# Patient Record
Sex: MALE | Race: WHITE | NOT HISPANIC OR LATINO
[De-identification: names, ages, dates, MRNs, and addresses within clinical notes are randomized per-mention and may not be internally consistent; named-entity substitution may affect disease eponyms.]

---

## 2022-04-27 ENCOUNTER — NON-APPOINTMENT (OUTPATIENT)
Age: 77
End: 2022-04-27

## 2022-06-24 ENCOUNTER — OUTPATIENT (OUTPATIENT)
Dept: OUTPATIENT SERVICES | Facility: HOSPITAL | Age: 77
LOS: 1 days | End: 2022-06-24
Payer: COMMERCIAL

## 2022-06-24 VITALS
RESPIRATION RATE: 20 BRPM | TEMPERATURE: 98 F | SYSTOLIC BLOOD PRESSURE: 130 MMHG | HEIGHT: 72 IN | HEART RATE: 61 BPM | DIASTOLIC BLOOD PRESSURE: 80 MMHG | WEIGHT: 308.65 LBS | OXYGEN SATURATION: 96 %

## 2022-06-24 DIAGNOSIS — Z98.890 OTHER SPECIFIED POSTPROCEDURAL STATES: Chronic | ICD-10-CM

## 2022-06-24 DIAGNOSIS — I48.0 PAROXYSMAL ATRIAL FIBRILLATION: ICD-10-CM

## 2022-06-24 DIAGNOSIS — Z29.9 ENCOUNTER FOR PROPHYLACTIC MEASURES, UNSPECIFIED: ICD-10-CM

## 2022-06-24 DIAGNOSIS — Z01.818 ENCOUNTER FOR OTHER PREPROCEDURAL EXAMINATION: ICD-10-CM

## 2022-06-24 DIAGNOSIS — D49.4 NEOPLASM OF UNSPECIFIED BEHAVIOR OF BLADDER: Chronic | ICD-10-CM

## 2022-06-24 LAB
ALBUMIN SERPL ELPH-MCNC: 4 G/DL — SIGNIFICANT CHANGE UP (ref 3.3–5.2)
ALP SERPL-CCNC: 57 U/L — SIGNIFICANT CHANGE UP (ref 40–120)
ALT FLD-CCNC: 19 U/L — SIGNIFICANT CHANGE UP
ANION GAP SERPL CALC-SCNC: 11 MMOL/L — SIGNIFICANT CHANGE UP (ref 5–17)
APTT BLD: 33.6 SEC — SIGNIFICANT CHANGE UP (ref 27.5–35.5)
AST SERPL-CCNC: 16 U/L — SIGNIFICANT CHANGE UP
BASOPHILS # BLD AUTO: 0.04 K/UL — SIGNIFICANT CHANGE UP (ref 0–0.2)
BASOPHILS NFR BLD AUTO: 0.5 % — SIGNIFICANT CHANGE UP (ref 0–2)
BILIRUB SERPL-MCNC: 0.9 MG/DL — SIGNIFICANT CHANGE UP (ref 0.4–2)
BLD GP AB SCN SERPL QL: SIGNIFICANT CHANGE UP
BUN SERPL-MCNC: 15.2 MG/DL — SIGNIFICANT CHANGE UP (ref 8–20)
CALCIUM SERPL-MCNC: 8.7 MG/DL — SIGNIFICANT CHANGE UP (ref 8.6–10.2)
CHLORIDE SERPL-SCNC: 104 MMOL/L — SIGNIFICANT CHANGE UP (ref 98–107)
CO2 SERPL-SCNC: 23 MMOL/L — SIGNIFICANT CHANGE UP (ref 22–29)
CREAT SERPL-MCNC: 1.05 MG/DL — SIGNIFICANT CHANGE UP (ref 0.5–1.3)
EGFR: 74 ML/MIN/1.73M2 — SIGNIFICANT CHANGE UP
EOSINOPHIL # BLD AUTO: 0.08 K/UL — SIGNIFICANT CHANGE UP (ref 0–0.5)
EOSINOPHIL NFR BLD AUTO: 1 % — SIGNIFICANT CHANGE UP (ref 0–6)
GLUCOSE SERPL-MCNC: 145 MG/DL — HIGH (ref 70–99)
HCT VFR BLD CALC: 38.2 % — LOW (ref 39–50)
HGB BLD-MCNC: 12.3 G/DL — LOW (ref 13–17)
IMM GRANULOCYTES NFR BLD AUTO: 0.4 % — SIGNIFICANT CHANGE UP (ref 0–1.5)
INR BLD: 2.08 RATIO — HIGH (ref 0.88–1.16)
LYMPHOCYTES # BLD AUTO: 0.71 K/UL — LOW (ref 1–3.3)
LYMPHOCYTES # BLD AUTO: 8.9 % — LOW (ref 13–44)
MAGNESIUM SERPL-MCNC: 1.4 MG/DL — LOW (ref 1.6–2.6)
MCHC RBC-ENTMCNC: 27.2 PG — SIGNIFICANT CHANGE UP (ref 27–34)
MCHC RBC-ENTMCNC: 32.2 GM/DL — SIGNIFICANT CHANGE UP (ref 32–36)
MCV RBC AUTO: 84.3 FL — SIGNIFICANT CHANGE UP (ref 80–100)
MONOCYTES # BLD AUTO: 0.86 K/UL — SIGNIFICANT CHANGE UP (ref 0–0.9)
MONOCYTES NFR BLD AUTO: 10.8 % — SIGNIFICANT CHANGE UP (ref 2–14)
NEUTROPHILS # BLD AUTO: 6.25 K/UL — SIGNIFICANT CHANGE UP (ref 1.8–7.4)
NEUTROPHILS NFR BLD AUTO: 78.4 % — HIGH (ref 43–77)
PLATELET # BLD AUTO: 186 K/UL — SIGNIFICANT CHANGE UP (ref 150–400)
POTASSIUM SERPL-MCNC: 4.5 MMOL/L — SIGNIFICANT CHANGE UP (ref 3.5–5.3)
POTASSIUM SERPL-SCNC: 4.5 MMOL/L — SIGNIFICANT CHANGE UP (ref 3.5–5.3)
PROT SERPL-MCNC: 6.6 G/DL — SIGNIFICANT CHANGE UP (ref 6.6–8.7)
PROTHROM AB SERPL-ACNC: 24.3 SEC — HIGH (ref 10.5–13.4)
RBC # BLD: 4.53 M/UL — SIGNIFICANT CHANGE UP (ref 4.2–5.8)
RBC # FLD: 15 % — HIGH (ref 10.3–14.5)
SODIUM SERPL-SCNC: 138 MMOL/L — SIGNIFICANT CHANGE UP (ref 135–145)
T3 SERPL-MCNC: 59 NG/DL — LOW (ref 80–200)
T4 AB SER-ACNC: 6.4 UG/DL — SIGNIFICANT CHANGE UP (ref 4.5–12)
TSH SERPL-MCNC: 2.96 UIU/ML — SIGNIFICANT CHANGE UP (ref 0.27–4.2)
WBC # BLD: 7.97 K/UL — SIGNIFICANT CHANGE UP (ref 3.8–10.5)
WBC # FLD AUTO: 7.97 K/UL — SIGNIFICANT CHANGE UP (ref 3.8–10.5)

## 2022-06-24 PROCEDURE — G0463: CPT

## 2022-06-24 PROCEDURE — 86901 BLOOD TYPING SEROLOGIC RH(D): CPT

## 2022-06-24 PROCEDURE — 93005 ELECTROCARDIOGRAM TRACING: CPT

## 2022-06-24 PROCEDURE — 80053 COMPREHEN METABOLIC PANEL: CPT

## 2022-06-24 PROCEDURE — 36415 COLL VENOUS BLD VENIPUNCTURE: CPT

## 2022-06-24 PROCEDURE — 84436 ASSAY OF TOTAL THYROXINE: CPT

## 2022-06-24 PROCEDURE — 93010 ELECTROCARDIOGRAM REPORT: CPT

## 2022-06-24 PROCEDURE — 85025 COMPLETE CBC W/AUTO DIFF WBC: CPT

## 2022-06-24 PROCEDURE — 83735 ASSAY OF MAGNESIUM: CPT

## 2022-06-24 PROCEDURE — 84443 ASSAY THYROID STIM HORMONE: CPT

## 2022-06-24 PROCEDURE — 86850 RBC ANTIBODY SCREEN: CPT

## 2022-06-24 PROCEDURE — 85610 PROTHROMBIN TIME: CPT

## 2022-06-24 PROCEDURE — 85730 THROMBOPLASTIN TIME PARTIAL: CPT

## 2022-06-24 PROCEDURE — 84480 ASSAY TRIIODOTHYRONINE (T3): CPT

## 2022-06-24 PROCEDURE — 86900 BLOOD TYPING SEROLOGIC ABO: CPT

## 2022-06-24 NOTE — H&P PST ADULT - HISTORY OF PRESENT ILLNESS
77 yo M PMH of HTN, HLD, DM2, hypothyroidism, obesity, bladder cancer s/p resection now self catheterizes, presents with c/o palpitations. Patient reports he was in his usual state of health until about 2 months ago when he started to experience some chest discomfort. He was feeling fatigued but denied dyspnea on exertion, near syncope or syncope, nausea, vomiting, diarrhea, fever, chills, or sweats. He presented to his cardiologist, was found to be in tachycardia and was referred to the ER at [facility] on 4/22/2022 where he was found to be in 2:1 atrial flutter. He was planned for ablation but aflutter degenerated into afib and then he spontaneously converted to sinus rhythm. He continued to go in and out of arrhythmia, has been treated with rate control and anticoagulation (Eliquis 5 mg bid). Since discharge he developed hematuria and had to hold eliquis for 2 days. He c/o chest discomfort and had a cardiac cath on 5/2/2022 which revealed no evidence of obstructive disease. He continues to c/o intermittent fatigue and some BLEDSOE. Patient now presents in anticipation of elective radiofrequency ablation of atrial fibrillation w/Dr Burdick scheduled for 7/13/2022    Cardiac Summary:   ECG: Typical atrial flutter with variable conduction.   12/27/21 Regadenoson NST: LVEF: 68%. Normal perfusion imaging.   7/14/21 TTE: LVEF: 60-65%. LAd 4.6 cm. RA/RV normal. Mild MR.  4/28/2022 TTE: LVEF 61%. LV thickness is mildly decreased. Grade 2 diastolic dysfunction. Interventricular septal thickness is mildly increased. RV systolic pressure elevated at 40-50 mmHg. Moderately dilated LA (LAd 5.3 cm; MARQUISE 422.6 ml/m2). Moderately dilated RA. Mild MR with eccentric jet. Mild TR.  Cath 5/2/2022: no obstructive coronary disease   77 yo M PMH of HTN, HLD, DM2, hypothyroidism, obesity, bladder cancer s/p resection now self catheterizes, presents with c/o palpitations. Patient reports he was in his usual state of health until about 2 months ago when he started to experience some chest discomfort. He was feeling fatigued but denied dyspnea on exertion, near syncope or syncope, nausea, vomiting, diarrhea, fever, chills, or sweats. He presented to his cardiologist, was found to be in tachycardia and was sent to the ER at [facility] on 4/22/2022 where he was found to be in 2:1 atrial flutter. He was planned for ablation but aflutter degenerated into afib and then he spontaneously converted to sinus rhythm. He continued to go in and out of arrhythmia, has been treated with rate control and anticoagulation (Eliquis 5 mg bid). Since discharge he developed hematuria and had to hold eliquis for 2 days. He c/o chest discomfort and had a cardiac cath on 5/2/2022 which revealed no evidence of obstructive disease. He continues to c/o intermittent fatigue and some BLEDSOE. Patient now presents in anticipation of elective radiofrequency ablation of atrial fibrillation w/Dr Burdick scheduled for 7/13/2022    Cardiac Summary:   ECG: Typical atrial flutter with variable conduction.   12/27/21 Regadenoson NST: LVEF: 68%. Normal perfusion imaging.   7/14/21 TTE: LVEF: 60-65%. LAd 4.6 cm. RA/RV normal. Mild MR.  4/28/2022 TTE: LVEF 61%. LV thickness is mildly decreased. Grade 2 diastolic dysfunction. Interventricular septal thickness is mildly increased. RV systolic pressure elevated at 40-50 mmHg. Moderately dilated LA (LAd 5.3 cm; MARQUISE 422.6 ml/m2). Moderately dilated RA. Mild MR with eccentric jet. Mild TR.  Cath 5/2/2022: no obstructive coronary disease

## 2022-06-24 NOTE — H&P PST ADULT - NSANTHOSAYNRD_GEN_A_CORE
sleep study scheduled/No. LILLY screening performed.  STOP BANG Legend: 0-2 = LOW Risk; 3-4 = INTERMEDIATE Risk; 5-8 = HIGH Risk

## 2022-06-24 NOTE — H&P PST ADULT - ATTENDING COMMENTS
All risks, benefits and alternatives discussed with patient. He agrees to proceed.    Fernando Burdick MD  Clinical Cardiac Electrophysiology

## 2022-06-24 NOTE — H&P PST ADULT - NEGATIVE ENMT SYMPTOMS
no hearing difficulty/no tinnitus/no vertigo/no sinus symptoms/no nose bleeds/no gum bleeding/no dysphagia

## 2022-06-24 NOTE — H&P PST ADULT - GASTROINTESTINAL
obese/soft/nontender/nondistended/normal active bowel sounds/no guarding/no masses palpable details…

## 2022-06-24 NOTE — H&P PST ADULT - NSICDXPASTMEDICALHX_GEN_ALL_CORE_FT
PAST MEDICAL HISTORY:  Bladder cancer     Diabetes mellitus     Hyperlipidemia     Hypertension     Hypothyroidism     Paroxysmal atrial fibrillation      PAST MEDICAL HISTORY:  At risk for sleep apnea     Bladder cancer     Diabetes mellitus     Hyperlipidemia     Hypertension     Hypothyroidism     Paroxysmal atrial fibrillation

## 2022-06-24 NOTE — H&P PST ADULT - CARDIOVASCULAR
details… normal/regular rate and rhythm/S1 S2 present/no gallops/no rub/no murmur/no JVD/pedal edema

## 2022-06-24 NOTE — H&P PST ADULT - NEUROLOGICAL
details… normal/cranial nerves II-XII intact/sensation intact/responds to verbal commands/cranial nerves intact/no spontaneous movement

## 2022-06-24 NOTE — H&P PST ADULT - NSICDXFAMILYHX_GEN_ALL_CORE_FT
FAMILY HISTORY:  Father  Still living? Unknown  FH: heart disease, Age at diagnosis: Age Unknown    Mother  Still living? Unknown  FHx: ovarian cancer, Age at diagnosis: Age Unknown    Sibling  Still living? Unknown  FH: breast cancer, Age at diagnosis: Age Unknown

## 2022-06-24 NOTE — H&P PST ADULT - RESPIRATORY
normal/clear to auscultation bilaterally/no wheezes/no rales/no rhonchi/breath sounds equal/respirations non-labored

## 2022-06-24 NOTE — H&P PST ADULT - ASSESSMENT
75 yo M PMH of HTN, HLD, DM2, hypothyroidism, obesity, bladder cancer s/p resection now self catheterizes, presents with c/o palpitations. Patient reports he was in his usual state of health until about 2 months ago when he started to experience some chest discomfort. He was feeling fatigued but denied dyspnea on exertion, near syncope or syncope, nausea, vomiting, diarrhea, fever, chills, or sweats. He presented to his cardiologist, was found to be in tachycardia and was referred to the ER at [facility] on 2022 where he was found to be in 2:1 atrial flutter. He was planned for ablation but aflutter degenerated into afib and then he spontaneously converted to sinus rhythm. He continued to go in and out of arrhythmia, has been treated with rate control and anticoagulation (Eliquis 5 mg bid). Since discharge he developed hematuria and had to hold eliquis for 2 days. He c/o chest discomfort and had a cardiac cath on 2022 which revealed no evidence of obstructive disease. He continues to c/o intermittent fatigue and some BLEDSOE. Patient now presents in anticipation of elective radiofrequency ablation of atrial fibrillation w/Dr Burdick scheduled for 2022    Plan:   Labs pending.   Pre-procedure instructions provided (verbal & written) as follows:  Ablation 2022  - Last dose Eliquis 2022 PM (NO a/c day of ablation).    - NPO after midnight prior except meds w/sips of water.    - Hold the following medications the morning of the procedure: vinicio    Pt was educated on preop preparation with written and verbal instructions. Pt was educated on NSAIDs, multivitamins and herbals that increase the risk of bleeding and need to be stopped 7 days before procedure. Pt was educated on covid testing and covid prevention, i.e. social distancing, handwashing, mask wearing. Pt verbalized understanding of the above.     OPIOID RISK TOOL    ALICIA EACH BOX THAT APPLIES AND ADD TOTALS AT THE END    FAMILY HISTORY OF SUBSTANCE ABUSE                 FEMALE         MALE                                                Alcohol                             [  ]1 pt          [  ]3pts                                               Illegal Durgs                     [  ]2 pts        [  ]3pts                                               Rx Drugs                           [  ]4 pts        [  ]4 pts    PERSONAL HISTORY OF SUBSTANCE ABUSE                                                                                          Alcohol                             [  ]3 pts       [  ]3 pts                                               Illegal Drugs                     [  ]4 pts        [  ]4 pts                                               Rx Drugs                           [  ]5 pts        [  ]5 pts    AGE BETWEEN 16-45 YEARS                                      [  ]1 pt         [  ]1 pt    HISTORY OF PREADOLESCENT   SEXUAL ABUSE                                                             [  ]3 pts        [  ]0pts    PSYCHOLOGICAL DISEASE                     ADD, OCD, Bipolar, Schizophrenia        [  ]2 pts         [  ]2 pts                      Depression                                               [  ]1 pt           [  ]1 pt           SCORING TOTAL   (add numbers and type here)              ( 0 )                                     A score of 3 or lower indicated LOW risk for future opioid abuse  A score of 4 to 7 indicated moderate risk for future opioid abuse  A score of 8 or higher indicates a high risk for opioid abuse    CAPRINI VTE 2.0 SCORE [CLOT updated 2019]    AGE RELATED RISK FACTORS                                                       MOBILITY RELATED FACTORS  [ ] Age 41-60 years                                            (1 Point)                    [ ] Bed rest                                                        (1 Point)  [ ] Age: 61-74 years                                           (2 Points)                  [ ] Plaster cast                                                   (2 Points)  [x ] Age= 75 years                                              (3 Points)                    [ ] Bed bound for more than 72 hours                 (2 Points)    DISEASE RELATED RISK FACTORS                                               GENDER SPECIFIC FACTORS  [ ] Edema in the lower extremities                       (1 Point)              [ ] Pregnancy                                                     (1 Point)  [ ] Varicose veins                                               (1 Point)                     [ ] Post-partum < 6 weeks                                   (1 Point)             [x ] BMI > 25 Kg/m2                                            (1 Point)                     [ ] Hormonal therapy  or oral contraception          (1 Point)                 [ ] Sepsis (in the previous month)                        (1 Point)               [ ] History of pregnancy complications                 (1 point)  [ ] Pneumonia or serious lung disease                                               [ ] Unexplained or recurrent                     (1 Point)           (in the previous month)                               (1 Point)  [ ] Abnormal pulmonary function test                     (1 Point)                 SURGERY RELATED RISK FACTORS  [ ] Acute myocardial infarction                              (1 Point)               [ ]  Section                                             (1 Point)  [ ] Congestive heart failure (in the previous month)  (1 Point)      [ ] Minor surgery                                                  (1 Point)   [ ] Inflammatory bowel disease                             (1 Point)               [ ] Arthroscopic surgery                                        (2 Points)  [ ] Central venous access                                      (2 Points)                [ x] General surgery lasting more than 45 minutes (2 points)  [ x] Malignancy- Present or previous                   (2 Points)                [ ] Elective arthroplasty                                         (5 points)    [ ] Stroke (in the previous month)                          (5 Points)                                                                                                                                                           HEMATOLOGY RELATED FACTORS                                                 TRAUMA RELATED RISK FACTORS  [ ] Prior episodes of VTE                                     (3 Points)                [ ] Fracture of the hip, pelvis, or leg                       (5 Points)  [ ] Positive family history for VTE                         (3 Points)             [ ] Acute spinal cord injury (in the previous month)  (5 Points)  [ ] Prothrombin 99505 A                                     (3 Points)               [ ] Paralysis  (less than 1 month)                             (5 Points)  [ ] Factor V Leiden                                             (3 Points)                  [ ] Multiple Trauma within 1 month                        (5 Points)  [ ] Lupus anticoagulants                                     (3 Points)                                                           [ ] Anticardiolipin antibodies                               (3 Points)                                                       [ ] High homocysteine in the blood                      (3 Points)                                             [ ] Other congenital or acquired thrombophilia      (3 Points)                                                [ ] Heparin induced thrombocytopenia                  (3 Points)                                     Total Score [     8     ] 75 yo M PMH of HTN, HLD, DM2, hypothyroidism, obesity, bladder cancer s/p resection now self catheterizes, presents with c/o palpitations. Patient reports he was in his usual state of health until about 2 months ago when he started to experience some chest discomfort. He was feeling fatigued but denied dyspnea on exertion, near syncope or syncope, nausea, vomiting, diarrhea, fever, chills, or sweats. He presented to his cardiologist, was found to be in tachycardia and was sent to the ER at [facility] on 2022 where he was found to be in 2:1 atrial flutter. He was planned for ablation but aflutter degenerated into afib and then he spontaneously converted to sinus rhythm. He continued to go in and out of arrhythmia, has been treated with rate control and anticoagulation (Eliquis 5 mg bid). Since discharge he developed hematuria and had to hold eliquis for 2 days. He c/o chest discomfort and had a cardiac cath on 2022 which revealed no evidence of obstructive disease. He continues to c/o intermittent fatigue and some BLEDSOE. Patient now presents in anticipation of elective radiofrequency ablation of atrial fibrillation w/Dr Burdick scheduled for 2022    Plan:   Labs pending.   Pre-procedure instructions provided (verbal & written) as follows:  Ablation 2022  - Last dose Eliquis 2022 PM (NO a/c day of ablation).    - NPO after midnight prior except meds w/sips of water.    - Hold the following medications the morning of the procedure: hold janumet 24 hrs prior to procedure    Pt was educated on preop preparation with written and verbal instructions. Pt was educated on NSAIDs, multivitamins and herbals that increase the risk of bleeding and need to be stopped 7 days before procedure. Pt was educated on covid testing and covid prevention, i.e. social distancing, handwashing, mask wearing. Pt verbalized understanding of the above.     OPIOID RISK TOOL    ALICIA EACH BOX THAT APPLIES AND ADD TOTALS AT THE END    FAMILY HISTORY OF SUBSTANCE ABUSE                 FEMALE         MALE                                                Alcohol                             [  ]1 pt          [  ]3pts                                               Illegal Durgs                     [  ]2 pts        [  ]3pts                                               Rx Drugs                           [  ]4 pts        [  ]4 pts    PERSONAL HISTORY OF SUBSTANCE ABUSE                                                                                          Alcohol                             [  ]3 pts       [  ]3 pts                                               Illegal Drugs                     [  ]4 pts        [  ]4 pts                                               Rx Drugs                           [  ]5 pts        [  ]5 pts    AGE BETWEEN 16-45 YEARS                                      [  ]1 pt         [  ]1 pt    HISTORY OF PREADOLESCENT   SEXUAL ABUSE                                                             [  ]3 pts        [  ]0pts    PSYCHOLOGICAL DISEASE                     ADD, OCD, Bipolar, Schizophrenia        [  ]2 pts         [  ]2 pts                      Depression                                               [  ]1 pt           [  ]1 pt           SCORING TOTAL   (add numbers and type here)              ( 0 )                                     A score of 3 or lower indicated LOW risk for future opioid abuse  A score of 4 to 7 indicated moderate risk for future opioid abuse  A score of 8 or higher indicates a high risk for opioid abuse    CAPRINI VTE 2.0 SCORE [CLOT updated 2019]    AGE RELATED RISK FACTORS                                                       MOBILITY RELATED FACTORS  [ ] Age 41-60 years                                            (1 Point)                    [ ] Bed rest                                                        (1 Point)  [ ] Age: 61-74 years                                           (2 Points)                  [ ] Plaster cast                                                   (2 Points)  [x ] Age= 75 years                                              (3 Points)                    [ ] Bed bound for more than 72 hours                 (2 Points)    DISEASE RELATED RISK FACTORS                                               GENDER SPECIFIC FACTORS  [ ] Edema in the lower extremities                       (1 Point)              [ ] Pregnancy                                                     (1 Point)  [ ] Varicose veins                                               (1 Point)                     [ ] Post-partum < 6 weeks                                   (1 Point)             [x ] BMI > 25 Kg/m2                                            (1 Point)                     [ ] Hormonal therapy  or oral contraception          (1 Point)                 [ ] Sepsis (in the previous month)                        (1 Point)               [ ] History of pregnancy complications                 (1 point)  [ ] Pneumonia or serious lung disease                                               [ ] Unexplained or recurrent                     (1 Point)           (in the previous month)                               (1 Point)  [ ] Abnormal pulmonary function test                     (1 Point)                 SURGERY RELATED RISK FACTORS  [ ] Acute myocardial infarction                              (1 Point)               [ ]  Section                                             (1 Point)  [ ] Congestive heart failure (in the previous month)  (1 Point)      [ ] Minor surgery                                                  (1 Point)   [ ] Inflammatory bowel disease                             (1 Point)               [ ] Arthroscopic surgery                                        (2 Points)  [ ] Central venous access                                      (2 Points)                [ x] General surgery lasting more than 45 minutes (2 points)  [ x] Malignancy- Present or previous                   (2 Points)                [ ] Elective arthroplasty                                         (5 points)    [ ] Stroke (in the previous month)                          (5 Points)                                                                                                                                                           HEMATOLOGY RELATED FACTORS                                                 TRAUMA RELATED RISK FACTORS  [ ] Prior episodes of VTE                                     (3 Points)                [ ] Fracture of the hip, pelvis, or leg                       (5 Points)  [ ] Positive family history for VTE                         (3 Points)             [ ] Acute spinal cord injury (in the previous month)  (5 Points)  [ ] Prothrombin 27496 A                                     (3 Points)               [ ] Paralysis  (less than 1 month)                             (5 Points)  [ ] Factor V Leiden                                             (3 Points)                  [ ] Multiple Trauma within 1 month                        (5 Points)  [ ] Lupus anticoagulants                                     (3 Points)                                                           [ ] Anticardiolipin antibodies                               (3 Points)                                                       [ ] High homocysteine in the blood                      (3 Points)                                             [ ] Other congenital or acquired thrombophilia      (3 Points)                                                [ ] Heparin induced thrombocytopenia                  (3 Points)                                     Total Score [     8     ]

## 2022-06-24 NOTE — H&P PST ADULT - NSICDXPASTSURGICALHX_GEN_ALL_CORE_FT
PAST SURGICAL HISTORY:  Bladder tumor s/p resection    H/O cardiac catheterization 5/2/2022    H/O shoulder surgery 1/1/2017

## 2022-06-30 ENCOUNTER — OUTPATIENT (OUTPATIENT)
Dept: OUTPATIENT SERVICES | Facility: HOSPITAL | Age: 77
LOS: 1 days | End: 2022-06-30
Payer: COMMERCIAL

## 2022-06-30 ENCOUNTER — RESULT REVIEW (OUTPATIENT)
Age: 77
End: 2022-06-30

## 2022-06-30 ENCOUNTER — APPOINTMENT (OUTPATIENT)
Dept: CT IMAGING | Facility: CLINIC | Age: 77
End: 2022-06-30

## 2022-06-30 DIAGNOSIS — D49.4 NEOPLASM OF UNSPECIFIED BEHAVIOR OF BLADDER: Chronic | ICD-10-CM

## 2022-06-30 DIAGNOSIS — Z98.890 OTHER SPECIFIED POSTPROCEDURAL STATES: Chronic | ICD-10-CM

## 2022-06-30 DIAGNOSIS — I48.0 PAROXYSMAL ATRIAL FIBRILLATION: ICD-10-CM

## 2022-06-30 PROCEDURE — 75572 CT HRT W/3D IMAGE: CPT | Mod: 26

## 2022-06-30 PROCEDURE — 75572 CT HRT W/3D IMAGE: CPT

## 2022-07-13 ENCOUNTER — TRANSCRIPTION ENCOUNTER (OUTPATIENT)
Age: 77
End: 2022-07-13

## 2022-07-13 ENCOUNTER — INPATIENT (INPATIENT)
Facility: HOSPITAL | Age: 77
LOS: 0 days | Discharge: HOME | DRG: 274 | End: 2022-07-14
Attending: INTERNAL MEDICINE | Admitting: INTERNAL MEDICINE
Payer: COMMERCIAL

## 2022-07-13 VITALS
RESPIRATION RATE: 16 BRPM | TEMPERATURE: 98 F | SYSTOLIC BLOOD PRESSURE: 159 MMHG | HEART RATE: 67 BPM | OXYGEN SATURATION: 95 % | DIASTOLIC BLOOD PRESSURE: 82 MMHG

## 2022-07-13 DIAGNOSIS — D49.4 NEOPLASM OF UNSPECIFIED BEHAVIOR OF BLADDER: Chronic | ICD-10-CM

## 2022-07-13 DIAGNOSIS — Z98.890 OTHER SPECIFIED POSTPROCEDURAL STATES: Chronic | ICD-10-CM

## 2022-07-13 DIAGNOSIS — I48.0 PAROXYSMAL ATRIAL FIBRILLATION: ICD-10-CM

## 2022-07-13 DIAGNOSIS — Z01.818 ENCOUNTER FOR OTHER PREPROCEDURAL EXAMINATION: ICD-10-CM

## 2022-07-13 LAB
ABO RH CONFIRMATION: SIGNIFICANT CHANGE UP
ANION GAP SERPL CALC-SCNC: 11 MMOL/L — SIGNIFICANT CHANGE UP (ref 5–17)
BUN SERPL-MCNC: 12.1 MG/DL — SIGNIFICANT CHANGE UP (ref 8–20)
CALCIUM SERPL-MCNC: 7.8 MG/DL — LOW (ref 8.6–10.2)
CHLORIDE SERPL-SCNC: 105 MMOL/L — SIGNIFICANT CHANGE UP (ref 98–107)
CO2 SERPL-SCNC: 23 MMOL/L — SIGNIFICANT CHANGE UP (ref 22–29)
CREAT SERPL-MCNC: 0.96 MG/DL — SIGNIFICANT CHANGE UP (ref 0.5–1.3)
EGFR: 82 ML/MIN/1.73M2 — SIGNIFICANT CHANGE UP
GLUCOSE BLDC GLUCOMTR-MCNC: 109 MG/DL — HIGH (ref 70–99)
GLUCOSE SERPL-MCNC: 104 MG/DL — HIGH (ref 70–99)
MAGNESIUM SERPL-MCNC: 1.3 MG/DL — LOW (ref 1.8–2.6)
POTASSIUM SERPL-MCNC: 3.9 MMOL/L — SIGNIFICANT CHANGE UP (ref 3.5–5.3)
POTASSIUM SERPL-SCNC: 3.9 MMOL/L — SIGNIFICANT CHANGE UP (ref 3.5–5.3)
SARS-COV-2 N GENE NPH QL NAA+PROBE: NOT DETECTED
SODIUM SERPL-SCNC: 139 MMOL/L — SIGNIFICANT CHANGE UP (ref 135–145)

## 2022-07-13 PROCEDURE — 93623 PRGRMD STIMJ&PACG IV RX NFS: CPT | Mod: 26

## 2022-07-13 PROCEDURE — 93656 COMPRE EP EVAL ABLTJ ATR FIB: CPT

## 2022-07-13 PROCEDURE — 93010 ELECTROCARDIOGRAM REPORT: CPT

## 2022-07-13 PROCEDURE — 93655 ICAR CATH ABLTJ DSCRT ARRHYT: CPT

## 2022-07-13 RX ORDER — SUCRALFATE 1 G
1 TABLET ORAL
Refills: 0 | Status: DISCONTINUED | OUTPATIENT
Start: 2022-07-13 | End: 2022-07-14

## 2022-07-13 RX ORDER — APIXABAN 2.5 MG/1
5 TABLET, FILM COATED ORAL EVERY 12 HOURS
Refills: 0 | Status: DISCONTINUED | OUTPATIENT
Start: 2022-07-13 | End: 2022-07-14

## 2022-07-13 RX ORDER — DILTIAZEM HCL 120 MG
240 CAPSULE, EXT RELEASE 24 HR ORAL DAILY
Refills: 0 | Status: DISCONTINUED | OUTPATIENT
Start: 2022-07-13 | End: 2022-07-14

## 2022-07-13 RX ORDER — PANTOPRAZOLE SODIUM 20 MG/1
40 TABLET, DELAYED RELEASE ORAL
Refills: 0 | Status: DISCONTINUED | OUTPATIENT
Start: 2022-07-13 | End: 2022-07-14

## 2022-07-13 RX ORDER — FUROSEMIDE 40 MG
20 TABLET ORAL ONCE
Refills: 0 | Status: COMPLETED | OUTPATIENT
Start: 2022-07-13 | End: 2022-07-13

## 2022-07-13 RX ORDER — FINASTERIDE 5 MG/1
5 TABLET, FILM COATED ORAL DAILY
Refills: 0 | Status: DISCONTINUED | OUTPATIENT
Start: 2022-07-13 | End: 2022-07-14

## 2022-07-13 RX ORDER — FUROSEMIDE 40 MG
20 TABLET ORAL DAILY
Refills: 0 | Status: DISCONTINUED | OUTPATIENT
Start: 2022-07-14 | End: 2022-07-14

## 2022-07-13 RX ORDER — INSULIN LISPRO 100/ML
VIAL (ML) SUBCUTANEOUS
Refills: 0 | Status: DISCONTINUED | OUTPATIENT
Start: 2022-07-13 | End: 2022-07-14

## 2022-07-13 RX ORDER — ACETAMINOPHEN 500 MG
650 TABLET ORAL EVERY 6 HOURS
Refills: 0 | Status: DISCONTINUED | OUTPATIENT
Start: 2022-07-13 | End: 2022-07-14

## 2022-07-13 RX ORDER — DOXAZOSIN MESYLATE 4 MG
4 TABLET ORAL AT BEDTIME
Refills: 0 | Status: DISCONTINUED | OUTPATIENT
Start: 2022-07-13 | End: 2022-07-14

## 2022-07-13 RX ORDER — DEXTROSE 50 % IN WATER 50 %
25 SYRINGE (ML) INTRAVENOUS ONCE
Refills: 0 | Status: DISCONTINUED | OUTPATIENT
Start: 2022-07-13 | End: 2022-07-14

## 2022-07-13 RX ORDER — DEXTROSE 50 % IN WATER 50 %
12.5 SYRINGE (ML) INTRAVENOUS ONCE
Refills: 0 | Status: DISCONTINUED | OUTPATIENT
Start: 2022-07-13 | End: 2022-07-14

## 2022-07-13 RX ORDER — GLUCAGON INJECTION, SOLUTION 0.5 MG/.1ML
1 INJECTION, SOLUTION SUBCUTANEOUS ONCE
Refills: 0 | Status: DISCONTINUED | OUTPATIENT
Start: 2022-07-13 | End: 2022-07-14

## 2022-07-13 RX ORDER — SODIUM CHLORIDE 9 MG/ML
1000 INJECTION, SOLUTION INTRAVENOUS
Refills: 0 | Status: DISCONTINUED | OUTPATIENT
Start: 2022-07-13 | End: 2022-07-14

## 2022-07-13 RX ORDER — METOPROLOL TARTRATE 50 MG
100 TABLET ORAL DAILY
Refills: 0 | Status: DISCONTINUED | OUTPATIENT
Start: 2022-07-13 | End: 2022-07-14

## 2022-07-13 RX ORDER — OXYCODONE AND ACETAMINOPHEN 5; 325 MG/1; MG/1
1 TABLET ORAL EVERY 6 HOURS
Refills: 0 | Status: DISCONTINUED | OUTPATIENT
Start: 2022-07-13 | End: 2022-07-14

## 2022-07-13 RX ORDER — BENZOCAINE AND MENTHOL 5; 1 G/100ML; G/100ML
1 LIQUID ORAL
Refills: 0 | Status: DISCONTINUED | OUTPATIENT
Start: 2022-07-13 | End: 2022-07-14

## 2022-07-13 RX ORDER — ATORVASTATIN CALCIUM 80 MG/1
40 TABLET, FILM COATED ORAL AT BEDTIME
Refills: 0 | Status: DISCONTINUED | OUTPATIENT
Start: 2022-07-13 | End: 2022-07-14

## 2022-07-13 RX ORDER — DEXTROSE 50 % IN WATER 50 %
15 SYRINGE (ML) INTRAVENOUS ONCE
Refills: 0 | Status: DISCONTINUED | OUTPATIENT
Start: 2022-07-13 | End: 2022-07-14

## 2022-07-13 RX ORDER — LEVOTHYROXINE SODIUM 125 MCG
25 TABLET ORAL DAILY
Refills: 0 | Status: DISCONTINUED | OUTPATIENT
Start: 2022-07-13 | End: 2022-07-14

## 2022-07-13 RX ADMIN — FINASTERIDE 5 MILLIGRAM(S): 5 TABLET, FILM COATED ORAL at 21:37

## 2022-07-13 RX ADMIN — APIXABAN 5 MILLIGRAM(S): 2.5 TABLET, FILM COATED ORAL at 18:16

## 2022-07-13 RX ADMIN — Medication 4 MILLIGRAM(S): at 21:37

## 2022-07-13 RX ADMIN — PANTOPRAZOLE SODIUM 40 MILLIGRAM(S): 20 TABLET, DELAYED RELEASE ORAL at 18:16

## 2022-07-13 RX ADMIN — Medication 20 MILLIGRAM(S): at 16:26

## 2022-07-13 RX ADMIN — Medication 1 GRAM(S): at 18:16

## 2022-07-13 RX ADMIN — ATORVASTATIN CALCIUM 40 MILLIGRAM(S): 80 TABLET, FILM COATED ORAL at 21:38

## 2022-07-13 RX ADMIN — BENZOCAINE AND MENTHOL 1 LOZENGE: 5; 1 LIQUID ORAL at 21:38

## 2022-07-13 NOTE — PROGRESS NOTE ADULT - SUBJECTIVE AND OBJECTIVE BOX
76 year old male with PMH of HTN, hyperlipidemia, DM type 2, hypothyroidism, obesity, bladder cancer s/p resection (now performs self-catheterization), obstructive sleep apnea and symptomatic paroxysmal atrial fibrillation & atrial flutter. He presents today for elective radiofrequency ablation of atrial fibrillation.     PST and labs from 6/24/22 reviewed; denies interval change.   Confirms NPO > 8 hrs, last dose Eliquis 7/12/22 PM      - iv heplock  - confirmatory type and screen  - 2 units PRBC on hold  - repeat BMP, Mg levels  - consent w/MD

## 2022-07-13 NOTE — DISCHARGE NOTE PROVIDER - NSDCMRMEDTOKEN_GEN_ALL_CORE_FT
atorvastatin 40 mg oral tablet: 1 tab(s) orally once a day  Cardizem: 240 milligram(s) orally once a day  doxazosin 4 mg oral tablet: 1 tab(s) orally once a day  doxycycline: 20 milligram(s) orally 2 times a day  Eliquis 5 mg oral tablet: 1 tab(s) orally 2 times a day  finasteride 5 mg oral tablet: 1 tab(s) orally once a day (at bedtime)  Janumet 50 mg-1000 mg oral tablet: 1 tab(s) orally 2 times a day  metoprolol succinate 100 mg oral tablet, extended release: 1 tab(s) orally once a day  Synthroid 25 mcg (0.025 mg) oral tablet: 1 tab(s) orally once a day   atorvastatin 40 mg oral tablet: 1 tab(s) orally once a day  Cardizem: 240 milligram(s) orally once a day  doxazosin 4 mg oral tablet: 1 tab(s) orally once a day  doxycycline: 20 milligram(s) orally 2 times a day  Eliquis 5 mg oral tablet: 1 tab(s) orally 2 times a day  finasteride 5 mg oral tablet: 1 tab(s) orally once a day (at bedtime)  furosemide 20 mg oral tablet: 1 tab(s) orally once a day  Janumet 50 mg-1000 mg oral tablet: 1 tab(s) orally 2 times a day  metoprolol succinate 100 mg oral tablet, extended release: 1 tab(s) orally once a day  pantoprazole 40 mg oral delayed release tablet: 1 tab(s) orally 2 times a day for 14 days then once daily for 6 weeks   sucralfate 1 g/10 mL oral suspension: 10 milliliter(s) orally 2 times a day  Synthroid 25 mcg (0.025 mg) oral tablet: 1 tab(s) orally once a day

## 2022-07-13 NOTE — DISCHARGE NOTE PROVIDER - CARE PROVIDER_API CALL
Fernando Burdick)  Cardiac Electrophysiology; Cardiology  [address]  [place] [address]  Phone: [phone]  Fax: [phone]  Follow Up Time:     Jeovanny Nichole ()  Cardiovascular Disease; Internal Medicine  [address], [address]  [place], [place] [id number]  Phone: [phone]  Fax: [phone]  Follow Up Time:

## 2022-07-13 NOTE — DISCHARGE NOTE PROVIDER - HOSPITAL COURSE
76 year old male with PMH of HTN, hyperlipidemia, DM type 2, hypothyroidism, obesity, bladder cancer s/p resection (now performs self-catheterization), obstructive sleep apnea and symptomatic paroxysmal atrial fibrillation & atrial flutter. Now POD#1 s/p uncomplicated radiofrequency ablation of atrial fibrillation ([facility]/PVI, CTI). The patient was observed overnight without event and was discharged home the following morning with a plan for outpatient follow up.

## 2022-07-13 NOTE — PROGRESS NOTE ADULT - SUBJECTIVE AND OBJECTIVE BOX
PROCEDURE(S): Radiofrequency Ablation of Atrial Fibrillation    ELECTROPHYSIOLOGIST(S): Fernando Burdick MD         COMPLICATIONS:  none        DISPOSITION: observation      CONDITION: stable    Pt doing well s/p elective radiofrequency atrial fibrillation ablation (WACA/PVI, CTI) via b/l femoral vein access.  Pt denies complaint post procedure.     I/O's: 3197cc in / 450 cc out     MEDICATIONS  (STANDING):  apixaban 5 milliGRAM(s) Oral every 12 hours  atorvastatin 40 milliGRAM(s) Oral at bedtime  dextrose 5%. 1000 milliLiter(s) (100 mL/Hr) IV Continuous <Continuous>  dextrose 5%. 1000 milliLiter(s) (50 mL/Hr) IV Continuous <Continuous>  dextrose 50% Injectable 25 Gram(s) IV Push once  dextrose 50% Injectable 12.5 Gram(s) IV Push once  dextrose 50% Injectable 25 Gram(s) IV Push once  diltiazem    milliGRAM(s) Oral daily  doxazosin 4 milliGRAM(s) Oral at bedtime  finasteride 5 milliGRAM(s) Oral daily  furosemide   Injectable 20 milliGRAM(s) IV Push once  glucagon  Injectable 1 milliGRAM(s) IntraMuscular once  insulin lispro (ADMELOG) corrective regimen sliding scale   SubCutaneous three times a day before meals  levothyroxine 25 MICROGram(s) Oral daily  metoprolol succinate  milliGRAM(s) Oral daily  pantoprazole    Tablet 40 milliGRAM(s) Oral two times a day  sucralfate suspension 1 Gram(s) Oral two times a day    MEDICATIONS  (PRN):  acetaminophen     Tablet .. 650 milliGRAM(s) Oral every 6 hours PRN Mild Pain (1 - 3), Moderate Pain (4 - 6)  aluminum hydroxide/magnesium hydroxide/simethicone Suspension 30 milliLiter(s) Oral every 4 hours PRN Dyspepsia  benzocaine 15 mG/menthol 3.6 mG Lozenge 1 Lozenge Oral every 2 hours PRN Sore Throat  dextrose Oral Gel 15 Gram(s) Oral once PRN Blood Glucose LESS THAN 70 milliGRAM(s)/deciliter  oxycodone    5 mG/acetaminophen 325 mG 1 Tablet(s) Oral every 6 hours PRN Severe Pain (7 - 10)    Allergies:  penicillins (Diarrhea)    VS:   T(C): 36.9 (07-13-22 @ 07:35), Max: 36.9 (07-13-22 @ 07:35)  HR: 62 (07-13-22 @ 13:20) (62 - 67)  BP: 159/82 (07-13-22 @ 07:35) (159/82 - 159/82)  RR: 18 (07-13-22 @ 13:20) (16 - 18)  SpO2: 93% (07-13-22 @ 13:20) (93% - 96%)  Post-procedure VS: /61 HR 67 O2 sat 98% RR 16     Physical exam:   awake, alert, no distress   Card: S1/S2, RRR, no m/g/r  Resp: lungs CTA b/l  Abd: S/NT/ND  Groins: hemostatic sutures in place; sites C/D/I; no bleeding, hematoma, erythema, exudate or edema  Ext: no edema; distal pulses intact    ECG: NSR 68 bpm     Assessment:   76 year old male with PMH of HTN, hyperlipidemia, DM type 2, hypothyroidism, obesity, bladder cancer s/p resection (now performs self-catheterization), obstructive sleep apnea and symptomatic paroxysmal atrial fibrillation & atrial flutter. Now status post uncomplicated radiofrequency ablation of atrial fibrillation (WACA/PVI, CTI). Resting comfortably.     Plan:   Admit to telemetry/ONU  Bedrest x 4 hours, then OOB with assistance and progress as tolerated.   Groin sutures to be removed by EP service in AM.   Radial art line to be removed once pt fully awake with stable vitals >1 hour.    Pending groin status: Eliquis 5mg Q12 hrs to resume @ 17:00  Start Protonix 40mg BID x 2 weeks then daily X 6 weeks.     Start Carafate 1gm BID x 2 weeks.   Lasix 20mg IV x 1 upon ambulation, then 20mg PO daily x 3 days.   Continue other home medications.   Labs and EKG in AM.   Remove gaitan catheter in AM.   Strict I/Os.  Please encourage incentive spirometry and ambulation once able.  Observation and monitoring on telemetry overnight with anticipated discharge in the AM and outpt follow up in 1 month.

## 2022-07-13 NOTE — DISCHARGE NOTE PROVIDER - NSDCFUADDINST_GEN_ALL_CORE_FT
Follow up with Dr. Burdick in 3-4 weeks, or sooner if needed. The patient was observed overnight without event and was discharged home the following morning with a plan for outpatient follow up.

## 2022-07-13 NOTE — DISCHARGE NOTE PROVIDER - NSDCCPTREATMENT_GEN_ALL_CORE_FT
PRINCIPAL PROCEDURE  Procedure: Radiofrequency ablation, arrhythmogenic focus, for atrial fibrillation  Findings and Treatment: - Bruising at the groin, sometimes extending down the leg, and/or a small lump under the skin at the groin access site is normal and will resolve within 2 – 3 weeks.   - Occasional skipped beats or palpitations that last for a few beats are common and generally resolve within 1-2 months.   - You may walk and take stairs at a regular pace.   - Do not perform any exercise more strenuous than walking for 1 week.   - Do not strain or lift heavy objects for 1 week.  - You may shower the day after the procedure.  - Do not soak in water (such as tub baths, hot tubs, swimming, etc.) for 1 week.   - You may resume all other activities the day after the procedure.  Call your doctor if:   - you notice bleeding, redness, drainage, swelling, increased tenderness or a hot sensation around the catheter insertion site.   - your temperature is greater than 100 degrees F for more than 24 hours.  - your rapid heart rhythm returns.  - you have any questions or concerns regarding the procedure.  If significant bleeding and/or a large lump (the size of a golf ball or bigger) occurs:  - Lie flat and apply continuous direct pressure just above the puncture site for at least 10 minutes  - If the issue resolves, notify your physician immediately.    - If the bleeding cannot be controlled, please seek immediate medical attention.  If you experience increased difficulty breathing or chest pain, or if you faint or have dizzy spells, please seek immediate medical attention.

## 2022-07-13 NOTE — PROGRESS NOTE ADULT - PROVIDER SPECIALTY LIST ADULT
Electrophysiology no psychiatric contraindications to discharge no psychiatric contraindications to discharge

## 2022-07-14 ENCOUNTER — TRANSCRIPTION ENCOUNTER (OUTPATIENT)
Age: 77
End: 2022-07-14

## 2022-07-14 VITALS
RESPIRATION RATE: 17 BRPM | OXYGEN SATURATION: 92 % | SYSTOLIC BLOOD PRESSURE: 145 MMHG | HEART RATE: 78 BPM | DIASTOLIC BLOOD PRESSURE: 76 MMHG

## 2022-07-14 LAB
ANION GAP SERPL CALC-SCNC: 13 MMOL/L — SIGNIFICANT CHANGE UP (ref 5–17)
BUN SERPL-MCNC: 10.4 MG/DL — SIGNIFICANT CHANGE UP (ref 8–20)
CALCIUM SERPL-MCNC: 8.3 MG/DL — LOW (ref 8.6–10.2)
CHLORIDE SERPL-SCNC: 106 MMOL/L — SIGNIFICANT CHANGE UP (ref 98–107)
CO2 SERPL-SCNC: 21 MMOL/L — LOW (ref 22–29)
CREAT SERPL-MCNC: 0.99 MG/DL — SIGNIFICANT CHANGE UP (ref 0.5–1.3)
EGFR: 79 ML/MIN/1.73M2 — SIGNIFICANT CHANGE UP
GLUCOSE BLDC GLUCOMTR-MCNC: 150 MG/DL — HIGH (ref 70–99)
GLUCOSE SERPL-MCNC: 149 MG/DL — HIGH (ref 70–99)
HCT VFR BLD CALC: 36.5 % — LOW (ref 39–50)
HGB BLD-MCNC: 11.9 G/DL — LOW (ref 13–17)
MAGNESIUM SERPL-MCNC: 1.3 MG/DL — LOW (ref 1.6–2.6)
MCHC RBC-ENTMCNC: 27.4 PG — SIGNIFICANT CHANGE UP (ref 27–34)
MCHC RBC-ENTMCNC: 32.6 GM/DL — SIGNIFICANT CHANGE UP (ref 32–36)
MCV RBC AUTO: 83.9 FL — SIGNIFICANT CHANGE UP (ref 80–100)
PLATELET # BLD AUTO: 163 K/UL — SIGNIFICANT CHANGE UP (ref 150–400)
POTASSIUM SERPL-MCNC: 4.2 MMOL/L — SIGNIFICANT CHANGE UP (ref 3.5–5.3)
POTASSIUM SERPL-SCNC: 4.2 MMOL/L — SIGNIFICANT CHANGE UP (ref 3.5–5.3)
RBC # BLD: 4.35 M/UL — SIGNIFICANT CHANGE UP (ref 4.2–5.8)
RBC # FLD: 15.3 % — HIGH (ref 10.3–14.5)
SODIUM SERPL-SCNC: 140 MMOL/L — SIGNIFICANT CHANGE UP (ref 135–145)
WBC # BLD: 9.86 K/UL — SIGNIFICANT CHANGE UP (ref 3.8–10.5)
WBC # FLD AUTO: 9.86 K/UL — SIGNIFICANT CHANGE UP (ref 3.8–10.5)

## 2022-07-14 PROCEDURE — 93623 PRGRMD STIMJ&PACG IV RX NFS: CPT

## 2022-07-14 PROCEDURE — 93656 COMPRE EP EVAL ABLTJ ATR FIB: CPT

## 2022-07-14 PROCEDURE — 82962 GLUCOSE BLOOD TEST: CPT

## 2022-07-14 PROCEDURE — C1894: CPT

## 2022-07-14 PROCEDURE — 93005 ELECTROCARDIOGRAM TRACING: CPT

## 2022-07-14 PROCEDURE — 85027 COMPLETE CBC AUTOMATED: CPT

## 2022-07-14 PROCEDURE — 36415 COLL VENOUS BLD VENIPUNCTURE: CPT

## 2022-07-14 PROCEDURE — 93010 ELECTROCARDIOGRAM REPORT: CPT

## 2022-07-14 PROCEDURE — C1766: CPT

## 2022-07-14 PROCEDURE — C1731: CPT

## 2022-07-14 PROCEDURE — 83735 ASSAY OF MAGNESIUM: CPT

## 2022-07-14 PROCEDURE — C1732: CPT

## 2022-07-14 PROCEDURE — C1889: CPT

## 2022-07-14 PROCEDURE — 80048 BASIC METABOLIC PNL TOTAL CA: CPT

## 2022-07-14 PROCEDURE — C1759: CPT

## 2022-07-14 PROCEDURE — 93655 ICAR CATH ABLTJ DSCRT ARRHYT: CPT

## 2022-07-14 RX ORDER — SUCRALFATE 1 G
10 TABLET ORAL
Qty: 280 | Refills: 0
Start: 2022-07-14 | End: 2022-07-27

## 2022-07-14 RX ORDER — FUROSEMIDE 40 MG
1 TABLET ORAL
Qty: 3 | Refills: 0
Start: 2022-07-14 | End: 2022-07-16

## 2022-07-14 RX ORDER — MAGNESIUM SULFATE 500 MG/ML
2 VIAL (ML) INJECTION ONCE
Refills: 0 | Status: COMPLETED | OUTPATIENT
Start: 2022-07-14 | End: 2022-07-14

## 2022-07-14 RX ORDER — PANTOPRAZOLE SODIUM 20 MG/1
1 TABLET, DELAYED RELEASE ORAL
Qty: 70 | Refills: 0
Start: 2022-07-14

## 2022-07-14 RX ADMIN — Medication 100 MILLIGRAM(S): at 05:39

## 2022-07-14 RX ADMIN — Medication 240 MILLIGRAM(S): at 05:39

## 2022-07-14 RX ADMIN — Medication 20 MILLIGRAM(S): at 05:38

## 2022-07-14 RX ADMIN — PANTOPRAZOLE SODIUM 40 MILLIGRAM(S): 20 TABLET, DELAYED RELEASE ORAL at 05:38

## 2022-07-14 RX ADMIN — APIXABAN 5 MILLIGRAM(S): 2.5 TABLET, FILM COATED ORAL at 05:38

## 2022-07-14 RX ADMIN — Medication 25 GRAM(S): at 10:11

## 2022-07-14 RX ADMIN — Medication 25 GRAM(S): at 08:20

## 2022-07-14 RX ADMIN — Medication 25 MICROGRAM(S): at 05:39

## 2022-07-14 RX ADMIN — Medication 1 GRAM(S): at 05:38

## 2022-07-14 NOTE — PROGRESS NOTE ADULT - SUBJECTIVE AND OBJECTIVE BOX
Pt doing well POD #1 s/p atrial fibrillation ablation. No overnight events noted, pt denies complaint today. Bilateral groin sutures removed without difficulty, pt tolerated well.       EKG: pending   TELE: no events noted    PAST MEDICAL & SURGICAL HISTORY:  Paroxysmal atrial fibrillation  Hypertension  Hyperlipidemia  Bladder cancer  Diabetes mellitus  Hypothyroidism  At risk for sleep apnea  H/O shoulder surgery 1/1/2017  H/O cardiac catheterization 5/2/2022  Bladder tumor s/p resection    MEDICATIONS  (STANDING):  apixaban 5 milliGRAM(s) Oral every 12 hours  atorvastatin 40 milliGRAM(s) Oral at bedtime  dextrose 5%. 1000 milliLiter(s) (50 mL/Hr) IV Continuous <Continuous>  dextrose 5%. 1000 milliLiter(s) (100 mL/Hr) IV Continuous <Continuous>  dextrose 50% Injectable 25 Gram(s) IV Push once  dextrose 50% Injectable 12.5 Gram(s) IV Push once  dextrose 50% Injectable 25 Gram(s) IV Push once  diltiazem    milliGRAM(s) Oral daily  doxazosin 4 milliGRAM(s) Oral at bedtime  finasteride 5 milliGRAM(s) Oral daily  furosemide    Tablet 20 milliGRAM(s) Oral daily  glucagon  Injectable 1 milliGRAM(s) IntraMuscular once  insulin lispro (ADMELOG) corrective regimen sliding scale   SubCutaneous three times a day before meals  levothyroxine 25 MICROGram(s) Oral daily  magnesium sulfate  IVPB 2 Gram(s) IV Intermittent once  metoprolol succinate  milliGRAM(s) Oral daily  pantoprazole    Tablet 40 milliGRAM(s) Oral two times a day  sucralfate suspension 1 Gram(s) Oral two times a day    MEDICATIONS  (PRN):  acetaminophen     Tablet .. 650 milliGRAM(s) Oral every 6 hours PRN Mild Pain (1 - 3), Moderate Pain (4 - 6)  aluminum hydroxide/magnesium hydroxide/simethicone Suspension 30 milliLiter(s) Oral every 4 hours PRN Dyspepsia  benzocaine 15 mG/menthol 3.6 mG Lozenge 1 Lozenge Oral every 2 hours PRN Sore Throat  dextrose Oral Gel 15 Gram(s) Oral once PRN Blood Glucose LESS THAN 70 milliGRAM(s)/deciliter  oxycodone    5 mG/acetaminophen 325 mG 1 Tablet(s) Oral every 6 hours PRN Severe Pain (7 - 10)    Allergies:  penicillins (Diarrhea)    Vital Signs Last 24 Hrs  T(C): 36.8 (14 Jul 2022 05:35), Max: 36.8 (14 Jul 2022 05:35)  T(F): 98.3 (14 Jul 2022 05:35), Max: 98.3 (14 Jul 2022 05:35)  HR: 86 (14 Jul 2022 05:35) (62 - 86)  BP: 149/77 (14 Jul 2022 05:35) (123/66 - 149/77)  RR: 19 (14 Jul 2022 05:35) (13 - 19)  SpO2: 92% (14 Jul 2022 05:35) (90% - 96%)    Physical Exam:  Constitutional: NAD, AAOx3  Cardiovascular: +S1S2 RRR  Pulmonary: CTA b/l, unlabored  Abd: soft NTND +BS  Groins: C/D/I bilaterally; no bleeding, hematoma, edema  Extremities: no pedal edema, +distal pulses b/l  Neuro: non focal, OLIVIER x4    LABS:                        11.9   9.86  )-----------( 163      ( 14 Jul 2022 05:51 )             36.5     07-14    140  |  106  |  10.4  ----------------------------<  149<H>  4.2   |  21.0<L>  |  0.99    Ca    8.3<L>      14 Jul 2022 05:51  Mg     1.3     07-14    Assessment:   76 year old male with PMH of HTN, hyperlipidemia, DM type 2, hypothyroidism, obesity, bladder cancer s/p resection (now performs self-catheterization), obstructive sleep apnea and symptomatic paroxysmal atrial fibrillation & atrial flutter. Now POD#1 s/p uncomplicated radiofrequency ablation of atrial fibrillation (WACA/PVI, CTI).     Plan:   Eliquis 5mg Q12 hrs   Start Protonix 40mg BID x 2 weeks then daily X 6 weeks.     Start Carafate 1gm BID x 2 weeks.   Lasix 20mg PO daily x 3 days.   Access site care and activity limitations reviewed w/ pt.   Stable for d/c home.   Outpt f/up in 2-4 weeks.  Pt doing well POD #1 s/p atrial fibrillation ablation. No overnight events noted. Pt c/o slight swelling to the roof of his mouth. Denies difficulty swallowing or eating.   Bilateral groin sutures removed without difficulty, pt tolerated well.       EKG: pending   TELE: no events noted    PAST MEDICAL & SURGICAL HISTORY:  Paroxysmal atrial fibrillation  Hypertension  Hyperlipidemia  Bladder cancer  Diabetes mellitus  Hypothyroidism  At risk for sleep apnea  H/O shoulder surgery 1/1/2017  H/O cardiac catheterization 5/2/2022  Bladder tumor s/p resection    MEDICATIONS  (STANDING):  apixaban 5 milliGRAM(s) Oral every 12 hours  atorvastatin 40 milliGRAM(s) Oral at bedtime  dextrose 5%. 1000 milliLiter(s) (50 mL/Hr) IV Continuous <Continuous>  dextrose 5%. 1000 milliLiter(s) (100 mL/Hr) IV Continuous <Continuous>  dextrose 50% Injectable 25 Gram(s) IV Push once  dextrose 50% Injectable 12.5 Gram(s) IV Push once  dextrose 50% Injectable 25 Gram(s) IV Push once  diltiazem    milliGRAM(s) Oral daily  doxazosin 4 milliGRAM(s) Oral at bedtime  finasteride 5 milliGRAM(s) Oral daily  furosemide    Tablet 20 milliGRAM(s) Oral daily  glucagon  Injectable 1 milliGRAM(s) IntraMuscular once  insulin lispro (ADMELOG) corrective regimen sliding scale   SubCutaneous three times a day before meals  levothyroxine 25 MICROGram(s) Oral daily  magnesium sulfate  IVPB 2 Gram(s) IV Intermittent once  metoprolol succinate  milliGRAM(s) Oral daily  pantoprazole    Tablet 40 milliGRAM(s) Oral two times a day  sucralfate suspension 1 Gram(s) Oral two times a day    MEDICATIONS  (PRN):  acetaminophen     Tablet .. 650 milliGRAM(s) Oral every 6 hours PRN Mild Pain (1 - 3), Moderate Pain (4 - 6)  aluminum hydroxide/magnesium hydroxide/simethicone Suspension 30 milliLiter(s) Oral every 4 hours PRN Dyspepsia  benzocaine 15 mG/menthol 3.6 mG Lozenge 1 Lozenge Oral every 2 hours PRN Sore Throat  dextrose Oral Gel 15 Gram(s) Oral once PRN Blood Glucose LESS THAN 70 milliGRAM(s)/deciliter  oxycodone    5 mG/acetaminophen 325 mG 1 Tablet(s) Oral every 6 hours PRN Severe Pain (7 - 10)    Allergies:  penicillins (Diarrhea)    Vital Signs Last 24 Hrs  T(C): 36.8 (14 Jul 2022 05:35), Max: 36.8 (14 Jul 2022 05:35)  T(F): 98.3 (14 Jul 2022 05:35), Max: 98.3 (14 Jul 2022 05:35)  HR: 86 (14 Jul 2022 05:35) (62 - 86)  BP: 149/77 (14 Jul 2022 05:35) (123/66 - 149/77)  RR: 19 (14 Jul 2022 05:35) (13 - 19)  SpO2: 92% (14 Jul 2022 05:35) (90% - 96%)    Physical Exam:  Constitutional: NAD, AAOx3  HEENT: NC, AT, right upper palate small hematoma noted, no airway obstruction or active bleeding   Cardiovascular: +S1S2 RRR  Pulmonary: CTA b/l, unlabored  Abd: soft NTND +BS  Groins: C/D/I bilaterally; no bleeding, hematoma, edema  Extremities: no pedal edema, +distal pulses b/l  Neuro: non focal, OLIVIER x4    LABS:                        11.9   9.86  )-----------( 163      ( 14 Jul 2022 05:51 )             36.5     07-14    140  |  106  |  10.4  ----------------------------<  149<H>  4.2   |  21.0<L>  |  0.99    Ca    8.3<L>      14 Jul 2022 05:51  Mg     1.3     07-14    Assessment:   76 year old male with PMH of HTN, hyperlipidemia, DM type 2, hypothyroidism, obesity, bladder cancer s/p resection (now performs self-catheterization), obstructive sleep apnea and symptomatic paroxysmal atrial fibrillation & atrial flutter. Now POD#1 s/p uncomplicated radiofrequency ablation of atrial fibrillation (WACA/PVI, CTI).     Plan:   Eliquis 5mg Q12 hrs   Start Protonix 40mg BID x 2 weeks then daily X 6 weeks.     Start Carafate 1gm BID x 2 weeks.   Lasix 20mg PO daily x 3 days.   Access site care and activity limitations reviewed w/ pt.   Stable for d/c home.   Outpt f/up in 2-4 weeks.  Pt doing well POD #1 s/p atrial fibrillation ablation. No overnight events noted. Pt c/o slight swelling to the roof of his mouth. Denies difficulty swallowing or eating.   Bilateral groin sutures removed without difficulty, pt tolerated well.       EKG: NSR 79 bpm  TELE: no events noted    PAST MEDICAL & SURGICAL HISTORY:  Paroxysmal atrial fibrillation  Hypertension  Hyperlipidemia  Bladder cancer  Diabetes mellitus  Hypothyroidism  At risk for sleep apnea  H/O shoulder surgery 1/1/2017  H/O cardiac catheterization 5/2/2022  Bladder tumor s/p resection    MEDICATIONS  (STANDING):  apixaban 5 milliGRAM(s) Oral every 12 hours  atorvastatin 40 milliGRAM(s) Oral at bedtime  dextrose 5%. 1000 milliLiter(s) (50 mL/Hr) IV Continuous <Continuous>  dextrose 5%. 1000 milliLiter(s) (100 mL/Hr) IV Continuous <Continuous>  dextrose 50% Injectable 25 Gram(s) IV Push once  dextrose 50% Injectable 12.5 Gram(s) IV Push once  dextrose 50% Injectable 25 Gram(s) IV Push once  diltiazem    milliGRAM(s) Oral daily  doxazosin 4 milliGRAM(s) Oral at bedtime  finasteride 5 milliGRAM(s) Oral daily  furosemide    Tablet 20 milliGRAM(s) Oral daily  glucagon  Injectable 1 milliGRAM(s) IntraMuscular once  insulin lispro (ADMELOG) corrective regimen sliding scale   SubCutaneous three times a day before meals  levothyroxine 25 MICROGram(s) Oral daily  magnesium sulfate  IVPB 2 Gram(s) IV Intermittent once  metoprolol succinate  milliGRAM(s) Oral daily  pantoprazole    Tablet 40 milliGRAM(s) Oral two times a day  sucralfate suspension 1 Gram(s) Oral two times a day    MEDICATIONS  (PRN):  acetaminophen     Tablet .. 650 milliGRAM(s) Oral every 6 hours PRN Mild Pain (1 - 3), Moderate Pain (4 - 6)  aluminum hydroxide/magnesium hydroxide/simethicone Suspension 30 milliLiter(s) Oral every 4 hours PRN Dyspepsia  benzocaine 15 mG/menthol 3.6 mG Lozenge 1 Lozenge Oral every 2 hours PRN Sore Throat  dextrose Oral Gel 15 Gram(s) Oral once PRN Blood Glucose LESS THAN 70 milliGRAM(s)/deciliter  oxycodone    5 mG/acetaminophen 325 mG 1 Tablet(s) Oral every 6 hours PRN Severe Pain (7 - 10)    Allergies:  penicillins (Diarrhea)    Vital Signs Last 24 Hrs  T(C): 36.8 (14 Jul 2022 05:35), Max: 36.8 (14 Jul 2022 05:35)  T(F): 98.3 (14 Jul 2022 05:35), Max: 98.3 (14 Jul 2022 05:35)  HR: 86 (14 Jul 2022 05:35) (62 - 86)  BP: 149/77 (14 Jul 2022 05:35) (123/66 - 149/77)  RR: 19 (14 Jul 2022 05:35) (13 - 19)  SpO2: 92% (14 Jul 2022 05:35) (90% - 96%)    Physical Exam:  Constitutional: NAD, AAOx3  HEENT: NC, AT, right upper palate small hematoma noted, no airway obstruction or active bleeding   Cardiovascular: +S1S2 RRR  Pulmonary: CTA b/l, unlabored  Abd: soft NTND +BS  Groins: C/D/I bilaterally; no bleeding, hematoma, edema  Extremities: no pedal edema, +distal pulses b/l  Neuro: non focal, OLIVIER x4    LABS:                        11.9   9.86  )-----------( 163      ( 14 Jul 2022 05:51 )             36.5     07-14    140  |  106  |  10.4  ----------------------------<  149<H>  4.2   |  21.0<L>  |  0.99    Ca    8.3<L>      14 Jul 2022 05:51  Mg     1.3     07-14    Assessment:   76 year old male with PMH of HTN, hyperlipidemia, DM type 2, hypothyroidism, obesity, bladder cancer s/p resection (now performs self-catheterization), obstructive sleep apnea and symptomatic paroxysmal atrial fibrillation & atrial flutter. Now POD#1 s/p uncomplicated radiofrequency ablation of atrial fibrillation (WACA/PVI, CTI).     Plan:   Eliquis 5mg Q12 hrs   Start Protonix 40mg BID x 2 weeks then daily X 6 weeks.     Start Carafate 1gm BID x 2 weeks.   Lasix 20mg PO daily x 3 days.   Access site care and activity limitations reviewed w/ pt.   Stable for d/c home.   Outpt f/up in 2-4 weeks.

## 2022-07-14 NOTE — DISCHARGE NOTE NURSING/CASE MANAGEMENT/SOCIAL WORK - PATIENT PORTAL LINK FT
You can access the FollowMyHealth Patient Portal offered by [facility] by registering at the following website: [email or web address]. By joining [employer]’s FollowMyHealth portal, you will also be able to view your health information using other applications (apps) compatible with our system.

## 2022-07-14 NOTE — DISCHARGE NOTE NURSING/CASE MANAGEMENT/SOCIAL WORK - NSDCPEFALRISK_GEN_ALL_CORE
For information on Fall & Injury Prevention, visit: [email or web address] OR  [email or web address] OR  https://www.cdc.gov/steadi/patient.html

## 2022-07-18 ENCOUNTER — INPATIENT (INPATIENT)
Facility: HOSPITAL | Age: 77
LOS: 0 days | Discharge: HOME | DRG: 309 | End: 2022-07-18
Attending: INTERNAL MEDICINE | Admitting: INTERNAL MEDICINE
Payer: COMMERCIAL

## 2022-07-18 ENCOUNTER — TRANSCRIPTION ENCOUNTER (OUTPATIENT)
Age: 77
End: 2022-07-18

## 2022-07-18 VITALS
OXYGEN SATURATION: 98 % | HEART RATE: 81 BPM | SYSTOLIC BLOOD PRESSURE: 127 MMHG | RESPIRATION RATE: 17 BRPM | DIASTOLIC BLOOD PRESSURE: 71 MMHG

## 2022-07-18 VITALS
HEIGHT: 72 IN | RESPIRATION RATE: 20 BRPM | TEMPERATURE: 98 F | DIASTOLIC BLOOD PRESSURE: 74 MMHG | SYSTOLIC BLOOD PRESSURE: 118 MMHG | OXYGEN SATURATION: 96 % | HEART RATE: 149 BPM | WEIGHT: 298.95 LBS

## 2022-07-18 DIAGNOSIS — Z98.890 OTHER SPECIFIED POSTPROCEDURAL STATES: Chronic | ICD-10-CM

## 2022-07-18 DIAGNOSIS — I48.92 UNSPECIFIED ATRIAL FLUTTER: ICD-10-CM

## 2022-07-18 DIAGNOSIS — D49.4 NEOPLASM OF UNSPECIFIED BEHAVIOR OF BLADDER: Chronic | ICD-10-CM

## 2022-07-18 LAB
ALBUMIN SERPL ELPH-MCNC: 3.7 G/DL — SIGNIFICANT CHANGE UP (ref 3.3–5.2)
ALP SERPL-CCNC: 54 U/L — SIGNIFICANT CHANGE UP (ref 40–120)
ALT FLD-CCNC: 19 U/L — SIGNIFICANT CHANGE UP
ANION GAP SERPL CALC-SCNC: 12 MMOL/L — SIGNIFICANT CHANGE UP (ref 5–17)
AST SERPL-CCNC: 16 U/L — SIGNIFICANT CHANGE UP
BASOPHILS # BLD AUTO: 0.03 K/UL — SIGNIFICANT CHANGE UP (ref 0–0.2)
BASOPHILS NFR BLD AUTO: 0.5 % — SIGNIFICANT CHANGE UP (ref 0–2)
BILIRUB SERPL-MCNC: 0.6 MG/DL — SIGNIFICANT CHANGE UP (ref 0.4–2)
BUN SERPL-MCNC: 13.2 MG/DL — SIGNIFICANT CHANGE UP (ref 8–20)
CALCIUM SERPL-MCNC: 8.9 MG/DL — SIGNIFICANT CHANGE UP (ref 8.6–10.2)
CHLORIDE SERPL-SCNC: 106 MMOL/L — SIGNIFICANT CHANGE UP (ref 98–107)
CO2 SERPL-SCNC: 21 MMOL/L — LOW (ref 22–29)
CREAT SERPL-MCNC: 1 MG/DL — SIGNIFICANT CHANGE UP (ref 0.5–1.3)
EGFR: 78 ML/MIN/1.73M2 — SIGNIFICANT CHANGE UP
EOSINOPHIL # BLD AUTO: 0.12 K/UL — SIGNIFICANT CHANGE UP (ref 0–0.5)
EOSINOPHIL NFR BLD AUTO: 2 % — SIGNIFICANT CHANGE UP (ref 0–6)
FLUAV AG NPH QL: SIGNIFICANT CHANGE UP
FLUBV AG NPH QL: SIGNIFICANT CHANGE UP
GLUCOSE BLDC GLUCOMTR-MCNC: 144 MG/DL — HIGH (ref 70–99)
GLUCOSE SERPL-MCNC: 164 MG/DL — HIGH (ref 70–99)
HCT VFR BLD CALC: 34.8 % — LOW (ref 39–50)
HGB BLD-MCNC: 11.2 G/DL — LOW (ref 13–17)
IMM GRANULOCYTES NFR BLD AUTO: 0.7 % — SIGNIFICANT CHANGE UP (ref 0–1.5)
LYMPHOCYTES # BLD AUTO: 0.72 K/UL — LOW (ref 1–3.3)
LYMPHOCYTES # BLD AUTO: 11.9 % — LOW (ref 13–44)
MAGNESIUM SERPL-MCNC: 1.4 MG/DL — LOW (ref 1.6–2.6)
MCHC RBC-ENTMCNC: 26.6 PG — LOW (ref 27–34)
MCHC RBC-ENTMCNC: 32.2 GM/DL — SIGNIFICANT CHANGE UP (ref 32–36)
MCV RBC AUTO: 82.7 FL — SIGNIFICANT CHANGE UP (ref 80–100)
MONOCYTES # BLD AUTO: 0.77 K/UL — SIGNIFICANT CHANGE UP (ref 0–0.9)
MONOCYTES NFR BLD AUTO: 12.7 % — SIGNIFICANT CHANGE UP (ref 2–14)
NEUTROPHILS # BLD AUTO: 4.36 K/UL — SIGNIFICANT CHANGE UP (ref 1.8–7.4)
NEUTROPHILS NFR BLD AUTO: 72.2 % — SIGNIFICANT CHANGE UP (ref 43–77)
PLATELET # BLD AUTO: 187 K/UL — SIGNIFICANT CHANGE UP (ref 150–400)
POTASSIUM SERPL-MCNC: 4.2 MMOL/L — SIGNIFICANT CHANGE UP (ref 3.5–5.3)
POTASSIUM SERPL-SCNC: 4.2 MMOL/L — SIGNIFICANT CHANGE UP (ref 3.5–5.3)
PROT SERPL-MCNC: 6.4 G/DL — LOW (ref 6.6–8.7)
RBC # BLD: 4.21 M/UL — SIGNIFICANT CHANGE UP (ref 4.2–5.8)
RBC # FLD: 15.1 % — HIGH (ref 10.3–14.5)
RSV RNA NPH QL NAA+NON-PROBE: SIGNIFICANT CHANGE UP
SARS-COV-2 RNA SPEC QL NAA+PROBE: SIGNIFICANT CHANGE UP
SODIUM SERPL-SCNC: 139 MMOL/L — SIGNIFICANT CHANGE UP (ref 135–145)
TROPONIN T SERPL-MCNC: 0.04 NG/ML — SIGNIFICANT CHANGE UP (ref 0–0.06)
TROPONIN T SERPL-MCNC: 0.06 NG/ML — SIGNIFICANT CHANGE UP (ref 0–0.06)
WBC # BLD: 6.04 K/UL — SIGNIFICANT CHANGE UP (ref 3.8–10.5)
WBC # FLD AUTO: 6.04 K/UL — SIGNIFICANT CHANGE UP (ref 3.8–10.5)

## 2022-07-18 PROCEDURE — 80053 COMPREHEN METABOLIC PANEL: CPT

## 2022-07-18 PROCEDURE — 82962 GLUCOSE BLOOD TEST: CPT

## 2022-07-18 PROCEDURE — 93010 ELECTROCARDIOGRAM REPORT: CPT

## 2022-07-18 PROCEDURE — 36415 COLL VENOUS BLD VENIPUNCTURE: CPT

## 2022-07-18 PROCEDURE — 93306 TTE W/DOPPLER COMPLETE: CPT | Mod: 26

## 2022-07-18 PROCEDURE — 71045 X-RAY EXAM CHEST 1 VIEW: CPT | Mod: 26

## 2022-07-18 PROCEDURE — 99285 EMERGENCY DEPT VISIT HI MDM: CPT | Mod: 25

## 2022-07-18 PROCEDURE — 92960 CARDIOVERSION ELECTRIC EXT: CPT

## 2022-07-18 PROCEDURE — 84484 ASSAY OF TROPONIN QUANT: CPT

## 2022-07-18 PROCEDURE — 83735 ASSAY OF MAGNESIUM: CPT

## 2022-07-18 PROCEDURE — 93005 ELECTROCARDIOGRAM TRACING: CPT

## 2022-07-18 PROCEDURE — C8929: CPT

## 2022-07-18 PROCEDURE — 87637 SARSCOV2&INF A&B&RSV AMP PRB: CPT

## 2022-07-18 PROCEDURE — 96374 THER/PROPH/DIAG INJ IV PUSH: CPT

## 2022-07-18 PROCEDURE — 71045 X-RAY EXAM CHEST 1 VIEW: CPT

## 2022-07-18 PROCEDURE — 99285 EMERGENCY DEPT VISIT HI MDM: CPT

## 2022-07-18 PROCEDURE — 96375 TX/PRO/DX INJ NEW DRUG ADDON: CPT

## 2022-07-18 PROCEDURE — 85025 COMPLETE CBC W/AUTO DIFF WBC: CPT

## 2022-07-18 RX ORDER — AMIODARONE HYDROCHLORIDE 400 MG/1
400 TABLET ORAL EVERY 12 HOURS
Refills: 0 | Status: DISCONTINUED | OUTPATIENT
Start: 2022-07-18 | End: 2022-07-18

## 2022-07-18 RX ORDER — MAGNESIUM SULFATE 500 MG/ML
2 VIAL (ML) INJECTION ONCE
Refills: 0 | Status: COMPLETED | OUTPATIENT
Start: 2022-07-18 | End: 2022-07-18

## 2022-07-18 RX ORDER — AMIODARONE HYDROCHLORIDE 400 MG/1
1 TABLET ORAL
Qty: 60 | Refills: 0
Start: 2022-07-18 | End: 2022-07-31

## 2022-07-18 RX ORDER — METOPROLOL TARTRATE 50 MG
5 TABLET ORAL ONCE
Refills: 0 | Status: COMPLETED | OUTPATIENT
Start: 2022-07-18 | End: 2022-07-18

## 2022-07-18 RX ADMIN — Medication 50 GRAM(S): at 16:43

## 2022-07-18 RX ADMIN — AMIODARONE HYDROCHLORIDE 400 MILLIGRAM(S): 400 TABLET ORAL at 16:43

## 2022-07-18 RX ADMIN — Medication 25 GRAM(S): at 10:44

## 2022-07-18 RX ADMIN — Medication 5 MILLIGRAM(S): at 06:54

## 2022-07-18 NOTE — ED PROVIDER NOTE - NS ED MD DISPO SPECIAL CONSIDERATION1
Group Therapy Documentation    PATIENT'S NAME: Carroll Duke  MRN:   [id number]  :   2002  ACCT. NUMBER: [id number]  DATE OF SERVICE: 21  START TIME:  6:30 PM  END TIME:  7:00 PM  FACILITATOR(S): Erika Burks LADC; Cecelia Quinones; Mamadou Summers  TOPIC: BEH Group Therapy  Number of patients attending the group:  4  Group Length:  0.5 hours     Dimensions addressed 3    Summary of Group / Topics Discussed:    Yoga Calm/Experiential Mindfulness  Summary of Group/Topics Discussed:    Explained to the group the purpose of using yoga calm/experiential mindfulness in treatment:  to help reduce stress, support emotional and cognitive skill development, learn flexibility, improve self-awareness and self-regulation.    There was a group discussion about yoga scult and a related activity. The group engaged in a yoga routine to address the topics discussed. The clients participated in a relaxation activity.        Patient session goals/objectives:     *  The client will be able to identify calming and grounding techniques   *  The client will be learn relaxation techniques to address mental health and  substance use.   *  The client will increase skills in regulating emotions   *  To help reduce stress and develop physical fitness      Group Attendance:  Attended group session    Patient's response to the group topic/interactions:  cooperative with task    Patient appeared to be Actively participating.       Client specific details:  Resident presented for group. Resident participated in group activity.      None

## 2022-07-18 NOTE — DISCHARGE NOTE NURSING/CASE MANAGEMENT/SOCIAL WORK - NSDCPEFALRISK_GEN_ALL_CORE
For information on Fall & Injury Prevention, visit: [email or web address] OR  [email or web address] OR  https://www.cdc.gov/steadi/patient.html

## 2022-07-18 NOTE — H&P PST ADULT - ASSESSMENT
76 year old male patient with a history of HTN, HLD, DM2, hypothyroidism, obesity, bladder cancer s/p resection now self catheterizes and persistent AFib who underwent ablation on 7/13/22 (WACA/PVI and CTI). Presents with ERAF    Patient has been compliant with anticoagulation and has not eaten today    - Plan for DCCV today  - Will plan on starting Amiodarone 400mg PO BID x 14 days followed by 200mg PO daily thereafter.

## 2022-07-18 NOTE — ED PROVIDER NOTE - PROGRESS NOTE DETAILS
EP at bedside spoke to EP PA who will admit patient under EP service for cardioversion for aflutter.

## 2022-07-18 NOTE — DISCHARGE NOTE PROVIDER - CARE PROVIDER_API CALL
Fernando Burdick)  Cardiac Electrophysiology; Cardiology  [address]  [place] [address]  Phone: [phone]  Fax: [phone]  Established Patient  Follow Up Time: 1 month

## 2022-07-18 NOTE — ED PROVIDER NOTE - CLINICAL SUMMARY MEDICAL DECISION MAKING FREE TEXT BOX
Patient with acute rapid atrial flutter which has responded to one dose of lopressor. Patient pending consultation with EP.

## 2022-07-18 NOTE — ED ADULT NURSE NOTE - OBJECTIVE STATEMENT
Pt presents to ED c/o palpitations. Recently had a cardiac ablation, was told to come to the ED if his HR elevated and did not come down. Noted to be in the 140s upon arrival to ED.

## 2022-07-18 NOTE — ED ADULT NURSE REASSESSMENT NOTE - NS ED NURSE REASSESS COMMENT FT1
pt straight cathed 400 cc urine output tolerated well. PT pending electrotherapy
Assumed care of pt from critical for afib. PT in NSR at this time being  telemonitored. PT denies cp/sob/ dizziness. Reports sp ablation last week.

## 2022-07-18 NOTE — CHART NOTE - NSCHARTNOTEFT_GEN_A_CORE
Patient underwent successful and uncomplicated DCCV x 1 at 300J resulting in SR.   VSS  EKG: SR at 69bpm; QRSD 88ms  Will add Amiodarone 400mg PO BID x 14 days followed by 200mg PO daily therafter  Discharge home with outpatient follow up with Dr. Burdick

## 2022-07-18 NOTE — ED ADULT NURSE NOTE - NSICDXPASTMEDICALHX_GEN_ALL_CORE_FT
PAST MEDICAL HISTORY:  At risk for sleep apnea     Bladder cancer     Diabetes mellitus     Hyperlipidemia     Hypertension     Hypothyroidism     Paroxysmal atrial fibrillation

## 2022-07-18 NOTE — DISCHARGE NOTE PROVIDER - NSDCMRMEDTOKEN_GEN_ALL_CORE_FT
atorvastatin 40 mg oral tablet: 1 tab(s) orally once a day  Cardizem: 240 milligram(s) orally once a day  doxazosin 4 mg oral tablet: 1 tab(s) orally once a day  doxycycline: 20 milligram(s) orally 2 times a day  Eliquis 5 mg oral tablet: 1 tab(s) orally 2 times a day  finasteride 5 mg oral tablet: 1 tab(s) orally once a day (at bedtime)  furosemide 20 mg oral tablet: 1 tab(s) orally once a day  Janumet 50 mg-1000 mg oral tablet: 1 tab(s) orally 2 times a day  metoprolol succinate 100 mg oral tablet, extended release: 1 tab(s) orally once a day  pantoprazole 40 mg oral delayed release tablet: 1 tab(s) orally 2 times a day for 14 days then once daily for 6 weeks   sucralfate 1 g/10 mL oral suspension: 10 milliliter(s) orally 2 times a day  Synthroid 25 mcg (0.025 mg) oral tablet: 1 tab(s) orally once a day   amiodarone 400 mg oral tablet: 1 tab(s) orally 2 times a day x 14 days followed by 0.5 tabs orally daily thereafter  atorvastatin 40 mg oral tablet: 1 tab(s) orally once a day  Cardizem: 240 milligram(s) orally once a day  doxazosin 4 mg oral tablet: 1 tab(s) orally once a day  doxycycline: 20 milligram(s) orally 2 times a day  Eliquis 5 mg oral tablet: 1 tab(s) orally 2 times a day  finasteride 5 mg oral tablet: 1 tab(s) orally once a day (at bedtime)  furosemide 20 mg oral tablet: 1 tab(s) orally once a day  Janumet 50 mg-1000 mg oral tablet: 1 tab(s) orally 2 times a day  metoprolol succinate 100 mg oral tablet, extended release: 1 tab(s) orally once a day  pantoprazole 40 mg oral delayed release tablet: 1 tab(s) orally 2 times a day for 14 days then once daily for 6 weeks   sucralfate 1 g/10 mL oral suspension: 10 milliliter(s) orally 2 times a day  Synthroid 25 mcg (0.025 mg) oral tablet: 1 tab(s) orally once a day

## 2022-07-18 NOTE — ED ADULT TRIAGE NOTE - CHIEF COMPLAINT QUOTE
s/p ablation last Wednesday, HR up to 145 x 2 episodes, one last night and again prior to arrival and persisting,  on arrival.

## 2022-07-18 NOTE — ED PROVIDER NOTE - OBJECTIVE STATEMENT
77 yo male with pmhx of HTN, DM, hyperlipidemia, bladder CA, atrial flutter whom is 4 days s/p ablation presents for evaluation of mild chest tightness this morning and persistent tachycardia. Patient states he was fine after the ablation. However, yesterday he woke up with mild chest tightness with rapid heart rate. He called the on-call cardiologist whom instructed him to take his am medication. His symptoms resolved and symptoms resolved for the day. It occurred again today and did not resolve with his morning medication.

## 2022-07-18 NOTE — DISCHARGE NOTE NURSING/CASE MANAGEMENT/SOCIAL WORK - PATIENT PORTAL LINK FT
You can access the FollowMyHealth Patient Portal offered by [facility] by registering at the following website: [email or web address]. By joining [facility]’s FollowMyHealth portal, you will also be able to view your health information using other applications (apps) compatible with our system.

## 2022-07-18 NOTE — H&P PST ADULT - HISTORY OF PRESENT ILLNESS
Very pleasant 76 year old male patient with a history of HTN, HLD, DM2, hypothyroidism, obesity, bladder cancer s/p resection now self catheterizes who presents once again with palpitations and mid-sternal chest discomfort. He underwent a uncomplicated radiofrequency ablation of atrial fibrillation (WACA/PVI, CTI) on 7/13/22. Post operatively he did well and was discharged without complication. Over the weekend the patient noted some mid sternal chest pain which was on and off. He reports that he was informed he should anticipate some discomfort following his lengthy ablation. However last night he reports he took his blood pressure multiple times and noted his HR was above 140bpm for some time. He was worried and called the on call physician who advised him to take his medications which he did. He said he did not observe any significant improvement and therefore came to the ER. He admits that since his ablation he has not missed any doses of his anticoagulation and has not eaten any breakfast or food today.     Cardiac Summary:   12/27/21 Regadenoson NST: LVEF: 68%. Normal perfusion imaging.   7/14/21 TTE: LVEF: 60-65%. LAd 4.6 cm. RA/RV normal. Mild MR.  4/28/2022 TTE: LVEF 61%. LV thickness is mildly decreased. Grade 2 diastolic dysfunction. Interventricular septal thickness is mildly increased. RV systolic pressure elevated at 40-50 mmHg. Moderately dilated LA (LAd 5.3 cm; MARQUISE 422.6 ml/m2). Moderately dilated RA. Mild MR with eccentric jet. Mild TR.  Cath 5/2/2022: no obstructive coronary disease

## 2022-07-18 NOTE — DISCHARGE NOTE PROVIDER - HOSPITAL COURSE
76 year old male patient with a history of HTN, HLD, DM2, hypothyroidism, obesity, bladder cancer s/p resection now self catheterizes and persistent AFib who underwent ablation on 7/13/22 (WACA/PVI and CTI). Presents with ERAF and is now s/p successful DCCV x 1 at 300J resulting in SR.

## 2022-07-30 ENCOUNTER — EMERGENCY (EMERGENCY)
Facility: HOSPITAL | Age: 77
LOS: 1 days | Discharge: HOME | End: 2022-07-30
Attending: EMERGENCY MEDICINE
Payer: COMMERCIAL

## 2022-07-30 VITALS
HEIGHT: 72 IN | SYSTOLIC BLOOD PRESSURE: 94 MMHG | RESPIRATION RATE: 18 BRPM | DIASTOLIC BLOOD PRESSURE: 56 MMHG | OXYGEN SATURATION: 95 % | HEART RATE: 50 BPM | TEMPERATURE: 99 F | WEIGHT: 279.99 LBS

## 2022-07-30 DIAGNOSIS — Z98.890 OTHER SPECIFIED POSTPROCEDURAL STATES: Chronic | ICD-10-CM

## 2022-07-30 DIAGNOSIS — I50.32 CHRONIC DIASTOLIC (CONGESTIVE) HEART FAILURE: ICD-10-CM

## 2022-07-30 DIAGNOSIS — I48.0 PAROXYSMAL ATRIAL FIBRILLATION: ICD-10-CM

## 2022-07-30 DIAGNOSIS — R00.1 BRADYCARDIA, UNSPECIFIED: ICD-10-CM

## 2022-07-30 DIAGNOSIS — I31.9 DISEASE OF PERICARDIUM, UNSPECIFIED: ICD-10-CM

## 2022-07-30 DIAGNOSIS — D49.4 NEOPLASM OF UNSPECIFIED BEHAVIOR OF BLADDER: Chronic | ICD-10-CM

## 2022-07-30 LAB
ALBUMIN SERPL ELPH-MCNC: 4 G/DL — SIGNIFICANT CHANGE UP (ref 3.3–5.2)
ALP SERPL-CCNC: 53 U/L — SIGNIFICANT CHANGE UP (ref 40–120)
ALT FLD-CCNC: 16 U/L — SIGNIFICANT CHANGE UP
ANION GAP SERPL CALC-SCNC: 14 MMOL/L — SIGNIFICANT CHANGE UP (ref 5–17)
AST SERPL-CCNC: 26 U/L — SIGNIFICANT CHANGE UP
BASOPHILS # BLD AUTO: 0.05 K/UL — SIGNIFICANT CHANGE UP (ref 0–0.2)
BASOPHILS NFR BLD AUTO: 0.6 % — SIGNIFICANT CHANGE UP (ref 0–2)
BILIRUB SERPL-MCNC: 0.8 MG/DL — SIGNIFICANT CHANGE UP (ref 0.4–2)
BUN SERPL-MCNC: 32.3 MG/DL — HIGH (ref 8–20)
CALCIUM SERPL-MCNC: 8.2 MG/DL — LOW (ref 8.4–10.5)
CHLORIDE SERPL-SCNC: 94 MMOL/L — LOW (ref 98–107)
CO2 SERPL-SCNC: 27 MMOL/L — SIGNIFICANT CHANGE UP (ref 22–29)
CREAT SERPL-MCNC: 1.5 MG/DL — HIGH (ref 0.5–1.3)
EGFR: 48 ML/MIN/1.73M2 — LOW
EOSINOPHIL # BLD AUTO: 0.11 K/UL — SIGNIFICANT CHANGE UP (ref 0–0.5)
EOSINOPHIL NFR BLD AUTO: 1.2 % — SIGNIFICANT CHANGE UP (ref 0–6)
GLUCOSE BLDC GLUCOMTR-MCNC: 192 MG/DL — HIGH (ref 70–99)
GLUCOSE BLDC GLUCOMTR-MCNC: 216 MG/DL — HIGH (ref 70–99)
GLUCOSE SERPL-MCNC: 120 MG/DL — HIGH (ref 70–99)
HCT VFR BLD CALC: 35.4 % — LOW (ref 39–50)
HGB BLD-MCNC: 11.5 G/DL — LOW (ref 13–17)
IMM GRANULOCYTES NFR BLD AUTO: 0.7 % — SIGNIFICANT CHANGE UP (ref 0–1.5)
LYMPHOCYTES # BLD AUTO: 0.92 K/UL — LOW (ref 1–3.3)
LYMPHOCYTES # BLD AUTO: 10.2 % — LOW (ref 13–44)
MCHC RBC-ENTMCNC: 26.4 PG — LOW (ref 27–34)
MCHC RBC-ENTMCNC: 32.5 GM/DL — SIGNIFICANT CHANGE UP (ref 32–36)
MCV RBC AUTO: 81.4 FL — SIGNIFICANT CHANGE UP (ref 80–100)
MONOCYTES # BLD AUTO: 1.2 K/UL — HIGH (ref 0–0.9)
MONOCYTES NFR BLD AUTO: 13.3 % — SIGNIFICANT CHANGE UP (ref 2–14)
NEUTROPHILS # BLD AUTO: 6.7 K/UL — SIGNIFICANT CHANGE UP (ref 1.8–7.4)
NEUTROPHILS NFR BLD AUTO: 74 % — SIGNIFICANT CHANGE UP (ref 43–77)
NT-PROBNP SERPL-SCNC: 730 PG/ML — HIGH (ref 0–300)
PLATELET # BLD AUTO: 228 K/UL — SIGNIFICANT CHANGE UP (ref 150–400)
POTASSIUM SERPL-MCNC: 3.9 MMOL/L — SIGNIFICANT CHANGE UP (ref 3.5–5.3)
POTASSIUM SERPL-SCNC: 3.9 MMOL/L — SIGNIFICANT CHANGE UP (ref 3.5–5.3)
PROT SERPL-MCNC: 6.8 G/DL — SIGNIFICANT CHANGE UP (ref 6.6–8.7)
RAPID RVP RESULT: SIGNIFICANT CHANGE UP
RBC # BLD: 4.35 M/UL — SIGNIFICANT CHANGE UP (ref 4.2–5.8)
RBC # FLD: 15.1 % — HIGH (ref 10.3–14.5)
SARS-COV-2 RNA SPEC QL NAA+PROBE: SIGNIFICANT CHANGE UP
SODIUM SERPL-SCNC: 135 MMOL/L — SIGNIFICANT CHANGE UP (ref 135–145)
TROPONIN T SERPL-MCNC: 0.02 NG/ML — SIGNIFICANT CHANGE UP (ref 0–0.06)
TROPONIN T SERPL-MCNC: 0.02 NG/ML — SIGNIFICANT CHANGE UP (ref 0–0.06)
TROPONIN T SERPL-MCNC: 0.03 NG/ML — SIGNIFICANT CHANGE UP (ref 0–0.06)
WBC # BLD: 9.04 K/UL — SIGNIFICANT CHANGE UP (ref 3.8–10.5)
WBC # FLD AUTO: 9.04 K/UL — SIGNIFICANT CHANGE UP (ref 3.8–10.5)

## 2022-07-30 PROCEDURE — 99220: CPT

## 2022-07-30 PROCEDURE — 71045 X-RAY EXAM CHEST 1 VIEW: CPT | Mod: 26

## 2022-07-30 PROCEDURE — 93010 ELECTROCARDIOGRAM REPORT: CPT

## 2022-07-30 PROCEDURE — 99222 1ST HOSP IP/OBS MODERATE 55: CPT

## 2022-07-30 RX ORDER — COLCHICINE 0.6 MG
0.6 TABLET ORAL
Refills: 0 | Status: DISCONTINUED | OUTPATIENT
Start: 2022-07-30 | End: 2022-07-30

## 2022-07-30 RX ORDER — METOPROLOL TARTRATE 50 MG
50 TABLET ORAL DAILY
Refills: 0 | Status: DISCONTINUED | OUTPATIENT
Start: 2022-07-31 | End: 2022-08-04

## 2022-07-30 RX ORDER — COLCHICINE 0.6 MG
0.6 TABLET ORAL DAILY
Refills: 0 | Status: DISCONTINUED | OUTPATIENT
Start: 2022-07-30 | End: 2022-08-04

## 2022-07-30 RX ORDER — DEXTROSE 50 % IN WATER 50 %
15 SYRINGE (ML) INTRAVENOUS ONCE
Refills: 0 | Status: DISCONTINUED | OUTPATIENT
Start: 2022-07-30 | End: 2022-08-04

## 2022-07-30 RX ORDER — FUROSEMIDE 40 MG
20 TABLET ORAL DAILY
Refills: 0 | Status: DISCONTINUED | OUTPATIENT
Start: 2022-07-31 | End: 2022-08-04

## 2022-07-30 RX ORDER — SODIUM CHLORIDE 9 MG/ML
1000 INJECTION, SOLUTION INTRAVENOUS
Refills: 0 | Status: DISCONTINUED | OUTPATIENT
Start: 2022-07-30 | End: 2022-08-04

## 2022-07-30 RX ORDER — AMIODARONE HYDROCHLORIDE 400 MG/1
200 TABLET ORAL DAILY
Refills: 0 | Status: DISCONTINUED | OUTPATIENT
Start: 2022-07-31 | End: 2022-08-04

## 2022-07-30 RX ORDER — FINASTERIDE 5 MG/1
5 TABLET, FILM COATED ORAL DAILY
Refills: 0 | Status: DISCONTINUED | OUTPATIENT
Start: 2022-07-30 | End: 2022-08-04

## 2022-07-30 RX ORDER — GLUCAGON INJECTION, SOLUTION 0.5 MG/.1ML
1 INJECTION, SOLUTION SUBCUTANEOUS ONCE
Refills: 0 | Status: DISCONTINUED | OUTPATIENT
Start: 2022-07-30 | End: 2022-08-04

## 2022-07-30 RX ORDER — FUROSEMIDE 40 MG
40 TABLET ORAL ONCE
Refills: 0 | Status: COMPLETED | OUTPATIENT
Start: 2022-07-30 | End: 2022-07-30

## 2022-07-30 RX ORDER — DEXTROSE 50 % IN WATER 50 %
25 SYRINGE (ML) INTRAVENOUS ONCE
Refills: 0 | Status: DISCONTINUED | OUTPATIENT
Start: 2022-07-30 | End: 2022-08-04

## 2022-07-30 RX ORDER — HYDROCORTISONE 20 MG
100 TABLET ORAL ONCE
Refills: 0 | Status: COMPLETED | OUTPATIENT
Start: 2022-07-30 | End: 2022-07-30

## 2022-07-30 RX ORDER — LEVOTHYROXINE SODIUM 125 MCG
25 TABLET ORAL DAILY
Refills: 0 | Status: DISCONTINUED | OUTPATIENT
Start: 2022-07-31 | End: 2022-08-04

## 2022-07-30 RX ORDER — INSULIN LISPRO 100/ML
VIAL (ML) SUBCUTANEOUS
Refills: 0 | Status: DISCONTINUED | OUTPATIENT
Start: 2022-07-30 | End: 2022-08-04

## 2022-07-30 RX ORDER — PANTOPRAZOLE SODIUM 20 MG/1
40 TABLET, DELAYED RELEASE ORAL
Refills: 0 | Status: DISCONTINUED | OUTPATIENT
Start: 2022-07-30 | End: 2022-08-04

## 2022-07-30 RX ORDER — DEXTROSE 50 % IN WATER 50 %
12.5 SYRINGE (ML) INTRAVENOUS ONCE
Refills: 0 | Status: DISCONTINUED | OUTPATIENT
Start: 2022-07-30 | End: 2022-08-04

## 2022-07-30 RX ORDER — DOXAZOSIN MESYLATE 4 MG
4 TABLET ORAL AT BEDTIME
Refills: 0 | Status: DISCONTINUED | OUTPATIENT
Start: 2022-07-30 | End: 2022-08-04

## 2022-07-30 RX ORDER — APIXABAN 2.5 MG/1
5 TABLET, FILM COATED ORAL EVERY 12 HOURS
Refills: 0 | Status: DISCONTINUED | OUTPATIENT
Start: 2022-07-30 | End: 2022-08-04

## 2022-07-30 RX ORDER — ATORVASTATIN CALCIUM 80 MG/1
40 TABLET, FILM COATED ORAL AT BEDTIME
Refills: 0 | Status: DISCONTINUED | OUTPATIENT
Start: 2022-07-30 | End: 2022-08-04

## 2022-07-30 RX ADMIN — APIXABAN 5 MILLIGRAM(S): 2.5 TABLET, FILM COATED ORAL at 18:29

## 2022-07-30 RX ADMIN — PANTOPRAZOLE SODIUM 40 MILLIGRAM(S): 20 TABLET, DELAYED RELEASE ORAL at 18:29

## 2022-07-30 RX ADMIN — Medication 40 MILLIGRAM(S): at 14:11

## 2022-07-30 RX ADMIN — Medication 2: at 18:29

## 2022-07-30 RX ADMIN — Medication 100 MILLIGRAM(S): at 15:14

## 2022-07-30 RX ADMIN — Medication 4 MILLIGRAM(S): at 21:20

## 2022-07-30 RX ADMIN — ATORVASTATIN CALCIUM 40 MILLIGRAM(S): 80 TABLET, FILM COATED ORAL at 21:20

## 2022-07-30 RX ADMIN — Medication 0.6 MILLIGRAM(S): at 15:15

## 2022-07-30 NOTE — ED ADULT NURSE REASSESSMENT NOTE - NS ED NURSE REASSESS COMMENT FT1
Assumed care of the patient at 1830. Verbal report received from [facility]. Patient transferred to observation unit A8L. Patient A&Ox4. No s/s of acute distress or pain. Denies CP/SOB or dizziness. NSR on CM. VSS. PIV patent. Gonzalez in place draining clear yellow urine. Patient pending TTE testing and rpt trop/ekg. Ambulatory independently. Patient in understanding of plan of care. Patient with no further questions for the RN. Resting in comfort. Call bell within reach and encouraged to use when assistance needed. Will continue to monitor.

## 2022-07-30 NOTE — ED ADULT NURSE REASSESSMENT NOTE - NS ED NURSE REASSESS COMMENT FT1
pt with no complaints at this time, seen by husam Burdick, at this time and pt aware of plan of care. awaiting echo, sinus andrew on monitor with more stable BP at this time. even and unlabored resps present, skin warm dry and intact.

## 2022-07-30 NOTE — ED CDU PROVIDER INITIAL DAY NOTE - NS ED ATTENDING STATEMENT MOD
This was a shared visit with the NICOLA. I reviewed and verified the documentation and independently performed the documented:

## 2022-07-30 NOTE — ED PROVIDER NOTE - PROGRESS NOTE DETAILS
Resident Carol Weeks: spoke to Dr. Burdick from cardiology, plan for limited echo, trend trops, lasix, and treat for suspected pericarditis with colchicine. Labs still pending. Resident Carol Weeks: Reassessed pt, no complaints at this time. Discussed lab and imaging results. Answered all questions. Spoke to Dr. Burdick, colchicine adjusted for renal function, will continue with echo and trending trops.

## 2022-07-30 NOTE — ED ADULT NURSE REASSESSMENT NOTE - COMFORT CARE
plan of care explained/repositioned/side rails up
ambulated to bathroom/meal provided/plan of care explained/po fluids offered/repositioned/wait time explained

## 2022-07-30 NOTE — CONSULT NOTE ADULT - ASSESSMENT
76 year old male patient with a history of HTN, HLD, DM2, hypothyroidism, obesity, bladder cancer s/p resection now self catheterizes, mod-severe MR, paroxysmal AF/AFL s/p RF PVI + CTI (7/13/22) with ERAF s/p CV 7/19/22 for atypical flutter with subsequent initiation of amiodarone who presents with chest discomfort.    His presentation is likely related to pericarditis and it could be that his initial symptoms were also related to this but were never treated with anti-inflammatory medications. He does not have ECG changes suggestive of ischemia. CXR on my read does not show pneumonia. He has no other signs of infection -- no leukocytosis, fever, chills, sweats. He does have signs of persistent CHF with peripheral edema. If his renal function is stable, I recommend starting Colchicine 0.6mg BID x1 week. We can give IV Hydrocortisone 100mg x1 now. Please give IV lasix 40mg x1 now and increase Torsemide to 40mg daily. Reduce Metoprolol to 50mg daily for bradycardia and continue amiodarone at current dose.    Recommendations:  - IV Hydrocortisone 100mg x1  - Colchicine 0.6mg BID x1 week  - IV Lasix 40mg x1  - Reduce Metoprolol Succinate to 50mg daily  - Continue amiodarone at 200mg daily  - f/up final CXR read  - Limited TTE to assess for any change in pericardial effusion  - R/o for MI with serial troponins  - Monitoring in observation overnight    Discussed with ER Team.    Thank you for this consult. We will follow with you.    Fernando Burdick MD  Clinical Cardiac Electrophysiology   76 year old male patient with a history of HTN, HLD, DM2, hypothyroidism, obesity, bladder cancer s/p resection now self catheterizes, mod-severe MR, paroxysmal AF/AFL s/p RF PVI + CTI (7/13/22) with ERAF s/p CV 7/19/22 for atypical flutter with subsequent initiation of amiodarone who presents with chest discomfort.    His presentation is likely related to pericarditis and it could be that his initial symptoms were also related to this but were never treated with anti-inflammatory medications. He does not have ECG changes suggestive of ischemia. CXR on my read does not show pneumonia. He has no other signs of infection -- no leukocytosis, fever, chills, sweats. He does have signs of persistent CHF with peripheral edema. If his renal function is stable, I recommend starting Colchicine 0.6mg BID x1 week. We can give IV Hydrocortisone 100mg x1 now. Please give IV lasix 40mg x1 now and increase Torsemide to 40mg daily. Reduce Metoprolol to 50mg daily for bradycardia and continue amiodarone at current dose.    Recommendations:  - IV Hydrocortisone 100mg x1  - Colchicine 0.6mg BID x1 week  - IV Lasix 40mg x1  - Reduce Metoprolol Succinate to 50mg daily  - Continue amiodarone at 200mg daily  - f/up final CXR read  - Limited TTE to assess for any change in pericardial effusion  - R/o for MI with serial troponins  - Monitoring in observation overnight    Discussed with ER Team.    Thank you for this consult. We will follow with you.    Fernando Burdick MD  Clinical Cardiac Electrophysiology    --    Creatinine returned elevated at 1.5. Will adjust medications as below.    Recommendations:  - Reduce Colchicine to 0.6 mg daily  - Discontinue sucralfate  - Continue PPI BID  - Maintain torsemide dose at 20mg daily    Fernando Burdick MD  Clinical Cardiac Electrophysiology     76 year old male patient with a history of HTN, HLD, DM2, hypothyroidism, obesity, bladder cancer s/p resection now self catheterizes, mod-severe MR, paroxysmal AF/AFL s/p RF PVI + CTI (7/13/22) with ERAF s/p CV 7/19/22 for atypical flutter with subsequent initiation of amiodarone who presents with chest discomfort.    His presentation is likely related to pericarditis and it could be that his initial symptoms were also related to this but were never treated with anti-inflammatory medications. He does not have ECG changes suggestive of ischemia. CXR on my read does not show pneumonia. He has no other signs of infection -- no leukocytosis, fever, chills, sweats. He does have signs of persistent CHF with peripheral edema. If his renal function is stable, I recommend starting Colchicine 0.6mg BID x1 week. We can give IV Hydrocortisone 100mg x1 now. Please give IV lasix 40mg x1 now and increase Torsemide to 40mg daily. Reduce Metoprolol to 50mg daily for bradycardia and continue amiodarone at current dose.    Recommendations:  - IV Hydrocortisone 100mg x1  - Colchicine 0.6mg BID x1 week  - IV Lasix 40mg x1  - Reduce Metoprolol Succinate to 50mg daily  - Continue amiodarone at 200mg daily  - f/up final CXR read  - Limited TTE to assess for any change in pericardial effusion  - R/o for MI with serial troponins  - Monitoring in observation overnight    Discussed with ER Team.    Thank you for this consult. We will follow with you.    Fernando Burdick MD  Clinical Cardiac Electrophysiology    --    Creatinine returned elevated at 1.5. Will adjust medications as below.    Recommendations:  - Reduce Colchicine to 0.6 mg daily  - Discontinue sucralfate  - Increase Pantoprazole to 40mg BID  - Change Torsemide 20mg daily to Furosemide 20mg daily  - Reduce Amiodarone 400mg BID to 200mg daily  - Reduce Metoprolol Succinate to 50mg daily  - Serial troponins  - Limited TTE  - F/up final CXR read  - Hydrocortisone 100mg IV x1    Discussed with ER team.    Fernando Burdick MD  Clinical Cardiac Electrophysiology

## 2022-07-30 NOTE — ED PROVIDER NOTE - ATTENDING CONTRIBUTION TO CARE
Pt had an ablation for AF 2 wks ago. Pt had recurrent flutter/fib and had a DCCV.  Pt states that since then he has had some mild chest pain and last night the pain was worse so he spoke with dr. magaña and was sent to the ER for further management.    physical - rrr. ctab. abd - soft, nt. no edema. no rash.    plan - labs and imaging reviewed. case dw cards. recommends obs for serial enzymes, tte and monitoring.

## 2022-07-30 NOTE — ED CDU PROVIDER INITIAL DAY NOTE - OBJECTIVE STATEMENT
77yo M w/pmh bladder ca, HTN, HLD, DM, hypothyroidism, afib s/p recent ablation 7/13, cardioversion 7/18, on Eliquis presents for CP. Reports onset last night, intermittent, tightness across his whole chest. Nonexertional, nonpleuritic, nonpositional. Denies f/ch, SOB, cough, n/v, sick contacts. Spoke to his cardiologist Dr. Burdick on the phone, who sent him to ER. Pt was seen by cards and recommends ECHO and repeat cardiac enzymes. Placed in obs for further testing.

## 2022-07-30 NOTE — ED ADULT NURSE REASSESSMENT NOTE - NS ED NURSE REASSESS COMMENT FT1
care assumed from JM Jacinto. patient states intermittent CP. denies CP at present. gaitan in place, draining clear yellow urine. tele monitoring in use. all questions answered. POC updated. bed in lowest position, call within reach

## 2022-07-30 NOTE — ED PROVIDER NOTE - OBJECTIVE STATEMENT
77yo M w/pmh bladder ca, HTN, HLD, DM, hypothyroidism, afib s/p recent ablation 7/13, cardioversion 7/18, on Eliquis presents for CP. Reports onset last night, intermittent, tightness across his whole chest. Nonexertional, nonpleuritic, nonpositional. Denies f/ch, SOB, cough, n/v, sick contacts. Spoke to his cardiologist Dr. Burdick on the phone, who sent him to ER.

## 2022-07-30 NOTE — ED PROVIDER NOTE - CLINICAL SUMMARY MEDICAL DECISION MAKING FREE TEXT BOX
77yo M w/pmh bladder ca, HTN, HLD, DM, hypothyroidism, afib s/p recent ablation 7/13, cardioversion 7/18, on Eliquis presents for CP. EKG shows sinus bradycardia. Labs, CXR pending.

## 2022-07-30 NOTE — CONSULT NOTE ADULT - SUBJECTIVE AND OBJECTIVE BOX
[facility]                                                               CARDIAC ELECTROPHYSIOLOGY                                                       [facility] [place]                                                      Office: [address], [facility]                                                       Telephone: [phone]. Fax:[phone]    HPI:  76 year old male patient with a history of HTN, HLD, DM2, hypothyroidism, obesity, bladder cancer s/p resection now self catheterizes, mod-severe MR, paroxysmal AF/AFL s/p RF PVI + CTI (7/13/22) with ERAF s/p CV 7/19/22 for atypical flutter with subsequent initiation of amiodarone who presents with chest discomfort. He has also had CHF post-operatively for which he was started on Torsemide 20mg daily with improvement in symptoms. His wife notes that he still has some bendopnea and peripheral edema. He states that he had some mild chest discomfort intermittently but last night it became worse around 6-7/10. It was described as a tightness. No clear exacerbating or alleviating factors, no change with position or breathing. No associated dizziness/lightheadedness or dyspnea. Given symptoms, he was recommended to come to the ER. Upon arrival, he reports his pain improved but is intermittent.    ECG: Sinus bradycardia with no acute ischemic changes.    TTE:  07/18/22 TTE: LVEF: 60-65%. Severely enlarged LA. Mod-severe RA enlargement. Small pericardial effusion. Moderate-severe MR. PASP 48.4 mmHg.    PAST MEDICAL & SURGICAL HISTORY:  Paroxysmal atrial fibrillation  Hypertension  Hyperlipidemia  Bladder cancer  Diabetes mellitus  Hypothyroidism  At risk for sleep apnea  H/O shoulder surgery  1/1/2017  H/O cardiac catheterization  5/2/2022  Bladder tumor  s/p resection    REVIEW OF SYSTEMS: As per HPI. Remaining 10 point ROS were reviewed and are negative.    MEDICATIONS  (STANDING):  colchicine 0.6 milliGRAM(s) Oral two times a day  furosemide   Injectable 40 milliGRAM(s) IV Push Once    Allergies  penicillins (Diarrhea)    SOCIAL HISTORY:  , retired   Coffee  Former smoker    FAMILY HISTORY:  FHx: ovarian cancer (Mother)  FH: breast cancer (Sibling)  FH: heart disease (Father)    Vital Signs Last 24 Hrs  T(C): 37.2 (30 Jul 2022 12:23), Max: 37.2 (30 Jul 2022 12:23)  T(F): 98.9 (30 Jul 2022 12:23), Max: 98.9 (30 Jul 2022 12:23)  HR: 48 (30 Jul 2022 13:30) (48 - 50)  BP: 116/60 (30 Jul 2022 13:30) (94/56 - 116/60)  BP(mean): --  RR: 18 (30 Jul 2022 13:30) (18 - 18)  SpO2: 95% (30 Jul 2022 13:30) (95% - 95%)    Parameters below as of 30 Jul 2022 13:30  Patient On (Oxygen Delivery Method): room air    Physical Exam:  Appearance: Normal, well nourished, obese  HEENT: Normal oral mucosa, EOMI, sclera non-icteric	  Cardiovascular: Normal S1 S2, No JVD, Possible rub, No murmurs, 1+ b/L LE edema  Respiratory: Lungs clear to auscultation, unlabored, no rhonchi/rales/wheezes  Psychiatry: A & O x 3, Mood & affect appropriate  Gastrointestinal:  Soft, Non-tender, + BS, no bruits	  Skin: No rashes, No ecchymoses, No cyanosis  Neurologic: Grossly non-focal with full strength in all four extremities  Extremities: Normal range of motion, No clubbing, cyanosis    LABS:                        11.5   9.04  )-----------( 228      ( 30 Jul 2022 13:10 )             35.4

## 2022-07-30 NOTE — ED CDU PROVIDER INITIAL DAY NOTE - ATTENDING APP SHARED VISIT CONTRIBUTION OF CARE
The patient seen and examined    Chest Pain    I, Joe Carpetner, performed the initial face to face bedside interview with this patient regarding history of present illness, review of symptoms and relevant past medical, social and family history.  I completed an independent physical examination.  I was the initial provider who evaluated this patient. I have signed out the follow up of any pending tests (i.e. labs, radiological studies) to the ACP.  I have communicated the patient’s plan of care and disposition with the ACP.

## 2022-07-30 NOTE — ED ADULT TRIAGE NOTE - CHIEF COMPLAINT QUOTE
Patient ambulated into ED with steady gait, Pt c/o chest pain started last night was told to come to ED per MD Ramirez

## 2022-07-31 VITALS
HEART RATE: 63 BPM | OXYGEN SATURATION: 96 % | SYSTOLIC BLOOD PRESSURE: 126 MMHG | DIASTOLIC BLOOD PRESSURE: 58 MMHG | RESPIRATION RATE: 20 BRPM

## 2022-07-31 DIAGNOSIS — N17.9 ACUTE KIDNEY FAILURE, UNSPECIFIED: ICD-10-CM

## 2022-07-31 LAB
GLUCOSE BLDC GLUCOMTR-MCNC: 114 MG/DL — HIGH (ref 70–99)
GLUCOSE BLDC GLUCOMTR-MCNC: 114 MG/DL — HIGH (ref 70–99)

## 2022-07-31 PROCEDURE — 85025 COMPLETE CBC W/AUTO DIFF WBC: CPT

## 2022-07-31 PROCEDURE — 93306 TTE W/DOPPLER COMPLETE: CPT | Mod: 26

## 2022-07-31 PROCEDURE — 93308 TTE F-UP OR LMTD: CPT

## 2022-07-31 PROCEDURE — 0225U NFCT DS DNA&RNA 21 SARSCOV2: CPT

## 2022-07-31 PROCEDURE — 96375 TX/PRO/DX INJ NEW DRUG ADDON: CPT | Mod: XU

## 2022-07-31 PROCEDURE — 36415 COLL VENOUS BLD VENIPUNCTURE: CPT

## 2022-07-31 PROCEDURE — 84484 ASSAY OF TROPONIN QUANT: CPT

## 2022-07-31 PROCEDURE — G0378: CPT

## 2022-07-31 PROCEDURE — 83880 ASSAY OF NATRIURETIC PEPTIDE: CPT

## 2022-07-31 PROCEDURE — 80053 COMPREHEN METABOLIC PANEL: CPT

## 2022-07-31 PROCEDURE — 71045 X-RAY EXAM CHEST 1 VIEW: CPT

## 2022-07-31 PROCEDURE — 99232 SBSQ HOSP IP/OBS MODERATE 35: CPT

## 2022-07-31 PROCEDURE — 82962 GLUCOSE BLOOD TEST: CPT

## 2022-07-31 PROCEDURE — 99285 EMERGENCY DEPT VISIT HI MDM: CPT | Mod: 25

## 2022-07-31 PROCEDURE — 93005 ELECTROCARDIOGRAM TRACING: CPT

## 2022-07-31 PROCEDURE — 99217: CPT

## 2022-07-31 PROCEDURE — 96374 THER/PROPH/DIAG INJ IV PUSH: CPT | Mod: XU

## 2022-07-31 RX ORDER — AMIODARONE HYDROCHLORIDE 400 MG/1
1 TABLET ORAL
Qty: 30 | Refills: 0
Start: 2022-07-31 | End: 2022-08-29

## 2022-07-31 RX ORDER — PANTOPRAZOLE SODIUM 20 MG/1
1 TABLET, DELAYED RELEASE ORAL
Qty: 28 | Refills: 0
Start: 2022-07-31 | End: 2022-08-13

## 2022-07-31 RX ORDER — COLCHICINE 0.6 MG
1 TABLET ORAL
Qty: 7 | Refills: 0
Start: 2022-07-31 | End: 2022-08-06

## 2022-07-31 RX ORDER — FUROSEMIDE 40 MG
1 TABLET ORAL
Qty: 30 | Refills: 0
Start: 2022-07-31 | End: 2022-08-29

## 2022-07-31 RX ORDER — METOPROLOL TARTRATE 50 MG
1 TABLET ORAL
Qty: 30 | Refills: 0
Start: 2022-07-31 | End: 2022-08-29

## 2022-07-31 RX ADMIN — AMIODARONE HYDROCHLORIDE 200 MILLIGRAM(S): 400 TABLET ORAL at 08:59

## 2022-07-31 RX ADMIN — APIXABAN 5 MILLIGRAM(S): 2.5 TABLET, FILM COATED ORAL at 06:14

## 2022-07-31 RX ADMIN — Medication 20 MILLIGRAM(S): at 08:59

## 2022-07-31 RX ADMIN — Medication 25 MICROGRAM(S): at 06:14

## 2022-07-31 RX ADMIN — Medication 0.6 MILLIGRAM(S): at 12:25

## 2022-07-31 RX ADMIN — PANTOPRAZOLE SODIUM 40 MILLIGRAM(S): 20 TABLET, DELAYED RELEASE ORAL at 06:14

## 2022-07-31 NOTE — ED ADULT NURSE REASSESSMENT NOTE - GENERAL PATIENT STATE
comfortable appearance/resting/sleeping
comfortable appearance/resting/sleeping
comfortable appearance/cooperative
comfortable appearance/cooperative

## 2022-07-31 NOTE — PROGRESS NOTE ADULT - ASSESSMENT
76 year old male patient with a history of HTN, HLD, DM2, hypothyroidism, obesity, bladder cancer s/p resection now self catheterizes, mod-severe MR, paroxysmal AF/AFL s/p RF PVI + CTI (7/13/22) with ERAF s/p CV 7/19/22 for atypical flutter with subsequent initiation of amiodarone who presents with chest discomfort likely due to pericarditis.    He has had improvement in chest discomfort with Hydrocortisone x1 and Colchicine. Will continue Colchicine course for 1 week. Will continue all other medication changes as described yesterday. WAYNE likely due to slight overdiuresis.    Recommendations:  - Colchicine 0.6mg daily x1 week  - Continue reduced Metoprolol Succinate dose of 50mg daily  - Continue reduced amiodarone dose of 200mg daily  - Discontinue sucralfate  - Continue Pantoprazole to 40mg BID x2 weeks, then 40mg daily  - Continue Furosemide 20mg daily  - Discontinue Torsemide 20mg daily (WAYNE likely due to overdiuresis, or unmasking of underlying renal disease)  - Limited TTE without any change in effusion  - Serial troponins negative, no MI    Discussed with ER team.    OK for discharge. Patient has f/up with cardiologist this week, with myself next week.    Fernando Burdick MD  Clinical Cardiac Electrophysiology

## 2022-07-31 NOTE — PROGRESS NOTE ADULT - SUBJECTIVE AND OBJECTIVE BOX
Electrophysiology Attending Follow Up Note    Subjective: Feels better. No significant chest discomfort. Notes that sometimes his chest tightness comes on when he pulls himself up to ambulate.    TELE: NSR.    MEDICATIONS  (STANDING):  aMIOdarone    Tablet 200 milliGRAM(s) Oral daily  apixaban 5 milliGRAM(s) Oral every 12 hours  atorvastatin 40 milliGRAM(s) Oral at bedtime  colchicine 0.6 milliGRAM(s) Oral daily  dextrose 5%. 1000 milliLiter(s) (50 mL/Hr) IV Continuous <Continuous>  dextrose 5%. 1000 milliLiter(s) (100 mL/Hr) IV Continuous <Continuous>  dextrose 50% Injectable 25 Gram(s) IV Push once  dextrose 50% Injectable 12.5 Gram(s) IV Push once  dextrose 50% Injectable 25 Gram(s) IV Push once  doxazosin 4 milliGRAM(s) Oral at bedtime  finasteride 5 milliGRAM(s) Oral daily  furosemide    Tablet 20 milliGRAM(s) Oral daily  glucagon  Injectable 1 milliGRAM(s) IntraMuscular once  insulin lispro (ADMELOG) corrective regimen sliding scale   SubCutaneous three times a day before meals  levothyroxine 25 MICROGram(s) Oral daily  metoprolol succinate ER 50 milliGRAM(s) Oral daily  pantoprazole    Tablet 40 milliGRAM(s) Oral two times a day    MEDICATIONS  (PRN):  dextrose Oral Gel 15 Gram(s) Oral once PRN Blood Glucose LESS THAN 70 milliGRAM(s)/deciliter    Allergies  penicillins (Diarrhea)    Vital Signs Last 24 Hrs  T(C): 36.6 (31 Jul 2022 11:20), Max: 36.9 (31 Jul 2022 00:57)  T(F): 97.8 (31 Jul 2022 11:20), Max: 98.4 (31 Jul 2022 00:57)  HR: 57 (31 Jul 2022 11:20) (48 - 64)  BP: 112/55 (31 Jul 2022 11:20) (104/51 - 133/76)  BP(mean): 77 (30 Jul 2022 19:25) (77 - 77)  RR: 20 (31 Jul 2022 11:20) (16 - 20)  SpO2: 94% (31 Jul 2022 11:20) (94% - 99%)    Parameters below as of 31 Jul 2022 11:20  Patient On (Oxygen Delivery Method): room air    Physical Exam:  Constitutional: NAD, AAOx3  Cardiovascular: +S1S2, RRR, II/VI systolic murmur at LLSB, no gallops  Pulmonary: CTA b/l, unlabored, no rhonchi, rales or wheezes  GI: soft NTND +BS  Extremities: trace pedal edema  Neuro: non focal, OLIVIER x4    LABS:             11.5   9.04  )-----------( 228      ( 30 Jul 2022 13:10 )             35.4     07-30    135  |  94<L>  |  32.3<H>  ----------------------------<  120<H>  3.9   |  27.0  |  1.50<H>    Ca    8.2<L>      30 Jul 2022 13:10    TPro  6.8  /  Alb  4.0  /  TBili  0.8  /  DBili  x   /  AST  26  /  ALT  16  /  AlkPhos  53  07-30

## 2022-07-31 NOTE — ED CDU PROVIDER DISPOSITION NOTE - NSFOLLOWUPINSTRUCTIONS_ED_ALL_ED_FT
please continue medication with new Recommendations by cardiology   - Colchicine 0.6mg daily x1 week  - Continue reduced Metoprolol Succinate dose of 50mg daily  - Continue reduced amiodarone dose of 200mg daily  - Discontinue sucralfate  - Continue Pantoprazole to 40mg every 12 hours x2 weeks, then 40mg daily  - Continue Furosemide 20mg daily  - Discontinue Torsemide 20mg daily     please call and follow up up with primary care Within 1-2 days   call and follow up with cardiology as well    Chest Pain    Chest pain can be caused by many different conditions which may or may not be dangerous. Causes include heartburn, lung infections, heart attack, blood clot in lungs, skin infections, strain or damage to muscle, cartilage, or bones, etc. In addition to a history and physical examination, an electrocardiogram (ECG) or other lab tests may have been performed to determine the cause of your chest pain. Follow up with your primary care provider or with a cardiologist as instructed.     SEEK IMMEDIATE MEDICAL CARE IF YOU HAVE ANY OF THE FOLLOWING SYMPTOMS: worsening chest pain, coughing up blood, unexplained back/neck/jaw pain, severe abdominal pain, dizziness or lightheadedness, fainting, shortness of breath, sweaty or clammy skin, vomiting, or racing heart beat. These symptoms may represent a serious problem that is an emergency. Do not wait to see if the symptoms will go away. Get medical help right away. Call 911 and do not drive yourself to the hospital.

## 2022-07-31 NOTE — ED POST DISCHARGE NOTE - ADDITIONAL DOCUMENTATION
received the call from DR Forte that pt has sever MVR that he had before - called back Dr Burdick and he is well aware of the MVR and pt had before as well dose not need to call the pt back

## 2022-07-31 NOTE — ED CDU PROVIDER DISPOSITION NOTE - PATIENT PORTAL LINK FT
You can access the FollowMyHealth Patient Portal offered by [facility] by registering at the following website: [email or web address]. By joining [facility]’s FollowMyHealth portal, you will also be able to view your health information using other applications (apps) compatible with our system.

## 2022-07-31 NOTE — ED ADULT NURSE REASSESSMENT NOTE - NS ED NURSE REASSESS COMMENT FT1
patient resting comfortably in NAD. per SAMMY Chaparro, BP meds pushed to breakfast as patient's BP low. asymptomatic at present. gaitan draining.

## 2022-07-31 NOTE — ED CDU PROVIDER DISPOSITION NOTE - CLINICAL COURSE
77yo M w/pmh bladder ca, HTN, HLD, DM, hypothyroidism, afib s/p recent ablation 7/13, cardioversion 7/18, on Eliquis presents for CP. Reports onset last night, intermittent, tightness across his whole chest. Nonexertional, nonpleuritic, nonpositional. Denies f/ch, SOB, cough, n/v, sick contacts. Spoke to his cardiologist Dr. Burdick on the phone, who sent him to ER. Pt was seen by cards and recommends ECHO and repeat cardiac enzymes. pt placed on obs - pt had echo done as per cardio team . seen again today at the bed side , he feels better since start the pt on colchicine - will d.c the pt on the change medication as per cardiology

## 2022-07-31 NOTE — ED CDU PROVIDER DISPOSITION NOTE - ATTENDING APP SHARED VISIT CONTRIBUTION OF CARE
pt with work up of chest pain; which negative for acs; pt to continue tx for pericarditis;  close follow up with cardiolgy as outpatient
37

## 2022-07-31 NOTE — ED ADULT NURSE NOTE - CAS EDN DISCHARGE INTERVENTIONS
patient has hx of bladder CA, self catheterized at home./IV discontinued, cath removed intact/Indwelling urinary catheter D/C'd and patient voided

## 2022-07-31 NOTE — ED ADULT NURSE REASSESSMENT NOTE - NS ED NURSE REASSESS COMMENT FT1
Assumed care of patient from JM Crum at 730, alert and oriented x4, resting comfortably in bed. Denies chest pains, denies SOB. Sinus bradycardia noted to monitor. Currently awaiting ECHO at this time. Wife at bedside. Will continue to monitor. Assumed care of patient from JM Crum at 730, alert and oriented x4, resting comfortably in bed. Denies chest pains, denies SOB. Sinus bradycardia noted to monitor. Currently awaiting ECHO at this time. Wife at bedside. Gonzalez catheter in place to bedside drainage. Clear yellow urine noted. Gonzalez care done.  Will continue to monitor.

## 2022-07-31 NOTE — ED CDU PROVIDER DISPOSITION NOTE - CARE PROVIDER_API CALL
Fernando Burdick)  Cardiac Electrophysiology; Cardiology  [address]  [place] [address]  Phone: [phone]  Fax: [phone]  Follow Up Time:

## 2022-08-05 ENCOUNTER — TRANSCRIPTION ENCOUNTER (OUTPATIENT)
Age: 77
End: 2022-08-05

## 2022-09-22 RX ORDER — METOPROLOL SUCCINATE 25 MG/1
25 TABLET, EXTENDED RELEASE ORAL DAILY
Refills: 2 | Status: ACTIVE | COMMUNITY

## 2022-09-22 RX ORDER — FINASTERIDE 5 MG/1
5 TABLET, FILM COATED ORAL
Qty: 90 | Refills: 1 | Status: ACTIVE | COMMUNITY

## 2022-09-22 RX ORDER — DOXAZOSIN 4 MG/1
4 TABLET ORAL DAILY
Refills: 0 | Status: ACTIVE | COMMUNITY

## 2022-09-26 PROBLEM — R07.89 CHEST DISCOMFORT: Status: ACTIVE | Noted: 2022-09-26

## 2022-09-26 PROBLEM — Z86.69 HISTORY OF SLEEP APNEA: Status: RESOLVED | Noted: 2022-09-26 | Resolved: 2022-09-26

## 2022-09-26 PROBLEM — K46.9 HERNIA: Status: ACTIVE | Noted: 2022-09-26

## 2022-09-26 PROBLEM — Z87.891 FORMER SMOKER: Status: ACTIVE | Noted: 2022-09-26

## 2022-09-26 PROBLEM — Z85.51 HISTORY OF MALIGNANT NEOPLASM OF BLADDER: Status: RESOLVED | Noted: 2022-09-26 | Resolved: 2022-09-26

## 2022-09-29 ENCOUNTER — APPOINTMENT (OUTPATIENT)
Dept: CARDIOTHORACIC SURGERY | Facility: CLINIC | Age: 77
End: 2022-09-29

## 2022-09-29 VITALS
TEMPERATURE: 98.5 F | SYSTOLIC BLOOD PRESSURE: 118 MMHG | OXYGEN SATURATION: 98 % | RESPIRATION RATE: 16 BRPM | DIASTOLIC BLOOD PRESSURE: 67 MMHG | HEART RATE: 97 BPM

## 2022-09-29 VITALS — HEIGHT: 72 IN | BODY MASS INDEX: 38.47 KG/M2 | WEIGHT: 284 LBS

## 2022-09-29 DIAGNOSIS — Z86.39 PERSONAL HISTORY OF OTHER ENDOCRINE, NUTRITIONAL AND METABOLIC DISEASE: ICD-10-CM

## 2022-09-29 DIAGNOSIS — Z87.891 PERSONAL HISTORY OF NICOTINE DEPENDENCE: ICD-10-CM

## 2022-09-29 DIAGNOSIS — K46.9 UNSPECIFIED ABDOMINAL HERNIA W/OUT OBSTRUCTION OR GANGRENE: ICD-10-CM

## 2022-09-29 DIAGNOSIS — I34.1 NONRHEUMATIC MITRAL (VALVE) PROLAPSE: ICD-10-CM

## 2022-09-29 DIAGNOSIS — I48.91 UNSPECIFIED ATRIAL FIBRILLATION: ICD-10-CM

## 2022-09-29 DIAGNOSIS — Z86.69 PERSONAL HISTORY OF OTHER DISEASES OF THE NERVOUS SYSTEM AND SENSE ORGANS: ICD-10-CM

## 2022-09-29 DIAGNOSIS — I34.0 NONRHEUMATIC MITRAL (VALVE) INSUFFICIENCY: ICD-10-CM

## 2022-09-29 DIAGNOSIS — Z78.9 OTHER SPECIFIED HEALTH STATUS: ICD-10-CM

## 2022-09-29 DIAGNOSIS — Z79.01 LONG TERM (CURRENT) USE OF ANTICOAGULANTS: ICD-10-CM

## 2022-09-29 DIAGNOSIS — E11.9 TYPE 2 DIABETES MELLITUS W/OUT COMPLICATIONS: ICD-10-CM

## 2022-09-29 DIAGNOSIS — N39.0 URINARY TRACT INFECTION, SITE NOT SPECIFIED: ICD-10-CM

## 2022-09-29 DIAGNOSIS — R07.89 OTHER CHEST PAIN: ICD-10-CM

## 2022-09-29 DIAGNOSIS — R07.9 CHEST PAIN, UNSPECIFIED: ICD-10-CM

## 2022-09-29 DIAGNOSIS — E66.9 OBESITY, UNSPECIFIED: ICD-10-CM

## 2022-09-29 DIAGNOSIS — G62.9 POLYNEUROPATHY, UNSPECIFIED: ICD-10-CM

## 2022-09-29 DIAGNOSIS — Z85.51 PERSONAL HISTORY OF MALIGNANT NEOPLASM OF BLADDER: ICD-10-CM

## 2022-09-29 DIAGNOSIS — E03.9 HYPOTHYROIDISM, UNSPECIFIED: ICD-10-CM

## 2022-09-29 PROCEDURE — 99214 OFFICE O/P EST MOD 30 MIN: CPT

## 2022-09-29 RX ORDER — METOPROLOL SUCCINATE 50 MG/1
50 TABLET, EXTENDED RELEASE ORAL
Qty: 30 | Refills: 2 | Status: COMPLETED | COMMUNITY
End: 2022-09-29

## 2022-09-29 RX ORDER — LEVOTHYROXINE SODIUM 0.03 MG/1
25 TABLET ORAL DAILY
Qty: 60 | Refills: 0 | Status: ACTIVE | COMMUNITY
Start: 2022-09-22

## 2022-09-29 RX ORDER — SITAGLIPTIN AND METFORMIN HYDROCHLORIDE 50; 1000 MG/1; MG/1
50-1000 TABLET, FILM COATED ORAL
Qty: 180 | Refills: 1 | Status: ACTIVE | COMMUNITY

## 2022-09-29 NOTE — DATA REVIEWED
[FreeTextEntry1] : ESDRAS at [facility]\par on 9/15/2022 \par left ventricular wall motion is normal \par Right ventricular global function is normal\par Mitral valve leaflets appear myxomatous\par prolapse of both leaflets\par There is anterior prolapse with a posterior directed eccentric jet which goes right up to and into the pulmonary veins There is also a second jet which climbs up the anterior wall reaching up to the interatrial  septum and crossing over in a small PFO. 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

## 2022-09-29 NOTE — PHYSICAL EXAM
[General Appearance - Alert] : alert [Sclera] : the sclera and conjunctiva were normal [Outer Ear] : the ears and nose were normal in appearance [Jugular Venous Distention Increased] : there was no jugular-venous distention [] : no respiratory distress [Respiration, Rhythm And Depth] : normal respiratory rhythm and effort [Auscultation Breath Sounds / Voice Sounds] : lungs were clear to auscultation bilaterally [II] : a grade 2 [Examination Of The Chest] : the chest was normal in appearance [Breast Appearance] : normal in appearance [Bowel Sounds] : normal bowel sounds [Abdomen Soft] : soft [Cervical Lymph Nodes Enlarged Posterior Bilaterally] : posterior cervical [No CVA Tenderness] : no ~M costovertebral angle tenderness [Abnormal Walk] : normal gait [Skin Color & Pigmentation] : normal skin color and pigmentation [No Focal Deficits] : no focal deficits [Oriented To Time, Place, And Person] : oriented to person, place, and time [Impaired Insight] : insight and judgment were intact [Right Carotid Bruit] : no bruit heard over the right carotid [Left Carotid Bruit] : no bruit heard over the left carotid

## 2022-09-29 NOTE — ASSESSMENT
[FreeTextEntry1] : Cesar is a 76 year old male with PMH of LILLY (CPAP?), DMT2, HLD, HTN, former smoker, peripheral neuropathy, pAF (s/p DCCV and RFA via Dr. Burdick now on Apixaban, now in NSR), obesity (BMI 39.1), urinary hesitancy (self cauterizes), and severe MR seen on recent ESDRAS. He reports chest discomfort underwent a cardiac \par \par Catherization which showed no obstructive coronary disease. He was referred for surgical consultation to discuss open mitral surgery vs. mitral ALANA clip. \par \par He follows with Dr. Nichole for his cardiology care last seen on 9/21/2022. He reports fatigue and mild dyspnea on exertion, pedal edema. He has difficulty walking due to his neuropathy. \par \par I have reviewed the patient's medical records, diagnostic images during the time of this office consultation and have made the following recommendation. Review of the imaging shows MVP with severe MR  has progressed and will require intervention. \par \par ESDRAS at [facility]\par on 9/15/2022 \par left ventricular wall motion is normal \par Right ventricular global function is normal\par Mitral valve leaflets appear myxomatous\par prolapse of both leaflets\par There is anterior prolapse with a posterior directed eccentric jet which goes right up to and into the pulmonary veins There is also a second jet which climbs up the anterior wall reaching up to the interatrial  septum and crossing over in a small PFO. 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 \par \par Risks, benefits and alternatives to surgery discussed. Risks included but not limited to bleeding, risk of stroke, myocardial Infarction, kidney problems, blood transfusion, permanent pacemaker implantation, infections and death. Various approaches discussed in detail.\par \par All questions have been fully answered and concerns addressed. Patient would like to proceed further work up and evaluation by our Structural Team. \par \par If his anatomy is deemed not suitable for mitral ALANA will then discuss open mitral valve surgery and ablation. \par

## 2022-09-29 NOTE — END OF VISIT
[Time Spent: ___ minutes] : I have spent [unfilled] minutes of time on the encounter. [FreeTextEntry3] : I personally performed the services described in the documentation, reviewed the documentation recorded by the scribe in my presence and it accurately and completely records my words and actions.\par \par I, Yesenia Sams NP, am scribing for and the presence of Dr. Escobar Barraza, the following sections HISTORY OF PRESENT ILLNESS, PAST MEDICAL/FAMILY/SOCIAL HISTORY; REVIEW OF SYSTEMS; VITAL SIGNS; PHYSICAL EXAM; DISPOSITION.\par

## 2022-09-29 NOTE — REVIEW OF SYSTEMS
[Feeling Tired] : feeling tired [Lower Ext Edema] : lower extremity edema [SOB on Exertion] : shortness of breath during exertion [Joint Swelling] : joint swelling [Joint Stiffness] : joint stiffness [Skin Lesions] : skin lesion [Easy Bleeding] : a tendency for easy bleeding [Eye Pain] : no eye pain [Loss Of Hearing] : no hearing loss [Chest Pain] : no chest pain [Abdominal Pain] : no abdominal pain [Nocturia] : no nocturia [Dizziness] : no dizziness [Suicidal] : not suicidal [Proptosis] : no proptosis [FreeTextEntry8] : self catherizes every day

## 2022-09-29 NOTE — HISTORY OF PRESENT ILLNESS
[Sleep Apnea] : Sleep Apnea [A fib / A flutter] : A fib / A flutter [FreeTextEntry1] : Cesar is a 76 year old male with PMH of LILLY (CPAP), DMT2, HLD, HTN, former smoker, peripheral neuropathy, pAF (s/p DCCV and RFA via Dr. Burdick now on Apixaban, now in NSR), obesity (BMI 39.1), urinary hesitancy (self cauterizes), and severe MR seen on recent ESDRAS. He reports chest discomfort underwent a cardiac Catherization which showed no obstructive coronary disease. He was referred for surgical consultation. \par \par He follows with Dr. Nichole for his cardiology care last seen on 9/21/2022. \par \par ESDRAS at [facility]\par on 9/15/2022 \par left ventricular wall motion is normal \par Right ventricular global function is normal\par Mitral valve leaflets appear myxomatous\par prolapse of both leaflets\par There is anterior prolapse with a posterior directed eccentric jet which goes right up to and into the pulmonary veins There is also a second jet which climbs up the anterior wall reaching up to the interatrial  septum and crossing over in a small PFO. 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  [Dialysis] : no dialysis [Liver Disease] : no liver disease [Unresponsive Neurologic State] : not in a unresponsive neurologic state [Cerebrovascular Disease] : no cerebrovascular disease

## 2022-10-18 ENCOUNTER — OUTPATIENT (OUTPATIENT)
Dept: OUTPATIENT SERVICES | Facility: HOSPITAL | Age: 77
LOS: 1 days | End: 2022-10-18
Payer: COMMERCIAL

## 2022-10-18 VITALS
DIASTOLIC BLOOD PRESSURE: 78 MMHG | HEIGHT: 72 IN | HEART RATE: 72 BPM | TEMPERATURE: 98 F | RESPIRATION RATE: 16 BRPM | OXYGEN SATURATION: 96 % | SYSTOLIC BLOOD PRESSURE: 124 MMHG | WEIGHT: 283.96 LBS

## 2022-10-18 DIAGNOSIS — U07.1 COVID-19: ICD-10-CM

## 2022-10-18 DIAGNOSIS — I34.0 NONRHEUMATIC MITRAL (VALVE) INSUFFICIENCY: ICD-10-CM

## 2022-10-18 DIAGNOSIS — Z98.890 OTHER SPECIFIED POSTPROCEDURAL STATES: Chronic | ICD-10-CM

## 2022-10-18 DIAGNOSIS — I48.0 PAROXYSMAL ATRIAL FIBRILLATION: ICD-10-CM

## 2022-10-18 DIAGNOSIS — Z29.9 ENCOUNTER FOR PROPHYLACTIC MEASURES, UNSPECIFIED: ICD-10-CM

## 2022-10-18 DIAGNOSIS — E11.9 TYPE 2 DIABETES MELLITUS WITHOUT COMPLICATIONS: ICD-10-CM

## 2022-10-18 DIAGNOSIS — I34.89 OTHER NONRHEUMATIC MITRAL VALVE DISORDERS: ICD-10-CM

## 2022-10-18 DIAGNOSIS — Z01.818 ENCOUNTER FOR OTHER PREPROCEDURAL EXAMINATION: ICD-10-CM

## 2022-10-18 DIAGNOSIS — D49.4 NEOPLASM OF UNSPECIFIED BEHAVIOR OF BLADDER: Chronic | ICD-10-CM

## 2022-10-18 LAB
A1C WITH ESTIMATED AVERAGE GLUCOSE RESULT: 6.2 % — HIGH (ref 4–5.6)
ANION GAP SERPL CALC-SCNC: 12 MMOL/L — SIGNIFICANT CHANGE UP (ref 5–17)
BLD GP AB SCN SERPL QL: NEGATIVE — SIGNIFICANT CHANGE UP
BUN SERPL-MCNC: 16 MG/DL — SIGNIFICANT CHANGE UP (ref 7–23)
CALCIUM SERPL-MCNC: 9.1 MG/DL — SIGNIFICANT CHANGE UP (ref 8.4–10.5)
CHLORIDE SERPL-SCNC: 102 MMOL/L — SIGNIFICANT CHANGE UP (ref 96–108)
CO2 SERPL-SCNC: 23 MMOL/L — SIGNIFICANT CHANGE UP (ref 22–31)
CREAT SERPL-MCNC: 1.01 MG/DL — SIGNIFICANT CHANGE UP (ref 0.5–1.3)
EGFR: 77 ML/MIN/1.73M2 — SIGNIFICANT CHANGE UP
ESTIMATED AVERAGE GLUCOSE: 131 MG/DL — HIGH (ref 68–114)
GLUCOSE SERPL-MCNC: 139 MG/DL — HIGH (ref 70–99)
HCT VFR BLD CALC: 35.4 % — LOW (ref 39–50)
HGB BLD-MCNC: 11.3 G/DL — LOW (ref 13–17)
MCHC RBC-ENTMCNC: 27.9 PG — SIGNIFICANT CHANGE UP (ref 27–34)
MCHC RBC-ENTMCNC: 31.9 GM/DL — LOW (ref 32–36)
MCV RBC AUTO: 87.4 FL — SIGNIFICANT CHANGE UP (ref 80–100)
NRBC # BLD: 0 /100 WBCS — SIGNIFICANT CHANGE UP (ref 0–0)
PLATELET # BLD AUTO: 199 K/UL — SIGNIFICANT CHANGE UP (ref 150–400)
POTASSIUM SERPL-MCNC: 4.3 MMOL/L — SIGNIFICANT CHANGE UP (ref 3.5–5.3)
POTASSIUM SERPL-SCNC: 4.3 MMOL/L — SIGNIFICANT CHANGE UP (ref 3.5–5.3)
RBC # BLD: 4.05 M/UL — LOW (ref 4.2–5.8)
RBC # FLD: 16 % — HIGH (ref 10.3–14.5)
RH IG SCN BLD-IMP: POSITIVE — SIGNIFICANT CHANGE UP
SODIUM SERPL-SCNC: 137 MMOL/L — SIGNIFICANT CHANGE UP (ref 135–145)
WBC # BLD: 6.82 K/UL — SIGNIFICANT CHANGE UP (ref 3.8–10.5)
WBC # FLD AUTO: 6.82 K/UL — SIGNIFICANT CHANGE UP (ref 3.8–10.5)

## 2022-10-18 PROCEDURE — 85027 COMPLETE CBC AUTOMATED: CPT

## 2022-10-18 PROCEDURE — 86923 COMPATIBILITY TEST ELECTRIC: CPT

## 2022-10-18 PROCEDURE — 86901 BLOOD TYPING SEROLOGIC RH(D): CPT

## 2022-10-18 PROCEDURE — 86850 RBC ANTIBODY SCREEN: CPT

## 2022-10-18 PROCEDURE — 86900 BLOOD TYPING SEROLOGIC ABO: CPT

## 2022-10-18 PROCEDURE — G0463: CPT

## 2022-10-18 PROCEDURE — 87640 STAPH A DNA AMP PROBE: CPT

## 2022-10-18 PROCEDURE — 80048 BASIC METABOLIC PNL TOTAL CA: CPT

## 2022-10-18 PROCEDURE — 87086 URINE CULTURE/COLONY COUNT: CPT

## 2022-10-18 PROCEDURE — 87641 MR-STAPH DNA AMP PROBE: CPT

## 2022-10-18 PROCEDURE — 83036 HEMOGLOBIN GLYCOSYLATED A1C: CPT

## 2022-10-18 RX ORDER — METOPROLOL TARTRATE 50 MG
1 TABLET ORAL
Qty: 0 | Refills: 0 | DISCHARGE

## 2022-10-18 RX ORDER — VANCOMYCIN HCL 1 G
1750 VIAL (EA) INTRAVENOUS ONCE
Refills: 0 | Status: DISCONTINUED | OUTPATIENT
Start: 2022-10-24 | End: 2022-10-24

## 2022-10-18 RX ORDER — DILTIAZEM HCL 120 MG
240 CAPSULE, EXT RELEASE 24 HR ORAL
Qty: 0 | Refills: 0 | DISCHARGE

## 2022-10-18 NOTE — H&P PST ADULT - PROBLEM SELECTOR PLAN 1
** Pt was tested covid positive with rapid test on 10/10/2022( surgeon aware)  & had MAB from [facility] on 10/10/2022 & pt opened the patient portal of [facility] & shown the results but unable to send or print .    Denies any Covid symptoms today. ** Pt was tested covid positive with rapid test on 10/10/2022( surgeon aware)  & had MAB from [facility] on 10/10/2022 (copy of available proof in chart)  Denies any Covid symptoms today.

## 2022-10-18 NOTE — H&P PST ADULT - GENERAL COMMENTS
had positive covid infection with mild symptoms on 10/10/22 had positive covid infection with mild symptoms for 2 days had MAB on 10/10/22

## 2022-10-18 NOTE — H&P PST ADULT - FALL HARM RISK - RISK INTERVENTIONS

## 2022-10-18 NOTE — H&P PST ADULT - ASSESSMENT
76 year old male patient with a history of HTN, HLD, DM2, hypothyroidism, obesity, bladder cancer s/p resection now self catheterizes and persistent AFib who underwent ablation on 7/13/22 ([facility]/PVI and CTI). Presents with ERAF    Patient has been compliant with anticoagulation and has not eaten today    - Plan for DCCV today  - Will plan on starting Amiodarone 400mg PO BID x 14 days followed by 200mg PO daily thereafter.    77 year old male patient with a history of HTN, HLD, DM2, hypothyroidism, obesity, bladder cancer s/p resection now self catheterizes and persistent AFib who underwent ablation on 22 ([facility]/PVI and CTI).    scheduled for Mitral valve replacement , MAZE, left atrial appendage closure on 10/20/2022.  CAPRINI SCORE [CLOT]    AGE RELATED RISK FACTORS                                                       MOBILITY RELATED FACTORS  [ ] Age 41-60 years                                            (1 Point)                  [ ] Bed rest                                                        (1 Point)  [ ] Age: 61-74 years                                           (2 Points)                 [ ] Plaster cast                                                   (2 Points)  [x ] Age= 75 years                                              (3 Points)                 [ ] Bed bound for more than 72 hours                 (2 Points)    DISEASE RELATED RISK FACTORS                                               GENDER SPECIFIC FACTORS  [ ] Edema in the lower extremities                       (1 Point)                  [ ] Pregnancy                                                     (1 Point)  [ ] Varicose veins                                               (1 Point)                  [ ] Post-partum < 6 weeks                                   (1 Point)             [x ] BMI > 25 Kg/m2                                            (1 Point)                  [ ] Hormonal therapy  or oral contraception          (1 Point)                 [ ] Sepsis (in the previous month)                        (1 Point)                  [ ] History of pregnancy complications                 (1 point)  [ ] Pneumonia or serious lung disease                                               [ ] Unexplained or recurrent                     (1 Point)           (in the previous month)                               (1 Point)  [ ] Abnormal pulmonary function test                     (1 Point)                 SURGERY RELATED RISK FACTORS  [ ] Acute myocardial infarction                              (1 Point)                 [ ]  Section                                             (1 Point)  [ ] Congestive heart failure (in the previous month)  (1 Point)               [ ] Minor surgery                                                  (1 Point)   [ ] Inflammatory bowel disease                             (1 Point)                 [ ] Arthroscopic surgery                                        (2 Points)  [ ] Central venous access                                      (2 Points)                [x ] General surgery lasting more than 45 minutes   (2 Points)       [ ] Stroke (in the previous month)                          (5 Points)               [ ] Elective arthroplasty                                         (5 Points)                                                                                                                                               HEMATOLOGY RELATED FACTORS                                                 TRAUMA RELATED RISK FACTORS  [ ] Prior episodes of VTE                                     (3 Points)                [ ] Fracture of the hip, pelvis, or leg                       (5 Points)  [ ] Positive family history for VTE                         (3 Points)                 [ ] Acute spinal cord injury (in the previous month)  (5 Points)  [ ] Prothrombin 04084 A                                     (3 Points)                 [ ] Paralysis  (less than 1 month)                             (5 Points)  [ ] Factor V Leiden                                             (3 Points)                  [ ] Multiple Trauma within 1 month                        (5 Points)  [ ] Lupus anticoagulants                                     (3 Points)                                                           [ ] Anticardiolipin antibodies                               (3 Points)                                                       [ ] High homocysteine in the blood                      (3 Points)                                             [ ] Other congenital or acquired thrombophilia      (3 Points)                                                [ ] Heparin induced thrombocytopenia                  (3 Points)                                          Total Score [    6      ]    Caprini Score 0 - 2:  Low Risk, No VTE Prophylaxis required for most patients, encourage ambulation  Caprini Score 3 - 6:  At Risk, pharmacologic VTE prophylaxis is indicated for most patients (in the absence of a contraindication)  Caprini Score Greater than or = 7:  High Risk, pharmacologic VTE prophylaxis is indicated for most patients (in the absence of a contraindication)     77 year old male patient with a history of HTN, HLD, DM2, hypothyroidism, obesity, bladder cancer s/p resection now self catheterizes and persistent AFib who underwent ablation on 22 ([facility]/PVI and CTI). Now with severe Mitral valve disorder, scheduled for Mitral valve replacement , MAZE, left atrial appendage closure on 10/20/2022.  CAPRINI SCORE [CLOT]    AGE RELATED RISK FACTORS                                                       MOBILITY RELATED FACTORS  [ ] Age 41-60 years                                            (1 Point)                  [ ] Bed rest                                                        (1 Point)  [ ] Age: 61-74 years                                           (2 Points)                 [ ] Plaster cast                                                   (2 Points)  [x ] Age= 75 years                                              (3 Points)                 [ ] Bed bound for more than 72 hours                 (2 Points)    DISEASE RELATED RISK FACTORS                                               GENDER SPECIFIC FACTORS  [ ] Edema in the lower extremities                       (1 Point)                  [ ] Pregnancy                                                     (1 Point)  [ ] Varicose veins                                               (1 Point)                  [ ] Post-partum < 6 weeks                                   (1 Point)             [x ] BMI > 25 Kg/m2                                            (1 Point)                  [ ] Hormonal therapy  or oral contraception          (1 Point)                 [ ] Sepsis (in the previous month)                        (1 Point)                  [ ] History of pregnancy complications                 (1 point)  [ ] Pneumonia or serious lung disease                                               [ ] Unexplained or recurrent                     (1 Point)           (in the previous month)                               (1 Point)  [ ] Abnormal pulmonary function test                     (1 Point)                 SURGERY RELATED RISK FACTORS  [ ] Acute myocardial infarction                              (1 Point)                 [ ]  Section                                             (1 Point)  [ ] Congestive heart failure (in the previous month)  (1 Point)               [ ] Minor surgery                                                  (1 Point)   [ ] Inflammatory bowel disease                             (1 Point)                 [ ] Arthroscopic surgery                                        (2 Points)  [ ] Central venous access                                      (2 Points)                [x ] General surgery lasting more than 45 minutes   (2 Points)       [ ] Stroke (in the previous month)                          (5 Points)               [ ] Elective arthroplasty                                         (5 Points)                                                                                                                                               HEMATOLOGY RELATED FACTORS                                                 TRAUMA RELATED RISK FACTORS  [ ] Prior episodes of VTE                                     (3 Points)                [ ] Fracture of the hip, pelvis, or leg                       (5 Points)  [ ] Positive family history for VTE                         (3 Points)                 [ ] Acute spinal cord injury (in the previous month)  (5 Points)  [ ] Prothrombin 99139 A                                     (3 Points)                 [ ] Paralysis  (less than 1 month)                             (5 Points)  [ ] Factor V Leiden                                             (3 Points)                  [ ] Multiple Trauma within 1 month                        (5 Points)  [ ] Lupus anticoagulants                                     (3 Points)                                                           [ ] Anticardiolipin antibodies                               (3 Points)                                                       [ ] High homocysteine in the blood                      (3 Points)                                             [ ] Other congenital or acquired thrombophilia      (3 Points)                                                [ ] Heparin induced thrombocytopenia                  (3 Points)                                          Total Score [    6      ]    Caprini Score 0 - 2:  Low Risk, No VTE Prophylaxis required for most patients, encourage ambulation  Caprini Score 3 - 6:  At Risk, pharmacologic VTE prophylaxis is indicated for most patients (in the absence of a contraindication)  Caprini Score Greater than or = 7:  High Risk, pharmacologic VTE prophylaxis is indicated for most patients (in the absence of a contraindication)

## 2022-10-18 NOTE — H&P PST ADULT - PROBLEM SELECTOR PLAN 2
Mitral valve replacement , MAZE, left atrial appendage closure on 10/20/2022.  so no preop Covid test required.    Denies any Covid symptoms today. Mitral valve replacement , MAZE, left atrial appendage closure on 10/20/2022,   surgeon/ ANES aware, no preop Covid test required.    Denies any Covid symptoms today.

## 2022-10-18 NOTE — H&P PST ADULT - PROBLEM SELECTOR PROBLEM 4
DATE: 06/02/2017



SUBJECTIVE:  The patient is seen sitting in bed.  He is awake.  He is alert and comfortable.



VITAL SIGNS:  Blood pressure 130/60, heart rate 72, respiratory rate 18, temperature 97.9.

HEENT:  Normocephalic, atraumatic.

NECK:  Supple, no JVD.

LUNGS:  Bilaterally equal air entry.  No rales.

CARDIAC:  S1, S2, regular rate and rhythm, no murmur, no rub.

ABDOMEN:  Obese, distended, soft, nontender, bowel sounds present.

EXTREMITIES:  No lower extremity edema.

INTAKE AND OUTPUT:  Not charted.



LABORATORY DATA:  Not available.



MEDICATIONS:  List reviewed.



ASSESSMENT AND PLAN:

1.  Status post sepsis.

2.  Status post respiratory failure.

3.  Methicillin-resistant Staphylococcus aureus pneumonia.

4.  Status post acute _____ hepatitis.

5.  Hypertension.

6.  End-stage renal disease.



PLAN:

1.  Dialysis today.

2.  Off all antibiotics now.

3.  Discharge planning.

4.  Continue antihypertensives and phosphate binders.





__________________________________________

Lavonne Maria MD







cc:



DD: 06/02/2017 09:57:11  [id number]

TT: 06/02/2017 10:41:03

Confirmation # [id number]

Dictation # [id number]

helen Paroxysmal atrial fibrillation

## 2022-10-18 NOTE — H&P PST ADULT - NSICDXPASTSURGICALHX_GEN_ALL_CORE_FT
PAST SURGICAL HISTORY:  Bladder tumor s/p resection    H/O cardiac catheterization 5/2/2022    H/O shoulder surgery 1/1/2017     PAST SURGICAL HISTORY:  Bladder tumor s/p resection 2020    H/O cardiac catheterization 5/2/2022    H/O shoulder surgery 1/1/2017

## 2022-10-18 NOTE — H&P PST ADULT - HEALTH CARE MAINTENANCE
Flu vaccine denies   Covid vaccine up to date   On yearly Annual physicals/ follow up regularly.  Last colonoscopy -up to date, last 3 years ago, had benign polyps

## 2022-10-18 NOTE — H&P PST ADULT - NSICDXPASTMEDICALHX_GEN_ALL_CORE_FT
PAST MEDICAL HISTORY:  At risk for sleep apnea     Bladder cancer     Diabetes mellitus     Hyperlipidemia     Hypertension     Hypothyroidism     Paroxysmal atrial fibrillation      PAST MEDICAL HISTORY:  At risk for sleep apnea LILLY on C-pap    Bladder cancer     Bladder cancer rsx/ treatment 2020, self cath 3x/day    COVID-19 virus infection positive with mild infection 10/10/22 , had MAB on 10/10/22 at [facility], surgeon aware    Diabetes mellitus 2    Dysuria on self cath 6x/day, UC sent    Hyperlipidemia     Hypertension     Hypothyroidism     PAF (paroxysmal atrial fibrillation) s/p DCCV 7 rfa DR Hoover > nsr    Paroxysmal atrial fibrillation s/p ablation  on ELIQUIS till 10/13    Severe mitral regurgitation seen in recent ESDRAS

## 2022-10-18 NOTE — H&P PST ADULT - HISTORY OF PRESENT ILLNESS
77  year old  RHD male PMH of HTN, HLD, DM2, hypothyroidism, obesity, LILLY on C-pap, ,  bladder cancer (2020) s/p resection now self catheterizes 6x/day . He underwent a uncomplicated radiofrequency ablation of atrial fibrillation (WACA/PVI, CTI) on 7/13/22.      4/28/2022 TTE: LVEF 61%. LV thickness is mildly decreased. Grade 2 diastolic dysfunction. Interventricular septal thickness is mildly increased. RV systolic pressure elevated at 40-50 mmHg. Moderately dilated LA (LAd 5.3 cm; MARQUISE 422.6 ml/m2). Moderately dilated RA. Mild MR with eccentric jet. Mild TR.  Cath 5/2/2022: no obstructive coronary disease   77  year old  RHD male PMH of HTN, HLD, DM2, former smoker,  hypothyroidism, obesity, LILLY on C-pap,  bladder cancer (2020,  s/p resection of bladder tumor/ now self catheterizes 6x/day ). PAF s/p DCCV /uncomplicated radiofrequency ablation of atrial fibrillation (WACA/PVI, CTI) on 7/13/22 , presence of a shunt at atrial level/ left atrial appendage & severe MR . Presents with c/o recent abnormal echo with more fatigue due to mitral valve regurgitation, for  scheduled Mitral valve replacement , MAZE, left atrial appendage closure on 10/20/2022.  ** Pt was tested covid positive with rapid test on 10/10/2022( surgeon aware)  & had MAB from [facility] on 10/10/2022 & pt opened the patient portal of [facility] & shown the results but unable to send or print .    Denies any Covid symptoms today.      4/28/2022 TTE: LVEF 61%. LV thickness is mildly decreased. Grade 2 diastolic dysfunction. Interventricular septal thickness is mildly increased. RV systolic pressure elevated at 40-50 mmHg. Moderately dilated LA (LAd 5.3 cm; MARQUISE 422.6 ml/m2). Moderately dilated RA. Mild MR with eccentric jet. Mild TR.  Cath 5/2/2022: no obstructive coronary disease   77  year old  RHD male PMH of HTN, HLD, DM2, former smoker,  hypothyroidism, obesity, LILLY on C-pap,  bladder cancer (2020, s/p resection of bladder tumor/ currently self catheterizes 6x/day ), PAF s/p DCCV /uncomplicated radiofrequency ablation of atrial fibrillation (WACA/PVI, CTI) on 7/13/22,  left atrial appendage & severe MR . Presents with c/o recent" abnormal echo with fatigue due to mitral valve regurgitation. Presents for  scheduled Mitral valve replacement , MAZE, left atrial appendage closure on 10/20/2022.  ** Pt s/p tested covid positive with rapid test on 10/10/2022  & had  Bebtelovimab MAB from [facility] on 10/10/44079  & available document in chart & as per patient surgeon aware & ok for suregry . Case discussed with ANES, & No preop covid test required.   Denies any Covid symptoms today.       4/28/2022 TTE: LVEF 61%. LV thickness is mildly decreased. Grade 2 diastolic dysfunction. Interventricular septal thickness is mildly increased. RV systolic pressure elevated at 40-50 mmHg. Moderately dilated LA (LAd 5.3 cm; MARQUISE 422.6 ml/m2). Moderately dilated RA. Mild MR with eccentric jet. Mild TR.  Cath 5/2/2022: no obstructive coronary disease

## 2022-10-18 NOTE — H&P PST ADULT - PROBLEM SELECTOR PLAN 3
Will follow up with labs/ fingerstick. Preop HgA1c ordered Instructed to hold metformin ( last dose on 10/18  AM  &  STAT FS  on the day of surgery ordered.

## 2022-10-19 LAB
CULTURE RESULTS: SIGNIFICANT CHANGE UP
MRSA PCR RESULT.: SIGNIFICANT CHANGE UP
S AUREUS DNA NOSE QL NAA+PROBE: SIGNIFICANT CHANGE UP
SPECIMEN SOURCE: SIGNIFICANT CHANGE UP

## 2022-10-23 ENCOUNTER — TRANSCRIPTION ENCOUNTER (OUTPATIENT)
Age: 77
End: 2022-10-23

## 2022-10-24 ENCOUNTER — INPATIENT (INPATIENT)
Facility: HOSPITAL | Age: 77
LOS: 13 days | Discharge: HOME | DRG: 219 | End: 2022-11-07
Attending: THORACIC SURGERY (CARDIOTHORACIC VASCULAR SURGERY) | Admitting: THORACIC SURGERY (CARDIOTHORACIC VASCULAR SURGERY)
Payer: COMMERCIAL

## 2022-10-24 ENCOUNTER — APPOINTMENT (OUTPATIENT)
Dept: CARDIOTHORACIC SURGERY | Facility: HOSPITAL | Age: 77
End: 2022-10-24

## 2022-10-24 ENCOUNTER — RESULT REVIEW (OUTPATIENT)
Age: 77
End: 2022-10-24

## 2022-10-24 VITALS
HEIGHT: 72 IN | WEIGHT: 279.77 LBS | DIASTOLIC BLOOD PRESSURE: 62 MMHG | SYSTOLIC BLOOD PRESSURE: 123 MMHG | RESPIRATION RATE: 20 BRPM | TEMPERATURE: 98 F | OXYGEN SATURATION: 98 % | HEART RATE: 61 BPM

## 2022-10-24 DIAGNOSIS — Z98.890 OTHER SPECIFIED POSTPROCEDURAL STATES: Chronic | ICD-10-CM

## 2022-10-24 DIAGNOSIS — I34.81 NONRHEUMATIC MITRAL (VALVE) ANNULUS CALCIFICATION: ICD-10-CM

## 2022-10-24 DIAGNOSIS — D49.4 NEOPLASM OF UNSPECIFIED BEHAVIOR OF BLADDER: Chronic | ICD-10-CM

## 2022-10-24 LAB
ALBUMIN SERPL ELPH-MCNC: 3.2 G/DL — LOW (ref 3.3–5)
ALBUMIN SERPL ELPH-MCNC: 3.5 G/DL — SIGNIFICANT CHANGE UP (ref 3.3–5)
ALP SERPL-CCNC: 44 U/L — SIGNIFICANT CHANGE UP (ref 40–120)
ALP SERPL-CCNC: 45 U/L — SIGNIFICANT CHANGE UP (ref 40–120)
ALT FLD-CCNC: 20 U/L — SIGNIFICANT CHANGE UP (ref 10–45)
ALT FLD-CCNC: 22 U/L — SIGNIFICANT CHANGE UP (ref 10–45)
ANION GAP SERPL CALC-SCNC: 12 MMOL/L — SIGNIFICANT CHANGE UP (ref 5–17)
ANION GAP SERPL CALC-SCNC: 13 MMOL/L — SIGNIFICANT CHANGE UP (ref 5–17)
APTT BLD: 26.8 SEC — LOW (ref 27.5–35.5)
APTT BLD: 32.8 SEC — SIGNIFICANT CHANGE UP (ref 27.5–35.5)
AST SERPL-CCNC: 45 U/L — HIGH (ref 10–40)
AST SERPL-CCNC: 48 U/L — HIGH (ref 10–40)
BASE EXCESS BLDV CALC-SCNC: -0.8 MMOL/L — SIGNIFICANT CHANGE UP (ref -2–3)
BASE EXCESS BLDV CALC-SCNC: -0.9 MMOL/L — SIGNIFICANT CHANGE UP (ref -2–3)
BASE EXCESS BLDV CALC-SCNC: -1.4 MMOL/L — SIGNIFICANT CHANGE UP (ref -2–3)
BASE EXCESS BLDV CALC-SCNC: -2.6 MMOL/L — LOW (ref -2–3)
BASE EXCESS BLDV CALC-SCNC: 0.2 MMOL/L — SIGNIFICANT CHANGE UP (ref -2–3)
BASOPHILS # BLD AUTO: 0.02 K/UL — SIGNIFICANT CHANGE UP (ref 0–0.2)
BASOPHILS # BLD AUTO: 0.03 K/UL — SIGNIFICANT CHANGE UP (ref 0–0.2)
BASOPHILS NFR BLD AUTO: 0.3 % — SIGNIFICANT CHANGE UP (ref 0–2)
BASOPHILS NFR BLD AUTO: 0.4 % — SIGNIFICANT CHANGE UP (ref 0–2)
BILIRUB SERPL-MCNC: 1 MG/DL — SIGNIFICANT CHANGE UP (ref 0.2–1.2)
BILIRUB SERPL-MCNC: 1 MG/DL — SIGNIFICANT CHANGE UP (ref 0.2–1.2)
BLOOD GAS VENOUS - CREATININE: SIGNIFICANT CHANGE UP MG/DL (ref 0.5–1.3)
BUN SERPL-MCNC: 15 MG/DL — SIGNIFICANT CHANGE UP (ref 7–23)
BUN SERPL-MCNC: 16 MG/DL — SIGNIFICANT CHANGE UP (ref 7–23)
CA-I SERPL-SCNC: 0.9 MMOL/L — LOW (ref 1.15–1.33)
CA-I SERPL-SCNC: 0.96 MMOL/L — LOW (ref 1.15–1.33)
CA-I SERPL-SCNC: 0.99 MMOL/L — LOW (ref 1.15–1.33)
CA-I SERPL-SCNC: 1 MMOL/L — LOW (ref 1.15–1.33)
CA-I SERPL-SCNC: 1 MMOL/L — LOW (ref 1.15–1.33)
CALCIUM SERPL-MCNC: 8 MG/DL — LOW (ref 8.4–10.5)
CALCIUM SERPL-MCNC: 8.2 MG/DL — LOW (ref 8.4–10.5)
CHLORIDE BLDV-SCNC: 103 MMOL/L — SIGNIFICANT CHANGE UP (ref 96–108)
CHLORIDE BLDV-SCNC: 105 MMOL/L — SIGNIFICANT CHANGE UP (ref 96–108)
CHLORIDE BLDV-SCNC: 105 MMOL/L — SIGNIFICANT CHANGE UP (ref 96–108)
CHLORIDE BLDV-SCNC: 106 MMOL/L — SIGNIFICANT CHANGE UP (ref 96–108)
CHLORIDE BLDV-SCNC: 106 MMOL/L — SIGNIFICANT CHANGE UP (ref 96–108)
CHLORIDE SERPL-SCNC: 106 MMOL/L — SIGNIFICANT CHANGE UP (ref 96–108)
CHLORIDE SERPL-SCNC: 106 MMOL/L — SIGNIFICANT CHANGE UP (ref 96–108)
CK MB BLD-MCNC: 8.1 % — HIGH (ref 0–3.5)
CK MB CFR SERPL CALC: 34.9 NG/ML — HIGH (ref 0–6.7)
CK SERPL-CCNC: 433 U/L — HIGH (ref 30–200)
CO2 BLDV-SCNC: 24 MMOL/L — SIGNIFICANT CHANGE UP (ref 22–26)
CO2 BLDV-SCNC: 26 MMOL/L — SIGNIFICANT CHANGE UP (ref 22–26)
CO2 BLDV-SCNC: 27 MMOL/L — HIGH (ref 22–26)
CO2 BLDV-SCNC: 27 MMOL/L — HIGH (ref 22–26)
CO2 BLDV-SCNC: 28 MMOL/L — HIGH (ref 22–26)
CO2 SERPL-SCNC: 24 MMOL/L — SIGNIFICANT CHANGE UP (ref 22–31)
CO2 SERPL-SCNC: 24 MMOL/L — SIGNIFICANT CHANGE UP (ref 22–31)
CREAT SERPL-MCNC: 0.94 MG/DL — SIGNIFICANT CHANGE UP (ref 0.5–1.3)
CREAT SERPL-MCNC: 1.09 MG/DL — SIGNIFICANT CHANGE UP (ref 0.5–1.3)
EGFR: 70 ML/MIN/1.73M2 — SIGNIFICANT CHANGE UP
EGFR: 84 ML/MIN/1.73M2 — SIGNIFICANT CHANGE UP
EOSINOPHIL # BLD AUTO: 0.01 K/UL — SIGNIFICANT CHANGE UP (ref 0–0.5)
EOSINOPHIL # BLD AUTO: 0.07 K/UL — SIGNIFICANT CHANGE UP (ref 0–0.5)
EOSINOPHIL NFR BLD AUTO: 0.1 % — SIGNIFICANT CHANGE UP (ref 0–6)
EOSINOPHIL NFR BLD AUTO: 0.9 % — SIGNIFICANT CHANGE UP (ref 0–6)
FIBRINOGEN PPP-MCNC: 376 MG/DL — SIGNIFICANT CHANGE UP (ref 330–520)
GAS PNL BLDA: SIGNIFICANT CHANGE UP
GAS PNL BLDV: 135 MMOL/L — LOW (ref 136–145)
GAS PNL BLDV: 136 MMOL/L — SIGNIFICANT CHANGE UP (ref 136–145)
GAS PNL BLDV: 136 MMOL/L — SIGNIFICANT CHANGE UP (ref 136–145)
GAS PNL BLDV: SIGNIFICANT CHANGE UP
GLUCOSE BLDV-MCNC: 133 MG/DL — HIGH (ref 70–99)
GLUCOSE BLDV-MCNC: 171 MG/DL — HIGH (ref 70–99)
GLUCOSE BLDV-MCNC: 181 MG/DL — HIGH (ref 70–99)
GLUCOSE BLDV-MCNC: 189 MG/DL — HIGH (ref 70–99)
GLUCOSE BLDV-MCNC: 198 MG/DL — HIGH (ref 70–99)
GLUCOSE SERPL-MCNC: 124 MG/DL — HIGH (ref 70–99)
GLUCOSE SERPL-MCNC: 190 MG/DL — HIGH (ref 70–99)
HCO3 BLDV-SCNC: 23 MMOL/L — SIGNIFICANT CHANGE UP (ref 22–29)
HCO3 BLDV-SCNC: 24 MMOL/L — SIGNIFICANT CHANGE UP (ref 22–29)
HCO3 BLDV-SCNC: 25 MMOL/L — SIGNIFICANT CHANGE UP (ref 22–29)
HCO3 BLDV-SCNC: 26 MMOL/L — SIGNIFICANT CHANGE UP (ref 22–29)
HCO3 BLDV-SCNC: 26 MMOL/L — SIGNIFICANT CHANGE UP (ref 22–29)
HCT VFR BLD CALC: 26.9 % — LOW (ref 39–50)
HCT VFR BLD CALC: 27 % — LOW (ref 39–50)
HCT VFR BLDA CALC: 22 % — LOW (ref 39–51)
HCT VFR BLDA CALC: 24 % — LOW (ref 39–51)
HCT VFR BLDA CALC: 24 % — LOW (ref 39–51)
HCT VFR BLDA CALC: 25 % — LOW (ref 39–51)
HCT VFR BLDA CALC: 27 % — LOW (ref 39–51)
HEPARINASE TEG R TIME: 11.6 MIN (ref 4.3–8.3)
HGB BLD CALC-MCNC: 7.4 G/DL — LOW (ref 12.6–17.4)
HGB BLD CALC-MCNC: 7.9 G/DL — LOW (ref 12.6–17.4)
HGB BLD CALC-MCNC: 8 G/DL — LOW (ref 12.6–17.4)
HGB BLD CALC-MCNC: 8.2 G/DL — LOW (ref 12.6–17.4)
HGB BLD CALC-MCNC: 9 G/DL — LOW (ref 12.6–17.4)
HGB BLD-MCNC: 8.7 G/DL — LOW (ref 13–17)
HGB BLD-MCNC: 8.8 G/DL — LOW (ref 13–17)
IMM GRANULOCYTES NFR BLD AUTO: 0.7 % — SIGNIFICANT CHANGE UP (ref 0–0.9)
IMM GRANULOCYTES NFR BLD AUTO: 1.4 % — HIGH (ref 0–0.9)
INR BLD: 1.34 RATIO — HIGH (ref 0.88–1.16)
INR BLD: 1.35 RATIO — HIGH (ref 0.88–1.16)
LACTATE BLDV-MCNC: 0.6 MMOL/L — SIGNIFICANT CHANGE UP (ref 0.5–2)
LACTATE BLDV-MCNC: 0.7 MMOL/L — SIGNIFICANT CHANGE UP (ref 0.5–2)
LACTATE BLDV-MCNC: 0.8 MMOL/L — SIGNIFICANT CHANGE UP (ref 0.5–2)
LACTATE BLDV-MCNC: 0.8 MMOL/L — SIGNIFICANT CHANGE UP (ref 0.5–2)
LACTATE BLDV-MCNC: 0.9 MMOL/L — SIGNIFICANT CHANGE UP (ref 0.5–2)
LYMPHOCYTES # BLD AUTO: 0.3 K/UL — LOW (ref 1–3.3)
LYMPHOCYTES # BLD AUTO: 0.61 K/UL — LOW (ref 1–3.3)
LYMPHOCYTES # BLD AUTO: 4.2 % — LOW (ref 13–44)
LYMPHOCYTES # BLD AUTO: 7.8 % — LOW (ref 13–44)
MAGNESIUM SERPL-MCNC: 2.2 MG/DL — SIGNIFICANT CHANGE UP (ref 1.6–2.6)
MAGNESIUM SERPL-MCNC: 2.5 MG/DL — SIGNIFICANT CHANGE UP (ref 1.6–2.6)
MCHC RBC-ENTMCNC: 27.4 PG — SIGNIFICANT CHANGE UP (ref 27–34)
MCHC RBC-ENTMCNC: 27.7 PG — SIGNIFICANT CHANGE UP (ref 27–34)
MCHC RBC-ENTMCNC: 32.3 GM/DL — SIGNIFICANT CHANGE UP (ref 32–36)
MCHC RBC-ENTMCNC: 32.6 GM/DL — SIGNIFICANT CHANGE UP (ref 32–36)
MCV RBC AUTO: 84.1 FL — SIGNIFICANT CHANGE UP (ref 80–100)
MCV RBC AUTO: 85.7 FL — SIGNIFICANT CHANGE UP (ref 80–100)
MONOCYTES # BLD AUTO: 0.77 K/UL — SIGNIFICANT CHANGE UP (ref 0–0.9)
MONOCYTES # BLD AUTO: 0.98 K/UL — HIGH (ref 0–0.9)
MONOCYTES NFR BLD AUTO: 13.6 % — SIGNIFICANT CHANGE UP (ref 2–14)
MONOCYTES NFR BLD AUTO: 9.9 % — SIGNIFICANT CHANGE UP (ref 2–14)
NEUTROPHILS # BLD AUTO: 5.85 K/UL — SIGNIFICANT CHANGE UP (ref 1.8–7.4)
NEUTROPHILS # BLD AUTO: 6.21 K/UL — SIGNIFICANT CHANGE UP (ref 1.8–7.4)
NEUTROPHILS NFR BLD AUTO: 79.6 % — HIGH (ref 43–77)
NEUTROPHILS NFR BLD AUTO: 81.1 % — HIGH (ref 43–77)
NRBC # BLD: 0 /100 WBCS — SIGNIFICANT CHANGE UP (ref 0–0)
NRBC # BLD: 0 /100 WBCS — SIGNIFICANT CHANGE UP (ref 0–0)
PCO2 BLDV: 40 MMHG — LOW (ref 42–55)
PCO2 BLDV: 41 MMHG — LOW (ref 42–55)
PCO2 BLDV: 43 MMHG — SIGNIFICANT CHANGE UP (ref 42–55)
PCO2 BLDV: 54 MMHG — SIGNIFICANT CHANGE UP (ref 42–55)
PCO2 BLDV: 56 MMHG — HIGH (ref 42–55)
PH BLDV: 7.27 — LOW (ref 7.32–7.43)
PH BLDV: 7.29 — LOW (ref 7.32–7.43)
PH BLDV: 7.36 — SIGNIFICANT CHANGE UP (ref 7.32–7.43)
PH BLDV: 7.38 — SIGNIFICANT CHANGE UP (ref 7.32–7.43)
PH BLDV: 7.38 — SIGNIFICANT CHANGE UP (ref 7.32–7.43)
PHOSPHATE SERPL-MCNC: 2.2 MG/DL — LOW (ref 2.5–4.5)
PHOSPHATE SERPL-MCNC: 3.9 MG/DL — SIGNIFICANT CHANGE UP (ref 2.5–4.5)
PLATELET # BLD AUTO: 108 K/UL — LOW (ref 150–400)
PLATELET # BLD AUTO: 112 K/UL — LOW (ref 150–400)
PO2 BLDV: 139 MMHG — HIGH (ref 25–45)
PO2 BLDV: 149 MMHG — HIGH (ref 25–45)
PO2 BLDV: 159 MMHG — HIGH (ref 25–45)
PO2 BLDV: 75 MMHG — HIGH (ref 25–45)
PO2 BLDV: 99 MMHG — HIGH (ref 25–45)
POTASSIUM BLDV-SCNC: 4.8 MMOL/L — SIGNIFICANT CHANGE UP (ref 3.5–5.1)
POTASSIUM BLDV-SCNC: 4.8 MMOL/L — SIGNIFICANT CHANGE UP (ref 3.5–5.1)
POTASSIUM BLDV-SCNC: 5.1 MMOL/L — SIGNIFICANT CHANGE UP (ref 3.5–5.1)
POTASSIUM BLDV-SCNC: 5.2 MMOL/L — HIGH (ref 3.5–5.1)
POTASSIUM BLDV-SCNC: 5.4 MMOL/L — HIGH (ref 3.5–5.1)
POTASSIUM SERPL-MCNC: 3.9 MMOL/L — SIGNIFICANT CHANGE UP (ref 3.5–5.3)
POTASSIUM SERPL-MCNC: 4.2 MMOL/L — SIGNIFICANT CHANGE UP (ref 3.5–5.3)
POTASSIUM SERPL-SCNC: 3.9 MMOL/L — SIGNIFICANT CHANGE UP (ref 3.5–5.3)
POTASSIUM SERPL-SCNC: 4.2 MMOL/L — SIGNIFICANT CHANGE UP (ref 3.5–5.3)
PROT SERPL-MCNC: 5.2 G/DL — LOW (ref 6–8.3)
PROT SERPL-MCNC: 5.5 G/DL — LOW (ref 6–8.3)
PROTHROM AB SERPL-ACNC: 15.5 SEC — HIGH (ref 10.5–13.4)
PROTHROM AB SERPL-ACNC: 15.6 SEC — HIGH (ref 10.5–13.4)
RAPIDTEG MAXIMUM AMPLITUDE: 57.9 MM — SIGNIFICANT CHANGE UP (ref 52–70)
RBC # BLD: 3.14 M/UL — LOW (ref 4.2–5.8)
RBC # BLD: 3.21 M/UL — LOW (ref 4.2–5.8)
RBC # FLD: 16.1 % — HIGH (ref 10.3–14.5)
RBC # FLD: 16.4 % — HIGH (ref 10.3–14.5)
SAO2 % BLDV: 100 % — HIGH (ref 67–88)
SAO2 % BLDV: 96.7 % — HIGH (ref 67–88)
SAO2 % BLDV: 98.7 % — HIGH (ref 67–88)
SAO2 % BLDV: 99.4 % — HIGH (ref 67–88)
SAO2 % BLDV: 99.6 % — HIGH (ref 67–88)
SODIUM SERPL-SCNC: 142 MMOL/L — SIGNIFICANT CHANGE UP (ref 135–145)
SODIUM SERPL-SCNC: 143 MMOL/L — SIGNIFICANT CHANGE UP (ref 135–145)
TEG FUNCTIONAL FIBRINOGEN: 20.3 MM — SIGNIFICANT CHANGE UP (ref 15–32)
TEG MAXIMUM AMPLITUDE: 58.3 MM — SIGNIFICANT CHANGE UP (ref 52–69)
TEG REACTION TIME: 9.8 MIN (ref 4.6–9.1)
TROPONIN T, HIGH SENSITIVITY RESULT: 1475 NG/L — HIGH (ref 0–51)
WBC # BLD: 7.21 K/UL — SIGNIFICANT CHANGE UP (ref 3.8–10.5)
WBC # BLD: 7.8 K/UL — SIGNIFICANT CHANGE UP (ref 3.8–10.5)
WBC # FLD AUTO: 7.21 K/UL — SIGNIFICANT CHANGE UP (ref 3.8–10.5)
WBC # FLD AUTO: 7.8 K/UL — SIGNIFICANT CHANGE UP (ref 3.8–10.5)

## 2022-10-24 PROCEDURE — 33427 REPAIR OF MITRAL VALVE: CPT

## 2022-10-24 PROCEDURE — 71045 X-RAY EXAM CHEST 1 VIEW: CPT | Mod: 26

## 2022-10-24 PROCEDURE — 99291 CRITICAL CARE FIRST HOUR: CPT

## 2022-10-24 PROCEDURE — 93010 ELECTROCARDIOGRAM REPORT: CPT

## 2022-10-24 PROCEDURE — 0042T: CPT

## 2022-10-24 PROCEDURE — 88305 TISSUE EXAM BY PATHOLOGIST: CPT | Mod: 26

## 2022-10-24 PROCEDURE — 99292 CRITICAL CARE ADDL 30 MIN: CPT

## 2022-10-24 PROCEDURE — 33259 ABLATE ATRIA W/BYPASS ADD-ON: CPT

## 2022-10-24 PROCEDURE — 70496 CT ANGIOGRAPHY HEAD: CPT | Mod: 26

## 2022-10-24 PROCEDURE — 70498 CT ANGIOGRAPHY NECK: CPT | Mod: 26

## 2022-10-24 DEVICE — KIT CVC 2LUM MAC 9FR CHG: Type: IMPLANTABLE DEVICE | Status: FUNCTIONAL

## 2022-10-24 DEVICE — ATRICURE ISOLATOR SYNERGY CLAMP OPEN LEFT CURVE 65MM JAW: Type: IMPLANTABLE DEVICE | Status: FUNCTIONAL

## 2022-10-24 DEVICE — CANNULA AORTIC ROOT 14G X 14CM FLANGED: Type: IMPLANTABLE DEVICE | Status: FUNCTIONAL

## 2022-10-24 DEVICE — CANNULA VENOUS 1 STAGE STRAIGHT 40FR X 1/2": Type: IMPLANTABLE DEVICE | Status: FUNCTIONAL

## 2022-10-24 DEVICE — SURGICEL FIBRILLAR 2 X 4": Type: IMPLANTABLE DEVICE | Status: FUNCTIONAL

## 2022-10-24 DEVICE — CANNULA AORTIC PERFUSION EZ GLIDE STRAIGHT 24FR X 35CM VENTED: Type: IMPLANTABLE DEVICE | Status: FUNCTIONAL

## 2022-10-24 DEVICE — IMPLANTABLE DEVICE: Type: IMPLANTABLE DEVICE | Status: FUNCTIONAL

## 2022-10-24 DEVICE — CANNULA VENOUS 1 STAGE RIGHT ANGLE 28FR X 3/8": Type: IMPLANTABLE DEVICE | Status: FUNCTIONAL

## 2022-10-24 DEVICE — KIT A-LINE 1LUM 20G X 12CM SAFE KIT: Type: IMPLANTABLE DEVICE | Status: FUNCTIONAL

## 2022-10-24 DEVICE — CANNULA RCSP 15FR X 12.5" AUTO-INFLATE CUFF WITH SOLID STYLET: Type: IMPLANTABLE DEVICE | Status: FUNCTIONAL

## 2022-10-24 DEVICE — SYS EXCLUSION LAA DISP ATRICLIP STD 45MM: Type: IMPLANTABLE DEVICE | Status: FUNCTIONAL

## 2022-10-24 DEVICE — LIGATING CLIPS WECK HORIZON SMALL-WIDE (RED) 24: Type: IMPLANTABLE DEVICE | Status: FUNCTIONAL

## 2022-10-24 DEVICE — SURGICEL 2 X 14": Type: IMPLANTABLE DEVICE | Status: FUNCTIONAL

## 2022-10-24 DEVICE — BONE WAX 2.5GM: Type: IMPLANTABLE DEVICE | Status: FUNCTIONAL

## 2022-10-24 DEVICE — PACING WIRE WHITE M-24 BAREWIRE 37MM X 89MM: Type: IMPLANTABLE DEVICE | Status: FUNCTIONAL

## 2022-10-24 DEVICE — LIGATING CLIPS WECK HORIZON MEDIUM (BLUE) 24: Type: IMPLANTABLE DEVICE | Status: FUNCTIONAL

## 2022-10-24 DEVICE — MEDIASTINAL CATH DRAIN 9MM: Type: IMPLANTABLE DEVICE | Status: FUNCTIONAL

## 2022-10-24 RX ORDER — ACETAMINOPHEN 500 MG
1000 TABLET ORAL ONCE
Refills: 0 | Status: DISCONTINUED | OUTPATIENT
Start: 2022-10-24 | End: 2022-10-24

## 2022-10-24 RX ORDER — GABAPENTIN 400 MG/1
100 CAPSULE ORAL EVERY 8 HOURS
Refills: 0 | Status: DISCONTINUED | OUTPATIENT
Start: 2022-10-24 | End: 2022-10-25

## 2022-10-24 RX ORDER — AMIODARONE HYDROCHLORIDE 400 MG/1
400 TABLET ORAL
Refills: 0 | Status: DISCONTINUED | OUTPATIENT
Start: 2022-10-24 | End: 2022-10-24

## 2022-10-24 RX ORDER — POLYETHYLENE GLYCOL 3350 17 G/17G
17 POWDER, FOR SOLUTION ORAL DAILY
Refills: 0 | Status: DISCONTINUED | OUTPATIENT
Start: 2022-10-25 | End: 2022-11-07

## 2022-10-24 RX ORDER — ACETAMINOPHEN 500 MG
1000 TABLET ORAL ONCE
Refills: 0 | Status: COMPLETED | OUTPATIENT
Start: 2022-10-24 | End: 2022-10-24

## 2022-10-24 RX ORDER — SODIUM CHLORIDE 9 MG/ML
250 INJECTION, SOLUTION INTRAVENOUS ONCE
Refills: 0 | Status: COMPLETED | OUTPATIENT
Start: 2022-10-24 | End: 2022-10-24

## 2022-10-24 RX ORDER — LEVETIRACETAM 250 MG/1
750 TABLET, FILM COATED ORAL EVERY 12 HOURS
Refills: 0 | Status: DISCONTINUED | OUTPATIENT
Start: 2022-10-25 | End: 2022-10-25

## 2022-10-24 RX ORDER — POTASSIUM CHLORIDE 20 MEQ
10 PACKET (EA) ORAL
Refills: 0 | Status: DISCONTINUED | OUTPATIENT
Start: 2022-10-24 | End: 2022-10-26

## 2022-10-24 RX ORDER — ALBUMIN HUMAN 25 %
250 VIAL (ML) INTRAVENOUS ONCE
Refills: 0 | Status: COMPLETED | OUTPATIENT
Start: 2022-10-24 | End: 2022-10-25

## 2022-10-24 RX ORDER — LEVETIRACETAM 250 MG/1
2000 TABLET, FILM COATED ORAL ONCE
Refills: 0 | Status: DISCONTINUED | OUTPATIENT
Start: 2022-10-24 | End: 2022-10-24

## 2022-10-24 RX ORDER — METOCLOPRAMIDE HCL 10 MG
10 TABLET ORAL EVERY 8 HOURS
Refills: 0 | Status: DISCONTINUED | OUTPATIENT
Start: 2022-10-24 | End: 2022-10-25

## 2022-10-24 RX ORDER — LEVOTHYROXINE SODIUM 125 MCG
12.5 TABLET ORAL AT BEDTIME
Refills: 0 | Status: DISCONTINUED | OUTPATIENT
Start: 2022-10-24 | End: 2022-10-25

## 2022-10-24 RX ORDER — SODIUM CHLORIDE 9 MG/ML
500 INJECTION, SOLUTION INTRAVENOUS ONCE
Refills: 0 | Status: COMPLETED | OUTPATIENT
Start: 2022-10-24 | End: 2022-10-24

## 2022-10-24 RX ORDER — SENNA PLUS 8.6 MG/1
2 TABLET ORAL AT BEDTIME
Refills: 0 | Status: DISCONTINUED | OUTPATIENT
Start: 2022-10-25 | End: 2022-11-07

## 2022-10-24 RX ORDER — SODIUM CHLORIDE 9 MG/ML
1000 INJECTION INTRAMUSCULAR; INTRAVENOUS; SUBCUTANEOUS
Refills: 0 | Status: DISCONTINUED | OUTPATIENT
Start: 2022-10-24 | End: 2022-10-27

## 2022-10-24 RX ORDER — CHLORHEXIDINE GLUCONATE 213 G/1000ML
15 SOLUTION TOPICAL EVERY 12 HOURS
Refills: 0 | Status: DISCONTINUED | OUTPATIENT
Start: 2022-10-24 | End: 2022-10-25

## 2022-10-24 RX ORDER — ACETAMINOPHEN 500 MG
650 TABLET ORAL EVERY 6 HOURS
Refills: 0 | Status: COMPLETED | OUTPATIENT
Start: 2022-10-24 | End: 2022-10-27

## 2022-10-24 RX ORDER — LIDOCAINE HCL 20 MG/ML
0.2 VIAL (ML) INJECTION ONCE
Refills: 0 | Status: DISCONTINUED | OUTPATIENT
Start: 2022-10-24 | End: 2022-10-24

## 2022-10-24 RX ORDER — POTASSIUM CHLORIDE 20 MEQ
10 PACKET (EA) ORAL
Refills: 0 | Status: COMPLETED | OUTPATIENT
Start: 2022-10-24 | End: 2022-10-24

## 2022-10-24 RX ORDER — ACETAMINOPHEN 500 MG
650 TABLET ORAL EVERY 6 HOURS
Refills: 0 | Status: DISCONTINUED | OUTPATIENT
Start: 2022-10-27 | End: 2022-11-07

## 2022-10-24 RX ORDER — KETOROLAC TROMETHAMINE 30 MG/ML
30 SYRINGE (ML) INJECTION EVERY 8 HOURS
Refills: 0 | Status: DISCONTINUED | OUTPATIENT
Start: 2022-10-24 | End: 2022-10-26

## 2022-10-24 RX ORDER — OXYCODONE HYDROCHLORIDE 5 MG/1
5 TABLET ORAL EVERY 4 HOURS
Refills: 0 | Status: DISCONTINUED | OUTPATIENT
Start: 2022-10-24 | End: 2022-10-31

## 2022-10-24 RX ORDER — LEVETIRACETAM 250 MG/1
2000 TABLET, FILM COATED ORAL ONCE
Refills: 0 | Status: COMPLETED | OUTPATIENT
Start: 2022-10-24 | End: 2022-10-24

## 2022-10-24 RX ORDER — OXYCODONE HYDROCHLORIDE 5 MG/1
10 TABLET ORAL EVERY 4 HOURS
Refills: 0 | Status: DISCONTINUED | OUTPATIENT
Start: 2022-10-24 | End: 2022-10-24

## 2022-10-24 RX ORDER — LEVETIRACETAM 250 MG/1
1500 TABLET, FILM COATED ORAL ONCE
Refills: 0 | Status: DISCONTINUED | OUTPATIENT
Start: 2022-10-24 | End: 2022-10-24

## 2022-10-24 RX ORDER — ASCORBIC ACID 60 MG
500 TABLET,CHEWABLE ORAL
Refills: 0 | Status: COMPLETED | OUTPATIENT
Start: 2022-10-24 | End: 2022-10-29

## 2022-10-24 RX ORDER — DEXMEDETOMIDINE HYDROCHLORIDE IN 0.9% SODIUM CHLORIDE 4 UG/ML
1.5 INJECTION INTRAVENOUS
Qty: 200 | Refills: 0 | Status: DISCONTINUED | OUTPATIENT
Start: 2022-10-24 | End: 2022-10-25

## 2022-10-24 RX ORDER — DEXTROSE 50 % IN WATER 50 %
50 SYRINGE (ML) INTRAVENOUS
Refills: 0 | Status: DISCONTINUED | OUTPATIENT
Start: 2022-10-24 | End: 2022-10-25

## 2022-10-24 RX ORDER — ALBUMIN HUMAN 25 %
250 VIAL (ML) INTRAVENOUS ONCE
Refills: 0 | Status: COMPLETED | OUTPATIENT
Start: 2022-10-24 | End: 2022-10-24

## 2022-10-24 RX ORDER — GABAPENTIN 400 MG/1
200 CAPSULE ORAL ONCE
Refills: 0 | Status: COMPLETED | OUTPATIENT
Start: 2022-10-24 | End: 2022-10-24

## 2022-10-24 RX ORDER — CEFUROXIME AXETIL 250 MG
1500 TABLET ORAL EVERY 8 HOURS
Refills: 0 | Status: COMPLETED | OUTPATIENT
Start: 2022-10-24 | End: 2022-10-25

## 2022-10-24 RX ORDER — NOREPINEPHRINE BITARTRATE/D5W 8 MG/250ML
0.04 PLASTIC BAG, INJECTION (ML) INTRAVENOUS
Qty: 8 | Refills: 0 | Status: DISCONTINUED | OUTPATIENT
Start: 2022-10-24 | End: 2022-10-25

## 2022-10-24 RX ORDER — INSULIN HUMAN 100 [IU]/ML
3 INJECTION, SOLUTION SUBCUTANEOUS
Qty: 100 | Refills: 0 | Status: DISCONTINUED | OUTPATIENT
Start: 2022-10-24 | End: 2022-10-25

## 2022-10-24 RX ORDER — CHLORHEXIDINE GLUCONATE 213 G/1000ML
1 SOLUTION TOPICAL ONCE
Refills: 0 | Status: DISCONTINUED | OUTPATIENT
Start: 2022-10-24 | End: 2022-10-24

## 2022-10-24 RX ORDER — MEPERIDINE HYDROCHLORIDE 50 MG/ML
25 INJECTION INTRAMUSCULAR; INTRAVENOUS; SUBCUTANEOUS ONCE
Refills: 0 | Status: DISCONTINUED | OUTPATIENT
Start: 2022-10-24 | End: 2022-10-25

## 2022-10-24 RX ORDER — SODIUM CHLORIDE 9 MG/ML
3 INJECTION INTRAMUSCULAR; INTRAVENOUS; SUBCUTANEOUS EVERY 8 HOURS
Refills: 0 | Status: DISCONTINUED | OUTPATIENT
Start: 2022-10-24 | End: 2022-10-24

## 2022-10-24 RX ORDER — CHLORHEXIDINE GLUCONATE 213 G/1000ML
1 SOLUTION TOPICAL DAILY
Refills: 0 | Status: DISCONTINUED | OUTPATIENT
Start: 2022-10-24 | End: 2022-11-07

## 2022-10-24 RX ORDER — HYDROMORPHONE HYDROCHLORIDE 2 MG/ML
0.5 INJECTION INTRAMUSCULAR; INTRAVENOUS; SUBCUTANEOUS EVERY 6 HOURS
Refills: 0 | Status: DISCONTINUED | OUTPATIENT
Start: 2022-10-24 | End: 2022-10-26

## 2022-10-24 RX ORDER — PANTOPRAZOLE SODIUM 20 MG/1
40 TABLET, DELAYED RELEASE ORAL DAILY
Refills: 0 | Status: DISCONTINUED | OUTPATIENT
Start: 2022-10-24 | End: 2022-10-25

## 2022-10-24 RX ORDER — SODIUM CHLORIDE 9 MG/ML
1000 INJECTION, SOLUTION INTRAVENOUS ONCE
Refills: 0 | Status: COMPLETED | OUTPATIENT
Start: 2022-10-24 | End: 2022-10-24

## 2022-10-24 RX ORDER — DEXTROSE 50 % IN WATER 50 %
25 SYRINGE (ML) INTRAVENOUS
Refills: 0 | Status: DISCONTINUED | OUTPATIENT
Start: 2022-10-24 | End: 2022-10-25

## 2022-10-24 RX ADMIN — LEVETIRACETAM 400 MILLIGRAM(S): 250 TABLET, FILM COATED ORAL at 23:30

## 2022-10-24 RX ADMIN — Medication 100 MILLIEQUIVALENT(S): at 15:30

## 2022-10-24 RX ADMIN — Medication 100 MILLIEQUIVALENT(S): at 16:30

## 2022-10-24 RX ADMIN — Medication 100 MILLIEQUIVALENT(S): at 16:00

## 2022-10-24 RX ADMIN — Medication 85 MILLIMOLE(S): at 17:45

## 2022-10-24 RX ADMIN — Medication 30 MILLIGRAM(S): at 17:20

## 2022-10-24 RX ADMIN — Medication 10 MILLIGRAM(S): at 23:28

## 2022-10-24 RX ADMIN — Medication 1000 MILLIGRAM(S): at 07:00

## 2022-10-24 RX ADMIN — Medication 400 MILLIGRAM(S): at 23:25

## 2022-10-24 RX ADMIN — Medication 1000 MILLIGRAM(S): at 06:50

## 2022-10-24 RX ADMIN — SODIUM CHLORIDE 3000 MILLILITER(S): 9 INJECTION, SOLUTION INTRAVENOUS at 17:35

## 2022-10-24 RX ADMIN — SODIUM CHLORIDE 3000 MILLILITER(S): 9 INJECTION, SOLUTION INTRAVENOUS at 16:45

## 2022-10-24 RX ADMIN — Medication 1000 MILLIGRAM(S): at 23:40

## 2022-10-24 RX ADMIN — SODIUM CHLORIDE 3000 MILLILITER(S): 9 INJECTION, SOLUTION INTRAVENOUS at 15:00

## 2022-10-24 RX ADMIN — Medication 500 MILLILITER(S): at 18:10

## 2022-10-24 RX ADMIN — Medication 100 MILLIGRAM(S): at 16:51

## 2022-10-24 RX ADMIN — Medication 500 MILLILITER(S): at 17:49

## 2022-10-24 RX ADMIN — SODIUM CHLORIDE 1500 MILLILITER(S): 9 INJECTION, SOLUTION INTRAVENOUS at 15:45

## 2022-10-24 RX ADMIN — Medication 30 MILLIGRAM(S): at 16:50

## 2022-10-24 RX ADMIN — GABAPENTIN 200 MILLIGRAM(S): 400 CAPSULE ORAL at 06:57

## 2022-10-24 RX ADMIN — Medication 100 MILLIEQUIVALENT(S): at 18:59

## 2022-10-24 RX ADMIN — Medication 12.5 MICROGRAM(S): at 23:28

## 2022-10-24 RX ADMIN — SODIUM CHLORIDE 6000 MILLILITER(S): 9 INJECTION, SOLUTION INTRAVENOUS at 14:45

## 2022-10-24 RX ADMIN — CHLORHEXIDINE GLUCONATE 15 MILLILITER(S): 213 SOLUTION TOPICAL at 17:41

## 2022-10-24 NOTE — PATIENT PROFILE ADULT - FALL HARM RISK - RISK INTERVENTIONS

## 2022-10-24 NOTE — BRIEF OPERATIVE NOTE - BRIEF OP NOTE DRAINS
1 substernal mediastinal chest tube  1 diaphragmatic mediastinal chest tube  1 mediastinal jared drain

## 2022-10-24 NOTE — PATIENT PROFILE ADULT - ARE SIGNIFICANT INDICATORS COMPLETE.
32 year old female with PMHx of precancerous cervical cells, , LMP approximately , presenting here with multiple complaints. Patient reports 2 week ago with one day of vaginal bleeding and since then has had brownish/greyish vaginal discharge intermittently. Patient also reports noticing some suprapubic abdominal cramping at night some nights. Last night patient with one episode of vomiting. Patient denies fever, chills, urinary symptoms, vaginal discomfort, flank pain, back pain, or any other acute complaints. Patient notes that she is currently stuck in the [place] due to travel restrictions from [place] [id number] and is from [place], so she has no OBGYN for follow up. No Yes

## 2022-10-24 NOTE — PROGRESS NOTE ADULT - SUBJECTIVE AND OBJECTIVE BOX
Patient seen and examined at the bedside.    Remained critically ill on continuous ICU monitoring.    OBJECTIVE:  Vital Signs Last 24 Hrs  T(C): 35.4 (24 Oct 2022 16:00), Max: 36.9 (24 Oct 2022 06:00)  T(F): 95.7 (24 Oct 2022 16:00), Max: 98.4 (24 Oct 2022 06:00)  HR: 80 (24 Oct 2022 18:00) (61 - 80)  BP: 123/62 (24 Oct 2022 08:19) (123/62 - 123/62)  BP(mean): --  RR: 12 (24 Oct 2022 18:00) (4 - 20)  SpO2: 100% (24 Oct 2022 18:00) (98% - 100%)    Parameters below as of 24 Oct 2022 14:30  Patient On (Oxygen Delivery Method): ventilator    REVIEW OF SYSTEMS:  [x ] N/A    Physical Exam:  General: intubated multiple lines gtt & tubes   Neurology: sedated   Eyes: bilaeral pupils equal and reactive   ENT/Neck: +ETT midline, Neck supple, trachea midline, No JVD   Respiratory: Rales noted bilaterally   CV: RRR, S1S2, no murmurs        [x] Sternal dressing, [x]Mediastinal CTx2 [x] Bulb     [x] Sinus rhythm, [x] Temporary pacing - DDD @80  Abdominal: Soft, NT, ND +BS,   Extremities: 1-2+ pedal edema noted, + peripheral pulses   Skin: No Rashes, Hematoma, Ecchymosis                           Assessment:    Plan:   ***Neuro***  [x] Sedated with [x] Precedex   Post operative neuro assessment     ***Cardiovascular***  Invasive hemodynamic monitoring, assess perfusion indices   SR / CVP 4/ MAP 60/Hct 26.0%/Lactate 1.0  [x] Levophed - 0.04mcgs   Reassessment of hemodynamics post resuscitation   Monitor chest tube outputs   Serial EKG and cardiac enzymes     ***Pulmonary***  Post op vent management   Titration of FiO2 and PEEP, follow SpO2, CXR, blood gases     Mode: AC/ CMV (Assist Control/ Continuous Mandatory Ventilation)  RR (machine): 12  TV (machine): 700  FiO2: 40  PEEP: 5  ITime: 1  MAP: 10  PIP: 22              Will plan to wean & extubate once pt is hemodynamically stable and not bleeding    ***GI***  NPO, will advance as tolerated   [x] Protonix   Reglan for gut motility     ***Renal***  GFR 84  Continue to monitor I/Os, BUN/Creatinine.   Replete lytes PRN  Gonzalez present     ***ID***  Perioperative coverage with Cefuroxime    **Endocrine***  [x] DM1  : HbA1c 6.2%             - [x]Insulin gtt              - Need tight glycemic control to prevent wound infection.    [x] Hypothyroidism   - c/w Synthroid          Patient requires continuous monitoring with bedside rhythm monitoring, pulse oximetry monitoring, and continuous central venous and arterial pressure monitoring; and intermittent blood gas analysis. Care plan discussed with the ICU care team.   Patient remained critical, at risk for life threatening decompensation.    I have spent 45 minutes providing critical care management to this patient.    By signing my name below, I, Zita Gonzalez, attest that this documentation has been prepared under the direction and in the presence of Nilesh Levy MD   Electronically signed: Ramesh Scott, 10-24-22 @ 19:14    I, Nilesh Levy, personally performed the services described in this documentation. all medical record entries made by the scribe were at my direction and in my presence. I have reviewed the chart and agree that the record reflects my personal performance and is accurate and complete  Electronically signed: Nilesh Levy MD  Patient seen and examined at the bedside.    Remained critically ill on continuous ICU monitoring.    OBJECTIVE:  Vital Signs Last 24 Hrs  T(C): 35.4 (24 Oct 2022 16:00), Max: 36.9 (24 Oct 2022 06:00)  T(F): 95.7 (24 Oct 2022 16:00), Max: 98.4 (24 Oct 2022 06:00)  HR: 80 (24 Oct 2022 18:00) (61 - 80)  BP: 123/62 (24 Oct 2022 08:19) (123/62 - 123/62)  BP(mean): --  RR: 12 (24 Oct 2022 18:00) (4 - 20)  SpO2: 100% (24 Oct 2022 18:00) (98% - 100%)    Parameters below as of 24 Oct 2022 14:30  Patient On (Oxygen Delivery Method): ventilator    REVIEW OF SYSTEMS:  [x ] N/A    Physical Exam:  General: intubated multiple lines gtt & tubes   Neurology: sedated   Eyes: bilaeral pupils equal and reactive   ENT/Neck: +ETT midline, Neck supple, trachea midline, No JVD   Respiratory: Rales noted bilaterally   CV: AV Paced 80         [x] Sternal dressing, [x]Mediastinal CTx2 [x] Bulb        [x] Temporary pacing - DDD @80  Abdominal: Soft, NT, ND +BS,   Extremities: 1-2+ pedal edema noted, + peripheral pulses   Skin: No Rashes, Hematoma, Ecchymosis                           Assessment:    77 yr male Obesity BMI 37 / LILLY (CPAP) / DM2 / Hypothyroidism / BPH / Bladder Ca / HTN / HLD / A-Fib S/P DCCV / RFA / Severe MR   S/P MV Repair / LAAL / Dawn Maze / PFO Closure D # 0  Intra operative bleeding requiring blood products & FEIBA   Acute blood loss Anemia   Multifactorial hypotension related to hypovolemia , bradycardia / JR   AV Sequential pacing for hemodynamic support   DM2 Hyperglycemia   Hypokalemia / Hypophosphatemia         Plan:   ***Neuro***  [x] Sedated with [x] Precedex   Post operative neuro assessment     ***Cardiovascular***  Post op labile hemodynamics requiring large volume resuscitation titration of Levophed   Invasive hemodynamic monitoring, assess serial perfusion indices   AV 80  / CVP 2- 4/ MAP 60- 70 /Hct 26.0%/ Lactate 1.0   [x] LR 2000 cc   [x] Levophed - 0.02-0.04 mcg/kg/min    [x] Albumin 500 cc   Post Maze no AMIO as 100% pacemaker dependent   Assess pacing threshold /   Monitor chest tube outputs   Serial EKG and cardiac enzymes     ***Pulmonary***  Post op vent management   Titration of FiO2 and PEEP, follow SpO2, CXR, blood gases     Mode: AC/ CMV (Assist Control/ Continuous Mandatory Ventilation)  RR (machine): 12  TV (machine): 700  FiO2: 40  PEEP: 5  ITime: 1  MAP: 10  PIP: 22              Will plan to wean & extubate once pt is hemodynamically stable and not bleeding    ***GI***  NPO, will advance as tolerated   [x] Protonix   Reglan for gut motility     ***Renal***  GFR 84  Continue to monitor I/Os, BUN/Creatinine.   Replete lytes PRN  Gonzalez present     ***ID***  Perioperative coverage with Cefuroxime    **Endocrine***  [x] DM1  : HbA1c 6.2%             - [x]Insulin gtt              - Need tight glycemic control to prevent wound infection.    [x] Hypothyroidism   - c/w Synthroid          Patient requires continuous monitoring with bedside rhythm monitoring, pulse oximetry monitoring, and continuous central venous and arterial pressure monitoring; and intermittent blood gas analysis. Care plan discussed with the ICU care team.   Patient remained critical, at risk for life threatening decompensation.    I have spent 45 minutes providing critical care management to this patient.    By signing my name below, I, Zita Gonzalez, attest that this documentation has been prepared under the direction and in the presence of Nilesh Levy MD   Electronically signed: Ramesh Scott, 10-24-22 @ 19:14    I, Nilesh Levy, personally performed the services described in this documentation. all medical record entries made by the scribe were at my direction and in my presence. I have reviewed the chart and agree that the record reflects my personal performance and is accurate and complete  Electronically signed: iNlesh Levy MD  Patient seen and examined at the bedside.    Remained critically ill on continuous ICU monitoring.    OBJECTIVE:  Vital Signs Last 24 Hrs  T(C): 35.4 (24 Oct 2022 16:00), Max: 36.9 (24 Oct 2022 06:00)  T(F): 95.7 (24 Oct 2022 16:00), Max: 98.4 (24 Oct 2022 06:00)  HR: 80 (24 Oct 2022 18:00) (61 - 80)  BP: 123/62 (24 Oct 2022 08:19) (123/62 - 123/62)  BP(mean): --  RR: 12 (24 Oct 2022 18:00) (4 - 20)  SpO2: 100% (24 Oct 2022 18:00) (98% - 100%)    Parameters below as of 24 Oct 2022 14:30  Patient On (Oxygen Delivery Method): ventilator    REVIEW OF SYSTEMS:  [x ] N/A    Physical Exam:  General: intubated multiple lines gtt & tubes   Neurology: sedated   Eyes: bilaeral pupils equal and reactive   ENT/Neck: +ETT midline, Neck supple, trachea midline, No JVD   Respiratory: Rales noted bilaterally   CV: AV Paced 80         [x] Sternal dressing, [x]Mediastinal CTx2 [x] Bulb        [x] Temporary pacing - DDD @80  Abdominal: Soft, NT, ND +BS,   Extremities: 1-2+ pedal edema noted, + peripheral pulses   Skin: No Rashes, Hematoma, Ecchymosis                           Assessment:    77 yr male Obesity BMI 37 / LILLY (CPAP) / DM2 / Hypothyroidism / BPH / Bladder Ca / HTN / HLD / A-Fib S/P DCCV / RFA / Severe MR   S/P MV Repair / LAAL / Dawn Maze / PFO Closure D # 0  Intra operative bleeding requiring blood products & FEIBA   Acute blood loss Anemia   Multifactorial hypotension related to hypovolemia , bradycardia / JR   AV Sequential pacing for hemodynamic support   DM2 Hyperglycemia   Hypokalemia / Hypophosphatemia        Plan:   ***Neuro***  [x] Sedated with [x] Precedex   Post operative neuro assessment     ***Cardiovascular***    Post op labile hemodynamics requiring large volume resuscitation titration of Levophed   Invasive hemodynamic monitoring, assess serial perfusion indices   AV 80  / CVP 2- 4/ MAP 60- 70 /Hct 26.0%/ Lactate 1.0   [x] LR 2000 cc   [x] Levophed - 0.02-0.04 mcg/kg/min    [x] Albumin 500 cc   Post Maze no Amiodarone  as 100% pacemaker dependent   Assess pacing threshold / no AV radha blocking drugs   Monitor chest tube outputs  [X] ASA [X] Statins   Replete K / Phos repeat levels            ***Pulmonary***  Post op vent management   At risk for post op pulmonary complication LILLY   Titration of FiO2 and PEEP, follow SpO2, CXR, blood gases     Mode: AC/ CMV (Assist Control/ Continuous Mandatory Ventilation)  RR (machine): 12  TV (machine): 700  FiO2: 40  PEEP: 5  ITime: 1  MAP: 10  PIP: 22              Will plan to wean & extubate once pt is hemodynamically stable and not bleeding  BiPAP on stand by     ***GI***  NPO, will advance as tolerated   [x] Protonix   Reglan for gut motility     ***Renal***  H/O BPH/Bladder Ca / self catheterization at home   GFR 84  Continue to monitor I/Os, BUN/Creatinine.   Replete lytes PRN  Gonzalez present   Resume Flomax / Proscar once extubated     ***ID***  Perioperative coverage with Cefuroxime    **Endocrine***  [x] DM1  : HbA1c 6.2%             - [x]Insulin gtt              - Need tight glycemic control to prevent wound infection.    [x] Hypothyroidism   - c/w Synthroid          Patient requires continuous monitoring with bedside rhythm monitoring, pulse oximetry monitoring, and continuous central venous and arterial pressure monitoring; and intermittent blood gas analysis. Care plan discussed with the ICU care team.   Patient remained critical, at risk for life threatening decompensation.    I have spent 45 minutes providing critical care management to this patient.    By signing my name below, I, Zita Gonzalez, attest that this documentation has been prepared under the direction and in the presence of Nilesh Levy MD   Electronically signed: Ramesh Scott, 10-24-22 @ 19:14    I, Nilesh Levy, personally performed the services described in this documentation. all medical record entries made by the scribe were at my direction and in my presence. I have reviewed the chart and agree that the record reflects my personal performance and is accurate and complete  Electronically signed: Nilesh Levy MD

## 2022-10-24 NOTE — BRIEF OPERATIVE NOTE - OPERATION/FINDINGS
Aortic XClamp: 147    |    Total CPB: 184  Procedure:    MVr - triangular resection with 28mm Bobby band  AtriClip 45mm   Dawn-Maze IV procedure

## 2022-10-24 NOTE — BRIEF OPERATIVE NOTE - VENOUS THROMBOEMBOLISM PROPHYLAXIS THERAPY
Ventricular Rate : 82  Atrial Rate : 82  P-R Interval : 178  QRS Duration : 120  Q-T Interval : 444  QTC Calculation(Bazett) : 518  P Axis : 58  R Axis : -8  T Axis : 102  Diagnosis : Normal sinus rhythm  Anteroseptal infarct , age undetermined  Abnormal ECG  When compared with ECG of 17-MAY-2020 00:54,  Left bundle branch block is no longer present  Anteroseptal infarct is now present  Confirmed by Jc Guerra ([id number]) on 7/8/2020 7:37:28 AM   Heparin

## 2022-10-24 NOTE — PROGRESS NOTE ADULT - SUBJECTIVE AND OBJECTIVE BOX
CRITICAL CARE ATTENDING - CTICU    MEDICATIONS  (STANDING):  acetaminophen     Tablet .. 650 milliGRAM(s) Oral every 6 hours  aMIOdarone    Tablet 400 milliGRAM(s) Oral two times a day  ascorbic acid 500 milliGRAM(s) Oral two times a day  cefuroxime  IVPB 1500 milliGRAM(s) IV Intermittent every 8 hours  chlorhexidine 0.12% Liquid 15 milliLiter(s) Oral Mucosa every 12 hours  chlorhexidine 2% Cloths 1 Application(s) Topical daily  dexMEDEtomidine Infusion 1.5 MICROgram(s)/kG/Hr (47.6 mL/Hr) IV Continuous <Continuous>  dextrose 50% Injectable 50 milliLiter(s) IV Push every 15 minutes  dextrose 50% Injectable 25 milliLiter(s) IV Push every 15 minutes  gabapentin 100 milliGRAM(s) Oral every 8 hours  insulin regular Infusion 3 Unit(s)/Hr (3 mL/Hr) IV Continuous <Continuous>  lactated ringers Bolus 1000 milliLiter(s) IV Bolus once  lactated ringers Bolus 500 milliLiter(s) IV Bolus once  lactated ringers Bolus 250 milliLiter(s) IV Bolus once  meperidine     Injectable 25 milliGRAM(s) IV Push once  metoclopramide Injectable 10 milliGRAM(s) IV Push every 8 hours  norepinephrine Infusion 0.04 MICROgram(s)/kG/Min (9.52 mL/Hr) IV Continuous <Continuous>  pantoprazole  Injectable 40 milliGRAM(s) IV Push daily  potassium chloride  10 mEq/50 mL IVPB 10 milliEquivalent(s) IV Intermittent every 1 hour  potassium chloride  10 mEq/50 mL IVPB 10 milliEquivalent(s) IV Intermittent every 1 hour  potassium chloride  10 mEq/50 mL IVPB 10 milliEquivalent(s) IV Intermittent every 1 hour  sodium chloride 0.9%. 1000 milliLiter(s) (10 mL/Hr) IV Continuous <Continuous>                                    8.8    7.80  )-----------( 108      ( 24 Oct 2022 14:53 )             27.0                   Mode: AC/ CMV (Assist Control/ Continuous Mandatory Ventilation)  RR (machine): 12  TV (machine): 700  FiO2: 40  PEEP: 5  ITime: 1  MAP: 10  PIP: 22      Daily Height in cm: 182.88 (24 Oct 2022 06:00)    Daily         Critically Ill patient  : [ ] preoperative ,   [ x] post operative    Requires :  [x ] Arterial Line   [x ] Central Line  [ ] PA catheter  [ ] IABP  [ ] ECMO  [ ] LVAD  [x ] Ventilator  [ x] pacemaker - TPM  [ ] Impella.                      [ x] ABG's     [x ] Pulse Oxymetry Monitoring  Bedside evaluation , monitoring , treatment of hemodynamics , fluids , IVP/ IVCD meds.        Diagnosis:     Op Day - MV repair     Hypotension     Hypovolemia     Hemodynamic lability,  instability. Requires IVCD [x ] vasopressors [ ] inotropes  [ ] vasodilator  [x ]IVSS fluid  to maintain MAP, perfusion, C.I.     CHF- acute [x ]   chronic [ x]    systolic [ ]   diastolic [ ]  Valvular [x ]          - Echo- EF -   MR          [ ] RV dysfunction          - Cxr-cardiomegally, edema          - Clinical-  [ ]inotropes   [ x]pressors   [ ]diuresis   [ ]IABP   [ ]ECMO   [ ]LVAD   [ ]Respiratory Failure    Obesity OHS / LILLY     Ventilator Management:  [x ]AC-rest post op [x ]CPAP-PS Wean  this PM    [ ]Trach Collar     [ ]Extubate    [ ] T-Piece  [ ]peep>5    Requires chest PT, pulmonary toilet, ambu bagging, suctioning to maintain SaO2,  patent airway and treat atelectasis.     May require extubation to BIPAP    Temporary pacemaker (TPM) interrogation and setting.     Chest Tube Drainage / Management     Requires bedside physical therapy, mobilization and total senior living care.     Thrombocytopenia     IVCD Insulin     Requires IVCD sedation / analgesia to maintain ventilator synchrony and wean from ventilator                          -                         Discussed with CT surgeon, Physician's Assistant - Nurse Practitioner- Critical care medicine team.   Discussed at  AM / PM rounds.   Chart, labs , films reviewed.    Cumulative Critical Care Time Given Today : 30 min

## 2022-10-24 NOTE — BRIEF OPERATIVE NOTE - NSICDXBRIEFPROCEDURE_GEN_ALL_CORE_FT
PROCEDURES:  MVR (mitral valve replacement) 24-Oct-2022 15:05:15  Phil Jones  Dawn maze IV procedure 24-Oct-2022 15:09:01  Phil Jones  Clipping, left atrial appendage 24-Oct-2022 15:09:13  Phil Jones

## 2022-10-24 NOTE — CONSULT NOTE ADULT - ATTENDING COMMENTS
code stroke called and neurology emergently assessed patient  Victor Hugo  77  year old  RHD male with HTN, HLD, DM2, former smoker,  hypothyroidism, obesity, LILLY on C-pap,  bladder cancer (2020, s/p resection of bladder tumor/ currently self catheterizes 6x/day ), PAF s/p DCCV /uncomplicated radiofrequency ablation of atrial fibrillation (WACA/PVI, CTI) on 7/13/22,  left atrial appendage & severe MR s/p open heart surgery for mitral valve repair and PFO closure 10/24/22.   Code stroke called for LUE convulsion with residual LUE weakness immediately after. LKW: 9:50 PM 10/24/22.   NIHSS: 12. MRS:2. on day of code stroke    Per primary team, LUE weakness improved. Patient had LUE convulsions that spread to entire body and team witnessed GTC's that lasted for 1.5 minutes. No witnessed gaze deviation per primary team.     On initial exam, patient able to follow commands and able to lift all extremities against gravity. Patient was able to life LUE but less than the RUE. Still, all extremities had drift that hit bed.   Not a tpa candidate given open heart surgery within 14 days. Not a thrombectomy candidate given no LVO.     o/e 10/25 AAOx3, OLIVIER but mild LUE weakness still noted.     CTH: R IC vague lucency noted. may be age indeterminate  CTA H/N limited 2/2 poor opacification of vessels but no LVO.  CTP neg   A1c 6.2  LDL 27     Impression: LUE convulsions with generalized spreading likely seizure. Stroke on differential as well given vague lucency at the genu of the right internal capsule and in the right thalamus of indeterminate age. Likely lacunar infarcts.     Recommendations:    - s/p load with Keppra 2g, now keppra maintenance 750 mg bid   - veeg in progress  - MR brain wo when stable unlikely to happen at this time.  would repeat TH as alternative   - f/u lidocaine level  - c/w ASA 81mg daily.  should be on statin therapy for secondary stroke prevention. lipitor 20 if no objection.  will hold off high dose as no athero on CTA H/N and LDL at goal  - TTE   - telemetry  - PT/OT/SS/SLP, OOBC  - BP per CTICU.   - check FS, glucose control <180  - GI/DVT ppx  - Counseling on diet, exercise, and medication adherence was done  - Counseling on smoking cessation and alcohol consumption offered when appropriate.  - Pain assessed and judicious use of narcotics when appropriate was discussed.    - Stroke education given when appropriate.  - Importance of fall prevention discussed.   - Differential diagnosis and plan of care discussed with patient and/or family and primary team  - Thank you for allowing me to participate in the care of this patient. Call with questions.   - spoke [facility] VINAY Sharpe MD  Vascular Neurology  Office: [phone]

## 2022-10-24 NOTE — CONSULT NOTE ADULT - ASSESSMENT
77  year old  RHD male PMH of HTN, HLD, DM2, former smoker,  hypothyroidism, obesity, LILLY on C-pap,  bladder cancer (2020, s/p resection of bladder tumor/ currently self catheterizes 6x/day ), PAF s/p DCCV /uncomplicated radiofrequency ablation of atrial fibrillation ([facility]/PVI, CTI) on 7/13/22,  left atrial appendage & severe MR s/p open heart surgery for mitral valve repair and PFO closure 10/24/22. Code stroke called for LUE convulsion with residual LUE weakness immediately after. LKW: 9:50 PM 10/24/22. NIHSS: 12. MRS:2. Per primary team, LUE weakness improved. Patient had LUE convulsions that spread to entire body and team witnessed GTC's that lasted for 1.5 minutes. No witnessed gaze deviation per primary team.     On exam, patient able to follow commands and able to lift all extremities against gravity. Patient was able to life LUE but less than the RUE. Still, all extremities had drift that hit bed.     Impression: LUE convulsions with generalized spreading likely seizure, but given significant cardiac hx, r/o stroke.     Recommendations:   []Keppra 2g   []keppra maintenance 750 mg bid   []vEEG   []MR brain wo when stable     Case d.w stroke fellow under supervision of stroke attending. Case to be seen by general neurology attending.              77  year old  RHD male PMH of HTN, HLD, DM2, former smoker,  hypothyroidism, obesity, LILLY on C-pap,  bladder cancer (2020, s/p resection of bladder tumor/ currently self catheterizes 6x/day ), PAF s/p DCCV /uncomplicated radiofrequency ablation of atrial fibrillation ([facility]/PVI, CTI) on 7/13/22,  left atrial appendage & severe MR s/p open heart surgery for mitral valve repair and PFO closure 10/24/22. Code stroke called for LUE convulsion with residual LUE weakness immediately after. LKW: 9:50 PM 10/24/22. NIHSS: 12. MRS:2. Per primary team, LUE weakness improved. Patient had LUE convulsions that spread to entire body and team witnessed GTC's that lasted for 1.5 minutes. No witnessed gaze deviation per primary team.     On exam, patient able to follow commands and able to lift all extremities against gravity. Patient was able to life LUE but less than the RUE. Still, all extremities had drift that hit bed.     Not a tpa candidate given open heart surgery within 14 days. Not a thrombectomy candidate given no LVO.     Impression: LUE convulsions with generalized spreading likely seizure. Stroke on differential as well given vague lucency at the genu of the right internal capsule and in the right thalamus of indeterminate age. Likely lacunar infarcts.     Recommendations:   []Keppra 2g   []keppra maintenance 750 mg bid   []vEEG   []MR brain wo when stable   []Please send hga1c and lipid panel     Case dJazminw stroke fellow under supervision of stroke attending. Case to be seen by stroke attending.

## 2022-10-24 NOTE — PATIENT PROFILE ADULT - DO YOU FEEL THREATENED BY OTHERS?
There is a medicine called gabapentin that might help with your HA's too. Shall I call this in?   no

## 2022-10-24 NOTE — PRE-ANESTHESIA EVALUATION PEDIATRIC - LAST ECHOCARDIOGRAM
[With Polypectomy] : polypectomy [Colon Cancer Screening] : colon cancer screening [Fm Hx of Colon Ca/Polyps] : family history of colon cancer and/or polyps [Hx of Colon Polyps] : history of colon polyps [Procedure Explained] : The procedure was explained [Allergies Reviewed] : allergies reviewed. [Risks] : Risks [Benefits] : benefits [Alternatives] : alternatives 2022 [Bleeding] : bleeding risk [Infection] : risk of infection [Consent Obtained] : written consent was obtained prior to the procedure and is detailed in the patient's record [Patient] : the patient [Bowel Prep Kit] : the patient took the appropriate bowel preparation kit as directed [Approved Diet Followed] : the patient avoided solid foods and adhered to the approved diet list for 24 hours prior to the procedure [Automated Blood Pressure Cuff] : automated blood pressure cuff [Cardiac Monitor] : cardiac monitor [Pulse Oximeter] : pulse oximeter [Propofol ___ mg IV] : Propofol [unfilled] ~Umg intravenously [2] : 2 [Prep Qualtiy: ___] : Prep Quality:  [unfilled] [Withdrawal Time: ___] : Withdrawal Time:  [unfilled] [Left Lateral Decubitus] : The patient was positioned in the left lateral decubitus position [Cecum (Landmarks/Transillum)] : and guided to the cecum which was identified by the anatomic landmarks of the appendiceal orifice and ileocecal valve and by transillumination in the right lower quadrant [No Difficulty] : without difficulty [Insufflated] : insufflated [Single Pass Needed] : after a single pass [Polyps] : polyps [Normal] : Normal [Diverticulosis] : diverticulosis [Hemorrhoids] : hemorrhoids [Sent to Pathology] : was sent to pathology for analysis [Tolerated Well] : the patient tolerated the procedure well [Vital Signs Stable] : the vital signs were stable [No Complications] : There were no complications [Patient Rotated Into Alternating Positions] : the patient was not rotated [de-identified] : [id number] [de-identified] : 4mm polyp in the hepatic flexure removed with the trisha forceps [de-identified] : small hemorrhoids [de-identified] : f/u colonoscopy 5 years

## 2022-10-24 NOTE — CONSULT NOTE ADULT - SUBJECTIVE AND OBJECTIVE BOX
Neurology  Consult Note  10-24-22    Name:  MECHE CHACKO; 77y (1945)    Reason for Admission: Nonrheumatic mitral annular calcification        Chief Complaint: code stroke for LUE weakness   HPI:77  year old  RHD male PMH of HTN, HLD, DM2, former smoker,  hypothyroidism, obesity, LILLY on C-pap,  bladder cancer (2020, s/p resection of bladder tumor/ currently self catheterizes 6x/day ), PAF s/p DCCV /uncomplicated radiofrequency ablation of atrial fibrillation ([facility]/PVI, CTI) on 7/13/22,  left atrial appendage & severe MR s/p open heart surgery for mitral valve repair and PFO closure 10/24/22. Code stroke called for LUE convulsion with residual LUE weakness immediately after. LKW: 9:50 PM 10/24/22. NIHSS: 12. MRS:2. Per primary team, LUE weakness improved. Patient had LUE convulsions that spread to entire body and team witnessed GTC's that lasted for 1.5 minutes.     On exam, patient able to follow commands and able to lift all extremities against gravity. Patient was able to life LUE but less than the RUE. Still, all extremities had drift that hit bed.     Review of Systems:  As states in HPI.    penicillins (Diarrhea)      PMHx:   Paroxysmal atrial fibrillation    Hypertension    Hyperlipidemia    Bladder cancer    Diabetes mellitus    Hypothyroidism    At risk for sleep apnea    COVID-19 virus infection    Severe mitral regurgitation    PAF (paroxysmal atrial fibrillation)    Bladder cancer    Dysuria      PFHx:   FHx: ovarian cancer (Mother)    FH: breast cancer (Sibling)    FH: heart disease (Father)      PSuHx:   H/O shoulder surgery    H/O cardiac catheterization    Bladder tumor      Medications:  MEDICATIONS  (STANDING):  acetaminophen     Tablet .. 650 milliGRAM(s) Oral every 6 hours  ascorbic acid 500 milliGRAM(s) Oral two times a day  cefuroxime  IVPB 1500 milliGRAM(s) IV Intermittent every 8 hours  chlorhexidine 0.12% Liquid 15 milliLiter(s) Oral Mucosa every 12 hours  chlorhexidine 2% Cloths 1 Application(s) Topical daily  dexMEDEtomidine Infusion 1.5 MICROgram(s)/kG/Hr (47.6 mL/Hr) IV Continuous <Continuous>  dextrose 50% Injectable 50 milliLiter(s) IV Push every 15 minutes  dextrose 50% Injectable 25 milliLiter(s) IV Push every 15 minutes  gabapentin 100 milliGRAM(s) Oral every 8 hours  insulin regular Infusion 3 Unit(s)/Hr (3 mL/Hr) IV Continuous <Continuous>  ketorolac   Injectable 30 milliGRAM(s) IV Push every 8 hours  levETIRAcetam  IVPB 1500 milliGRAM(s) IV Intermittent once  levothyroxine Injectable 12.5 MICROGram(s) IV Push at bedtime  meperidine     Injectable 25 milliGRAM(s) IV Push once  metoclopramide Injectable 10 milliGRAM(s) IV Push every 8 hours  norepinephrine Infusion 0.04 MICROgram(s)/kG/Min (9.52 mL/Hr) IV Continuous <Continuous>  pantoprazole  Injectable 40 milliGRAM(s) IV Push daily  potassium chloride  10 mEq/50 mL IVPB 10 milliEquivalent(s) IV Intermittent every 1 hour  potassium chloride  10 mEq/50 mL IVPB 10 milliEquivalent(s) IV Intermittent every 1 hour  potassium chloride  10 mEq/50 mL IVPB 10 milliEquivalent(s) IV Intermittent every 1 hour  sodium chloride 0.9%. 1000 milliLiter(s) (10 mL/Hr) IV Continuous <Continuous>    MEDICATIONS  (PRN):  HYDROmorphone  Injectable 0.5 milliGRAM(s) IV Push every 6 hours PRN Breakthrough Pain  oxyCODONE    IR 5 milliGRAM(s) Oral every 4 hours PRN Moderate Pain (4 - 6)  oxyCODONE    IR 10 milliGRAM(s) Oral every 4 hours PRN Severe Pain (7 - 10)    Vitals:  T(C): 36.5 (10-24-22 @ 20:00), Max: 36.9 (10-24-22 @ 06:00)  HR: 80 (10-24-22 @ 21:30) (61 - 80)  BP: 123/62 (10-24-22 @ 08:19) (123/62 - 123/62)  RR: 16 (10-24-22 @ 21:30) (0 - 21)  SpO2: 99% (10-24-22 @ 21:30) (98% - 100%)    Labs:                        8.7    7.21  )-----------( 112      ( 24 Oct 2022 22:10 )             26.9     10-24    143  |  106  |  15  ----------------------------<  124<H>  3.9   |  24  |  0.94    Ca    8.2<L>      24 Oct 2022 14:53  Phos  2.2     10-24  Mg     2.5     10-24    TPro  5.2<L>  /  Alb  3.2<L>  /  TBili  1.0  /  DBili  x   /  AST  45<H>  /  ALT  22  /  AlkPhos  44  10-24    CAPILLARY BLOOD GLUCOSE      POCT Blood Glucose.: 179 mg/dL (24 Oct 2022 21:35)    LIVER FUNCTIONS - ( 24 Oct 2022 14:53 )  Alb: 3.2 g/dL / Pro: 5.2 g/dL / ALK PHOS: 44 U/L / ALT: 22 U/L / AST: 45 U/L / GGT: x             PT/INR - ( 24 Oct 2022 22:00 )   PT: 15.6 sec;   INR: 1.35 ratio         PTT - ( 24 Oct 2022 22:00 )  PTT:32.8 sec  PHYSICAL EXAMINATION:  General: Intubated  Eyes: Conjunctiva and sclera clear.  Neurologic:  - Mental Status:  Intubated, eyes closed, able to follow commands. Nonverbal.   - Cranial Nerves II-XII:    II:  Visual fields are full to confrontation; Pupils are equal, round, and minimally reactive to light  III, IV, VI:  Extraocular movements are intact without nystagmus.  V:  Facial sensation is intact in the V1-V3 distribution bilaterally.  VII:  Face is symmetric with normal eye closure and smile  VIII:  Hearing is intact to finger rub.  IX, X:  Uvula is midline and soft palate rises symmetrically  XI:  Head turning and shoulder shrug are intact.  XII:  Tongue protudes in the midline.  - Motor:  Strength is 4+/5 throughout.  There is no pronator drift.  Normal muscle bulk and tone throughout.  - Sensory:  Intact throughout to light touch, pin prick.  - Coordination:  unable to assess   - Gait:   Intubated, unable to assess     Radiology:   Neurology  Consult Note  10-24-22    Name:  MECHE CHACKO; 77y (1945)    Reason for Admission: Nonrheumatic mitral annular calcification        Chief Complaint: code stroke for LUE weakness   HPI:77  year old  RHD male PMH of HTN, HLD, DM2, former smoker,  hypothyroidism, obesity, LILLY on C-pap,  bladder cancer (2020, s/p resection of bladder tumor/ currently self catheterizes 6x/day ), PAF s/p DCCV /uncomplicated radiofrequency ablation of atrial fibrillation ([facility]/PVI, CTI) on 7/13/22,  left atrial appendage & severe MR s/p open heart surgery for mitral valve repair and PFO closure 10/24/22. Code stroke called for LUE convulsion with residual LUE weakness immediately after. LKW: 9:50 PM 10/24/22. NIHSS: 12. MRS:2. Per primary team, LUE weakness improved. Patient had LUE convulsions that spread to entire body and team witnessed GTC's that lasted for 1.5 minutes.     On exam, patient able to follow commands and able to lift all extremities against gravity. Patient was able to life LUE but less than the RUE. Still, all extremities had drift that hit bed.     Review of Systems:  As states in HPI.    penicillins (Diarrhea)      PMHx:   Paroxysmal atrial fibrillation    Hypertension    Hyperlipidemia    Bladder cancer    Diabetes mellitus    Hypothyroidism    At risk for sleep apnea    COVID-19 virus infection    Severe mitral regurgitation    PAF (paroxysmal atrial fibrillation)    Bladder cancer    Dysuria      PFHx:   FHx: ovarian cancer (Mother)    FH: breast cancer (Sibling)    FH: heart disease (Father)      PSuHx:   H/O shoulder surgery    H/O cardiac catheterization    Bladder tumor      Medications:  MEDICATIONS  (STANDING):  acetaminophen     Tablet .. 650 milliGRAM(s) Oral every 6 hours  ascorbic acid 500 milliGRAM(s) Oral two times a day  cefuroxime  IVPB 1500 milliGRAM(s) IV Intermittent every 8 hours  chlorhexidine 0.12% Liquid 15 milliLiter(s) Oral Mucosa every 12 hours  chlorhexidine 2% Cloths 1 Application(s) Topical daily  dexMEDEtomidine Infusion 1.5 MICROgram(s)/kG/Hr (47.6 mL/Hr) IV Continuous <Continuous>  dextrose 50% Injectable 50 milliLiter(s) IV Push every 15 minutes  dextrose 50% Injectable 25 milliLiter(s) IV Push every 15 minutes  gabapentin 100 milliGRAM(s) Oral every 8 hours  insulin regular Infusion 3 Unit(s)/Hr (3 mL/Hr) IV Continuous <Continuous>  ketorolac   Injectable 30 milliGRAM(s) IV Push every 8 hours  levETIRAcetam  IVPB 1500 milliGRAM(s) IV Intermittent once  levothyroxine Injectable 12.5 MICROGram(s) IV Push at bedtime  meperidine     Injectable 25 milliGRAM(s) IV Push once  metoclopramide Injectable 10 milliGRAM(s) IV Push every 8 hours  norepinephrine Infusion 0.04 MICROgram(s)/kG/Min (9.52 mL/Hr) IV Continuous <Continuous>  pantoprazole  Injectable 40 milliGRAM(s) IV Push daily  potassium chloride  10 mEq/50 mL IVPB 10 milliEquivalent(s) IV Intermittent every 1 hour  potassium chloride  10 mEq/50 mL IVPB 10 milliEquivalent(s) IV Intermittent every 1 hour  potassium chloride  10 mEq/50 mL IVPB 10 milliEquivalent(s) IV Intermittent every 1 hour  sodium chloride 0.9%. 1000 milliLiter(s) (10 mL/Hr) IV Continuous <Continuous>    MEDICATIONS  (PRN):  HYDROmorphone  Injectable 0.5 milliGRAM(s) IV Push every 6 hours PRN Breakthrough Pain  oxyCODONE    IR 5 milliGRAM(s) Oral every 4 hours PRN Moderate Pain (4 - 6)  oxyCODONE    IR 10 milliGRAM(s) Oral every 4 hours PRN Severe Pain (7 - 10)    Vitals:  T(C): 36.5 (10-24-22 @ 20:00), Max: 36.9 (10-24-22 @ 06:00)  HR: 80 (10-24-22 @ 21:30) (61 - 80)  BP: 123/62 (10-24-22 @ 08:19) (123/62 - 123/62)  RR: 16 (10-24-22 @ 21:30) (0 - 21)  SpO2: 99% (10-24-22 @ 21:30) (98% - 100%)    Labs:                        8.7    7.21  )-----------( 112      ( 24 Oct 2022 22:10 )             26.9     10-24    143  |  106  |  15  ----------------------------<  124<H>  3.9   |  24  |  0.94    Ca    8.2<L>      24 Oct 2022 14:53  Phos  2.2     10-24  Mg     2.5     10-24    TPro  5.2<L>  /  Alb  3.2<L>  /  TBili  1.0  /  DBili  x   /  AST  45<H>  /  ALT  22  /  AlkPhos  44  10-24    CAPILLARY BLOOD GLUCOSE      POCT Blood Glucose.: 179 mg/dL (24 Oct 2022 21:35)    LIVER FUNCTIONS - ( 24 Oct 2022 14:53 )  Alb: 3.2 g/dL / Pro: 5.2 g/dL / ALK PHOS: 44 U/L / ALT: 22 U/L / AST: 45 U/L / GGT: x             PT/INR - ( 24 Oct 2022 22:00 )   PT: 15.6 sec;   INR: 1.35 ratio         PTT - ( 24 Oct 2022 22:00 )  PTT:32.8 sec  PHYSICAL EXAMINATION:  General: Intubated  Eyes: Conjunctiva and sclera clear.  Neurologic:  - Mental Status:  Intubated, eyes closed, able to follow commands. Nonverbal.   - Cranial Nerves II-XII:    II:  Visual fields are full to confrontation; Pupils are equal, round, and minimally reactive to light  III, IV, VI:  Extraocular movements are intact without nystagmus.  V:  Facial sensation is intact in the V1-V3 distribution bilaterally.  VII:  Face is symmetric with normal eye closure and smile  VIII:  Hearing is intact to finger rub.  IX, X:  Uvula is midline and soft palate rises symmetrically  XI:  Head turning and shoulder shrug are intact.  XII:  Tongue protudes in the midline.  - Motor:  Strength is 4+/5 throughout.  There is no pronator drift.  Normal muscle bulk and tone throughout.  - Sensory:  Intact throughout to light touch, pin prick.  - Coordination:  unable to assess   - Gait:   Intubated, unable to assess     Radiology:  < from: CT Angio Brain Stroke Protocol  w/ IV Cont (10.24.22 @ 22:51) >  Limited evaluation due to poor opacification of the vessels.    CT BRAIN PERFUSION: No evidence of a core infarct or tissue at risk.    CTA HEAD AND NECK:    Very limited due to poor arterial opacification.    Patent cervical carotid and vertebral arteries.    Patent proximal intracranial arterial vasculature.    Pneumomediastinum and subcutaneous case emphysema, likely postprocedural.    < end of copied text >  < from: CT Brain Stroke Protocol (10.24.22 @ 22:50) >  No acute intracranial hemorrhage, mass effect, or CTevidence of an acute   transcortical infarct.    Vague lucency at the genu of the right internal capsule and in the right   thalamus which may represent lacunar infarcts of indeterminate age. No   prior studies are available for comparison.    < end of copied text >

## 2022-10-25 LAB
ALBUMIN SERPL ELPH-MCNC: 3.5 G/DL — SIGNIFICANT CHANGE UP (ref 3.3–5)
ALP SERPL-CCNC: 44 U/L — SIGNIFICANT CHANGE UP (ref 40–120)
ALT FLD-CCNC: 21 U/L — SIGNIFICANT CHANGE UP (ref 10–45)
ANION GAP SERPL CALC-SCNC: 10 MMOL/L — SIGNIFICANT CHANGE UP (ref 5–17)
APTT BLD: 27.9 SEC — SIGNIFICANT CHANGE UP (ref 27.5–35.5)
AST SERPL-CCNC: 47 U/L — HIGH (ref 10–40)
BASOPHILS # BLD AUTO: 0.03 K/UL — SIGNIFICANT CHANGE UP (ref 0–0.2)
BASOPHILS NFR BLD AUTO: 0.4 % — SIGNIFICANT CHANGE UP (ref 0–2)
BILIRUB SERPL-MCNC: 1 MG/DL — SIGNIFICANT CHANGE UP (ref 0.2–1.2)
BUN SERPL-MCNC: 16 MG/DL — SIGNIFICANT CHANGE UP (ref 7–23)
CALCIUM SERPL-MCNC: 8.1 MG/DL — LOW (ref 8.4–10.5)
CHLORIDE SERPL-SCNC: 104 MMOL/L — SIGNIFICANT CHANGE UP (ref 96–108)
CHOLEST SERPL-MCNC: 65 MG/DL — SIGNIFICANT CHANGE UP
CO2 SERPL-SCNC: 24 MMOL/L — SIGNIFICANT CHANGE UP (ref 22–31)
CREAT SERPL-MCNC: 1.16 MG/DL — SIGNIFICANT CHANGE UP (ref 0.5–1.3)
EGFR: 65 ML/MIN/1.73M2 — SIGNIFICANT CHANGE UP
EOSINOPHIL # BLD AUTO: 0 K/UL — SIGNIFICANT CHANGE UP (ref 0–0.5)
EOSINOPHIL NFR BLD AUTO: 0 % — SIGNIFICANT CHANGE UP (ref 0–6)
GAS PNL BLDA: SIGNIFICANT CHANGE UP
GAS PNL BLDA: SIGNIFICANT CHANGE UP
GLUCOSE SERPL-MCNC: 171 MG/DL — HIGH (ref 70–99)
HCT VFR BLD CALC: 25.6 % — LOW (ref 39–50)
HDLC SERPL-MCNC: 27 MG/DL — LOW
HGB BLD-MCNC: 8.4 G/DL — LOW (ref 13–17)
IMM GRANULOCYTES NFR BLD AUTO: 0.4 % — SIGNIFICANT CHANGE UP (ref 0–0.9)
INR BLD: 1.33 RATIO — HIGH (ref 0.88–1.16)
LIPID PNL WITH DIRECT LDL SERPL: 27 MG/DL — SIGNIFICANT CHANGE UP
LYMPHOCYTES # BLD AUTO: 0.24 K/UL — LOW (ref 1–3.3)
LYMPHOCYTES # BLD AUTO: 3.4 % — LOW (ref 13–44)
MAGNESIUM SERPL-MCNC: 2.2 MG/DL — SIGNIFICANT CHANGE UP (ref 1.6–2.6)
MCHC RBC-ENTMCNC: 27.7 PG — SIGNIFICANT CHANGE UP (ref 27–34)
MCHC RBC-ENTMCNC: 32.8 GM/DL — SIGNIFICANT CHANGE UP (ref 32–36)
MCV RBC AUTO: 84.5 FL — SIGNIFICANT CHANGE UP (ref 80–100)
MONOCYTES # BLD AUTO: 0.72 K/UL — SIGNIFICANT CHANGE UP (ref 0–0.9)
MONOCYTES NFR BLD AUTO: 10.1 % — SIGNIFICANT CHANGE UP (ref 2–14)
NEUTROPHILS # BLD AUTO: 6.13 K/UL — SIGNIFICANT CHANGE UP (ref 1.8–7.4)
NEUTROPHILS NFR BLD AUTO: 85.7 % — HIGH (ref 43–77)
NON HDL CHOLESTEROL: 38 MG/DL — SIGNIFICANT CHANGE UP
NRBC # BLD: 0 /100 WBCS — SIGNIFICANT CHANGE UP (ref 0–0)
PHOSPHATE SERPL-MCNC: 3.8 MG/DL — SIGNIFICANT CHANGE UP (ref 2.5–4.5)
PLATELET # BLD AUTO: 129 K/UL — LOW (ref 150–400)
POTASSIUM BLDA-SCNC: 4 MMOL/L — SIGNIFICANT CHANGE UP (ref 3.5–5.1)
POTASSIUM SERPL-MCNC: 4 MMOL/L — SIGNIFICANT CHANGE UP (ref 3.5–5.3)
POTASSIUM SERPL-SCNC: 4 MMOL/L — SIGNIFICANT CHANGE UP (ref 3.5–5.3)
PROT SERPL-MCNC: 5.4 G/DL — LOW (ref 6–8.3)
PROTHROM AB SERPL-ACNC: 15.3 SEC — HIGH (ref 10.5–13.4)
RBC # BLD: 3.03 M/UL — LOW (ref 4.2–5.8)
RBC # FLD: 16.5 % — HIGH (ref 10.3–14.5)
SODIUM SERPL-SCNC: 138 MMOL/L — SIGNIFICANT CHANGE UP (ref 135–145)
TRIGL SERPL-MCNC: 55 MG/DL — SIGNIFICANT CHANGE UP
TSH SERPL-MCNC: 2.44 UIU/ML — SIGNIFICANT CHANGE UP (ref 0.27–4.2)
WBC # BLD: 7.15 K/UL — SIGNIFICANT CHANGE UP (ref 3.8–10.5)
WBC # FLD AUTO: 7.15 K/UL — SIGNIFICANT CHANGE UP (ref 3.8–10.5)

## 2022-10-25 PROCEDURE — 71045 X-RAY EXAM CHEST 1 VIEW: CPT | Mod: 26

## 2022-10-25 PROCEDURE — 93010 ELECTROCARDIOGRAM REPORT: CPT

## 2022-10-25 PROCEDURE — 99291 CRITICAL CARE FIRST HOUR: CPT | Mod: 24

## 2022-10-25 PROCEDURE — 95718 EEG PHYS/QHP 2-12 HR W/VEEG: CPT

## 2022-10-25 RX ORDER — INSULIN LISPRO 100/ML
VIAL (ML) SUBCUTANEOUS AT BEDTIME
Refills: 0 | Status: DISCONTINUED | OUTPATIENT
Start: 2022-10-25 | End: 2022-11-07

## 2022-10-25 RX ORDER — ASPIRIN/CALCIUM CARB/MAGNESIUM 324 MG
81 TABLET ORAL DAILY
Refills: 0 | Status: DISCONTINUED | OUTPATIENT
Start: 2022-10-25 | End: 2022-11-07

## 2022-10-25 RX ORDER — FUROSEMIDE 40 MG
20 TABLET ORAL DAILY
Refills: 0 | Status: DISCONTINUED | OUTPATIENT
Start: 2022-10-25 | End: 2022-10-26

## 2022-10-25 RX ORDER — PANTOPRAZOLE SODIUM 20 MG/1
40 TABLET, DELAYED RELEASE ORAL
Refills: 0 | Status: DISCONTINUED | OUTPATIENT
Start: 2022-10-26 | End: 2022-11-07

## 2022-10-25 RX ORDER — AMIODARONE HYDROCHLORIDE 400 MG/1
400 TABLET ORAL EVERY 12 HOURS
Refills: 0 | Status: DISCONTINUED | OUTPATIENT
Start: 2022-10-25 | End: 2022-10-26

## 2022-10-25 RX ORDER — SODIUM CHLORIDE 9 MG/ML
1000 INJECTION, SOLUTION INTRAVENOUS
Refills: 0 | Status: DISCONTINUED | OUTPATIENT
Start: 2022-10-25 | End: 2022-11-07

## 2022-10-25 RX ORDER — SPIRONOLACTONE 25 MG/1
25 TABLET, FILM COATED ORAL DAILY
Refills: 0 | Status: DISCONTINUED | OUTPATIENT
Start: 2022-10-25 | End: 2022-10-26

## 2022-10-25 RX ORDER — LEVOTHYROXINE SODIUM 125 MCG
25 TABLET ORAL DAILY
Refills: 0 | Status: DISCONTINUED | OUTPATIENT
Start: 2022-10-25 | End: 2022-11-07

## 2022-10-25 RX ORDER — ATORVASTATIN CALCIUM 80 MG/1
40 TABLET, FILM COATED ORAL AT BEDTIME
Refills: 0 | Status: DISCONTINUED | OUTPATIENT
Start: 2022-10-25 | End: 2022-11-04

## 2022-10-25 RX ORDER — AMIODARONE HYDROCHLORIDE 400 MG/1
200 TABLET ORAL EVERY 12 HOURS
Refills: 0 | Status: DISCONTINUED | OUTPATIENT
Start: 2022-10-25 | End: 2022-10-25

## 2022-10-25 RX ORDER — ENOXAPARIN SODIUM 100 MG/ML
40 INJECTION SUBCUTANEOUS EVERY 24 HOURS
Refills: 0 | Status: DISCONTINUED | OUTPATIENT
Start: 2022-10-25 | End: 2022-10-29

## 2022-10-25 RX ORDER — GLUCAGON INJECTION, SOLUTION 0.5 MG/.1ML
1 INJECTION, SOLUTION SUBCUTANEOUS ONCE
Refills: 0 | Status: DISCONTINUED | OUTPATIENT
Start: 2022-10-25 | End: 2022-11-07

## 2022-10-25 RX ORDER — DEXTROSE 50 % IN WATER 50 %
15 SYRINGE (ML) INTRAVENOUS ONCE
Refills: 0 | Status: DISCONTINUED | OUTPATIENT
Start: 2022-10-25 | End: 2022-11-07

## 2022-10-25 RX ORDER — INSULIN LISPRO 100/ML
VIAL (ML) SUBCUTANEOUS
Refills: 0 | Status: DISCONTINUED | OUTPATIENT
Start: 2022-10-25 | End: 2022-11-07

## 2022-10-25 RX ORDER — DOXAZOSIN MESYLATE 4 MG
4 TABLET ORAL AT BEDTIME
Refills: 0 | Status: DISCONTINUED | OUTPATIENT
Start: 2022-10-25 | End: 2022-11-07

## 2022-10-25 RX ADMIN — Medication 650 MILLIGRAM(S): at 23:19

## 2022-10-25 RX ADMIN — Medication 100 MILLIGRAM(S): at 16:00

## 2022-10-25 RX ADMIN — Medication 100 MILLIGRAM(S): at 08:12

## 2022-10-25 RX ADMIN — Medication 100 MILLIGRAM(S): at 00:10

## 2022-10-25 RX ADMIN — Medication 125 MILLILITER(S): at 00:47

## 2022-10-25 RX ADMIN — Medication 10 MILLIGRAM(S): at 06:25

## 2022-10-25 RX ADMIN — Medication 500 MILLIGRAM(S): at 18:26

## 2022-10-25 RX ADMIN — CHLORHEXIDINE GLUCONATE 15 MILLILITER(S): 213 SOLUTION TOPICAL at 06:25

## 2022-10-25 RX ADMIN — SPIRONOLACTONE 25 MILLIGRAM(S): 25 TABLET, FILM COATED ORAL at 14:43

## 2022-10-25 RX ADMIN — Medication 650 MILLIGRAM(S): at 12:00

## 2022-10-25 RX ADMIN — Medication 81 MILLIGRAM(S): at 14:26

## 2022-10-25 RX ADMIN — Medication 650 MILLIGRAM(S): at 18:26

## 2022-10-25 RX ADMIN — ATORVASTATIN CALCIUM 40 MILLIGRAM(S): 80 TABLET, FILM COATED ORAL at 08:39

## 2022-10-25 RX ADMIN — ATORVASTATIN CALCIUM 40 MILLIGRAM(S): 80 TABLET, FILM COATED ORAL at 23:18

## 2022-10-25 RX ADMIN — GABAPENTIN 100 MILLIGRAM(S): 400 CAPSULE ORAL at 06:25

## 2022-10-25 RX ADMIN — Medication 500 MILLIGRAM(S): at 06:25

## 2022-10-25 RX ADMIN — LEVETIRACETAM 400 MILLIGRAM(S): 250 TABLET, FILM COATED ORAL at 03:19

## 2022-10-25 RX ADMIN — Medication 4 MILLIGRAM(S): at 23:18

## 2022-10-25 RX ADMIN — CHLORHEXIDINE GLUCONATE 1 APPLICATION(S): 213 SOLUTION TOPICAL at 14:26

## 2022-10-25 RX ADMIN — SENNA PLUS 2 TABLET(S): 8.6 TABLET ORAL at 23:16

## 2022-10-25 RX ADMIN — Medication 100 MILLIGRAM(S): at 23:19

## 2022-10-25 RX ADMIN — ENOXAPARIN SODIUM 40 MILLIGRAM(S): 100 INJECTION SUBCUTANEOUS at 16:11

## 2022-10-25 RX ADMIN — Medication 650 MILLIGRAM(S): at 12:30

## 2022-10-25 RX ADMIN — Medication 650 MILLIGRAM(S): at 18:29

## 2022-10-25 RX ADMIN — Medication 650 MILLIGRAM(S): at 06:25

## 2022-10-25 RX ADMIN — Medication 20 MILLIGRAM(S): at 14:43

## 2022-10-25 RX ADMIN — AMIODARONE HYDROCHLORIDE 400 MILLIGRAM(S): 400 TABLET ORAL at 16:11

## 2022-10-25 NOTE — PHYSICAL THERAPY INITIAL EVALUATION ADULT - LIVES WITH, PROFILE
Pt resides in private home with spouse, 2 steps to enter, one level within, PTA independence with mobility and ADL's/spouse

## 2022-10-25 NOTE — PROGRESS NOTE ADULT - SUBJECTIVE AND OBJECTIVE BOX
Patient seen and examined at the bedside.    Remained critically ill on continuous ICU monitoring.    OBJECTIVE:  Vital Signs Last 24 Hrs  T(C): 36.2 (25 Oct 2022 04:00), Max: 36.5 (24 Oct 2022 20:00)  T(F): 97.2 (25 Oct 2022 04:00), Max: 97.7 (24 Oct 2022 20:00)  HR: 80 (25 Oct 2022 07:45) (80 - 80)  BP: --  BP(mean): --  RR: 15 (25 Oct 2022 07:45) (0 - 30)  SpO2: 99% (25 Oct 2022 07:45) (97% - 100%)    Parameters below as of 25 Oct 2022 04:30  Patient On (Oxygen Delivery Method): mask, aerosol  O2 Flow (L/min): 10  O2 Concentration (%): 40      Physical Exam:   General: intubated multiple lines gtt & tubes   Neurology: sedated   Eyes: bilaeral pupils equal and reactive   ENT/Neck: +ETT midline, Neck supple, trachea midline, No JVD   Respiratory: Rales noted bilaterally   CV: AV Paced 80         [x] Sternal dressing, [x]Mediastinal CTx2 [x] Bulb        [x] Temporary pacing - DDD @80  Abdominal: Soft, NT, ND +BS,   Extremities: 1-2+ pedal edema noted, + peripheral pulses   Skin: No Rashes, Hematoma, Ecchymosis                         Assessment:  77  year old  RHD male PMH of HTN, HLD, DM2, former smoker,  hypothyroidism, obesity, LILLY on C-pap,  bladder cancer (2020, s/p resection of bladder tumor/ currently self   catheterizes 6x/day ), PAF s/p DCCV /uncomplicated radiofrequency ablation of atrial fibrillation ([facility]/PVI, CTI) on 7/13/22,  left atrial appendage & severe MR . Presents with c/o recent" abnormal echo with fatigue due to mitral valve regurgitation. Presents for  scheduled Mitral valve replacement , MAZE, left atrial appendage closure on 10/20/2022.    Mitral regurgitation s/p MVR 10/24/2022  S/p Dawn Maze IV Procedure 10/24/2022  s/p Clipping, left atrial appendage 10/24/2022  Hypokalemia   Hypophosphatemia   Hemodynamic instability  Hypovolemia  Post op respiratory insufficiency  Acute blood loss anemia  Thrombocytopenia  Stress hyperglycemia      Plan:   ***Neuro***  10/24: Full body tremors L>R, with LUE and LLE weakness? Post-ictal phase. Code stroke-non-con CT and CTA neg; C/w Keppra   [x] Nonfocal  [x] Sedated with [x] Diprivan [x] Precedex   Post operative neuro assessment     ***Cardiovascular***  Invasive hemodynamic monitoring, assess perfusion indices   MN / CVP 0/ MAP 65/ Hct 25.6/ Lactate 0.5  [x] Levophed - 0.02-0.04 mcg/kg/min    [x] Albumin 500 cc given overnight [x] LR 2000 cc given overnight   Post Maze no Amiodarone  as 100% pacemaker dependent   Assess pacing threshold / no AV radha blocking drugs   [x] VTE prophylaxis with Lovenox   Monitor chest tube outputs  [X] ASA [X] Statins   Replete K / Phos repeat levels        ***Pulmonary***  Post op vent management   At risk for post op pulmonary complication LILLY   Titration of FiO2 and PEEP, follow SpO2, CXR, blood gases       Mode: CPAP with PS  FiO2: 40  PEEP: 5  PS: 5  MAP: 7  PIP: 11              ***GI***  [x]  Diet: Regular    [x] Protonix   Reglan for gut motility       ***Renal***  H/O BPH, Bladder Ca, and self catheterization at home   Continue to monitor I/Os, BUN/Creatinine.   Replete lytes PRN  Gonzalez present   Resume Flomax / Proscar once extubated       ***ID***  Perioperative coverage with Cefuroxime      ***Endocrine***  [x] DM1  : HbA1c 6.2%             - [x]Insulin gtt              - Need tight glycemic control to prevent wound infection.      [x] Hypothyroid  - c/w Synthroid         Patient requires continuous monitoring with bedside rhythm monitoring, pulse oximetry monitoring, and continuous central venous and arterial pressure monitoring; and intermittent blood gas analysis. Care plan discussed with the ICU care team.   Patient remained critical, at risk for life threatening decompensation.    I have spent 30 minutes providing critical care management to this patient.    By signing my name below, I, Charan Yepez, attest that this documentation has been prepared under the direction and in the presence of ASMMY Yen   Electronically signed: Ramesh Woodard, 10-25-22 @ 08:19    I, SAMMY Yen, personally performed the services described in this documentation. all medical record entries made by the scribe were at my direction and in my presence. I have reviewed the chart and agree that the record reflects my personal performance and is accurate and complete  Electronically signed: SAMMY Yen  Patient seen and examined at the bedside.    Remained critically ill on continuous ICU monitoring.    OBJECTIVE:  Vital Signs Last 24 Hrs  T(C): 36.2 (25 Oct 2022 04:00), Max: 36.5 (24 Oct 2022 20:00)  T(F): 97.2 (25 Oct 2022 04:00), Max: 97.7 (24 Oct 2022 20:00)  HR: 80 (25 Oct 2022 07:45) (80 - 80)  BP: --  BP(mean): --  RR: 15 (25 Oct 2022 07:45) (0 - 30)  SpO2: 99% (25 Oct 2022 07:45) (97% - 100%)    Parameters below as of 25 Oct 2022 04:30  Patient On (Oxygen Delivery Method): mask, aerosol  O2 Flow (L/min): 10  O2 Concentration (%): 40      Physical Exam:   General: intubated multiple lines gtt & tubes   Neurology: sedated   Eyes: bilaeral pupils equal and reactive   ENT/Neck: +ETT midline, Neck supple, trachea midline, No JVD   Respiratory: Rales noted bilaterally   CV: AV Paced 80         [x] Sternal dressing, [x]Mediastinal CTx2 [x] Bulb        [x] Temporary pacing - DDD @80  Abdominal: Soft, NT, ND +BS,   Extremities: 1-2+ pedal edema noted, + peripheral pulses   Skin: No Rashes, Hematoma, Ecchymosis                         Assessment:  77  year old  RHD male PMH of HTN, HLD, DM2, former smoker,  hypothyroidism, obesity, LILLY on C-pap,  bladder cancer (2020, s/p resection of bladder tumor/ currently self   catheterizes 6x/day ), PAF s/p DCCV /uncomplicated radiofrequency ablation of atrial fibrillation ([facility]/PVI, CTI) on 7/13/22,  left atrial appendage & severe MR . Presents with c/o recent" abnormal echo with fatigue due to mitral valve regurgitation. Presents for  scheduled Mitral valve replacement , MAZE, left atrial appendage closure on 10/20/2022.    Mitral regurgitation s/p MVR 10/24/2022  S/p Dawn Maze IV Procedure 10/24/2022  s/p Clipping, left atrial appendage 10/24/2022  Hypokalemia   Hypophosphatemia   Hemodynamic instability  Hypovolemia  Post op respiratory insufficiency  Acute blood loss anemia  Thrombocytopenia  Stress hyperglycemia      Plan:   ***Neuro***  10/24: Full body tremors L>R, with LUE and LLE weakness? Post-ictal phase. Code stroke-non-con CT and CTA neg; C/w Keppra   [x] Nonfocal  [x] Sedated with [x] Diprivan [x] Precedex   Post operative neuro assessment     ***Cardiovascular***  Invasive hemodynamic monitoring, assess perfusion indices   HI / CVP 0/ MAP 65/ Hct 25.6/ Lactate 0.5  [x] Levophed - 0.02-0.04 mcg/kg/min--> currently off   Volume: [x] PRBC 1U given today  [x] Albumin 500 cc given overnight [x] LR 2000 cc given overnight   Post Maze no Amiodarone  as 100% pacemaker dependent   Assess pacing threshold / no AV radha blocking drugs   [x] VTE prophylaxis with Lovenox   Monitor chest tube outputs  [X] ASA [X] Statins   Replete K / Phos repeat levels        ***Pulmonary***  Post op vent management   Starting on CPAP trials today   At risk for post op pulmonary complication LILLY   Titration of FiO2 and PEEP, follow SpO2, CXR, blood gases       Mode: CPAP with PS  FiO2: 40  PEEP: 5  PS: 5  MAP: 7  PIP: 11              ***GI***  [x]  Diet: Regular    [x] Protonix   Reglan for gut motility--> D/C today       ***Renal***  H/O BPH, Bladder Ca, and self catheterization at home   Continue to monitor I/Os, BUN/Creatinine.   Replete lytes PRN  Gonzalez present   Resume Flomax / Proscar once extubated       ***ID***  Perioperative coverage with Cefuroxime      ***Endocrine***  [x] DM1  : HbA1c 6.2%             - [x]Insulin gtt              - Need tight glycemic control to prevent wound infection.      [x] Hypothyroid  - c/w Synthroid         Patient requires continuous monitoring with bedside rhythm monitoring, pulse oximetry monitoring, and continuous central venous and arterial pressure monitoring; and intermittent blood gas analysis. Care plan discussed with the ICU care team.   Patient remained critical, at risk for life threatening decompensation.    I have spent 30 minutes providing critical care management to this patient.    By signing my name below, I, Charan Yepez, attest that this documentation has been prepared under the direction and in the presence of SAMMY Yen   Electronically signed: Ramesh Woodard, 10-25-22 @ 08:19    I, SAMMY Yen, personally performed the services described in this documentation. all medical record entries made by the scribe were at my direction and in my presence. I have reviewed the chart and agree that the record reflects my personal performance and is accurate and complete  Electronically signed: SAMMY Yen  Patient seen and examined at the bedside.    Remained critically ill on continuous ICU monitoring.    OBJECTIVE:  Vital Signs Last 24 Hrs  T(C): 36.2 (25 Oct 2022 04:00), Max: 36.5 (24 Oct 2022 20:00)  T(F): 97.2 (25 Oct 2022 04:00), Max: 97.7 (24 Oct 2022 20:00)  HR: 80 (25 Oct 2022 07:45) (80 - 80)  BP: 145/48  BP(mean): 74  RR: 15 (25 Oct 2022 07:45) (0 - 30)  SpO2: 99% (25 Oct 2022 07:45) (97% - 100%)    Parameters below as of 25 Oct 2022 04:30  Patient On (Oxygen Delivery Method): mask, aerosol  O2 Flow (L/min): 10  O2 Concentration (%): 40      Physical Exam:   General: Resting comfortably in chair  Neurology: slight tremor in LUE with movement, strength 5/5 upper and lower extremities  Eyes: bilaeral pupils equal and reactive   ENT/Neck: Neck supple, trachea midline, No JVD   Respiratory: Lungs CTA bilaterally   CV: AV Paced 80         [x] Sternal dressing, [x]Mediastinal CTx2 [x] Bulb        [x] Temporary pacing - DDD @80  Abdominal: Soft, NT, ND +BS,   Extremities: 1-2+ pedal edema noted, + peripheral pulses   Skin: No Rashes, Hematoma, Ecchymosis                         Assessment:  77  year old  RHD male PMH of HTN, HLD, DM2, former smoker,  hypothyroidism, obesity, LILLY on C-pap,  bladder cancer (2020, s/p resection of bladder tumor/ currently self   catheterizes 6x/day ), PAF s/p DCCV /uncomplicated radiofrequency ablation of atrial fibrillation ([facility]/PVI, CTI) on 7/13/22,  left atrial appendage & severe MR . Presents with c/o recent" abnormal echo with fatigue due to mitral valve regurgitation. Presents for  scheduled Mitral valve replacement , MAZE, left atrial appendage closure on 10/20/2022.    Mitral regurgitation s/p MVR 10/24/2022  S/p Dawn Maze IV Procedure 10/24/2022  s/p Clipping, left atrial appendage 10/24/2022  Hypokalemia   Hypophosphatemia   Hemodynamic instability  Hypovolemia  Post op respiratory insufficiency  Acute blood loss anemia  Thrombocytopenia  Stress hyperglycemia      Plan:   ***Neuro***  10/24: Full body tremors L>R, with LUE and LLE weakness? Post-ictal phase. Code stroke-non-con CT and CTA neg; C/w Keppra   Post operative neuro assessment   EEG today  Follow up neurology  CTH negative for acute ischemia or hemorrhage, no LVO  Cont neuro checks    ***Cardiovascular***  Invasive hemodynamic monitoring, assess perfusion indices   CO / CVP 0/ MAP 65/ Hct 25.6/ Lactate 0.5  [x] Levophed - 0.02-0.04 mcg/kg/min--> currently off   Volume: [x] PRBC 1U given today  [x] Albumin 500 cc given overnight [x] LR 2000 cc given overnight   Post Maze - will d/w Dr. Madi miranda plan  Assess pacing threshold / no AV radha blocking drugs   [x] VTE prophylaxis with Lovenox   Monitor chest tube outputs  [X] ASA [X] Statins   Replete K / Phos repeat levels        ***Pulmonary***  Extubated successfully  Monitor SpO2  Supp O2 prn  Incentive spirometry, pulmonary toilet                  ***GI***  [x]  Diet: Regular    [x] Protonix   Reglan for gut motility--> D/C today for risk of neuro side effects      ***Renal***  H/O BPH, Bladder Ca, and self catheterization at home   Continue to monitor I/Os, BUN/Creatinine.   Replete lytes PRN  Gonzalez present   Resume Proscar       ***ID***  Perioperative coverage with Cefuroxime      ***Endocrine***  [x] DM1  : HbA1c 6.2%             - [x]Insulin gtt              - Need tight glycemic control to prevent wound infection.      [x] Hypothyroid  - c/w Synthroid         Patient requires continuous monitoring with bedside rhythm monitoring, pulse oximetry monitoring, and continuous central venous and arterial pressure monitoring; and intermittent blood gas analysis. Care plan discussed with the ICU care team.   Patient remained critical, at risk for life threatening decompensation.    I have spent 35 minutes providing critical care management to this patient.    By signing my name below, I, Charan Yepez, attest that this documentation has been prepared under the direction and in the presence of SAMMY Yen   Electronically signed: Ramesh Woodard, 10-25-22 @ 08:19    I, SAMMY Yen, personally performed the services described in this documentation. all medical record entries made by the scribe were at my direction and in my presence. I have reviewed the chart and agree that the record reflects my personal performance and is accurate and complete  Electronically signed: SAMMY Yen

## 2022-10-25 NOTE — OCCUPATIONAL THERAPY INITIAL EVALUATION ADULT - ADDITIONAL COMMENTS
CT angio 10/24-Limited evaluation due to poor opacification of the vessels.CT BRAIN PERFUSION: No evidence of a core infarct or tissue at risk. CTA HEAD AND NECK: Very limited due to poor arterial opacification. Patent cervical carotid and vertebral arteries. Patent proximal intracranial arterial vasculature. Pneumomediastinum and subcutaneous case emphysema, likely postprocedural.

## 2022-10-25 NOTE — OCCUPATIONAL THERAPY INITIAL EVALUATION ADULT - LIVES WITH, PROFILE
Pvt home, 2 steps to enter with handrail. (I) ADLs and functional transfers without AD/DME./children/spouse

## 2022-10-25 NOTE — OCCUPATIONAL THERAPY INITIAL EVALUATION ADULT - ADL RETRAINING, OT EVAL
Patient will dress lower body (I) AE as needed in 2 weeks. Patient will dress upper body (I), following sternal precautions in 2 weeks

## 2022-10-25 NOTE — PHYSICAL THERAPY INITIAL EVALUATION ADULT - PLANNED THERAPY INTERVENTIONS, PT EVAL
GOALS: Pt will negotiate 12 steps with handrail & Step to pattern with independence in 4wks/bed mobility training/gait training/transfer training

## 2022-10-25 NOTE — AIRWAY REMOVAL NOTE  ADULT & PEDS - ARTIFICAL AIRWAY REMOVAL COMMENTS
Written order for extubation verified, patient was identified by full name and birth date compared to the identification band. Present during the procedure was ... Darshana Ward RN

## 2022-10-25 NOTE — PHYSICAL THERAPY INITIAL EVALUATION ADULT - GENERAL OBSERVATIONS, REHAB EVAL
received OOB sitting in chair, A&OX4, following commands, pleasant & eager to participate, s/p MVR/PFO closure (10/24), +supplemental 02 via NC, +ICU monitoring. Pt educated on sternal precautions with good understanding

## 2022-10-25 NOTE — OCCUPATIONAL THERAPY INITIAL EVALUATION ADULT - PERTINENT HX OF CURRENT PROBLEM, REHAB EVAL
77  year old  RHD male PMH of HTN, HLD, DM2, former smoker,  hypothyroidism, obesity, LILLY on C-pap,  bladder cancer (2020, s/p resection of bladder tumor/ currently self catheterizes 6x/day ), PAF s/p DCCV /uncomplicated radiofrequency ablation of atrial fibrillation ([facility]/PVI, CTI) on 7/13/22,  left atrial appendage & severe MR s/p open heart surgery for mitral valve repair and PFO closure 10/24/22. Code stroke called for LUE convulsion with residual LUE weakness immediately after. LKW: 9:50 PM 10/24/22. NIHSS: 12. MRS:2. Per primary team, LUE weakness improved. Patient had LUE convulsions that spread to entire body and team witnessed GTC's that lasted for 1.5 minutes.  CT brain 10/24-No acute intracranial hemorrhage, mass effect, or CT evidence of an acute transcortical infarct. Vague lucency at the genu of the right internal capsule and in the right thalamus which may represent lacunar infarcts of indeterminate age. No prior studies are available for comparison.

## 2022-10-25 NOTE — OCCUPATIONAL THERAPY INITIAL EVALUATION ADULT - GENERAL OBSERVATIONS, REHAB EVAL
Patient received in bedside chair, +tele, pulse ox, +IV, triple lumen, a-line, gaitan, external pacemaker, NC, sequentials donned

## 2022-10-25 NOTE — PHYSICAL THERAPY INITIAL EVALUATION ADULT - GAIT DEVIATIONS NOTED, PT EVAL
decreased shaylee/increased time in double stance/decreased velocity of limb motion/decreased step length/decreased stride length/decreased weight-shifting ability

## 2022-10-25 NOTE — PHYSICAL THERAPY INITIAL EVALUATION ADULT - PERTINENT HX OF CURRENT PROBLEM, REHAB EVAL
77 year old RHD male PMHx of HTN, HLD, DM2, former smoker,  hypothyroidism, obesity, LILLY on C-pap,  bladder cancer (2020, s/p resection of bladder tumor/ currently self catheterizes 6x/day ), PAF s/p DCCV /uncomplicated radiofrequency ablation of atrial fibrillation ([facility]/PVI, CTI) on 7/13/22,  left atrial appendage & severe MR s/p open heart surgery for mitral valve repair and PFO closure 10/24/22. Code stroke called for LUE convulsion with residual LUE weakness immediately after. LKW: 9:50 PM 10/24/22. NIHSS: 12. MRS:2. Per primary team, LUE weakness improved. Patient had LUE convulsions that spread to entire body and team witnessed GTC's that lasted for 1.5 minutes.  CT brain 10/24-No acute intracranial hemorrhage, mass effect, or CT evidence of an acute transcortical infarct. Vague lucency at the genu of the right internal capsule and in the right thalamus which may represent lacunar infarcts of indeterminate age. No prior studies are available for comparison.

## 2022-10-26 DIAGNOSIS — Z98.890 OTHER SPECIFIED POSTPROCEDURAL STATES: ICD-10-CM

## 2022-10-26 DIAGNOSIS — Z91.89 OTHER SPECIFIED PERSONAL RISK FACTORS, NOT ELSEWHERE CLASSIFIED: ICD-10-CM

## 2022-10-26 LAB
ALBUMIN SERPL ELPH-MCNC: 3.4 G/DL — SIGNIFICANT CHANGE UP (ref 3.3–5)
ALP SERPL-CCNC: 44 U/L — SIGNIFICANT CHANGE UP (ref 40–120)
ALT FLD-CCNC: 19 U/L — SIGNIFICANT CHANGE UP (ref 10–45)
ANION GAP SERPL CALC-SCNC: 10 MMOL/L — SIGNIFICANT CHANGE UP (ref 5–17)
AST SERPL-CCNC: 36 U/L — SIGNIFICANT CHANGE UP (ref 10–40)
BASOPHILS # BLD AUTO: 0.03 K/UL — SIGNIFICANT CHANGE UP (ref 0–0.2)
BASOPHILS NFR BLD AUTO: 0.4 % — SIGNIFICANT CHANGE UP (ref 0–2)
BILIRUB SERPL-MCNC: 0.6 MG/DL — SIGNIFICANT CHANGE UP (ref 0.2–1.2)
BUN SERPL-MCNC: 15 MG/DL — SIGNIFICANT CHANGE UP (ref 7–23)
CALCIUM SERPL-MCNC: 8.2 MG/DL — LOW (ref 8.4–10.5)
CHLORIDE SERPL-SCNC: 107 MMOL/L — SIGNIFICANT CHANGE UP (ref 96–108)
CO2 SERPL-SCNC: 24 MMOL/L — SIGNIFICANT CHANGE UP (ref 22–31)
CREAT SERPL-MCNC: 1.02 MG/DL — SIGNIFICANT CHANGE UP (ref 0.5–1.3)
EGFR: 76 ML/MIN/1.73M2 — SIGNIFICANT CHANGE UP
EOSINOPHIL # BLD AUTO: 0.03 K/UL — SIGNIFICANT CHANGE UP (ref 0–0.5)
EOSINOPHIL NFR BLD AUTO: 0.4 % — SIGNIFICANT CHANGE UP (ref 0–6)
GAS PNL BLDA: SIGNIFICANT CHANGE UP
GLUCOSE SERPL-MCNC: 159 MG/DL — HIGH (ref 70–99)
HCT VFR BLD CALC: 27.1 % — LOW (ref 39–50)
HGB BLD-MCNC: 8.7 G/DL — LOW (ref 13–17)
IMM GRANULOCYTES NFR BLD AUTO: 0.4 % — SIGNIFICANT CHANGE UP (ref 0–0.9)
LYMPHOCYTES # BLD AUTO: 0.53 K/UL — LOW (ref 1–3.3)
LYMPHOCYTES # BLD AUTO: 6.6 % — LOW (ref 13–44)
MAGNESIUM SERPL-MCNC: 2.1 MG/DL — SIGNIFICANT CHANGE UP (ref 1.6–2.6)
MCHC RBC-ENTMCNC: 27.7 PG — SIGNIFICANT CHANGE UP (ref 27–34)
MCHC RBC-ENTMCNC: 32.1 GM/DL — SIGNIFICANT CHANGE UP (ref 32–36)
MCV RBC AUTO: 86.3 FL — SIGNIFICANT CHANGE UP (ref 80–100)
MONOCYTES # BLD AUTO: 1 K/UL — HIGH (ref 0–0.9)
MONOCYTES NFR BLD AUTO: 12.4 % — SIGNIFICANT CHANGE UP (ref 2–14)
NEUTROPHILS # BLD AUTO: 6.42 K/UL — SIGNIFICANT CHANGE UP (ref 1.8–7.4)
NEUTROPHILS NFR BLD AUTO: 79.8 % — HIGH (ref 43–77)
NRBC # BLD: 0 /100 WBCS — SIGNIFICANT CHANGE UP (ref 0–0)
PHOSPHATE SERPL-MCNC: 4 MG/DL — SIGNIFICANT CHANGE UP (ref 2.5–4.5)
PLATELET # BLD AUTO: 104 K/UL — LOW (ref 150–400)
POTASSIUM SERPL-MCNC: 4 MMOL/L — SIGNIFICANT CHANGE UP (ref 3.5–5.3)
POTASSIUM SERPL-SCNC: 4 MMOL/L — SIGNIFICANT CHANGE UP (ref 3.5–5.3)
PROT SERPL-MCNC: 5.6 G/DL — LOW (ref 6–8.3)
RBC # BLD: 3.14 M/UL — LOW (ref 4.2–5.8)
RBC # FLD: 16.5 % — HIGH (ref 10.3–14.5)
SODIUM SERPL-SCNC: 141 MMOL/L — SIGNIFICANT CHANGE UP (ref 135–145)
WBC # BLD: 8.04 K/UL — SIGNIFICANT CHANGE UP (ref 3.8–10.5)
WBC # FLD AUTO: 8.04 K/UL — SIGNIFICANT CHANGE UP (ref 3.8–10.5)

## 2022-10-26 PROCEDURE — 93010 ELECTROCARDIOGRAM REPORT: CPT

## 2022-10-26 PROCEDURE — 71045 X-RAY EXAM CHEST 1 VIEW: CPT | Mod: 26

## 2022-10-26 PROCEDURE — 99291 CRITICAL CARE FIRST HOUR: CPT | Mod: 24

## 2022-10-26 RX ORDER — LEVETIRACETAM 250 MG/1
750 TABLET, FILM COATED ORAL EVERY 12 HOURS
Refills: 0 | Status: ACTIVE | OUTPATIENT
Start: 2022-10-26 | End: 2023-09-24

## 2022-10-26 RX ORDER — POTASSIUM CHLORIDE 20 MEQ
10 PACKET (EA) ORAL
Refills: 0 | Status: COMPLETED | OUTPATIENT
Start: 2022-10-26 | End: 2022-10-26

## 2022-10-26 RX ORDER — SPIRONOLACTONE 25 MG/1
50 TABLET, FILM COATED ORAL DAILY
Refills: 0 | Status: DISCONTINUED | OUTPATIENT
Start: 2022-10-26 | End: 2022-10-30

## 2022-10-26 RX ORDER — AMIODARONE HYDROCHLORIDE 400 MG/1
200 TABLET ORAL
Refills: 0 | Status: DISCONTINUED | OUTPATIENT
Start: 2022-10-26 | End: 2022-11-01

## 2022-10-26 RX ADMIN — Medication 4 MILLIGRAM(S): at 21:15

## 2022-10-26 RX ADMIN — ATORVASTATIN CALCIUM 40 MILLIGRAM(S): 80 TABLET, FILM COATED ORAL at 21:15

## 2022-10-26 RX ADMIN — Medication 25 MICROGRAM(S): at 05:44

## 2022-10-26 RX ADMIN — AMIODARONE HYDROCHLORIDE 200 MILLIGRAM(S): 400 TABLET ORAL at 05:44

## 2022-10-26 RX ADMIN — PANTOPRAZOLE SODIUM 40 MILLIGRAM(S): 20 TABLET, DELAYED RELEASE ORAL at 07:29

## 2022-10-26 RX ADMIN — SPIRONOLACTONE 50 MILLIGRAM(S): 25 TABLET, FILM COATED ORAL at 05:43

## 2022-10-26 RX ADMIN — Medication 650 MILLIGRAM(S): at 05:44

## 2022-10-26 RX ADMIN — Medication 650 MILLIGRAM(S): at 17:05

## 2022-10-26 RX ADMIN — Medication 2: at 06:52

## 2022-10-26 RX ADMIN — Medication 81 MILLIGRAM(S): at 12:39

## 2022-10-26 RX ADMIN — Medication 650 MILLIGRAM(S): at 12:39

## 2022-10-26 RX ADMIN — Medication 50 MILLIEQUIVALENT(S): at 01:03

## 2022-10-26 RX ADMIN — Medication 500 MILLIGRAM(S): at 05:44

## 2022-10-26 RX ADMIN — AMIODARONE HYDROCHLORIDE 200 MILLIGRAM(S): 400 TABLET ORAL at 17:05

## 2022-10-26 RX ADMIN — POLYETHYLENE GLYCOL 3350 17 GRAM(S): 17 POWDER, FOR SOLUTION ORAL at 12:39

## 2022-10-26 RX ADMIN — LEVETIRACETAM 400 MILLIGRAM(S): 250 TABLET, FILM COATED ORAL at 14:59

## 2022-10-26 RX ADMIN — Medication 1 DROP(S): at 06:00

## 2022-10-26 RX ADMIN — Medication 50 MILLIEQUIVALENT(S): at 02:00

## 2022-10-26 RX ADMIN — ENOXAPARIN SODIUM 40 MILLIGRAM(S): 100 INJECTION SUBCUTANEOUS at 17:05

## 2022-10-26 RX ADMIN — Medication 650 MILLIGRAM(S): at 14:59

## 2022-10-26 RX ADMIN — Medication 500 MILLIGRAM(S): at 17:05

## 2022-10-26 RX ADMIN — Medication 10 MILLIGRAM(S): at 05:43

## 2022-10-26 RX ADMIN — Medication 2: at 13:00

## 2022-10-26 RX ADMIN — CHLORHEXIDINE GLUCONATE 1 APPLICATION(S): 213 SOLUTION TOPICAL at 12:00

## 2022-10-26 RX ADMIN — Medication 30 MILLIGRAM(S): at 07:49

## 2022-10-26 NOTE — PROGRESS NOTE ADULT - SUBJECTIVE AND OBJECTIVE BOX
Patient seen and examined at the bedside.    Remained critically ill on continuous ICU monitoring.    OBJECTIVE:  Vital Signs Last 24 Hrs  T(C): 36.4 (26 Oct 2022 04:00), Max: 37.1 (25 Oct 2022 20:00)  T(F): 97.5 (26 Oct 2022 04:00), Max: 98.7 (25 Oct 2022 20:00)  HR: 66 (26 Oct 2022 08:00) (58 - 122)  BP: 118/59 (26 Oct 2022 03:00) (112/56 - 171/76)  BP(mean): 85 (26 Oct 2022 03:00) (81 - 109)  RR: 27 (26 Oct 2022 08:00) (7 - 67)  SpO2: 99% (26 Oct 2022 08:00) (92% - 100%)    Parameters below as of 26 Oct 2022 08:00  Patient On (Oxygen Delivery Method): nasal cannula  O2 Flow (L/min): 3        Physical Exam:   General: Resting comfortably in chair  Neurology: slight tremor in LUE with movement, strength 5/5 upper and lower extremities  Eyes: bilaeral pupils equal and reactive   ENT/Neck: Neck supple, trachea midline, No JVD   Respiratory: Lungs CTA bilaterally   CV: AV Paced 80         [x] Sternal dressing, [x]Mediastinal CTx2 [x] Bulb        [x] Temporary pacing - DDD @80  Abdominal: Soft, NT, ND +BS,   Extremities: 1-2+ pedal edema noted, + peripheral pulses   Skin: No Rashes, Hematoma, Ecchymosis                                Assessment:  77  year old  RHD male PMH of HTN, HLD, DM2, former smoker,  hypothyroidism, obesity, LILLY on C-pap,  bladder cancer (2020, s/p resection of bladder tumor/ currently self   catheterizes 6x/day ), PAF s/p DCCV /uncomplicated radiofrequency ablation of atrial fibrillation (WACA/PVI, CTI) on 7/13/22,  left atrial appendage & severe MR . Presents with c/o recent" abnormal echo with fatigue due to mitral valve regurgitation. Presents for  scheduled Mitral valve replacement , MAZE, left atrial appendage closure on 10/20/2022.    Mitral regurgitation s/p MVR 10/24/2022  S/p Dawn Maze IV Procedure 10/24/2022  s/p Clipping, left atrial appendage 10/24/2022  Hypokalemia   Hypophosphatemia   Hemodynamic instability  Hypovolemia  Post op respiratory insufficiency  Acute blood loss anemia  Thrombocytopenia  Stress hyperglycemia        Plan:   ***Neuro***  10/24: Full body tremors L>R, with LUE and LLE weakness? Post-ictal phase. Code stroke-non-con CT and CTA neg; C/w Keppra   Post operative neuro assessment   EEG done yesterday   Follow up neurology  CTH negative for acute ischemia or hemorrhage, no LVO  Cont neuro checks      ***Cardiovascular***  Invasive hemodynamic monitoring, assess perfusion indices   SR / CVP 1/ MAP 64/ Hct 27.1/ Lactate 0.7  [x] Levophed - 0.02-0.04 mcg/kg/min--> currently off   Continue with PO Amiodarone for rate control.   Assess pacing threshold / no AV radha blocking drugs  C/w Cardura for hypertension    [x] VTE prophylaxis with Lovenox   Monitor chest tube outputs  [X] ASA [X] Statins   Replete K / Phos repeat levels        ***Pulmonary***  Extubated successfully   [x] 3L NC  Encourage incentive spirometry, continue pulse ox monitoring, follow ABGs                 ***GI***  [x]  Diet: Regular    [x] Protonix   Bowel regimen with Miralax and senna     ***Renal***  H/O BPH, Bladder Ca, and self catheterization at home   Continue to monitor I/Os, BUN/Creatinine.   Replete lytes PRN  Gonzalez present   Resume Proscar   c/w Torsemide     ***ID***  No active antibiotic coverage      ***Endocrine***  [x] DM1  : HbA1c 6.2%             - [x] ISS             - Need tight glycemic control to prevent wound infection.      [x] Hypothyroid  - c/w Synthroid         Patient requires continuous monitoring with bedside rhythm monitoring, pulse oximetry monitoring, and continuous central venous and arterial pressure monitoring; and intermittent blood gas analysis. Care plan discussed with the ICU care team.   Patient remained critical, at risk for life threatening decompensation.    I have spent 30 minutes providing critical care management to this patient.    By signing my name below, I, Charan Yepez, attest that this documentation has been prepared under the direction and in the presence of SAMMY Yen   Electronically signed: Ramesh Woodard, 10-26-22 @ 08:34    I, SAMMY Yen, personally performed the services described in this documentation. all medical record entries made by the scribe were at my direction and in my presence. I have reviewed the chart and agree that the record reflects my personal performance and is accurate and complete  Electronically signed: SAMMY Yen  Patient seen and examined at the bedside.    Remained critically ill on continuous ICU monitoring.    OBJECTIVE:  Vital Signs Last 24 Hrs  T(C): 36.4 (26 Oct 2022 04:00), Max: 37.1 (25 Oct 2022 20:00)  T(F): 97.5 (26 Oct 2022 04:00), Max: 98.7 (25 Oct 2022 20:00)  HR: 66 (26 Oct 2022 08:00) (58 - 122)  BP: 118/59 (26 Oct 2022 03:00) (112/56 - 171/76)  BP(mean): 85 (26 Oct 2022 03:00) (81 - 109)  RR: 27 (26 Oct 2022 08:00) (7 - 67)  SpO2: 99% (26 Oct 2022 08:00) (92% - 100%)    Parameters below as of 26 Oct 2022 08:00  Patient On (Oxygen Delivery Method): nasal cannula  O2 Flow (L/min): 3        Physical Exam:   General: Resting comfortably in chair  Neurology: slight tremor in LUE with movement, strength 5/5 upper and lower extremities  Eyes: bilaeral pupils equal and reactive   ENT/Neck: Neck supple, trachea midline, No JVD   Respiratory: Lungs CTA bilaterally   CV: AV Paced 80         [x] Sternal dressing, [x]Mediastinal CTx2 [x] Bulb       [x] Afib  [x] Temporary pacing - DDD @40  Abdominal: Soft, NT, ND +BS,   Extremities: 1-2+ pedal edema noted, + peripheral pulses   Skin: No Rashes, Hematoma, Ecchymosis                                Assessment:  77  year old  RHD male PMH of HTN, HLD, DM2, former smoker,  hypothyroidism, obesity, LILLY on C-pap,  bladder cancer (2020, s/p resection of bladder tumor/ currently self   catheterizes 6x/day ), PAF s/p DCCV /uncomplicated radiofrequency ablation of atrial fibrillation (WACA/PVI, CTI) on 7/13/22,  left atrial appendage & severe MR . Presents with c/o recent" abnormal echo with fatigue due to mitral valve regurgitation. Presents for  scheduled Mitral valve replacement , MAZE, left atrial appendage closure on 10/20/2022.    Mitral regurgitation s/p MVR 10/24/2022  S/p Dawn Maze IV Procedure 10/24/2022  s/p Clipping, left atrial appendage 10/24/2022  Hypokalemia   Hypophosphatemia   Hemodynamic instability  Hypovolemia  Post op respiratory insufficiency  Acute blood loss anemia  Thrombocytopenia  Stress hyperglycemia        Plan:   ***Neuro***  10/24: Full body tremors L>R, with LUE and LLE weakness? Post-ictal phase. Code stroke-non-con CT and CTA neg  Tremors improved, CT head neg, d/c'ed Keppra   Post operative neuro assessment   EEG done yesterday   Follow up neurology  CTH negative for acute ischemia or hemorrhage, no LVO  Cont neuro checks      ***Cardiovascular***  Invasive hemodynamic monitoring, assess perfusion indices   Afib / CVP 1/ MAP 64/ Hct 27.1/ Lactate 0.7  [x] Levophed - 0.02-0.04 mcg/kg/min--> currently off   Continue with PO Amiodarone for rate control.   Assess pacing threshold / no AV radha blocking drugs  C/w Cardura for BPH   [x] VTE prophylaxis with Lovenox--> will switch to elliqus   Monitor chest tube outputs  [X] ASA [X] Statins   Replete K / Phos repeat levels        ***Pulmonary***  Extubated successfully   [x] 3L NC  Encourage incentive spirometry, continue pulse ox monitoring, follow ABGs                 ***GI***  [x]  Diet: Regular    [x] Protonix   Bowel regimen with Miralax and senna     ***Renal***  H/O BPH, Bladder Ca, and self catheterization at home   Continue to monitor I/Os, BUN/Creatinine.   Replete lytes PRN  Gonzalez present   Resume Proscar   c/w Torsemide     ***ID***  No active antibiotic coverage      ***Endocrine***  [x] DM1  : HbA1c 6.2%             - [x] ISS             - Need tight glycemic control to prevent wound infection.      [x] Hypothyroid  - c/w Synthroid         Patient requires continuous monitoring with bedside rhythm monitoring, pulse oximetry monitoring, and continuous central venous and arterial pressure monitoring; and intermittent blood gas analysis. Care plan discussed with the ICU care team.   Patient remained critical, at risk for life threatening decompensation.    I have spent 30 minutes providing critical care management to this patient.    By signing my name below, I, Charan Yepez, attest that this documentation has been prepared under the direction and in the presence of SAMMY Yen   Electronically signed: Ramesh Woodard, 10-26-22 @ 08:34    I, SAMMY Yen, personally performed the services described in this documentation. all medical record entries made by the scribe were at my direction and in my presence. I have reviewed the chart and agree that the record reflects my personal performance and is accurate and complete  Electronically signed: SAMMY Yen  Patient seen and examined at the bedside.    Remained critically ill on continuous ICU monitoring.    OBJECTIVE:  Vital Signs Last 24 Hrs  T(C): 36.4 (26 Oct 2022 04:00), Max: 37.1 (25 Oct 2022 20:00)  T(F): 97.5 (26 Oct 2022 04:00), Max: 98.7 (25 Oct 2022 20:00)  HR: 66 (26 Oct 2022 08:00) (58 - 122)  BP: 118/59 (26 Oct 2022 03:00) (112/56 - 171/76)  BP(mean): 85 (26 Oct 2022 03:00) (81 - 109)  RR: 27 (26 Oct 2022 08:00) (7 - 67)  SpO2: 99% (26 Oct 2022 08:00) (92% - 100%)    Parameters below as of 26 Oct 2022 08:00  Patient On (Oxygen Delivery Method): nasal cannula  O2 Flow (L/min): 3        Physical Exam:   General: Resting comfortably in chair  Neurology: tremor gone in LUE, strength 5/5 upper and lower extremities  Eyes: bilaeral pupils equal and reactive   ENT/Neck: Neck supple, trachea midline, No JVD   Respiratory: Lungs CTA bilaterally   CV: AV Paced 80         [x] Sternal dressing, [x]Mediastinal CTx2 > removed [x] Bulb       [x] Afib  [x] Temporary pacing - DDD @40  Abdominal: Soft, NT, ND +BS,   Extremities: 1-2+ pedal edema noted, + peripheral pulses   Skin: No Rashes, Hematoma, Ecchymosis                                Assessment:  77  year old  RHD male PMH of HTN, HLD, DM2, former smoker,  hypothyroidism, obesity, LILLY on C-pap,  bladder cancer (2020, s/p resection of bladder tumor/ currently self   catheterizes 6x/day ), PAF s/p DCCV /uncomplicated radiofrequency ablation of atrial fibrillation ([facility]ACA/PVI, CTI) on 7/13/22,  left atrial appendage & severe MR . Presents with c/o recent" abnormal echo with fatigue due to mitral valve regurgitation. Presents for  scheduled Mitral valve replacement , MAZE, left atrial appendage closure on 10/20/2022.    Mitral regurgitation s/p MVR 10/24/2022  S/p Dawn Maze IV Procedure 10/24/2022  s/p Clipping, left atrial appendage 10/24/2022  Hypokalemia   Hypophosphatemia   Hemodynamic instability  Hypovolemia  Post op respiratory insufficiency  Acute blood loss anemia  Thrombocytopenia  Stress hyperglycemia        Plan:   ***Neuro***  10/24: Full body tremors L>R, with LUE and LLE weakness? Post-ictal phase. Code stroke-non-con CT and CTA neg  Tremors improved, CT head neg, d/c'ed Keppra   Post operative neuro assessment   EEG done yesterday   Follow up neurology  CTH negative for acute ischemia or hemorrhage, no LVO  Cont neuro checks  F/up neuro for final EEG read and AED plans      ***Cardiovascular***  Invasive hemodynamic monitoring, assess perfusion indices   Afib / CVP 1/ MAP 64/ Hct 27.1/ Lactate 0.7  Continue with PO Amiodarone for rate control and MAZE procedure  Assess pacing threshold / no AV radha blocking drugs  C/w Cardura for BPH   [x] VTE prophylaxis with Lovenox--> will switch to eliquis eventually  Monitor jared outputs possible dc later  [X] ASA [X] Statins   Replete K / Phos repeat levels        ***Pulmonary***  Extubated successfully   [x] 3L NC  Encourage incentive spirometry, continue pulse ox monitoring, follow ABGs                 ***GI***  [x]  Diet: Regular    [x] Protonix   Bowel regimen with Miralax and senna     ***Renal***  H/O BPH, Bladder Ca, and self catheterization at home   Continue to monitor I/Os, BUN/Creatinine.   Replete lytes PRN  Gonzalez present > keep for now considering bladder CA  Resume Proscar   c/w Torsemide     ***ID***  No active antibiotic coverage      ***Endocrine***  [x] DM1  : HbA1c 6.2%             - [x] ISS             - Need tight glycemic control to prevent wound infection.      [x] Hypothyroid  - c/w Synthroid         Patient requires continuous monitoring with bedside rhythm monitoring, pulse oximetry monitoring, and continuous central venous and arterial pressure monitoring; and intermittent blood gas analysis. Care plan discussed with the ICU care team.   Patient remained critical, at risk for life threatening decompensation.    I have spent 45 minutes providing critical care management to this patient.    By signing my name below, I, Charan Yepez, attest that this documentation has been prepared under the direction and in the presence of SAMMY Yen   Electronically signed: Ramesh Woodard, 10-26-22 @ 08:34    I, SAMMY Yen, personally performed the services described in this documentation. all medical record entries made by the scribe were at my direction and in my presence. I have reviewed the chart and agree that the record reflects my personal performance and is accurate and complete  Electronically signed: SAMMY Yen

## 2022-10-26 NOTE — EEG REPORT - NS EEG TEXT BOX
MECHE CHACKO [facility]-[id number]     Study Date: 		10-25-22 10:04 to 10-25-22 17:23  Duration x Hours: 7hrs  --------------------------------------------------------------------------------------------------  History:  CC/ HPI Patient is a 77y old  Male who presents with a chief complaint of MV repair, NAYELY ligation, MAZE, PFO closure (24 Oct 2022 19:12)    MEDICATIONS  (STANDING):  acetaminophen     Tablet .. 650 milliGRAM(s) Oral every 6 hours  aMIOdarone    Tablet 200 milliGRAM(s) Oral two times a day  ascorbic acid 500 milliGRAM(s) Oral two times a day  aspirin enteric coated 81 milliGRAM(s) Oral daily  atorvastatin 40 milliGRAM(s) Oral at bedtime  bisacodyl Suppository 10 milliGRAM(s) Rectal once  chlorhexidine 2% Cloths 1 Application(s) Topical daily  dextrose 5%. 1000 milliLiter(s) (50 mL/Hr) IV Continuous <Continuous>  dextrose 5%. 1000 milliLiter(s) (100 mL/Hr) IV Continuous <Continuous>  doxazosin 4 milliGRAM(s) Oral at bedtime  enoxaparin Injectable 40 milliGRAM(s) SubCutaneous every 24 hours  glucagon  Injectable 1 milliGRAM(s) IntraMuscular once  insulin lispro (ADMELOG) corrective regimen sliding scale   SubCutaneous three times a day before meals  insulin lispro (ADMELOG) corrective regimen sliding scale   SubCutaneous at bedtime  levothyroxine 25 MICROGram(s) Oral daily  pantoprazole    Tablet 40 milliGRAM(s) Oral before breakfast  polyethylene glycol 3350 17 Gram(s) Oral daily  potassium chloride  10 mEq/50 mL IVPB 10 milliEquivalent(s) IV Intermittent every 1 hour  potassium chloride  10 mEq/50 mL IVPB 10 milliEquivalent(s) IV Intermittent every 1 hour  potassium chloride  10 mEq/50 mL IVPB 10 milliEquivalent(s) IV Intermittent every 1 hour  senna 2 Tablet(s) Oral at bedtime  sodium chloride 0.9%. 1000 milliLiter(s) (10 mL/Hr) IV Continuous <Continuous>  spironolactone 50 milliGRAM(s) Oral daily  torsemide 10 milliGRAM(s) Oral daily    --------------------------------------------------------------------------------------------------  Study Interpretation:    [[[Abbreviation Key:  PDR=alpha rhythm/posterior dominant rhythm. A-P=anterior posterior.  Amplitude: ‘very low’:<20; ‘low’:20-49; ‘medium’:; ‘high’:>150uV.  Persistence for periodic/rhythmic patterns (% of epoch) ‘rare’:<1%; ‘occasional’:1-10%; ‘frequent’:10-50%; ‘abundant’:50-90%; ‘continuous’:>90%.  Persistence for sporadic discharges: ‘rare’:<1/hr; ‘occasional’:1/min-1/hr; ‘frequent’:>1/min; ‘abundant’:>1/10 sec.  RPP=rhythmic and periodic patterns; GRDA=generalized rhythmic delta activity; FIRDA=frontal intermittent GRDA; LRDA=lateralized rhythmic delta activity; TIRDA=temporal intermittent rhythmic delta activity;  LPD=PLED=lateralized periodic discharges; GPD=generalized periodic discharges; BIPDs =bilateral independent periodic discharges; Mf=multifocal; SIRPDs=stimulus induced rhythmic, periodic, or ictal appearing discharges; BIRDs=brief potentially ictal rhythmic discharges >4 Hz, lasting .5-10s; PFA (paroxysmal bursts >13 Hz or =8 Hz <10s).  Modifiers: +F=with fast component; +S=with spike component; +R=with rhythmic component.  S-B=burst suppression pattern.  Max=maximal. N1-drowsy; N2-stage II sleep; N3-slow wave sleep. SSS/BETS=small sharp spikes/benign epileptiform transients of sleep. HV=hyperventilation; PS=photic stimulation]]]    Daily EEG Visual Analysis    FINDINGS:      Background:  Continuous: continuous  Symmetry: symmetric  PDR: 6 Hz activity, with amplitude to 40 uV, that attenuated to eye opening.  Low amplitude frontal beta noted in wakefulness.  Reactivity: present  Voltage: normal, mostly 20-150uV  Anterior Posterior Gradient: present  Other background findings: none  Breach: absent    Background Slowing:  Generalized slowing: Continuous diffuse polymorphic delta and theta  Focal slowing: none was present.    State Changes:   -Drowsiness and clinical sleep seen with increased slowing of background  -N2 sleep transients were not recorded.    Sporadic Epileptiform Discharges:    Occasional sharp waves in bilateral posterior head region, max O1/O2    Rhythmic and Periodic Patterns (RPPs):  One episode of lateralized rhythmic delta activity in the bilateral posterior head region with frequencies up to 2hz     Electrographic and Electroclinical seizures:  None    Other Clinical Events:  None    Activation Procedures:   -Hyperventilation was not performed.    -Photic stimulation was not performed.    Artifacts:  Intermittent myogenic and movement artifacts were noted.    ECG:  The heart rate on single channel ECG was predominantly between 80-90 BPM.    EEG Classification / Summary:  Abnormal  EEG in the awake / drowsy / asleep state(s).    Occasional Sharp waves max O1/O2  Continuous polymorphic slowing, focal, bilateral posterior head region  Background slowing, generalized, mild     Clinical Impression:    Evidence for focal dysfunction and increased risk for seizures in the bilateral posterior head region  Mild diffuse/multifocal cerebral dysfunction, not specific as to etiology    This is a prelim report only, pending review with attending prior to finalization.    -------------------------------------------------------------------------------------------------------  [facility] EEG Reading Room Ph#: [phone]  Epilepsy Answering Service after 5PM and before 8:30AM: Ph#: [phone]    Sachin Esquivel M.D.   Epilepsy fellow MECHE CHACKO [facility]-[id number]     Study Date: 		10-25-22 10:04 to 10-25-22 17:23  Duration x Hours: 7hrs  --------------------------------------------------------------------------------------------------  History:  CC/ HPI Patient is a 77y old  Male who presents with a chief complaint of MV repair, NAYELY ligation, MAZE, PFO closure (24 Oct 2022 19:12)    MEDICATIONS  (STANDING):  acetaminophen     Tablet .. 650 milliGRAM(s) Oral every 6 hours  aMIOdarone    Tablet 200 milliGRAM(s) Oral two times a day  ascorbic acid 500 milliGRAM(s) Oral two times a day  aspirin enteric coated 81 milliGRAM(s) Oral daily  atorvastatin 40 milliGRAM(s) Oral at bedtime  bisacodyl Suppository 10 milliGRAM(s) Rectal once  doxazosin 4 milliGRAM(s) Oral at bedtime  enoxaparin Injectable 40 milliGRAM(s) SubCutaneous every 24 hours  glucagon  Injectable 1 milliGRAM(s) IntraMuscular once  insulin lispro (ADMELOG) corrective regimen sliding scale   SubCutaneous three times a day before meals  insulin lispro (ADMELOG) corrective regimen sliding scale   SubCutaneous at bedtime  levothyroxine 25 MICROGram(s) Oral daily  pantoprazole    Tablet 40 milliGRAM(s) Oral before breakfast  polyethylene glycol 3350 17 Gram(s) Oral daily  potassium chloride  10 mEq/50 mL IVPB 10 milliEquivalent(s) IV Intermittent every 1 hour  potassium chloride  10 mEq/50 mL IVPB 10 milliEquivalent(s) IV Intermittent every 1 hour  potassium chloride  10 mEq/50 mL IVPB 10 milliEquivalent(s) IV Intermittent every 1 hour  senna 2 Tablet(s) Oral at bedtime  sodium chloride 0.9%. 1000 milliLiter(s) (10 mL/Hr) IV Continuous <Continuous>  spironolactone 50 milliGRAM(s) Oral daily  torsemide 10 milliGRAM(s) Oral daily    --------------------------------------------------------------------------------------------------  Study Interpretation:    [[[Abbreviation Key:  PDR=alpha rhythm/posterior dominant rhythm. A-P=anterior posterior.  Amplitude: ‘very low’:<20; ‘low’:20-49; ‘medium’:; ‘high’:>150uV.  Persistence for periodic/rhythmic patterns (% of epoch) ‘rare’:<1%; ‘occasional’:1-10%; ‘frequent’:10-50%; ‘abundant’:50-90%; ‘continuous’:>90%.  Persistence for sporadic discharges: ‘rare’:<1/hr; ‘occasional’:1/min-1/hr; ‘frequent’:>1/min; ‘abundant’:>1/10 sec.  RPP=rhythmic and periodic patterns; GRDA=generalized rhythmic delta activity; FIRDA=frontal intermittent GRDA; LRDA=lateralized rhythmic delta activity; TIRDA=temporal intermittent rhythmic delta activity;  LPD=PLED=lateralized periodic discharges; GPD=generalized periodic discharges; BIPDs =bilateral independent periodic discharges; Mf=multifocal; SIRPDs=stimulus induced rhythmic, periodic, or ictal appearing discharges; BIRDs=brief potentially ictal rhythmic discharges >4 Hz, lasting .5-10s; PFA (paroxysmal bursts >13 Hz or =8 Hz <10s).  Modifiers: +F=with fast component; +S=with spike component; +R=with rhythmic component.  S-B=burst suppression pattern.  Max=maximal. N1-drowsy; N2-stage II sleep; N3-slow wave sleep. SSS/BETS=small sharp spikes/benign epileptiform transients of sleep. HV=hyperventilation; PS=photic stimulation]]]    Daily EEG Visual Analysis    FINDINGS:      Background:  Continuous: continuous  Symmetry: symmetric  PDR: 6 Hz activity, with amplitude to 40 uV, that attenuated to eye opening.  Low amplitude frontal beta noted in wakefulness.  Reactivity: present  Voltage: normal, mostly 20-150uV  Anterior Posterior Gradient: present  Other background findings: none  Breach: absent    Background Slowing:  Generalized slowing: Continuous diffuse polymorphic delta and theta  Focal slowing: none was present.    State Changes:   -Drowsiness and clinical sleep seen with increased slowing of background  -N2 sleep transients were not recorded.    Sporadic Epileptiform Discharges:    Occasional sharp waves in bilateral posterior head region, max O1/O2    Rhythmic and Periodic Patterns (RPPs):  One episode of lateralized rhythmic delta activity in the bilateral posterior head region with frequencies up to 2hz briefly without evolution    Electrographic and Electroclinical seizures:  None    Other Clinical Events:  None    Activation Procedures:   -Hyperventilation was not performed.    -Photic stimulation was not performed.    Artifacts:  Intermittent myogenic and movement artifacts were noted.    ECG:  The heart rate on single channel ECG was predominantly between 80-90 BPM.    EEG Classification / Summary:  Abnormal  EEG in the awake / drowsy / asleep state(s).  Occasional repetitive sharp waves max O1/O2  Continuous polymorphic slowing, focal, bilateral posterior head region  Background slowing, generalized, mild     Clinical Impression:  Evidence for focal dysfunction in and increased risk for seizures from the bilateral posterior head region  Mild diffuse/multifocal cerebral dysfunction, not specific as to etiology      -------------------------------------------------------------------------------------------------------  [facility] EEG Reading Room Ph#: [phone]  Epilepsy Answering Service after 5PM and before 8:30AM: Ph#: [phone]    Sachin Esquivel M.D.   Epilepsy fellow

## 2022-10-26 NOTE — PROGRESS NOTE ADULT - ASSESSMENT
77  year old  RHD male PMH of HTN, HLD, DM2, former smoker,  hypothyroidism, obesity, LILLY on C-pap,  bladder cancer (2020, s/p resection of bladder tumor/ currently self catheterizes 6x/day ), PAF s/p DCCV /uncomplicated radiofrequency ablation of atrial fibrillation (WACA/PVI, CTI) on 7/13/22,  left atrial appendage & severe MR . Presents with c/o recent" abnormal echo with fatigue due to mitral valve regurgitation. Presents for  scheduled Mitral valve replacement , MAZE, left atrial appendage closure on 10/20/2022.  10/24/22   MVr - triangular resection with 28mm Bobby band  AtriClip 45mm   Dawn-Maze IV procedure  PFO CLOSURE  Code stroke called for LUE convulsion with residual LUE weakness immediately after, Patient had LUE convulsions that spread to entire body  CT done, neuro following:  Stroke on differential as well given vague lucency at the genu of the right internal capsule and in the right thalamus of indeterminate age. Likely lacunar infarcts.   MRI when capable , eeg and Keppra added  Extubated d 1  + pressors, prbc's  EEG prelim neg.  Pt with lethargy post keppra, d/c since eeg neg  10/ 26 EEG reviewed by neuro, although no seizures  he is at risk for further sezures, recommended resuming keppra and if lethargic ,  change to vimpat  Pt with junctional / afib  Transferred to sdu

## 2022-10-26 NOTE — PROGRESS NOTE ADULT - PROBLEM SELECTOR PLAN 1
Asa, Statin, Chest PT,  Incentive spirometry, wound care and assessment.  Ambulate   Modified amio for afib, currently intermittent junctional/afib, 60's  Consider anticoagulation

## 2022-10-26 NOTE — PROGRESS NOTE ADULT - ASSESSMENT
77  year old  RHD male with HTN, HLD, DM2, former smoker,  hypothyroidism, obesity, LILLY on C-pap,  bladder cancer (2020, s/p resection of bladder tumor/ currently self catheterizes 6x/day ), PAF s/p DCCV /uncomplicated radiofrequency ablation of atrial fibrillation (WACA/PVI, CTI) on 7/13/22,  left atrial appendage & severe MR s/p open heart surgery for mitral valve repair and PFO closure 10/24/22.   Code stroke called for LUE convulsion with residual LUE weakness immediately after. LKW: 9:50 PM 10/24/22.   NIHSS: 12. MRS:2. on day of code stroke    Per primary team, LUE weakness improved. Patient had LUE convulsions that spread to entire body and team witnessed GTC's that lasted for 1.5 minutes. No witnessed gaze deviation per primary team.     On initial exam, patient able to follow commands and able to lift all extremities against gravity. Patient was able to life LUE but less than the RUE. Still, all extremities had drift that hit bed.   Not a tpa candidate given open heart surgery within 14 days. Not a thrombectomy candidate given no LVO.     o/e 10/25 AAOx3, OLIVIER but mild LUE weakness still noted.   10/26 exam baseline.  EEG with occasional sharps and risk for seizures from bilateral posterior head region     CTH: R IC vague lucency noted. may be age indeterminate  CTA H/N limited 2/2 poor opacification of vessels but no LVO.  CTP neg   A1c 6.2  LDL 27     Impression: LUE convulsions with generalized spreading likely seizure. Stroke on differential as well given vague lucency at the genu of the right internal capsule and in the right thalamus of indeterminate age. Likely lacunar infarcts.     Recommendations:    - s/p load with Keppra 2g, now keppra maintenance 750 mg bid ;  was stopped but would continue with AED.  if keppra 750mg BID was making him sleeping I would start vimpat 100mg BID given EEG findings   - MR brain wo when stable unlikely to happen at this time.  would repeat CTH as alternative   - f/u lidocaine level  - c/w ASA 81mg daily.  should be on statin therapy for secondary stroke prevention. lipitor 20 if no objection.  will hold off high dose as no athero on CTA H/N and LDL at goal  - telemetry  - PT/OT/SS/SLP, OOBC  - BP per CTICU.   - check FS, glucose control <180  - GI/DVT ppx  - Thank you for allowing me to participate in the care of this patient. Call with questions.   - spoke jcarlos Sharpe MD  Vascular Neurology  Office: [phone] .    77  year old  RHD male with HTN, HLD, DM2, former smoker,  hypothyroidism, obesity, LILLY on C-pap,  bladder cancer (2020, s/p resection of bladder tumor/ currently self catheterizes 6x/day ), PAF s/p DCCV /uncomplicated radiofrequency ablation of atrial fibrillation (WACA/PVI, CTI) on 7/13/22,  left atrial appendage & severe MR s/p open heart surgery for mitral valve repair and PFO closure 10/24/22.   Code stroke called for LUE convulsion with residual LUE weakness immediately after. LKW: 9:50 PM 10/24/22.   NIHSS: 12. MRS:2. on day of code stroke    Per primary team, LUE weakness improved. Patient had LUE convulsions that spread to entire body and team witnessed GTC's that lasted for 1.5 minutes. No witnessed gaze deviation per primary team.     On initial exam, patient able to follow commands and able to lift all extremities against gravity. Patient was able to life LUE but less than the RUE. Still, all extremities had drift that hit bed.   Not a tpa candidate given open heart surgery within 14 days. Not a thrombectomy candidate given no LVO.     o/e 10/25 AAOx3, OLIVIER but mild LUE weakness still noted.   10/26 exam baseline.  EEG with occasional sharps and risk for seizures from bilateral posterior head region     CTH: R IC vague lucency noted. may be age indeterminate  CTA H/N limited 2/2 poor opacification of vessels but no LVO.  CTP neg   A1c 6.2  LDL 27     Impression: LUE convulsions with generalized spreading likely seizure. Stroke on differential as well given vague lucency at the genu of the right internal capsule and in the right thalamus of indeterminate age. Likely lacunar infarcts.     Recommendations:    - s/p load with Keppra 2g, now keppra maintenance 750 mg bid ;  was stopped but would continue with AED.  if keppra 750mg BID was making him sleeping I would start vimpat 100mg BID given EEG findings   - MR brain wo when stable unlikely to happen at this time.  would repeat CTH as alternative   - f/u lidocaine level  - c/w ASA 81mg daily.  should be on statin therapy for secondary stroke prevention. lipitor 20 if no objection.  will hold off high dose as no athero on CTA H/N and LDL at goal  - telemetry  - PT/OT/SS/SLP, OOBC  - BP per CTICU.   - check FS, glucose control <180  - GI/DVT ppx  - Thank you for allowing me to participate in the care of this patient. Call with questions.   - spoke iw ACP   - spoke with sister at bedside   Bucky Sharpe MD  Vascular Neurology  Office: [phone] .

## 2022-10-26 NOTE — PROGRESS NOTE ADULT - SUBJECTIVE AND OBJECTIVE BOX
Neurology Progress Note    S: Patient seen and examined in ICU. No new events overnight. patient denied CP, SOB, HA or pain. sister at bedside     Medication:  acetaminophen     Tablet .. 650 milliGRAM(s) Oral every 6 hours  aMIOdarone    Tablet 200 milliGRAM(s) Oral two times a day  ascorbic acid 500 milliGRAM(s) Oral two times a day  aspirin enteric coated 81 milliGRAM(s) Oral daily  atorvastatin 40 milliGRAM(s) Oral at bedtime  bisacodyl Suppository 10 milliGRAM(s) Rectal once  chlorhexidine 2% Cloths 1 Application(s) Topical daily  dextrose 5%. 1000 milliLiter(s) IV Continuous <Continuous>  dextrose 5%. 1000 milliLiter(s) IV Continuous <Continuous>  dextrose Oral Gel 15 Gram(s) Oral once PRN  doxazosin 4 milliGRAM(s) Oral at bedtime  enoxaparin Injectable 40 milliGRAM(s) SubCutaneous every 24 hours  glucagon  Injectable 1 milliGRAM(s) IntraMuscular once  HYDROmorphone  Injectable 0.5 milliGRAM(s) IV Push every 6 hours PRN  insulin lispro (ADMELOG) corrective regimen sliding scale   SubCutaneous three times a day before meals  insulin lispro (ADMELOG) corrective regimen sliding scale   SubCutaneous at bedtime  levothyroxine 25 MICROGram(s) Oral daily  oxyCODONE    IR 5 milliGRAM(s) Oral every 4 hours PRN  oxyCODONE    IR 10 milliGRAM(s) Oral every 4 hours PRN  pantoprazole    Tablet 40 milliGRAM(s) Oral before breakfast  polyethylene glycol 3350 17 Gram(s) Oral daily  potassium chloride  10 mEq/50 mL IVPB 10 milliEquivalent(s) IV Intermittent every 1 hour  potassium chloride  10 mEq/50 mL IVPB 10 milliEquivalent(s) IV Intermittent every 1 hour  potassium chloride  10 mEq/50 mL IVPB 10 milliEquivalent(s) IV Intermittent every 1 hour  senna 2 Tablet(s) Oral at bedtime  sodium chloride 0.9%. 1000 milliLiter(s) IV Continuous <Continuous>  spironolactone 50 milliGRAM(s) Oral daily  torsemide 10 milliGRAM(s) Oral daily      Vitals:  Vital Signs Last 24 Hrs  T(C): 36.7 (26 Oct 2022 08:00), Max: 37.1 (25 Oct 2022 20:00)  T(F): 98.1 (26 Oct 2022 08:00), Max: 98.7 (25 Oct 2022 20:00)  HR: 59 (26 Oct 2022 10:07) (58 - 122)  BP: 125/60 (26 Oct 2022 09:06) (112/56 - 171/76)  BP(mean): 86 (26 Oct 2022 09:06) (81 - 109)  RR: 31 (26 Oct 2022 10:00) (7 - 67)  SpO2: 98% (26 Oct 2022 10:07) (97% - 100%)    Parameters below as of 26 Oct 2022 09:06  Patient On (Oxygen Delivery Method): nasal cannula        General Exam:   General Appearance: Appropriately dressed and in no acute distress       Head: Normocephalic, atraumatic and no dysmorphic features  Ear, Nose, and Throat: Moist mucous membranes  CVS: S1S2+  Resp: No SOB, no wheeze or rhonchi  Abd: soft NTND  Extremities: No edema, no cyanosis  Skin: No bruises, no rashes     Neurological Exam:  Mental Status: Awake, alert and oriented x 3.  Able to follow simple and complex verbal commands. Able to name and repeat. fluent speech. No obvious aphasia or dysarthria noted.   Cranial Nerves: PERRL, EOMI, VFFC, sensation V1-V3 intact,  no obvious facial asymmetry , equal elevation of palate, scm/trap 5/5, tongue is midline on protrusion. no obvious papilledema on fundoscopic exam. Hearing is grossly intact.   Motor: Normal bulk, tone and strength throughout. Fine finger movements were intact and symmetric. no tremors or drift noted.    Sensation: Intact to light touch and pinprick throughout. no right/left confusion. no extinction to tactile on DSS.    Reflexes: 1+ throughout at biceps, brachioradialis, triceps, patellars and ankles bilaterally and equal. No clonus. R toe and L toe were both downgoing.  Coordination: No dysmetria on FNF or HKS  Gait: deferred     I personally reviewed the below data/images/labs:      CBC Full  -  ( 26 Oct 2022 00:34 )  WBC Count : 8.04 K/uL  RBC Count : 3.14 M/uL  Hemoglobin : 8.7 g/dL  Hematocrit : 27.1 %  Platelet Count - Automated : 104 K/uL  Mean Cell Volume : 86.3 fl  Mean Cell Hemoglobin : 27.7 pg  Mean Cell Hemoglobin Concentration : 32.1 gm/dL  Auto Neutrophil # : 6.42 K/uL  Auto Lymphocyte # : 0.53 K/uL  Auto Monocyte # : 1.00 K/uL  Auto Eosinophil # : 0.03 K/uL  Auto Basophil # : 0.03 K/uL  Auto Neutrophil % : 79.8 %  Auto Lymphocyte % : 6.6 %  Auto Monocyte % : 12.4 %  Auto Eosinophil % : 0.4 %  Auto Basophil % : 0.4 %    10-26    141  |  107  |  15  ----------------------------<  159<H>  4.0   |  24  |  1.02    Ca    8.2<L>      26 Oct 2022 00:34  Phos  4.0     10-26  Mg     2.1     10-26    TPro  5.6<L>  /  Alb  3.4  /  TBili  0.6  /  DBili  x   /  AST  36  /  ALT  19  /  AlkPhos  44  10-26    LIVER FUNCTIONS - ( 26 Oct 2022 00:34 )  Alb: 3.4 g/dL / Pro: 5.6 g/dL / ALK PHOS: 44 U/L / ALT: 19 U/L / AST: 36 U/L / GGT: x           PT/INR - ( 25 Oct 2022 00:25 )   PT: 15.3 sec;   INR: 1.33 ratio         PTT - ( 25 Oct 2022 00:25 )  PTT:27.9 sec    < from: CT Brain Stroke Protocol (10.24.22 @ 22:50) >  No acute intracranial hemorrhage, mass effect, or CTevidence of an acute   transcortical infarct.    Vague lucency at the genu of the right internal capsule and in the right   thalamus which may represent lacunar infarcts of indeterminate age. No   prior studies are available for comparison.    EEG Classification / Summary:  Abnormal  EEG in the awake / drowsy / asleep state(s).  Occasional repetitive sharp waves max O1/O2  Continuous polymorphic slowing, focal, bilateral posterior head region  Background slowing, generalized, mild     Clinical Impression:  Evidence for focal dysfunction in and increased risk for seizures from the bilateral posterior head region  Mild diffuse/multifocal cerebral dysfunction, not specific as to etiology

## 2022-10-26 NOTE — PROGRESS NOTE ADULT - SUBJECTIVE AND OBJECTIVE BOX
VITAL SIGNS    Telemetry:  60 junctional    Vital Signs Last 24 Hrs  T(C): 36.7 (10-26-22 @ 14:04), Max: 37.1 (10-25-22 @ 20:00)  T(F): 98.1 (10-26-22 @ 14:04), Max: 98.7 (10-25-22 @ 20:00)  HR: 63 (10-26-22 @ 14:04) (58 - 122)  BP: 142/58 (10-26-22 @ 14:04) (112/56 - 171/76)  RR: 18 (10-26-22 @ 14:04) (7 - 67)  SpO2: 99% (10-26-22 @ 14:04) (97% - 100%)                   10-25 @ 07:01  -  10-26 @ 07:00  --------------------------------------------------------  IN: 506 mL / OUT: 1465 mL / NET: -959 mL    10-26 @ 07:01  -  10-26 @ 14:38  --------------------------------------------------------  IN: 170 mL / OUT: 430 mL / NET: -260 mL          Daily     Daily Weight in k.1 (26 Oct 2022 00:00)            CAPILLARY BLOOD GLUCOSE  157 (26 Oct 2022 07:00)  169 (25 Oct 2022 20:00)  178 (25 Oct 2022 19:00)  123 (25 Oct 2022 15:00)      POCT Blood Glucose.: 181 mg/dL (26 Oct 2022 12:42)  POCT Blood Glucose.: 157 mg/dL (26 Oct 2022 06:48)  POCT Blood Glucose.: 163 mg/dL (25 Oct 2022 22:49)  POCT Blood Glucose.: 169 mg/dL (25 Oct 2022 20:19)  POCT Blood Glucose.: 178 mg/dL (25 Oct 2022 18:48)  POCT Blood Glucose.: 123 mg/dL (25 Oct 2022 15:02)            Drains:     MS         [x  ] Drainage: jared                   Pacing Wires        [  x]   Settings:      vvi 40                            Isolated  [  ]    Coumadin    [ ] YES          x[  ]      NO                                   PHYSICAL EXAM        Neurology: alert and oriented x 3, nonfocal, no gross deficits  CV : s1 s2 RRR  Sternal Wound :  CDI , Stable  Lungs: cta  Abdomen: soft, nontender, nondistended, positive bowel sounds, last bowel movement                       jared ss  :    gaitan - sbd         Extremities:    -  edema   /  -   calve tenderness ,              acetaminophen     Tablet .. 650 milliGRAM(s) Oral every 6 hours  aMIOdarone    Tablet 200 milliGRAM(s) Oral two times a day  ascorbic acid 500 milliGRAM(s) Oral two times a day  aspirin enteric coated 81 milliGRAM(s) Oral daily  atorvastatin 40 milliGRAM(s) Oral at bedtime  bisacodyl Suppository 10 milliGRAM(s) Rectal once  chlorhexidine 2% Cloths 1 Application(s) Topical daily  dextrose 5%. 1000 milliLiter(s) IV Continuous <Continuous>  dextrose 5%. 1000 milliLiter(s) IV Continuous <Continuous>  dextrose Oral Gel 15 Gram(s) Oral once PRN  doxazosin 4 milliGRAM(s) Oral at bedtime  enoxaparin Injectable 40 milliGRAM(s) SubCutaneous every 24 hours  glucagon  Injectable 1 milliGRAM(s) IntraMuscular once  insulin lispro (ADMELOG) corrective regimen sliding scale   SubCutaneous three times a day before meals  insulin lispro (ADMELOG) corrective regimen sliding scale   SubCutaneous at bedtime  levETIRAcetam  IVPB 750 milliGRAM(s) IV Intermittent every 12 hours  levothyroxine 25 MICROGram(s) Oral daily  oxyCODONE    IR 5 milliGRAM(s) Oral every 4 hours PRN  oxyCODONE    IR 10 milliGRAM(s) Oral every 4 hours PRN  pantoprazole    Tablet 40 milliGRAM(s) Oral before breakfast  polyethylene glycol 3350 17 Gram(s) Oral daily  senna 2 Tablet(s) Oral at bedtime  sodium chloride 0.9%. 1000 milliLiter(s) IV Continuous <Continuous>  spironolactone 50 milliGRAM(s) Oral daily  torsemide 10 milliGRAM(s) Oral daily                    Physical Therapy Rec:   Home  [  ]   Home w/ PT  [  ]  Rehab  [  ]  Discussed with Cardiothoracic Team at AM rounds.

## 2022-10-26 NOTE — PROGRESS NOTE ADULT - PROBLEM SELECTOR PLAN 2
Post op seizure, keppra for prophylaxis  Neuro f/u  D/w Neuro the timing of transition to oral Keppra

## 2022-10-27 LAB
ALBUMIN SERPL ELPH-MCNC: 3.3 G/DL — SIGNIFICANT CHANGE UP (ref 3.3–5)
ALP SERPL-CCNC: 42 U/L — SIGNIFICANT CHANGE UP (ref 40–120)
ALT FLD-CCNC: 14 U/L — SIGNIFICANT CHANGE UP (ref 10–45)
ANION GAP SERPL CALC-SCNC: 9 MMOL/L — SIGNIFICANT CHANGE UP (ref 5–17)
AST SERPL-CCNC: 20 U/L — SIGNIFICANT CHANGE UP (ref 10–40)
BASOPHILS # BLD AUTO: 0.02 K/UL — SIGNIFICANT CHANGE UP (ref 0–0.2)
BASOPHILS NFR BLD AUTO: 0.3 % — SIGNIFICANT CHANGE UP (ref 0–2)
BILIRUB SERPL-MCNC: 0.6 MG/DL — SIGNIFICANT CHANGE UP (ref 0.2–1.2)
BUN SERPL-MCNC: 21 MG/DL — SIGNIFICANT CHANGE UP (ref 7–23)
CALCIUM SERPL-MCNC: 8.2 MG/DL — LOW (ref 8.4–10.5)
CHLORIDE SERPL-SCNC: 107 MMOL/L — SIGNIFICANT CHANGE UP (ref 96–108)
CO2 SERPL-SCNC: 24 MMOL/L — SIGNIFICANT CHANGE UP (ref 22–31)
CREAT SERPL-MCNC: 1.12 MG/DL — SIGNIFICANT CHANGE UP (ref 0.5–1.3)
EGFR: 68 ML/MIN/1.73M2 — SIGNIFICANT CHANGE UP
EOSINOPHIL # BLD AUTO: 0.1 K/UL — SIGNIFICANT CHANGE UP (ref 0–0.5)
EOSINOPHIL NFR BLD AUTO: 1.6 % — SIGNIFICANT CHANGE UP (ref 0–6)
GLUCOSE SERPL-MCNC: 137 MG/DL — HIGH (ref 70–99)
HCT VFR BLD CALC: 26 % — LOW (ref 39–50)
HGB BLD-MCNC: 8.5 G/DL — LOW (ref 13–17)
IMM GRANULOCYTES NFR BLD AUTO: 0.5 % — SIGNIFICANT CHANGE UP (ref 0–0.9)
LYMPHOCYTES # BLD AUTO: 0.76 K/UL — LOW (ref 1–3.3)
LYMPHOCYTES # BLD AUTO: 11.8 % — LOW (ref 13–44)
MAGNESIUM SERPL-MCNC: 2 MG/DL — SIGNIFICANT CHANGE UP (ref 1.6–2.6)
MCHC RBC-ENTMCNC: 27.8 PG — SIGNIFICANT CHANGE UP (ref 27–34)
MCHC RBC-ENTMCNC: 32.7 GM/DL — SIGNIFICANT CHANGE UP (ref 32–36)
MCV RBC AUTO: 85 FL — SIGNIFICANT CHANGE UP (ref 80–100)
MONOCYTES # BLD AUTO: 0.89 K/UL — SIGNIFICANT CHANGE UP (ref 0–0.9)
MONOCYTES NFR BLD AUTO: 13.9 % — SIGNIFICANT CHANGE UP (ref 2–14)
NEUTROPHILS # BLD AUTO: 4.62 K/UL — SIGNIFICANT CHANGE UP (ref 1.8–7.4)
NEUTROPHILS NFR BLD AUTO: 71.9 % — SIGNIFICANT CHANGE UP (ref 43–77)
NRBC # BLD: 0 /100 WBCS — SIGNIFICANT CHANGE UP (ref 0–0)
PHOSPHATE SERPL-MCNC: 3 MG/DL — SIGNIFICANT CHANGE UP (ref 2.5–4.5)
PLATELET # BLD AUTO: 103 K/UL — LOW (ref 150–400)
POTASSIUM SERPL-MCNC: 3.9 MMOL/L — SIGNIFICANT CHANGE UP (ref 3.5–5.3)
POTASSIUM SERPL-SCNC: 3.9 MMOL/L — SIGNIFICANT CHANGE UP (ref 3.5–5.3)
PROT SERPL-MCNC: 5.6 G/DL — LOW (ref 6–8.3)
RBC # BLD: 3.06 M/UL — LOW (ref 4.2–5.8)
RBC # FLD: 16.3 % — HIGH (ref 10.3–14.5)
SODIUM SERPL-SCNC: 140 MMOL/L — SIGNIFICANT CHANGE UP (ref 135–145)
SURGICAL PATHOLOGY STUDY: SIGNIFICANT CHANGE UP
WBC # BLD: 6.42 K/UL — SIGNIFICANT CHANGE UP (ref 3.8–10.5)
WBC # FLD AUTO: 6.42 K/UL — SIGNIFICANT CHANGE UP (ref 3.8–10.5)

## 2022-10-27 PROCEDURE — 71045 X-RAY EXAM CHEST 1 VIEW: CPT | Mod: 26

## 2022-10-27 RX ORDER — LEVETIRACETAM 250 MG/1
750 TABLET, FILM COATED ORAL
Refills: 0 | Status: DISCONTINUED | OUTPATIENT
Start: 2022-10-27 | End: 2022-11-03

## 2022-10-27 RX ADMIN — Medication 650 MILLIGRAM(S): at 00:54

## 2022-10-27 RX ADMIN — Medication 10 MILLIGRAM(S): at 07:09

## 2022-10-27 RX ADMIN — Medication 650 MILLIGRAM(S): at 06:05

## 2022-10-27 RX ADMIN — SENNA PLUS 2 TABLET(S): 8.6 TABLET ORAL at 09:07

## 2022-10-27 RX ADMIN — CHLORHEXIDINE GLUCONATE 1 APPLICATION(S): 213 SOLUTION TOPICAL at 08:55

## 2022-10-27 RX ADMIN — AMIODARONE HYDROCHLORIDE 200 MILLIGRAM(S): 400 TABLET ORAL at 18:01

## 2022-10-27 RX ADMIN — ATORVASTATIN CALCIUM 40 MILLIGRAM(S): 80 TABLET, FILM COATED ORAL at 21:18

## 2022-10-27 RX ADMIN — Medication 4 MILLIGRAM(S): at 21:18

## 2022-10-27 RX ADMIN — SPIRONOLACTONE 50 MILLIGRAM(S): 25 TABLET, FILM COATED ORAL at 06:05

## 2022-10-27 RX ADMIN — LEVETIRACETAM 400 MILLIGRAM(S): 250 TABLET, FILM COATED ORAL at 06:06

## 2022-10-27 RX ADMIN — ENOXAPARIN SODIUM 40 MILLIGRAM(S): 100 INJECTION SUBCUTANEOUS at 18:07

## 2022-10-27 RX ADMIN — Medication 650 MILLIGRAM(S): at 12:15

## 2022-10-27 RX ADMIN — Medication 650 MILLIGRAM(S): at 11:33

## 2022-10-27 RX ADMIN — Medication 650 MILLIGRAM(S): at 00:00

## 2022-10-27 RX ADMIN — Medication 650 MILLIGRAM(S): at 06:46

## 2022-10-27 RX ADMIN — Medication 81 MILLIGRAM(S): at 09:07

## 2022-10-27 RX ADMIN — Medication 500 MILLIGRAM(S): at 06:06

## 2022-10-27 RX ADMIN — POLYETHYLENE GLYCOL 3350 17 GRAM(S): 17 POWDER, FOR SOLUTION ORAL at 09:07

## 2022-10-27 RX ADMIN — Medication 25 MICROGRAM(S): at 06:05

## 2022-10-27 RX ADMIN — PANTOPRAZOLE SODIUM 40 MILLIGRAM(S): 20 TABLET, DELAYED RELEASE ORAL at 06:05

## 2022-10-27 RX ADMIN — LEVETIRACETAM 750 MILLIGRAM(S): 250 TABLET, FILM COATED ORAL at 18:01

## 2022-10-27 RX ADMIN — AMIODARONE HYDROCHLORIDE 200 MILLIGRAM(S): 400 TABLET ORAL at 06:05

## 2022-10-27 RX ADMIN — Medication 2: at 17:48

## 2022-10-27 RX ADMIN — Medication 2: at 11:34

## 2022-10-27 RX ADMIN — Medication 500 MILLIGRAM(S): at 18:01

## 2022-10-27 NOTE — PROGRESS NOTE ADULT - ASSESSMENT
77  year old  RHD male with HTN, HLD, DM2, former smoker,  hypothyroidism, obesity, LILLY on C-pap,  bladder cancer (2020, s/p resection of bladder tumor/ currently self catheterizes 6x/day ), PAF s/p DCCV /uncomplicated radiofrequency ablation of atrial fibrillation (WACA/PVI, CTI) on 7/13/22,  left atrial appendage & severe MR s/p open heart surgery for mitral valve repair and PFO closure 10/24/22.   Code stroke called for LUE convulsion with residual LUE weakness immediately after. LKW: 9:50 PM 10/24/22.   NIHSS: 12. MRS:2. on day of code stroke    Per primary team, LUE weakness improved. Patient had LUE convulsions that spread to entire body and team witnessed GTC's that lasted for 1.5 minutes. No witnessed gaze deviation per primary team.     On initial exam, patient able to follow commands and able to lift all extremities against gravity. Patient was able to life LUE but less than the RUE. Still, all extremities had drift that hit bed.   Not a tpa candidate given open heart surgery within 14 days. Not a thrombectomy candidate given no LVO.     o/e 10/25 AAOx3, OLIVIER but mild LUE weakness still noted.   10/26 exam baseline.  EEG with occasional sharps and risk for seizures from bilateral posterior head region     CTH: R IC vague lucency noted. may be age indeterminate  CTA H/N limited 2/2 poor opacification of vessels but no LVO.  CTP neg   A1c 6.2  LDL 27     Impression: LUE convulsions with generalized spreading likely seizure. Stroke on differential as well given vague lucency at the genu of the right internal capsule and in the right thalamus of indeterminate age. Likely lacunar infarcts.     Recommendations:    - s/p load with Keppra 2g, now keppra maintenance 750 mg bid ;  was stopped but would continue with AED.  if keppra 750mg BID was making him sleeping I would start vimpat 100mg BID given EEG findings ; now c/w keppra 750mg BID. okay to change keppra to PO, same dose 1:1 conversion 750mg BID PO ; c/w AED until outpatient f/u and repeat EEG   - MR brain wo when stable unlikely to happen at this time.  would repeat CTH as alternative   - f/u lidocaine level  - c/w ASA 81mg daily.  should be on statin therapy for secondary stroke prevention. lipitor 20 if no objection.  will hold off high dose as no athero on CTA H/N and LDL at goal  - telemetry  - PT/OT/SS/SLP, OOBC  - BP per CTICU.   - check FS, glucose control <180  - GI/DVT ppx  - Thank you for allowing me to participate in the care of this patient. Call with questions.   - spoke [facility] ACP   - spoke with sister at bedside   Bucky Sharpe MD  Vascular Neurology  Office: [phone] .

## 2022-10-27 NOTE — PROGRESS NOTE ADULT - SUBJECTIVE AND OBJECTIVE BOX
VITAL SIGNS    Telemetry:  junctional 50-60    Vital Signs Last 24 Hrs  T(C): 36.7 (10-27-22 @ 07:01), Max: 36.8 (10-26-22 @ 23:12)  T(F): 98.1 (10-27-22 @ 07:01), Max: 98.3 (10-27-22 @ 03:13)  HR: 55 (10-27-22 @ 07:01) (53 - 81)  BP: 128/59 (10-27-22 @ 07:01) (122/57 - 142/58)  RR: 18 (10-27-22 @ 07:01) (18 - 44)  SpO2: 96% (10-27-22 @ 07:01) (95% - 99%)                   10-26 @ 07:01  -  10-27 @ 07:00  --------------------------------------------------------  IN: 470 mL / OUT: 1170 mL / NET: -700 mL    10-27 @ 07:01  -  10-27 @ 08:48  --------------------------------------------------------  IN: 240 mL / OUT: 0 mL / NET: 240 mL          Daily     Daily Weight in k.3 (27 Oct 2022 06:40)            CAPILLARY BLOOD GLUCOSE      POCT Blood Glucose.: 145 mg/dL (27 Oct 2022 07:26)  POCT Blood Glucose.: 180 mg/dL (26 Oct 2022 20:49)  POCT Blood Glucose.: 139 mg/dL (26 Oct 2022 16:39)  POCT Blood Glucose.: 181 mg/dL (26 Oct 2022 12:42)            Drains:     MS         [  ] Drainage:                 L Pleural  [  ]  Drainage:                R Pleural  [  ]  Drainage:    Pacing Wires        [x  ]   Settings:                                  Isolated  [  ]    Coumadin    [ ] YES          [  x]      NO    eventual eliquis                               PHYSICAL EXAM        Neurology: alert and oriented x 3, nonfocal, no gross deficits  CV : s1 s2 RRR  Sternal Wound :  CDI , Stable  Lungs: cta  Abdomen: soft, nontender, nondistended, positive bowel sounds,                   :    gaitan - sbd         Extremities:    trace   edema   /  -   calve tenderness ,            acetaminophen     Tablet .. 650 milliGRAM(s) Oral every 6 hours  acetaminophen     Tablet .. 650 milliGRAM(s) Oral every 6 hours PRN  aMIOdarone    Tablet 200 milliGRAM(s) Oral two times a day  ascorbic acid 500 milliGRAM(s) Oral two times a day  aspirin enteric coated 81 milliGRAM(s) Oral daily  atorvastatin 40 milliGRAM(s) Oral at bedtime  bisacodyl Suppository 10 milliGRAM(s) Rectal once  chlorhexidine 2% Cloths 1 Application(s) Topical daily  dextrose 5%. 1000 milliLiter(s) IV Continuous <Continuous>  dextrose 5%. 1000 milliLiter(s) IV Continuous <Continuous>  dextrose Oral Gel 15 Gram(s) Oral once PRN  doxazosin 4 milliGRAM(s) Oral at bedtime  enoxaparin Injectable 40 milliGRAM(s) SubCutaneous every 24 hours  glucagon  Injectable 1 milliGRAM(s) IntraMuscular once  insulin lispro (ADMELOG) corrective regimen sliding scale   SubCutaneous three times a day before meals  insulin lispro (ADMELOG) corrective regimen sliding scale   SubCutaneous at bedtime  levETIRAcetam  IVPB 750 milliGRAM(s) IV Intermittent every 12 hours  levothyroxine 25 MICROGram(s) Oral daily  oxyCODONE    IR 5 milliGRAM(s) Oral every 4 hours PRN  oxyCODONE    IR 10 milliGRAM(s) Oral every 4 hours PRN  pantoprazole    Tablet 40 milliGRAM(s) Oral before breakfast  polyethylene glycol 3350 17 Gram(s) Oral daily  senna 2 Tablet(s) Oral at bedtime  spironolactone 50 milliGRAM(s) Oral daily  torsemide 10 milliGRAM(s) Oral daily                    Physical Therapy Rec:   Home  [  ]   Home w/ PT  [  ]  Rehab  [  ]  Discussed with Cardiothoracic Team at AM rounds.

## 2022-10-27 NOTE — PROGRESS NOTE ADULT - ASSESSMENT
77  year old  RHD male PMH of HTN, HLD, DM2, former smoker,  hypothyroidism, obesity, LILLY on C-pap,  bladder cancer (2020, s/p resection of bladder tumor/ currently self catheterizes 6x/day ), PAF s/p DCCV /uncomplicated radiofrequency ablation of atrial fibrillation (WACA/PVI, CTI) on 7/13/22,  left atrial appendage & severe MR . Presents with c/o recent" abnormal echo with fatigue due to mitral valve regurgitation. Presents for  scheduled Mitral valve replacement , MAZE, left atrial appendage closure on 10/20/2022.  10/24/22   MVr - triangular resection with 28mm Bobby band  AtriClip 45mm   Dawn-Maze IV procedure  PFO CLOSURE  Code stroke called for LUE convulsion with residual LUE weakness immediately after, Patient had LUE convulsions that spread to entire body  CT done, neuro following:  Stroke on differential as well given vague lucency at the genu of the right internal capsule and in the right thalamus of indeterminate age. Likely lacunar infarcts.   MRI when capable , eeg and Keppra added  Extubated d 1  + pressors, prbc's  EEG prelim neg.  Pt with lethargy post keppra, d/c since eeg neg  10/ 26 EEG reviewed by neuro, although no seizures  he is at risk for further sezures, recommended resuming keppra and if lethargic ,  change to vimpat  Pt with junctional / afib  Transferred to sdu  10/27 Junctional 50-60, remains on amio 200bid, no beta blocker   Cont keppra for now.  Neuro f/u  D/c jared drain.

## 2022-10-27 NOTE — PROGRESS NOTE ADULT - SUBJECTIVE AND OBJECTIVE BOX
Neurology Progress Note    S: Patient seen and examined in ICU. No new events overnight. patient denied CP, SOB, HA or pain.      Medication:  MEDICATIONS  (STANDING):  aMIOdarone    Tablet 200 milliGRAM(s) Oral two times a day  ascorbic acid 500 milliGRAM(s) Oral two times a day  aspirin enteric coated 81 milliGRAM(s) Oral daily  atorvastatin 40 milliGRAM(s) Oral at bedtime  bisacodyl Suppository 10 milliGRAM(s) Rectal once  chlorhexidine 2% Cloths 1 Application(s) Topical daily  dextrose 5%. 1000 milliLiter(s) (50 mL/Hr) IV Continuous <Continuous>  dextrose 5%. 1000 milliLiter(s) (100 mL/Hr) IV Continuous <Continuous>  doxazosin 4 milliGRAM(s) Oral at bedtime  enoxaparin Injectable 40 milliGRAM(s) SubCutaneous every 24 hours  glucagon  Injectable 1 milliGRAM(s) IntraMuscular once  insulin lispro (ADMELOG) corrective regimen sliding scale   SubCutaneous three times a day before meals  insulin lispro (ADMELOG) corrective regimen sliding scale   SubCutaneous at bedtime  levETIRAcetam  IVPB 750 milliGRAM(s) IV Intermittent every 12 hours  levothyroxine 25 MICROGram(s) Oral daily  pantoprazole    Tablet 40 milliGRAM(s) Oral before breakfast  polyethylene glycol 3350 17 Gram(s) Oral daily  senna 2 Tablet(s) Oral at bedtime  spironolactone 50 milliGRAM(s) Oral daily  torsemide 10 milliGRAM(s) Oral daily    MEDICATIONS  (PRN):  acetaminophen     Tablet .. 650 milliGRAM(s) Oral every 6 hours PRN Mild Pain (1 - 3)  dextrose Oral Gel 15 Gram(s) Oral once PRN Blood Glucose LESS THAN 70 milliGRAM(s)/deciliter  oxyCODONE    IR 5 milliGRAM(s) Oral every 4 hours PRN Moderate Pain (4 - 6)  oxyCODONE    IR 10 milliGRAM(s) Oral every 4 hours PRN Severe Pain (7 - 10)        Vitals:    Vital Signs Last 24 Hrs  T(C): 36.7 (10-27-22 @ 11:03), Max: 36.8 (10-26-22 @ 23:12)  T(F): 98.1 (10-27-22 @ 11:03), Max: 98.3 (10-27-22 @ 03:13)  HR: 51 (10-27-22 @ 11:03) (51 - 63)  BP: 133/65 (10-27-22 @ 11:03) (122/57 - 142/58)  BP(mean): 93 (10-27-22 @ 11:03) (82 - 93)  RR: 18 (10-27-22 @ 11:03) (18 - 18)  SpO2: 96% (10-27-22 @ 11:03) (95% - 99%)          General Exam:   General Appearance: Appropriately dressed and in no acute distress       Head: Normocephalic, atraumatic and no dysmorphic features  Ear, Nose, and Throat: Moist mucous membranes  CVS: S1S2+  Resp: No SOB, no wheeze or rhonchi  Abd: soft NTND  Extremities: No edema, no cyanosis  Skin: No bruises, no rashes     Neurological Exam:  Mental Status: Awake, alert and oriented x 3.  Able to follow simple and complex verbal commands. Able to name and repeat. fluent speech. No obvious aphasia or dysarthria noted.   Cranial Nerves: PERRL, EOMI, VFFC, sensation V1-V3 intact,  no obvious facial asymmetry , equal elevation of palate, scm/trap 5/5, tongue is midline on protrusion. no obvious papilledema on fundoscopic exam. Hearing is grossly intact.   Motor: Normal bulk, tone and strength throughout. Fine finger movements were intact and symmetric. no tremors or drift noted.    Sensation: Intact to light touch and pinprick throughout. no right/left confusion. no extinction to tactile on DSS.    Reflexes: 1+ throughout at biceps, brachioradialis, triceps, patellars and ankles bilaterally and equal. No clonus. R toe and L toe were both downgoing.  Coordination: No dysmetria on FNF or HKS  Gait: deferred     I personally reviewed the below data/images/labs:    CBC Full  -  ( 27 Oct 2022 06:38 )  WBC Count : 6.42 K/uL  RBC Count : 3.06 M/uL  Hemoglobin : 8.5 g/dL  Hematocrit : 26.0 %  Platelet Count - Automated : 103 K/uL  Mean Cell Volume : 85.0 fl  Mean Cell Hemoglobin : 27.8 pg  Mean Cell Hemoglobin Concentration : 32.7 gm/dL  Auto Neutrophil # : 4.62 K/uL  Auto Lymphocyte # : 0.76 K/uL  Auto Monocyte # : 0.89 K/uL  Auto Eosinophil # : 0.10 K/uL  Auto Basophil # : 0.02 K/uL  Auto Neutrophil % : 71.9 %  Auto Lymphocyte % : 11.8 %  Auto Monocyte % : 13.9 %  Auto Eosinophil % : 1.6 %  Auto Basophil % : 0.3 %    10-27    140  |  107  |  21  ----------------------------<  137<H>  3.9   |  24  |  1.12    Ca    8.2<L>      27 Oct 2022 06:45  Phos  3.0     10-27  Mg     2.0     10-27    TPro  5.6<L>  /  Alb  3.3  /  TBili  0.6  /  DBili  x   /  AST  20  /  ALT  14  /  AlkPhos  42  10-27    < from: CT Brain Stroke Protocol (10.24.22 @ 22:50) >  No acute intracranial hemorrhage, mass effect, or CTevidence of an acute   transcortical infarct.    Vague lucency at the genu of the right internal capsule and in the right   thalamus which may represent lacunar infarcts of indeterminate age. No   prior studies are available for comparison.    EEG Classification / Summary:  Abnormal  EEG in the awake / drowsy / asleep state(s).  Occasional repetitive sharp waves max O1/O2  Continuous polymorphic slowing, focal, bilateral posterior head region  Background slowing, generalized, mild     Clinical Impression:  Evidence for focal dysfunction in and increased risk for seizures from the bilateral posterior head region  Mild diffuse/multifocal cerebral dysfunction, not specific as to etiology

## 2022-10-28 LAB
ALBUMIN SERPL ELPH-MCNC: 3.2 G/DL — LOW (ref 3.3–5)
ALP SERPL-CCNC: 47 U/L — SIGNIFICANT CHANGE UP (ref 40–120)
ALT FLD-CCNC: 23 U/L — SIGNIFICANT CHANGE UP (ref 10–45)
ANION GAP SERPL CALC-SCNC: 9 MMOL/L — SIGNIFICANT CHANGE UP (ref 5–17)
AST SERPL-CCNC: 21 U/L — SIGNIFICANT CHANGE UP (ref 10–40)
BASOPHILS # BLD AUTO: 0.04 K/UL — SIGNIFICANT CHANGE UP (ref 0–0.2)
BASOPHILS NFR BLD AUTO: 0.7 % — SIGNIFICANT CHANGE UP (ref 0–2)
BILIRUB SERPL-MCNC: 0.6 MG/DL — SIGNIFICANT CHANGE UP (ref 0.2–1.2)
BUN SERPL-MCNC: 17 MG/DL — SIGNIFICANT CHANGE UP (ref 7–23)
CALCIUM SERPL-MCNC: 8.1 MG/DL — LOW (ref 8.4–10.5)
CHLORIDE SERPL-SCNC: 106 MMOL/L — SIGNIFICANT CHANGE UP (ref 96–108)
CO2 SERPL-SCNC: 24 MMOL/L — SIGNIFICANT CHANGE UP (ref 22–31)
CREAT SERPL-MCNC: 0.96 MG/DL — SIGNIFICANT CHANGE UP (ref 0.5–1.3)
EGFR: 82 ML/MIN/1.73M2 — SIGNIFICANT CHANGE UP
EOSINOPHIL # BLD AUTO: 0.15 K/UL — SIGNIFICANT CHANGE UP (ref 0–0.5)
EOSINOPHIL NFR BLD AUTO: 2.7 % — SIGNIFICANT CHANGE UP (ref 0–6)
GLUCOSE SERPL-MCNC: 138 MG/DL — HIGH (ref 70–99)
HCT VFR BLD CALC: 28.3 % — LOW (ref 39–50)
HGB BLD-MCNC: 9 G/DL — LOW (ref 13–17)
IMM GRANULOCYTES NFR BLD AUTO: 0.7 % — SIGNIFICANT CHANGE UP (ref 0–0.9)
LYMPHOCYTES # BLD AUTO: 0.73 K/UL — LOW (ref 1–3.3)
LYMPHOCYTES # BLD AUTO: 13 % — SIGNIFICANT CHANGE UP (ref 13–44)
MAGNESIUM SERPL-MCNC: 1.8 MG/DL — SIGNIFICANT CHANGE UP (ref 1.6–2.6)
MCHC RBC-ENTMCNC: 27.4 PG — SIGNIFICANT CHANGE UP (ref 27–34)
MCHC RBC-ENTMCNC: 31.8 GM/DL — LOW (ref 32–36)
MCV RBC AUTO: 86.3 FL — SIGNIFICANT CHANGE UP (ref 80–100)
MONOCYTES # BLD AUTO: 0.79 K/UL — SIGNIFICANT CHANGE UP (ref 0–0.9)
MONOCYTES NFR BLD AUTO: 14.1 % — HIGH (ref 2–14)
NEUTROPHILS # BLD AUTO: 3.86 K/UL — SIGNIFICANT CHANGE UP (ref 1.8–7.4)
NEUTROPHILS NFR BLD AUTO: 68.8 % — SIGNIFICANT CHANGE UP (ref 43–77)
NRBC # BLD: 0 /100 WBCS — SIGNIFICANT CHANGE UP (ref 0–0)
PHOSPHATE SERPL-MCNC: 2.7 MG/DL — SIGNIFICANT CHANGE UP (ref 2.5–4.5)
PLATELET # BLD AUTO: 124 K/UL — LOW (ref 150–400)
POTASSIUM SERPL-MCNC: 4 MMOL/L — SIGNIFICANT CHANGE UP (ref 3.5–5.3)
POTASSIUM SERPL-SCNC: 4 MMOL/L — SIGNIFICANT CHANGE UP (ref 3.5–5.3)
PROT SERPL-MCNC: 5.6 G/DL — LOW (ref 6–8.3)
RBC # BLD: 3.28 M/UL — LOW (ref 4.2–5.8)
RBC # FLD: 15.9 % — HIGH (ref 10.3–14.5)
SODIUM SERPL-SCNC: 139 MMOL/L — SIGNIFICANT CHANGE UP (ref 135–145)
WBC # BLD: 5.61 K/UL — SIGNIFICANT CHANGE UP (ref 3.8–10.5)
WBC # FLD AUTO: 5.61 K/UL — SIGNIFICANT CHANGE UP (ref 3.8–10.5)

## 2022-10-28 RX ORDER — SORBITOL SOLUTION 70 %
30 SOLUTION, ORAL MISCELLANEOUS ONCE
Refills: 0 | Status: COMPLETED | OUTPATIENT
Start: 2022-10-28 | End: 2022-10-28

## 2022-10-28 RX ADMIN — Medication 500 MILLIGRAM(S): at 05:04

## 2022-10-28 RX ADMIN — POLYETHYLENE GLYCOL 3350 17 GRAM(S): 17 POWDER, FOR SOLUTION ORAL at 07:30

## 2022-10-28 RX ADMIN — SPIRONOLACTONE 50 MILLIGRAM(S): 25 TABLET, FILM COATED ORAL at 05:04

## 2022-10-28 RX ADMIN — Medication 2: at 17:17

## 2022-10-28 RX ADMIN — LEVETIRACETAM 750 MILLIGRAM(S): 250 TABLET, FILM COATED ORAL at 05:07

## 2022-10-28 RX ADMIN — ATORVASTATIN CALCIUM 40 MILLIGRAM(S): 80 TABLET, FILM COATED ORAL at 21:35

## 2022-10-28 RX ADMIN — AMIODARONE HYDROCHLORIDE 200 MILLIGRAM(S): 400 TABLET ORAL at 05:03

## 2022-10-28 RX ADMIN — Medication 81 MILLIGRAM(S): at 07:30

## 2022-10-28 RX ADMIN — Medication 4: at 11:22

## 2022-10-28 RX ADMIN — Medication 500 MILLIGRAM(S): at 17:18

## 2022-10-28 RX ADMIN — PANTOPRAZOLE SODIUM 40 MILLIGRAM(S): 20 TABLET, DELAYED RELEASE ORAL at 05:04

## 2022-10-28 RX ADMIN — LEVETIRACETAM 750 MILLIGRAM(S): 250 TABLET, FILM COATED ORAL at 17:19

## 2022-10-28 RX ADMIN — Medication 4 MILLIGRAM(S): at 21:36

## 2022-10-28 RX ADMIN — Medication 25 MICROGRAM(S): at 05:04

## 2022-10-28 RX ADMIN — CHLORHEXIDINE GLUCONATE 1 APPLICATION(S): 213 SOLUTION TOPICAL at 09:20

## 2022-10-28 RX ADMIN — AMIODARONE HYDROCHLORIDE 200 MILLIGRAM(S): 400 TABLET ORAL at 17:18

## 2022-10-28 RX ADMIN — Medication 10 MILLIGRAM(S): at 05:04

## 2022-10-28 RX ADMIN — ENOXAPARIN SODIUM 40 MILLIGRAM(S): 100 INJECTION SUBCUTANEOUS at 17:20

## 2022-10-28 RX ADMIN — SENNA PLUS 2 TABLET(S): 8.6 TABLET ORAL at 21:36

## 2022-10-28 NOTE — PROGRESS NOTE ADULT - ASSESSMENT
77  year old  RHD male with HTN, HLD, DM2, former smoker,  hypothyroidism, obesity, LILLY on C-pap,  bladder cancer (2020, s/p resection of bladder tumor/ currently self catheterizes 6x/day ), PAF s/p DCCV /uncomplicated radiofrequency ablation of atrial fibrillation (WACA/PVI, CTI) on 7/13/22,  left atrial appendage & severe MR s/p open heart surgery for mitral valve repair and PFO closure 10/24/22.   Code stroke called for LUE convulsion with residual LUE weakness immediately after. LKW: 9:50 PM 10/24/22.   NIHSS: 12. MRS:2. on day of code stroke    Per primary team, LUE weakness improved. Patient had LUE convulsions that spread to entire body and team witnessed GTC's that lasted for 1.5 minutes. No witnessed gaze deviation per primary team.     On initial exam, patient able to follow commands and able to lift all extremities against gravity. Patient was able to life LUE but less than the RUE. Still, all extremities had drift that hit bed.   Not a tpa candidate given open heart surgery within 14 days. Not a thrombectomy candidate given no LVO.     o/e 10/25 AAOx3, OLIVIER but mild LUE weakness still noted.   10/26 exam baseline.  EEG with occasional sharps and risk for seizures from bilateral posterior head region     CTH: R IC vague lucency noted. may be age indeterminate  CTA H/N limited 2/2 poor opacification of vessels but no LVO.  CTP neg   A1c 6.2  LDL 27     Impression: LUE convulsions with generalized spreading likely seizure. Stroke on differential as well given vague lucency at the genu of the right internal capsule and in the right thalamus of indeterminate age. Likely lacunar infarcts.     Recommendations:    - s/p load with Keppra 2g, now keppra maintenance 750 mg bid ;  was stopped but would continue with AED.  if keppra 750mg BID was making him sleeping I would start vimpat 100mg BID given EEG findings ; now c/w keppra 750mg BID. okay to change keppra to PO, same dose 1:1 conversion 750mg BID PO ; c/w AED until outpatient f/u and repeat EEG   - MR brain wo when stable unlikely to happen at this time.  would repeat CTH as alternative   - f/u lidocaine level  - c/w ASA 81mg daily.  should be on statin therapy for secondary stroke prevention. lipitor 20 if no objection.  will hold off high dose as no athero on CTA H/N and LDL at goal  - telemetry  - PT/OT/SS/SLP, OOBC  - BP per CT team .   - check FS, glucose control <180  - GI/DVT ppx  - Thank you for allowing me to participate in the care of this patient. Call with questions.   - spoke [facility]   - spoke with sister at bedside   Bucky Sharpe MD  Vascular Neurology  Office: [phone] .

## 2022-10-28 NOTE — PROGRESS NOTE ADULT - SUBJECTIVE AND OBJECTIVE BOX
VITAL SIGNS    Telemetry:  junctional rhythm 50's  Vital Signs Last 24 Hrs  T(C): 36.6 (10-28-22 @ 07:02), Max: 36.8 (10-27-22 @ 15:24)  T(F): 97.8 (10-28-22 @ 07:02), Max: 98.3 (10-27-22 @ 15:24)  HR: 68 (10-28-22 @ 09:13) (51 - 68)  BP: 129/60 (10-28-22 @ 07:02) (129/60 - 157/64)  RR: 18 (10-28-22 @ 07:02) (18 - 19)  SpO2: 95% (10-28-22 @ 09:13) (95% - 98%)            10-27 @ 07:01  -  10-28 @ 07:00  --------------------------------------------------------  IN: 1560 mL / OUT: 1740 mL / NET: -180 mL    10-28 @ 07:01  -  10-28 @ 10:17  --------------------------------------------------------  IN: 540 mL / OUT: 600 mL / NET: -60 mL       Daily     Daily Weight in k.1 (28 Oct 2022 07:00)  Admit Wt: Drug Dosing Weight  Height (cm): 182.9 (24 Oct 2022 06:00)  Weight (kg): 126.9 (24 Oct 2022 06:00)  BMI (kg/m2): 37.9 (24 Oct 2022 06:00)  BSA (m2): 2.46 (24 Oct 2022 06:00)    Bilirubin Total, Serum: 0.6 mg/dL (10-28 @ 06:29)    CAPILLARY BLOOD GLUCOSE      POCT Blood Glucose.: 136 mg/dL (28 Oct 2022 07:10)  POCT Blood Glucose.: 170 mg/dL (27 Oct 2022 20:51)  POCT Blood Glucose.: 152 mg/dL (27 Oct 2022 16:44)  POCT Blood Glucose.: 154 mg/dL (27 Oct 2022 11:09)          MEDICATIONS  acetaminophen     Tablet .. 650 milliGRAM(s) Oral every 6 hours PRN  aMIOdarone    Tablet 200 milliGRAM(s) Oral two times a day  ascorbic acid 500 milliGRAM(s) Oral two times a day  aspirin enteric coated 81 milliGRAM(s) Oral daily  atorvastatin 40 milliGRAM(s) Oral at bedtime  bisacodyl Suppository 10 milliGRAM(s) Rectal once  chlorhexidine 2% Cloths 1 Application(s) Topical daily  dextrose 5%. 1000 milliLiter(s) IV Continuous <Continuous>  dextrose 5%. 1000 milliLiter(s) IV Continuous <Continuous>  dextrose Oral Gel 15 Gram(s) Oral once PRN  doxazosin 4 milliGRAM(s) Oral at bedtime  enoxaparin Injectable 40 milliGRAM(s) SubCutaneous every 24 hours  glucagon  Injectable 1 milliGRAM(s) IntraMuscular once  insulin lispro (ADMELOG) corrective regimen sliding scale   SubCutaneous three times a day before meals  insulin lispro (ADMELOG) corrective regimen sliding scale   SubCutaneous at bedtime  levETIRAcetam 750 milliGRAM(s) Oral two times a day  levothyroxine 25 MICROGram(s) Oral daily  oxyCODONE    IR 5 milliGRAM(s) Oral every 4 hours PRN  oxyCODONE    IR 10 milliGRAM(s) Oral every 4 hours PRN  pantoprazole    Tablet 40 milliGRAM(s) Oral before breakfast  polyethylene glycol 3350 17 Gram(s) Oral daily  senna 2 Tablet(s) Oral at bedtime  spironolactone 50 milliGRAM(s) Oral daily  torsemide 10 milliGRAM(s) Oral daily      >>> <<<  PHYSICAL EXAM  Subjective: NAD  Neurology: alert and oriented x 3, nonfocal, no gross deficits  CV : s1s2 +PW  Sternal Wound :  CDI , Stable  Lungs: CTA b/l  Abdomen: soft, NT,ND, ( -)BM + flatus  :  gaitan  Extremities: -c/c/ trace edema    LABS  10-    139  |  106  |  17  ----------------------------<  138<H>  4.0   |  24  |  0.96    Ca    8.1<L>      28 Oct 2022 06:29  Phos  2.7     10-  Mg     1.8     10-    TPro  5.6<L>  /  Alb  3.2<L>  /  TBili  0.6  /  DBili  x   /  AST  21  /  ALT  23  /  AlkPhos  47  10-                                 9.0    5.61  )-----------( 124      ( 28 Oct 2022 06:27 )             28.3                 PAST MEDICAL & SURGICAL HISTORY:  Paroxysmal atrial fibrillation  s/p ablation  on ELIQUIS till 10/13      Hypertension      Hyperlipidemia      Bladder cancer      Diabetes mellitus  2      Hypothyroidism      At risk for sleep apnea  LILLY on C-pap      COVID-19 virus infection  positive with mild infection 10/10/22 , had MAB on 10/10/22 at [facility], surgeon aware      Severe mitral regurgitation  seen in recent ESDRAS      PAF (paroxysmal atrial fibrillation)  s/p DCCV 7 rfa DR Hoover &gt; nsr      Bladder cancer  rsx/ treatment , self cath 3x/day      Dysuria  on self cath 6x/day, UC sent      H/O shoulder surgery  2017      H/O cardiac catheterization  2022      Bladder tumor  s/p resection

## 2022-10-28 NOTE — PROGRESS NOTE ADULT - PROBLEM SELECTOR PLAN 1
Asa, Statin, Chest PT,  Incentive spirometry, wound care and assessment.  Ambulate   Modified amio for afib, currently intermittent junctional/afib, 60's  Consider anticoagulation if PPM not indicated

## 2022-10-28 NOTE — PROGRESS NOTE ADULT - SUBJECTIVE AND OBJECTIVE BOX
Neurology Progress Note    S: Patient seen and examined. No new events overnight. patient denied CP, SOB, HA or pain.      Medication:  MEDICATIONS  (STANDING):  aMIOdarone    Tablet 200 milliGRAM(s) Oral two times a day  ascorbic acid 500 milliGRAM(s) Oral two times a day  aspirin enteric coated 81 milliGRAM(s) Oral daily  atorvastatin 40 milliGRAM(s) Oral at bedtime  bisacodyl Suppository 10 milliGRAM(s) Rectal once  chlorhexidine 2% Cloths 1 Application(s) Topical daily  dextrose 5%. 1000 milliLiter(s) (50 mL/Hr) IV Continuous <Continuous>  dextrose 5%. 1000 milliLiter(s) (100 mL/Hr) IV Continuous <Continuous>  doxazosin 4 milliGRAM(s) Oral at bedtime  enoxaparin Injectable 40 milliGRAM(s) SubCutaneous every 24 hours  glucagon  Injectable 1 milliGRAM(s) IntraMuscular once  insulin lispro (ADMELOG) corrective regimen sliding scale   SubCutaneous three times a day before meals  insulin lispro (ADMELOG) corrective regimen sliding scale   SubCutaneous at bedtime  levETIRAcetam 750 milliGRAM(s) Oral two times a day  levothyroxine 25 MICROGram(s) Oral daily  pantoprazole    Tablet 40 milliGRAM(s) Oral before breakfast  polyethylene glycol 3350 17 Gram(s) Oral daily  senna 2 Tablet(s) Oral at bedtime  sorbitol 70% Solution 30 milliLiter(s) Oral once  spironolactone 50 milliGRAM(s) Oral daily  torsemide 10 milliGRAM(s) Oral daily    MEDICATIONS  (PRN):  acetaminophen     Tablet .. 650 milliGRAM(s) Oral every 6 hours PRN Mild Pain (1 - 3)  dextrose Oral Gel 15 Gram(s) Oral once PRN Blood Glucose LESS THAN 70 milliGRAM(s)/deciliter  oxyCODONE    IR 5 milliGRAM(s) Oral every 4 hours PRN Moderate Pain (4 - 6)  oxyCODONE    IR 10 milliGRAM(s) Oral every 4 hours PRN Severe Pain (7 - 10)      Vitals:    Vital Signs Last 24 Hrs  T(C): 36.6 (28 Oct 2022 07:02), Max: 36.8 (27 Oct 2022 15:24)  T(F): 97.8 (28 Oct 2022 07:02), Max: 98.3 (27 Oct 2022 15:24)  HR: 68 (28 Oct 2022 09:13) (52 - 68)  BP: 129/60 (28 Oct 2022 07:02) (129/60 - 157/64)  BP(mean): 87 (28 Oct 2022 07:02) (87 - 99)  RR: 18 (28 Oct 2022 07:02) (18 - 19)  SpO2: 95% (28 Oct 2022 09:13) (95% - 98%)    Parameters below as of 28 Oct 2022 03:50  Patient On (Oxygen Delivery Method): BiPAP/CPAP          General Exam:   General Appearance: Appropriately dressed and in no acute distress       Head: Normocephalic, atraumatic and no dysmorphic features  Ear, Nose, and Throat: Moist mucous membranes  CVS: S1S2+  Resp: No SOB, no wheeze or rhonchi  Abd: soft NTND  Extremities: No edema, no cyanosis  Skin: No bruises, no rashes     Neurological Exam:  Mental Status: Awake, alert and oriented x 3.  Able to follow simple and complex verbal commands. Able to name and repeat. fluent speech. No obvious aphasia or dysarthria noted.   Cranial Nerves: PERRL, EOMI, VFFC, sensation V1-V3 intact,  no obvious facial asymmetry , equal elevation of palate, scm/trap 5/5, tongue is midline on protrusion. no obvious papilledema on fundoscopic exam. Hearing is grossly intact.   Motor: Normal bulk, tone and strength throughout. Fine finger movements were intact and symmetric. no tremors or drift noted.    Sensation: Intact to light touch and pinprick throughout. no right/left confusion. no extinction to tactile on DSS.    Reflexes: 1+ throughout at biceps, brachioradialis, triceps, patellars and ankles bilaterally and equal. No clonus. R toe and L toe were both downgoing.  Coordination: No dysmetria on FNF or HKS  Gait: deferred     I personally reviewed the below data/images/labs:   CBC Full  -  ( 28 Oct 2022 06:27 )  WBC Count : 5.61 K/uL  RBC Count : 3.28 M/uL  Hemoglobin : 9.0 g/dL  Hematocrit : 28.3 %  Platelet Count - Automated : 124 K/uL  Mean Cell Volume : 86.3 fl  Mean Cell Hemoglobin : 27.4 pg  Mean Cell Hemoglobin Concentration : 31.8 gm/dL  Auto Neutrophil # : 3.86 K/uL  Auto Lymphocyte # : 0.73 K/uL  Auto Monocyte # : 0.79 K/uL  Auto Eosinophil # : 0.15 K/uL  Auto Basophil # : 0.04 K/uL  Auto Neutrophil % : 68.8 %  Auto Lymphocyte % : 13.0 %  Auto Monocyte % : 14.1 %  Auto Eosinophil % : 2.7 %  Auto Basophil % : 0.7 %    10-28    139  |  106  |  17  ----------------------------<  138<H>  4.0   |  24  |  0.96    Ca    8.1<L>      28 Oct 2022 06:29  Phos  2.7     10-28  Mg     1.8     10-28    TPro  5.6<L>  /  Alb  3.2<L>  /  TBili  0.6  /  DBili  x   /  AST  21  /  ALT  23  /  AlkPhos  47  10-28      < from: CT Brain Stroke Protocol (10.24.22 @ 22:50) >  No acute intracranial hemorrhage, mass effect, or CTevidence of an acute   transcortical infarct.    Vague lucency at the genu of the right internal capsule and in the right   thalamus which may represent lacunar infarcts of indeterminate age. No   prior studies are available for comparison.    EEG Classification / Summary:  Abnormal  EEG in the awake / drowsy / asleep state(s).  Occasional repetitive sharp waves max O1/O2  Continuous polymorphic slowing, focal, bilateral posterior head region  Background slowing, generalized, mild     Clinical Impression:  Evidence for focal dysfunction in and increased risk for seizures from the bilateral posterior head region  Mild diffuse/multifocal cerebral dysfunction, not specific as to etiology

## 2022-10-29 LAB
ALBUMIN SERPL ELPH-MCNC: 3.1 G/DL — LOW (ref 3.3–5)
ALP SERPL-CCNC: 48 U/L — SIGNIFICANT CHANGE UP (ref 40–120)
ALT FLD-CCNC: 22 U/L — SIGNIFICANT CHANGE UP (ref 10–45)
ANION GAP SERPL CALC-SCNC: 11 MMOL/L — SIGNIFICANT CHANGE UP (ref 5–17)
AST SERPL-CCNC: 16 U/L — SIGNIFICANT CHANGE UP (ref 10–40)
BASOPHILS # BLD AUTO: 0.02 K/UL — SIGNIFICANT CHANGE UP (ref 0–0.2)
BASOPHILS NFR BLD AUTO: 0.4 % — SIGNIFICANT CHANGE UP (ref 0–2)
BILIRUB SERPL-MCNC: 0.7 MG/DL — SIGNIFICANT CHANGE UP (ref 0.2–1.2)
BUN SERPL-MCNC: 16 MG/DL — SIGNIFICANT CHANGE UP (ref 7–23)
CALCIUM SERPL-MCNC: 8.2 MG/DL — LOW (ref 8.4–10.5)
CHLORIDE SERPL-SCNC: 104 MMOL/L — SIGNIFICANT CHANGE UP (ref 96–108)
CO2 SERPL-SCNC: 24 MMOL/L — SIGNIFICANT CHANGE UP (ref 22–31)
CREAT SERPL-MCNC: 1.05 MG/DL — SIGNIFICANT CHANGE UP (ref 0.5–1.3)
EGFR: 74 ML/MIN/1.73M2 — SIGNIFICANT CHANGE UP
EOSINOPHIL # BLD AUTO: 0.17 K/UL — SIGNIFICANT CHANGE UP (ref 0–0.5)
EOSINOPHIL NFR BLD AUTO: 3.2 % — SIGNIFICANT CHANGE UP (ref 0–6)
GLUCOSE SERPL-MCNC: 136 MG/DL — HIGH (ref 70–99)
HCT VFR BLD CALC: 26.4 % — LOW (ref 39–50)
HGB BLD-MCNC: 8.5 G/DL — LOW (ref 13–17)
IMM GRANULOCYTES NFR BLD AUTO: 0.4 % — SIGNIFICANT CHANGE UP (ref 0–0.9)
LYMPHOCYTES # BLD AUTO: 0.84 K/UL — LOW (ref 1–3.3)
LYMPHOCYTES # BLD AUTO: 15.7 % — SIGNIFICANT CHANGE UP (ref 13–44)
MAGNESIUM SERPL-MCNC: 1.7 MG/DL — SIGNIFICANT CHANGE UP (ref 1.6–2.6)
MCHC RBC-ENTMCNC: 27.6 PG — SIGNIFICANT CHANGE UP (ref 27–34)
MCHC RBC-ENTMCNC: 32.2 GM/DL — SIGNIFICANT CHANGE UP (ref 32–36)
MCV RBC AUTO: 85.7 FL — SIGNIFICANT CHANGE UP (ref 80–100)
MONOCYTES # BLD AUTO: 0.91 K/UL — HIGH (ref 0–0.9)
MONOCYTES NFR BLD AUTO: 17 % — HIGH (ref 2–14)
NEUTROPHILS # BLD AUTO: 3.39 K/UL — SIGNIFICANT CHANGE UP (ref 1.8–7.4)
NEUTROPHILS NFR BLD AUTO: 63.3 % — SIGNIFICANT CHANGE UP (ref 43–77)
NRBC # BLD: 0 /100 WBCS — SIGNIFICANT CHANGE UP (ref 0–0)
PHOSPHATE SERPL-MCNC: 3 MG/DL — SIGNIFICANT CHANGE UP (ref 2.5–4.5)
PLATELET # BLD AUTO: 141 K/UL — LOW (ref 150–400)
POTASSIUM SERPL-MCNC: 3.8 MMOL/L — SIGNIFICANT CHANGE UP (ref 3.5–5.3)
POTASSIUM SERPL-SCNC: 3.8 MMOL/L — SIGNIFICANT CHANGE UP (ref 3.5–5.3)
PROT SERPL-MCNC: 5.5 G/DL — LOW (ref 6–8.3)
RBC # BLD: 3.08 M/UL — LOW (ref 4.2–5.8)
RBC # FLD: 15.8 % — HIGH (ref 10.3–14.5)
SODIUM SERPL-SCNC: 139 MMOL/L — SIGNIFICANT CHANGE UP (ref 135–145)
WBC # BLD: 5.35 K/UL — SIGNIFICANT CHANGE UP (ref 3.8–10.5)
WBC # FLD AUTO: 5.35 K/UL — SIGNIFICANT CHANGE UP (ref 3.8–10.5)

## 2022-10-29 RX ORDER — APIXABAN 2.5 MG/1
5 TABLET, FILM COATED ORAL EVERY 12 HOURS
Refills: 0 | Status: DISCONTINUED | OUTPATIENT
Start: 2022-10-29 | End: 2022-10-29

## 2022-10-29 RX ORDER — APIXABAN 2.5 MG/1
2.5 TABLET, FILM COATED ORAL EVERY 12 HOURS
Refills: 0 | Status: DISCONTINUED | OUTPATIENT
Start: 2022-10-29 | End: 2022-11-02

## 2022-10-29 RX ORDER — MAGNESIUM SULFATE 500 MG/ML
2 VIAL (ML) INJECTION ONCE
Refills: 0 | Status: COMPLETED | OUTPATIENT
Start: 2022-10-29 | End: 2022-10-29

## 2022-10-29 RX ADMIN — PANTOPRAZOLE SODIUM 40 MILLIGRAM(S): 20 TABLET, DELAYED RELEASE ORAL at 05:09

## 2022-10-29 RX ADMIN — Medication 25 GRAM(S): at 13:50

## 2022-10-29 RX ADMIN — CHLORHEXIDINE GLUCONATE 1 APPLICATION(S): 213 SOLUTION TOPICAL at 14:17

## 2022-10-29 RX ADMIN — LEVETIRACETAM 750 MILLIGRAM(S): 250 TABLET, FILM COATED ORAL at 17:11

## 2022-10-29 RX ADMIN — Medication 25 MICROGRAM(S): at 05:09

## 2022-10-29 RX ADMIN — Medication 4 MILLIGRAM(S): at 23:06

## 2022-10-29 RX ADMIN — Medication 4: at 12:06

## 2022-10-29 RX ADMIN — Medication 2: at 17:10

## 2022-10-29 RX ADMIN — SENNA PLUS 2 TABLET(S): 8.6 TABLET ORAL at 23:06

## 2022-10-29 RX ADMIN — Medication 81 MILLIGRAM(S): at 12:06

## 2022-10-29 RX ADMIN — Medication 10 MILLIGRAM(S): at 05:09

## 2022-10-29 RX ADMIN — LEVETIRACETAM 750 MILLIGRAM(S): 250 TABLET, FILM COATED ORAL at 05:08

## 2022-10-29 RX ADMIN — APIXABAN 2.5 MILLIGRAM(S): 2.5 TABLET, FILM COATED ORAL at 17:11

## 2022-10-29 RX ADMIN — AMIODARONE HYDROCHLORIDE 200 MILLIGRAM(S): 400 TABLET ORAL at 17:11

## 2022-10-29 RX ADMIN — AMIODARONE HYDROCHLORIDE 200 MILLIGRAM(S): 400 TABLET ORAL at 05:08

## 2022-10-29 RX ADMIN — Medication 500 MILLIGRAM(S): at 05:10

## 2022-10-29 RX ADMIN — SPIRONOLACTONE 50 MILLIGRAM(S): 25 TABLET, FILM COATED ORAL at 05:09

## 2022-10-29 RX ADMIN — ATORVASTATIN CALCIUM 40 MILLIGRAM(S): 80 TABLET, FILM COATED ORAL at 23:06

## 2022-10-29 NOTE — PROGRESS NOTE ADULT - SUBJECTIVE AND OBJECTIVE BOX
Neurology Progress Note    S: Patient seen and examined. No new events overnight. patient denied CP, SOB, HA or pain.      Medication:  MEDICATIONS  (STANDING):  aMIOdarone    Tablet 200 milliGRAM(s) Oral two times a day  apixaban 5 milliGRAM(s) Oral every 12 hours  aspirin enteric coated 81 milliGRAM(s) Oral daily  atorvastatin 40 milliGRAM(s) Oral at bedtime  bisacodyl Suppository 10 milliGRAM(s) Rectal once  chlorhexidine 2% Cloths 1 Application(s) Topical daily  dextrose 5%. 1000 milliLiter(s) (50 mL/Hr) IV Continuous <Continuous>  dextrose 5%. 1000 milliLiter(s) (100 mL/Hr) IV Continuous <Continuous>  doxazosin 4 milliGRAM(s) Oral at bedtime  glucagon  Injectable 1 milliGRAM(s) IntraMuscular once  insulin lispro (ADMELOG) corrective regimen sliding scale   SubCutaneous three times a day before meals  insulin lispro (ADMELOG) corrective regimen sliding scale   SubCutaneous at bedtime  levETIRAcetam 750 milliGRAM(s) Oral two times a day  levothyroxine 25 MICROGram(s) Oral daily  pantoprazole    Tablet 40 milliGRAM(s) Oral before breakfast  polyethylene glycol 3350 17 Gram(s) Oral daily  senna 2 Tablet(s) Oral at bedtime  spironolactone 50 milliGRAM(s) Oral daily  torsemide 10 milliGRAM(s) Oral daily    MEDICATIONS  (PRN):  acetaminophen     Tablet .. 650 milliGRAM(s) Oral every 6 hours PRN Mild Pain (1 - 3)  dextrose Oral Gel 15 Gram(s) Oral once PRN Blood Glucose LESS THAN 70 milliGRAM(s)/deciliter  oxyCODONE    IR 5 milliGRAM(s) Oral every 4 hours PRN Moderate Pain (4 - 6)  oxyCODONE    IR 10 milliGRAM(s) Oral every 4 hours PRN Severe Pain (7 - 10)        Vitals:      Vital Signs Last 24 Hrs  T(C): 37.1 (10-29-22 @ 04:35), Max: 37.1 (10-28-22 @ 14:22)  T(F): 98.8 (10-29-22 @ 04:35), Max: 98.8 (10-28-22 @ 14:22)  HR: 74 (10-29-22 @ 04:37) (54 - 79)  BP: 121/56 (10-29-22 @ 04:35) (121/56 - 133/63)  BP(mean): --  RR: 18 (10-29-22 @ 04:35) (18 - 18)  SpO2: 96% (10-29-22 @ 04:37) (94% - 97%)           General Exam:   General Appearance: Appropriately dressed and in no acute distress       Head: Normocephalic, atraumatic and no dysmorphic features  Ear, Nose, and Throat: Moist mucous membranes  CVS: S1S2+  Resp: No SOB, no wheeze or rhonchi  Abd: soft NTND  Extremities: No edema, no cyanosis  Skin: No bruises, no rashes     Neurological Exam:  Mental Status: Awake, alert and oriented x 3.  Able to follow simple and complex verbal commands. Able to name and repeat. fluent speech. No obvious aphasia or dysarthria noted.   Cranial Nerves: PERRL, EOMI, VFFC, sensation V1-V3 intact,  no obvious facial asymmetry , equal elevation of palate, scm/trap 5/5, tongue is midline on protrusion. no obvious papilledema on fundoscopic exam. Hearing is grossly intact.   Motor: Normal bulk, tone and strength throughout. Fine finger movements were intact and symmetric. no tremors or drift noted.    Sensation: Intact to light touch and pinprick throughout. no right/left confusion. no extinction to tactile on DSS.    Reflexes: 1+ throughout at biceps, brachioradialis, triceps, patellars and ankles bilaterally and equal. No clonus. R toe and L toe were both downgoing.  Coordination: No dysmetria on FNF or HKS  Gait: deferred     I personally reviewed the below data/images/labs:  CBC Full  -  ( 29 Oct 2022 06:10 )  WBC Count : 5.35 K/uL  RBC Count : 3.08 M/uL  Hemoglobin : 8.5 g/dL  Hematocrit : 26.4 %  Platelet Count - Automated : 141 K/uL  Mean Cell Volume : 85.7 fl  Mean Cell Hemoglobin : 27.6 pg  Mean Cell Hemoglobin Concentration : 32.2 gm/dL  Auto Neutrophil # : 3.39 K/uL  Auto Lymphocyte # : 0.84 K/uL  Auto Monocyte # : 0.91 K/uL  Auto Eosinophil # : 0.17 K/uL  Auto Basophil # : 0.02 K/uL  Auto Neutrophil % : 63.3 %  Auto Lymphocyte % : 15.7 %  Auto Monocyte % : 17.0 %  Auto Eosinophil % : 3.2 %  Auto Basophil % : 0.4 %      10-29    139  |  104  |  16  ----------------------------<  136<H>  3.8   |  24  |  1.05    Ca    8.2<L>      29 Oct 2022 06:10  Phos  3.0     10-29  Mg     1.7     10-29    TPro  5.5<L>  /  Alb  3.1<L>  /  TBili  0.7  /  DBili  x   /  AST  16  /  ALT  22  /  AlkPhos  48  10-29          < from: CT Brain Stroke Protocol (10.24.22 @ 22:50) >  No acute intracranial hemorrhage, mass effect, or CTevidence of an acute   transcortical infarct.    Vague lucency at the genu of the right internal capsule and in the right   thalamus which may represent lacunar infarcts of indeterminate age. No   prior studies are available for comparison.    EEG Classification / Summary:  Abnormal  EEG in the awake / drowsy / asleep state(s).  Occasional repetitive sharp waves max O1/O2  Continuous polymorphic slowing, focal, bilateral posterior head region  Background slowing, generalized, mild     Clinical Impression:  Evidence for focal dysfunction in and increased risk for seizures from the bilateral posterior head region  Mild diffuse/multifocal cerebral dysfunction, not specific as to etiology

## 2022-10-29 NOTE — PROGRESS NOTE ADULT - ASSESSMENT
77  year old  RHD male PMH of HTN, HLD, DM2, former smoker,  hypothyroidism, obesity, LILLY on C-pap,  bladder cancer (2020, s/p resection of bladder tumor/ currently self catheterizes 6x/day ), PAF s/p DCCV /uncomplicated radiofrequency ablation of atrial fibrillation (WACA/PVI, CTI) on 7/13/22,  left atrial appendage & severe MR . Presents with c/o recent" abnormal echo with fatigue due to mitral valve regurgitation. Presents for  scheduled Mitral valve replacement , MAZE, left atrial appendage closure on 10/20/2022.  10/24/22   MVr - triangular resection with 28mm Bobby band  AtriClip 45mm   Dawn-Maze IV procedure  PFO CLOSURE  Code stroke called for LUE convulsion with residual LUE weakness immediately after, Patient had LUE convulsions that spread to entire body  CT done, neuro following:  Stroke on differential as well given vague lucency at the genu of the right internal capsule and in the right thalamus of indeterminate age. Likely lacunar infarcts.   MRI when capable , eeg and Keppra added  Extubated d 1  + pressors, prbc's  EEG prelim neg.  Pt with lethargy post keppra, d/c since eeg neg  10/ 26 EEG reviewed by neuro, although no seizures  he is at risk for further sezures, recommended resuming keppra and if lethargic ,  change to vimpat  Pt with junctional / afib  Transferred to sdu  10/28 Maintain PW for junctional rhythm, sorbitol for constipation. Transferred to [address] [place] [address]  10/27 Junctional 50-60, remains on amio 200bid, no beta blocker   Cont keppra for now.  Neuro f/u  D/c jared drain.  10/29 Add Eliquis 2.5 bid>PW removed this am Continue diuretics  Amio 1800 mg to date>continue to 5 gram load as per DR Barraza  Magnesium repleted

## 2022-10-29 NOTE — PROGRESS NOTE ADULT - ASSESSMENT
77  year old  RHD male with HTN, HLD, DM2, former smoker,  hypothyroidism, obesity, LILLY on C-pap,  bladder cancer (2020, s/p resection of bladder tumor/ currently self catheterizes 6x/day ), PAF s/p DCCV /uncomplicated radiofrequency ablation of atrial fibrillation (WACA/PVI, CTI) on 7/13/22,  left atrial appendage & severe MR s/p open heart surgery for mitral valve repair and PFO closure 10/24/22.   Code stroke called for LUE convulsion with residual LUE weakness immediately after. LKW: 9:50 PM 10/24/22.   NIHSS: 12. MRS:2. on day of code stroke    Per primary team, LUE weakness improved. Patient had LUE convulsions that spread to entire body and team witnessed GTC's that lasted for 1.5 minutes. No witnessed gaze deviation per primary team.     On initial exam, patient able to follow commands and able to lift all extremities against gravity. Patient was able to life LUE but less than the RUE. Still, all extremities had drift that hit bed.   Not a tpa candidate given open heart surgery within 14 days. Not a thrombectomy candidate given no LVO.     o/e 10/25 AAOx3, OLIVIER but mild LUE weakness still noted.   10/26 exam baseline.  EEG with occasional sharps and risk for seizures from bilateral posterior head region     CTH: R IC vague lucency noted. may be age indeterminate  CTA H/N limited 2/2 poor opacification of vessels but no LVO.  CTP neg   A1c 6.2  LDL 27     Impression: LUE convulsions with generalized spreading likely seizure. Stroke on differential as well given vague lucency at the genu of the right internal capsule and in the right thalamus of indeterminate age. Likely lacunar infarcts.     Recommendations:    - s/p load with Keppra 2g, now keppra maintenance 750 mg bid ;  was stopped but would continue with AED.  if keppra 750mg BID was making him sleeping I would start vimpat 100mg BID given EEG findings ; now c/w keppra 750mg BID. okay to change keppra to PO, same dose 1:1 conversion 750mg BID PO ; c/w AED until outpatient f/u and repeat EEG   - MR brain wo when stable if able.  otherwise repeat CTH   - c/w ASA 81mg daily.  should be on statin therapy for secondary stroke prevention. lipitor 20 if no objection.  will hold off high dose as no athero on CTA H/N and LDL at goal  - telemetry  - PT/OT/SS/SLP, OOBC  - BP per CT team .   - check FS, glucose control <180  - GI/DVT ppx  - Thank you for allowing me to participate in the care of this patient. Call with questions.   - spoke [facility] ACP   - spoke with sister at bedside   called wife at [phone] and discussed keppra and interactions.  she is okay with kedaronra.   Bucky Sharpe MD  Vascular Neurology  Office: [phone] .

## 2022-10-29 NOTE — PROGRESS NOTE ADULT - SUBJECTIVE AND OBJECTIVE BOX
Subjective:  "Hello"  OOB chair  Family at bedside      Tele:  Junctional  50 - 60           V/S:                    T(F): 98.2 (10-29-22 @ 12:13), Max: 98.8 (10-28-22 @ 14:22)  HR: 59 (10-29-22 @ 12:13) (54 - 79)  BP: 122/62 (10-29-22 @ 12:13) (121/56 - 133/63)  RR: 18 (10-29-22 @ 12:13) (18 - 18)  SpO2: 97% (10-29-22 @ 12:13) (94% - 97%)  Wt(kg): --      LV EF:      Labs:  10-29    139  |  104  |  16  ----------------------------<  136<H>  3.8   |  24  |  1.05    Ca    8.2<L>      29 Oct 2022 06:10  Phos  3.0     10-29  Mg     1.7     10-29    TPro  5.5<L>  /  Alb  3.1<L>  /  TBili  0.7  /  DBili  x   /  AST  16  /  ALT  22  /  AlkPhos  48  10-29                               8.5    5.35  )-----------( 141      ( 29 Oct 2022 06:10 )             26.4             CAPILLARY BLOOD GLUCOSE      POCT Blood Glucose.: 231 mg/dL (29 Oct 2022 11:45)  POCT Blood Glucose.: 147 mg/dL (29 Oct 2022 07:36)  POCT Blood Glucose.: 165 mg/dL (28 Oct 2022 21:23)  POCT Blood Glucose.: 161 mg/dL (28 Oct 2022 16:55)           CXR:    I&O's Detail    28 Oct 2022 07:01  -  29 Oct 2022 07:00  --------------------------------------------------------  IN:    Oral Fluid: 1560 mL  Total IN: 1560 mL    OUT:    Indwelling Catheter - Urethral (mL): 2300 mL  Total OUT: 2300 mL    Total NET: -740 mL      EPICARDIAL WIRES:  [x ] YES [ ] NO  > removed    BOWEL MOVEMENT:  [x ] YES [ ] NO      Daily     Daily Weight in k.6 (29 Oct 2022 08:02)  Medications:  acetaminophen     Tablet .. 650 milliGRAM(s) Oral every 6 hours PRN  aMIOdarone    Tablet 200 milliGRAM(s) Oral two times a day  apixaban 2.5 milliGRAM(s) Oral every 12 hours  aspirin enteric coated 81 milliGRAM(s) Oral daily  atorvastatin 40 milliGRAM(s) Oral at bedtime  bisacodyl Suppository 10 milliGRAM(s) Rectal once  chlorhexidine 2% Cloths 1 Application(s) Topical daily  dextrose 5%. 1000 milliLiter(s) IV Continuous <Continuous>  dextrose 5%. 1000 milliLiter(s) IV Continuous <Continuous>  dextrose Oral Gel 15 Gram(s) Oral once PRN  doxazosin 4 milliGRAM(s) Oral at bedtime  glucagon  Injectable 1 milliGRAM(s) IntraMuscular once  insulin lispro (ADMELOG) corrective regimen sliding scale   SubCutaneous three times a day before meals  insulin lispro (ADMELOG) corrective regimen sliding scale   SubCutaneous at bedtime  levETIRAcetam 750 milliGRAM(s) Oral two times a day  levothyroxine 25 MICROGram(s) Oral daily  oxyCODONE    IR 5 milliGRAM(s) Oral every 4 hours PRN  oxyCODONE    IR 10 milliGRAM(s) Oral every 4 hours PRN  pantoprazole    Tablet 40 milliGRAM(s) Oral before breakfast  polyethylene glycol 3350 17 Gram(s) Oral daily  senna 2 Tablet(s) Oral at bedtime  spironolactone 50 milliGRAM(s) Oral daily  torsemide 10 milliGRAM(s) Oral daily        Physical Exam:    Neurology: alert and oriented x 3,    CV : S1 S2  RRR    PW removed     Sternal Wound :  CDI , Stable    Lungs: CTA b/l    Abdomen: soft, NT,ND, ( +)BM     :  gaitan  marilin    Extremities: 1+ edema bilateral lower extrem        PAST MEDICAL & SURGICAL HISTORY:  Paroxysmal atrial fibrillation  s/p ablation  on ELIQUIS till 10/13      Hypertension      Hyperlipidemia      Bladder cancer      Diabetes mellitus  2      Hypothyroidism      At risk for sleep apnea  LILLY on C-pap      COVID-19 virus infection  positive with mild infection 10/10/22 , had MAB on 10/10/22 at [facility], surgeon aware      Severe mitral regurgitation  seen in recent ESDRAS      PAF (paroxysmal atrial fibrillation)  s/p DCCV 7 rfa DR Hoover &gt; nsr      Bladder cancer  rsx/ treatment , self cath 3x/day      Dysuria  on self cath 6x/day, UC sent      H/O shoulder surgery  2017      H/O cardiac catheterization  2022      Bladder tumor  s/p resection

## 2022-10-29 NOTE — PROGRESS NOTE ADULT - PROBLEM SELECTOR PLAN 1
Asa, Statin,   Chest PT,  Incentive spirometry, wound care and assessment.  Ambulate   Synthroid hypothyroidism  Modified amio for afib, currently intermittent junctional/afib, 60's  Start Eliquis

## 2022-10-30 DIAGNOSIS — R39.9 UNSPECIFIED SYMPTOMS AND SIGNS INVOLVING THE GENITOURINARY SYSTEM: ICD-10-CM

## 2022-10-30 LAB
ALBUMIN SERPL ELPH-MCNC: 3.1 G/DL — LOW (ref 3.3–5)
ALP SERPL-CCNC: 50 U/L — SIGNIFICANT CHANGE UP (ref 40–120)
ALT FLD-CCNC: 25 U/L — SIGNIFICANT CHANGE UP (ref 10–45)
ANION GAP SERPL CALC-SCNC: 10 MMOL/L — SIGNIFICANT CHANGE UP (ref 5–17)
APPEARANCE UR: SIGNIFICANT CHANGE UP
AST SERPL-CCNC: 16 U/L — SIGNIFICANT CHANGE UP (ref 10–40)
BACTERIA # UR AUTO: NEGATIVE — SIGNIFICANT CHANGE UP
BILIRUB SERPL-MCNC: 0.7 MG/DL — SIGNIFICANT CHANGE UP (ref 0.2–1.2)
BILIRUB UR-MCNC: NEGATIVE — SIGNIFICANT CHANGE UP
BUN SERPL-MCNC: 14 MG/DL — SIGNIFICANT CHANGE UP (ref 7–23)
CALCIUM SERPL-MCNC: 8.5 MG/DL — SIGNIFICANT CHANGE UP (ref 8.4–10.5)
CHLORIDE SERPL-SCNC: 105 MMOL/L — SIGNIFICANT CHANGE UP (ref 96–108)
CO2 SERPL-SCNC: 22 MMOL/L — SIGNIFICANT CHANGE UP (ref 22–31)
COLOR SPEC: COLORLESS — SIGNIFICANT CHANGE UP
CREAT SERPL-MCNC: 1.01 MG/DL — SIGNIFICANT CHANGE UP (ref 0.5–1.3)
DIFF PNL FLD: ABNORMAL
EGFR: 77 ML/MIN/1.73M2 — SIGNIFICANT CHANGE UP
EPI CELLS # UR: 0 /HPF — SIGNIFICANT CHANGE UP
GLUCOSE SERPL-MCNC: 152 MG/DL — HIGH (ref 70–99)
GLUCOSE UR QL: NEGATIVE — SIGNIFICANT CHANGE UP
HCT VFR BLD CALC: 27 % — LOW (ref 39–50)
HGB BLD-MCNC: 8.5 G/DL — LOW (ref 13–17)
HYALINE CASTS # UR AUTO: 1 /LPF — SIGNIFICANT CHANGE UP (ref 0–2)
KETONES UR-MCNC: NEGATIVE — SIGNIFICANT CHANGE UP
LEUKOCYTE ESTERASE UR-ACNC: ABNORMAL
MAGNESIUM SERPL-MCNC: 1.8 MG/DL — SIGNIFICANT CHANGE UP (ref 1.6–2.6)
MCHC RBC-ENTMCNC: 27.3 PG — SIGNIFICANT CHANGE UP (ref 27–34)
MCHC RBC-ENTMCNC: 31.5 GM/DL — LOW (ref 32–36)
MCV RBC AUTO: 86.8 FL — SIGNIFICANT CHANGE UP (ref 80–100)
NITRITE UR-MCNC: NEGATIVE — SIGNIFICANT CHANGE UP
NRBC # BLD: 0 /100 WBCS — SIGNIFICANT CHANGE UP (ref 0–0)
PH UR: 6.5 — SIGNIFICANT CHANGE UP (ref 5–8)
PHOSPHATE SERPL-MCNC: 3.2 MG/DL — SIGNIFICANT CHANGE UP (ref 2.5–4.5)
PLATELET # BLD AUTO: 148 K/UL — LOW (ref 150–400)
POTASSIUM SERPL-MCNC: 4 MMOL/L — SIGNIFICANT CHANGE UP (ref 3.5–5.3)
POTASSIUM SERPL-SCNC: 4 MMOL/L — SIGNIFICANT CHANGE UP (ref 3.5–5.3)
PROT SERPL-MCNC: 5.6 G/DL — LOW (ref 6–8.3)
PROT UR-MCNC: SIGNIFICANT CHANGE UP
RBC # BLD: 3.11 M/UL — LOW (ref 4.2–5.8)
RBC # FLD: 15.6 % — HIGH (ref 10.3–14.5)
RBC CASTS # UR COMP ASSIST: 37 /HPF — HIGH (ref 0–4)
SODIUM SERPL-SCNC: 137 MMOL/L — SIGNIFICANT CHANGE UP (ref 135–145)
SP GR SPEC: 1.01 — LOW (ref 1.01–1.02)
UROBILINOGEN FLD QL: NEGATIVE — SIGNIFICANT CHANGE UP
WBC # BLD: 5.24 K/UL — SIGNIFICANT CHANGE UP (ref 3.8–10.5)
WBC # FLD AUTO: 5.24 K/UL — SIGNIFICANT CHANGE UP (ref 3.8–10.5)
WBC UR QL: 6 /HPF — HIGH (ref 0–5)

## 2022-10-30 PROCEDURE — 93010 ELECTROCARDIOGRAM REPORT: CPT

## 2022-10-30 PROCEDURE — 71045 X-RAY EXAM CHEST 1 VIEW: CPT | Mod: 26

## 2022-10-30 RX ORDER — SPIRONOLACTONE 25 MG/1
50 TABLET, FILM COATED ORAL
Refills: 0 | Status: DISCONTINUED | OUTPATIENT
Start: 2022-10-30 | End: 2022-11-03

## 2022-10-30 RX ADMIN — Medication 1 TABLET(S): at 13:03

## 2022-10-30 RX ADMIN — LEVETIRACETAM 750 MILLIGRAM(S): 250 TABLET, FILM COATED ORAL at 17:03

## 2022-10-30 RX ADMIN — Medication 81 MILLIGRAM(S): at 12:07

## 2022-10-30 RX ADMIN — CHLORHEXIDINE GLUCONATE 1 APPLICATION(S): 213 SOLUTION TOPICAL at 12:05

## 2022-10-30 RX ADMIN — Medication 25 MICROGRAM(S): at 06:15

## 2022-10-30 RX ADMIN — PANTOPRAZOLE SODIUM 40 MILLIGRAM(S): 20 TABLET, DELAYED RELEASE ORAL at 06:15

## 2022-10-30 RX ADMIN — SPIRONOLACTONE 50 MILLIGRAM(S): 25 TABLET, FILM COATED ORAL at 06:15

## 2022-10-30 RX ADMIN — APIXABAN 2.5 MILLIGRAM(S): 2.5 TABLET, FILM COATED ORAL at 17:03

## 2022-10-30 RX ADMIN — APIXABAN 2.5 MILLIGRAM(S): 2.5 TABLET, FILM COATED ORAL at 06:37

## 2022-10-30 RX ADMIN — Medication 1 TABLET(S): at 21:42

## 2022-10-30 RX ADMIN — AMIODARONE HYDROCHLORIDE 200 MILLIGRAM(S): 400 TABLET ORAL at 06:15

## 2022-10-30 RX ADMIN — Medication 2: at 16:44

## 2022-10-30 RX ADMIN — Medication 10 MILLIGRAM(S): at 06:15

## 2022-10-30 RX ADMIN — Medication 2: at 12:06

## 2022-10-30 RX ADMIN — SENNA PLUS 2 TABLET(S): 8.6 TABLET ORAL at 21:39

## 2022-10-30 RX ADMIN — LEVETIRACETAM 750 MILLIGRAM(S): 250 TABLET, FILM COATED ORAL at 06:15

## 2022-10-30 RX ADMIN — SPIRONOLACTONE 50 MILLIGRAM(S): 25 TABLET, FILM COATED ORAL at 17:04

## 2022-10-30 RX ADMIN — AMIODARONE HYDROCHLORIDE 200 MILLIGRAM(S): 400 TABLET ORAL at 17:04

## 2022-10-30 RX ADMIN — Medication 4 MILLIGRAM(S): at 21:40

## 2022-10-30 RX ADMIN — Medication 2: at 08:08

## 2022-10-30 RX ADMIN — ATORVASTATIN CALCIUM 40 MILLIGRAM(S): 80 TABLET, FILM COATED ORAL at 21:39

## 2022-10-30 NOTE — PROGRESS NOTE ADULT - SUBJECTIVE AND OBJECTIVE BOX
VITAL SIGNS    Telemetry:    Vital Signs Last 24 Hrs  T(C): 36.2 (10-30-22 @ 04:37), Max: 36.8 (10-29-22 @ 12:13)  T(F): 97.2 (10-30-22 @ 04:37), Max: 98.2 (10-29-22 @ 12:13)  HR: 80 (10-30-22 @ 09:46) (57 - 82)  BP: 137/70 (10-30-22 @ 04:37) (122/62 - 137/70)  RR: 18 (10-30-22 @ 04:37) (18 - 18)  SpO2: 100% (10-30-22 @ 09:46) (96% - 100%)            10-29 @ 07:01  -  10-30 @ 07:00  --------------------------------------------------------  IN: 610 mL / OUT: 1900 mL / NET: -1290 mL       Daily     Daily Weight in k.3 (30 Oct 2022 08:41)  Admit Wt: Drug Dosing Weight  Height (cm): 182.9 (24 Oct 2022 06:00)  Weight (kg): 126.9 (24 Oct 2022 06:00)  BMI (kg/m2): 37.9 (24 Oct 2022 06:00)  BSA (m2): 2.46 (24 Oct 2022 06:00)    Bilirubin Total, Serum: 0.7 mg/dL (10-30 @ 06:31)    CAPILLARY BLOOD GLUCOSE      POCT Blood Glucose.: 156 mg/dL (30 Oct 2022 07:46)  POCT Blood Glucose.: 166 mg/dL (29 Oct 2022 21:33)  POCT Blood Glucose.: 174 mg/dL (29 Oct 2022 16:36)  POCT Blood Glucose.: 231 mg/dL (29 Oct 2022 11:45)          acetaminophen     Tablet .. 650 milliGRAM(s) Oral every 6 hours PRN  aMIOdarone    Tablet 200 milliGRAM(s) Oral two times a day  apixaban 2.5 milliGRAM(s) Oral every 12 hours  aspirin enteric coated 81 milliGRAM(s) Oral daily  atorvastatin 40 milliGRAM(s) Oral at bedtime  bisacodyl Suppository 10 milliGRAM(s) Rectal once  chlorhexidine 2% Cloths 1 Application(s) Topical daily  dextrose 5%. 1000 milliLiter(s) IV Continuous <Continuous>  dextrose 5%. 1000 milliLiter(s) IV Continuous <Continuous>  dextrose Oral Gel 15 Gram(s) Oral once PRN  doxazosin 4 milliGRAM(s) Oral at bedtime  glucagon  Injectable 1 milliGRAM(s) IntraMuscular once  guaiFENesin Oral Liquid (Sugar-Free) 100 milliGRAM(s) Oral every 6 hours PRN  insulin lispro (ADMELOG) corrective regimen sliding scale   SubCutaneous three times a day before meals  insulin lispro (ADMELOG) corrective regimen sliding scale   SubCutaneous at bedtime  levETIRAcetam 750 milliGRAM(s) Oral two times a day  levothyroxine 25 MICROGram(s) Oral daily  oxyCODONE    IR 5 milliGRAM(s) Oral every 4 hours PRN  oxyCODONE    IR 10 milliGRAM(s) Oral every 4 hours PRN  pantoprazole    Tablet 40 milliGRAM(s) Oral before breakfast  polyethylene glycol 3350 17 Gram(s) Oral daily  senna 2 Tablet(s) Oral at bedtime  spironolactone 50 milliGRAM(s) Oral two times a day  torsemide 10 milliGRAM(s) Oral daily      PHYSICAL EXAM    Subjective: "Hi.   Neurology: alert and oriented x 3, nonfocal, no gross deficits  CV : tele:  RSR  Sternal Wound :  CDI with dressing , Stable  Lungs: clear. RR easy, unlabored   Abdomen: soft, nontender, nondistended, positive bowel sounds, bowel movement   Neg N/V/D   :  pt voiding without difficulty   Extremities:   OLIVIER; edema, neg calf tenderness.   PPP bilaterally      PW:  Chest tubes:                 VITAL SIGNS    Telemetry:  rsr/ junct 50-60  Vital Signs Last 24 Hrs  T(C): 36.2 (10-30-22 @ 04:37), Max: 36.8 (10-29-22 @ 12:13)  T(F): 97.2 (10-30-22 @ 04:37), Max: 98.2 (10-29-22 @ 12:13)  HR: 80 (10-30-22 @ 09:46) (57 - 82)  BP: 137/70 (10-30-22 @ 04:37) (122/62 - 137/70)  RR: 18 (10-30-22 @ 04:37) (18 - 18)  SpO2: 100% (10-30-22 @ 09:46) (96% - 100%)            10-29 @ 07:01  -  10-30 @ 07:00  --------------------------------------------------------  IN: 610 mL / OUT: 1900 mL / NET: -1290 mL       Daily     Daily Weight in k.3 (30 Oct 2022 08:41)  Admit Wt: Drug Dosing Weight  Height (cm): 182.9 (24 Oct 2022 06:00)  Weight (kg): 126.9 (24 Oct 2022 06:00)  BMI (kg/m2): 37.9 (24 Oct 2022 06:00)  BSA (m2): 2.46 (24 Oct 2022 06:00)    Bilirubin Total, Serum: 0.7 mg/dL (10-30 @ 06:31)    CAPILLARY BLOOD GLUCOSE      POCT Blood Glucose.: 156 mg/dL (30 Oct 2022 07:46)  POCT Blood Glucose.: 166 mg/dL (29 Oct 2022 21:33)  POCT Blood Glucose.: 174 mg/dL (29 Oct 2022 16:36)  POCT Blood Glucose.: 231 mg/dL (29 Oct 2022 11:45)          acetaminophen     Tablet .. 650 milliGRAM(s) Oral every 6 hours PRN  aMIOdarone    Tablet 200 milliGRAM(s) Oral two times a day  apixaban 2.5 milliGRAM(s) Oral every 12 hours  aspirin enteric coated 81 milliGRAM(s) Oral daily  atorvastatin 40 milliGRAM(s) Oral at bedtime  bisacodyl Suppository 10 milliGRAM(s) Rectal once  chlorhexidine 2% Cloths 1 Application(s) Topical daily  dextrose 5%. 1000 milliLiter(s) IV Continuous <Continuous>  dextrose 5%. 1000 milliLiter(s) IV Continuous <Continuous>  dextrose Oral Gel 15 Gram(s) Oral once PRN  doxazosin 4 milliGRAM(s) Oral at bedtime  glucagon  Injectable 1 milliGRAM(s) IntraMuscular once  guaiFENesin Oral Liquid (Sugar-Free) 100 milliGRAM(s) Oral every 6 hours PRN  insulin lispro (ADMELOG) corrective regimen sliding scale   SubCutaneous three times a day before meals  insulin lispro (ADMELOG) corrective regimen sliding scale   SubCutaneous at bedtime  levETIRAcetam 750 milliGRAM(s) Oral two times a day  levothyroxine 25 MICROGram(s) Oral daily  oxyCODONE    IR 5 milliGRAM(s) Oral every 4 hours PRN  oxyCODONE    IR 10 milliGRAM(s) Oral every 4 hours PRN  pantoprazole    Tablet 40 milliGRAM(s) Oral before breakfast  polyethylene glycol 3350 17 Gram(s) Oral daily  senna 2 Tablet(s) Oral at bedtime  spironolactone 50 milliGRAM(s) Oral two times a day  torsemide 10 milliGRAM(s) Oral daily      PHYSICAL EXAM    Subjective: "I feel ok."  Neurology: alert and oriented x 3, nonfocal, no gross deficits  CV : tele:  rsr/ junct 50-60  Sternal Wound :  CDI THADDEUS, sternum Stable  Lungs: clear diminished at the bases; RR easy, unlabored   Abdomen: soft, nontender, nondistended, positive bowel sounds, + bowel movement   Neg N/V/D   :  + gaitan--> cloudy yellow urine- pt self caths at home   Extremities:   OLIVIER; trace LE edema, neg calf tenderness.   PPP bilaterally        PW: no  Chest tubes: none

## 2022-10-30 NOTE — PROGRESS NOTE ADULT - PROBLEM SELECTOR PLAN 1
Asa, Statin,   Chest PT,  Incentive spirometry, wound care and assessment.  Ambulate   Synthroid hypothyroidism  Modified amio for afib, currently intermittent junctional/afib, 60's  Start Eliquis continue postop care  continue amio 200 bid   no bb secondary to sb/ junct 50-60  diuresis  d/c gaitan- allow pt to self cath  continue synthroid  pulm toilet  pain management  increase activity as tolerated  bowel regimen  Discharge planning- home pt mon/ tue

## 2022-10-30 NOTE — PROGRESS NOTE ADULT - ASSESSMENT
77  year old  RHD male PMH of HTN, HLD, DM2, former smoker,  hypothyroidism, obesity, LILLY on C-pap,  bladder cancer (2020, s/p resection of bladder tumor/ currently self catheterizes 6x/day ), PAF s/p DCCV /uncomplicated radiofrequency ablation of atrial fibrillation (WACA/PVI, CTI) on 7/13/22,  left atrial appendage & severe MR . Presents with c/o recent" abnormal echo with fatigue due to mitral valve regurgitation. Presents for  scheduled Mitral valve replacement , MAZE, left atrial appendage closure on 10/20/2022.  10/24/22   MVr - triangular resection with 28mm Bobby band  AtriClip 45mm   Dawn-Maze IV procedure  PFO CLOSURE  Code stroke called for LUE convulsion with residual LUE weakness immediately after, Patient had LUE convulsions that spread to entire body  CT done, neuro following:  Stroke on differential as well given vague lucency at the genu of the right internal capsule and in the right thalamus of indeterminate age. Likely lacunar infarcts.   MRI when capable , eeg and Keppra added  Extubated d 1  + pressors, prbc's  EEG prelim neg.  Pt with lethargy post keppra, d/c since eeg neg  10/ 26 EEG reviewed by neuro, although no seizures  he is at risk for further sezures, recommended resuming keppra and if lethargic ,  change to vimpat  Pt with junctional / afib  Transferred to sdu  10/28 Maintain PW for junctional rhythm, sorbitol for constipation. Transferred to [address] [place] [address]  10/27 Junctional 50-60, remains on amio 200bid, no beta blocker   Cont keppra for now.  Neuro f/u  D/c jared drain.  10/29 Add Eliquis 2.5 bid>PW removed this am Continue diuretics  Amio 1800 mg to date>continue to 5 gram load as per DR Barraza  Magnesium repleted       77  year old  RHD male PMH of HTN, HLD, DM2, former smoker,  hypothyroidism, obesity, LILLY on C-pap,  bladder cancer (2020, s/p resection of bladder tumor/ currently self catheterizes 6x/day ), PAF s/p DCCV /uncomplicated radiofrequency ablation of atrial fibrillation (WACA/PVI, CTI) on 7/13/22,  left atrial appendage & severe MR . Presents with c/o recent" abnormal echo with fatigue due to mitral valve regurgitation. Presents for  scheduled Mitral valve replacement , MAZE, left atrial appendage closure on 10/20/2022.  10/24/22   MVr - triangular resection with 28mm Bobby band  AtriClip 45mm   Dawn-Maze IV procedure  PFO CLOSURE  Code stroke called for LUE convulsion with residual LUE weakness immediately after, Patient had LUE convulsions that spread to entire body  CT done, neuro following:  Stroke on differential as well given vague lucency at the genu of the right internal capsule and in the right thalamus of indeterminate age. Likely lacunar infarcts.   MRI when capable , eeg and Keppra added  Extubated d 1  + pressors, prbc's  EEG prelim neg.  Pt with lethargy post keppra, d/c since eeg neg  10/ 26 EEG reviewed by neuro, although no seizures  he is at risk for further sezures, recommended resuming keppra and if lethargic ,  change to vimpat  Pt with junctional / afib  Transferred to sdu  10/28 Maintain PW for junctional rhythm, sorbitol for constipation. Transferred to [address] [place] [address]  10/27 Junctional 50-60, remains on amio 200bid, no beta blocker   Cont keppra for now.  Neuro f/u  D/c jared drain.  10/29 Add Eliquis 2.5 bid>PW removed this am Continue diuretics  Amio 1800 mg to date>continue to 5 gram load as per DR Barraza  Magnesium repleted  10/30 VSS; rsr/junct 50-60- no bb; continue amio 200 bid; pt c/o dysuria--> ck ua/ urine cx +l.esterase; neg nitrate; start bactrim as per Dr. Barraza and ck urine cx; monitor ekg- ck in am 10/31  continue diuresis; allow pt to start self cath himself  increase activity as tolerated  Discharge planning- home likely mon/ tue

## 2022-10-30 NOTE — PROGRESS NOTE ADULT - PROBLEM SELECTOR PLAN 2
Post op seizure, keppra for prophylaxis  Neuro f/u  Outpt MRI Post op seizure---> continue keppra 750bid  for prophylaxis  Neuro f/u as an outpatient   Outpt MRI as per neuro   continue to monitor

## 2022-10-30 NOTE — PROGRESS NOTE ADULT - PROBLEM SELECTOR PLAN 3
pt c/o dysuria--> ck ua/ urine cx +l.esterase; neg nitrate  start bactrim as per Dr. Barraza and ck urine cx;   monitor ekg- ck in am 10/31  allow pt to start self cath himself

## 2022-10-31 LAB
ANION GAP SERPL CALC-SCNC: 10 MMOL/L — SIGNIFICANT CHANGE UP (ref 5–17)
BUN SERPL-MCNC: 16 MG/DL — SIGNIFICANT CHANGE UP (ref 7–23)
CALCIUM SERPL-MCNC: 8.5 MG/DL — SIGNIFICANT CHANGE UP (ref 8.4–10.5)
CHLORIDE SERPL-SCNC: 103 MMOL/L — SIGNIFICANT CHANGE UP (ref 96–108)
CO2 SERPL-SCNC: 24 MMOL/L — SIGNIFICANT CHANGE UP (ref 22–31)
CREAT SERPL-MCNC: 1.2 MG/DL — SIGNIFICANT CHANGE UP (ref 0.5–1.3)
EGFR: 63 ML/MIN/1.73M2 — SIGNIFICANT CHANGE UP
GLUCOSE SERPL-MCNC: 148 MG/DL — HIGH (ref 70–99)
HCT VFR BLD CALC: 26.6 % — LOW (ref 39–50)
HGB BLD-MCNC: 8.3 G/DL — LOW (ref 13–17)
MCHC RBC-ENTMCNC: 26.9 PG — LOW (ref 27–34)
MCHC RBC-ENTMCNC: 31.2 GM/DL — LOW (ref 32–36)
MCV RBC AUTO: 86.4 FL — SIGNIFICANT CHANGE UP (ref 80–100)
NRBC # BLD: 0 /100 WBCS — SIGNIFICANT CHANGE UP (ref 0–0)
PLATELET # BLD AUTO: 153 K/UL — SIGNIFICANT CHANGE UP (ref 150–400)
POTASSIUM SERPL-MCNC: 4.4 MMOL/L — SIGNIFICANT CHANGE UP (ref 3.5–5.3)
POTASSIUM SERPL-SCNC: 4.4 MMOL/L — SIGNIFICANT CHANGE UP (ref 3.5–5.3)
RBC # BLD: 3.08 M/UL — LOW (ref 4.2–5.8)
RBC # FLD: 15.6 % — HIGH (ref 10.3–14.5)
SODIUM SERPL-SCNC: 137 MMOL/L — SIGNIFICANT CHANGE UP (ref 135–145)
WBC # BLD: 5.9 K/UL — SIGNIFICANT CHANGE UP (ref 3.8–10.5)
WBC # FLD AUTO: 5.9 K/UL — SIGNIFICANT CHANGE UP (ref 3.8–10.5)

## 2022-10-31 PROCEDURE — 93010 ELECTROCARDIOGRAM REPORT: CPT

## 2022-10-31 RX ORDER — INSULIN GLARGINE 100 [IU]/ML
5 INJECTION, SOLUTION SUBCUTANEOUS AT BEDTIME
Refills: 0 | Status: DISCONTINUED | OUTPATIENT
Start: 2022-10-31 | End: 2022-11-02

## 2022-10-31 RX ORDER — INSULIN LISPRO 100/ML
3 VIAL (ML) SUBCUTANEOUS
Refills: 0 | Status: DISCONTINUED | OUTPATIENT
Start: 2022-10-31 | End: 2022-11-02

## 2022-10-31 RX ADMIN — Medication 3 UNIT(S): at 08:22

## 2022-10-31 RX ADMIN — SPIRONOLACTONE 50 MILLIGRAM(S): 25 TABLET, FILM COATED ORAL at 17:08

## 2022-10-31 RX ADMIN — OXYCODONE HYDROCHLORIDE 5 MILLIGRAM(S): 5 TABLET ORAL at 12:19

## 2022-10-31 RX ADMIN — OXYCODONE HYDROCHLORIDE 5 MILLIGRAM(S): 5 TABLET ORAL at 11:49

## 2022-10-31 RX ADMIN — Medication 1 TABLET(S): at 05:53

## 2022-10-31 RX ADMIN — Medication 1 TABLET(S): at 17:08

## 2022-10-31 RX ADMIN — LEVETIRACETAM 750 MILLIGRAM(S): 250 TABLET, FILM COATED ORAL at 05:44

## 2022-10-31 RX ADMIN — CHLORHEXIDINE GLUCONATE 1 APPLICATION(S): 213 SOLUTION TOPICAL at 11:40

## 2022-10-31 RX ADMIN — POLYETHYLENE GLYCOL 3350 17 GRAM(S): 17 POWDER, FOR SOLUTION ORAL at 11:49

## 2022-10-31 RX ADMIN — Medication 3 UNIT(S): at 11:49

## 2022-10-31 RX ADMIN — LEVETIRACETAM 750 MILLIGRAM(S): 250 TABLET, FILM COATED ORAL at 17:08

## 2022-10-31 RX ADMIN — APIXABAN 2.5 MILLIGRAM(S): 2.5 TABLET, FILM COATED ORAL at 05:47

## 2022-10-31 RX ADMIN — SPIRONOLACTONE 50 MILLIGRAM(S): 25 TABLET, FILM COATED ORAL at 05:51

## 2022-10-31 RX ADMIN — INSULIN GLARGINE 5 UNIT(S): 100 INJECTION, SOLUTION SUBCUTANEOUS at 21:34

## 2022-10-31 RX ADMIN — APIXABAN 2.5 MILLIGRAM(S): 2.5 TABLET, FILM COATED ORAL at 17:09

## 2022-10-31 RX ADMIN — Medication 4 MILLIGRAM(S): at 21:33

## 2022-10-31 RX ADMIN — Medication 25 MICROGRAM(S): at 05:46

## 2022-10-31 RX ADMIN — Medication 3 UNIT(S): at 17:09

## 2022-10-31 RX ADMIN — PANTOPRAZOLE SODIUM 40 MILLIGRAM(S): 20 TABLET, DELAYED RELEASE ORAL at 05:46

## 2022-10-31 RX ADMIN — Medication 81 MILLIGRAM(S): at 11:48

## 2022-10-31 RX ADMIN — AMIODARONE HYDROCHLORIDE 200 MILLIGRAM(S): 400 TABLET ORAL at 17:09

## 2022-10-31 RX ADMIN — AMIODARONE HYDROCHLORIDE 200 MILLIGRAM(S): 400 TABLET ORAL at 05:45

## 2022-10-31 RX ADMIN — SENNA PLUS 2 TABLET(S): 8.6 TABLET ORAL at 21:34

## 2022-10-31 RX ADMIN — Medication 2: at 11:50

## 2022-10-31 RX ADMIN — ATORVASTATIN CALCIUM 40 MILLIGRAM(S): 80 TABLET, FILM COATED ORAL at 21:33

## 2022-10-31 RX ADMIN — Medication 10 MILLIGRAM(S): at 05:53

## 2022-10-31 NOTE — PROGRESS NOTE ADULT - SUBJECTIVE AND OBJECTIVE BOX
Neurology Progress Note    S: Patient seen and examined. No new events overnight. patient denied CP, SOB, HA or pain.      Medication:  MEDICATIONS  (STANDING):  aMIOdarone    Tablet 200 milliGRAM(s) Oral two times a day  apixaban 2.5 milliGRAM(s) Oral every 12 hours  aspirin enteric coated 81 milliGRAM(s) Oral daily  atorvastatin 40 milliGRAM(s) Oral at bedtime  bisacodyl Suppository 10 milliGRAM(s) Rectal once  chlorhexidine 2% Cloths 1 Application(s) Topical daily  dextrose 5%. 1000 milliLiter(s) (50 mL/Hr) IV Continuous <Continuous>  dextrose 5%. 1000 milliLiter(s) (100 mL/Hr) IV Continuous <Continuous>  doxazosin 4 milliGRAM(s) Oral at bedtime  glucagon  Injectable 1 milliGRAM(s) IntraMuscular once  insulin glargine Injectable (LANTUS) 5 Unit(s) SubCutaneous at bedtime  insulin lispro (ADMELOG) corrective regimen sliding scale   SubCutaneous three times a day before meals  insulin lispro (ADMELOG) corrective regimen sliding scale   SubCutaneous at bedtime  insulin lispro Injectable (ADMELOG) 3 Unit(s) SubCutaneous three times a day before meals  levETIRAcetam 750 milliGRAM(s) Oral two times a day  levothyroxine 25 MICROGram(s) Oral daily  pantoprazole    Tablet 40 milliGRAM(s) Oral before breakfast  polyethylene glycol 3350 17 Gram(s) Oral daily  senna 2 Tablet(s) Oral at bedtime  spironolactone 50 milliGRAM(s) Oral two times a day  torsemide 10 milliGRAM(s) Oral daily  trimethoprim  160 mG/sulfamethoxazole 800 mG 1 Tablet(s) Oral two times a day    MEDICATIONS  (PRN):  acetaminophen     Tablet .. 650 milliGRAM(s) Oral every 6 hours PRN Mild Pain (1 - 3)  dextrose Oral Gel 15 Gram(s) Oral once PRN Blood Glucose LESS THAN 70 milliGRAM(s)/deciliter  guaiFENesin Oral Liquid (Sugar-Free) 100 milliGRAM(s) Oral every 6 hours PRN Cough  oxyCODONE    IR 5 milliGRAM(s) Oral every 4 hours PRN Moderate Pain (4 - 6)  oxyCODONE    IR 10 milliGRAM(s) Oral every 4 hours PRN Severe Pain (7 - 10)      Vitals:    Vital Signs Last 24 Hrs  T(C): 36.8 (10-31-22 @ 04:14), Max: 37.6 (10-30-22 @ 20:05)  T(F): 98.2 (10-31-22 @ 04:14), Max: 99.6 (10-30-22 @ 20:05)  HR: 57 (10-31-22 @ 04:14) (57 - 87)  BP: 128/69 (10-31-22 @ 04:14) (128/69 - 138/69)  BP(mean): --  RR: 18 (10-31-22 @ 04:14) (18 - 18)  SpO2: 96% (10-31-22 @ 04:14) (96% - 100%)        General Exam:   General Appearance: Appropriately dressed and in no acute distress       Head: Normocephalic, atraumatic and no dysmorphic features  Ear, Nose, and Throat: Moist mucous membranes  CVS: S1S2+  Resp: No SOB, no wheeze or rhonchi  Abd: soft NTND  Extremities: No edema, no cyanosis  Skin: No bruises, no rashes     Neurological Exam:  Mental Status: Awake, alert and oriented x 3.  Able to follow simple and complex verbal commands. Able to name and repeat. fluent speech. No obvious aphasia or dysarthria noted.   Cranial Nerves: PERRL, EOMI, VFFC, sensation V1-V3 intact,  no obvious facial asymmetry , equal elevation of palate, scm/trap 5/5, tongue is midline on protrusion. no obvious papilledema on fundoscopic exam. Hearing is grossly intact.   Motor: Normal bulk, tone and strength throughout. Fine finger movements were intact and symmetric. no tremors or drift noted.    Sensation: Intact to light touch and pinprick throughout. no right/left confusion. no extinction to tactile on DSS.    Reflexes: 1+ throughout at biceps, brachioradialis, triceps, patellars and ankles bilaterally and equal. No clonus. R toe and L toe were both downgoing.  Coordination: No dysmetria on FNF or HKS  Gait: deferred     I personally reviewed the below data/images/labs:  CBC Full  -  ( 31 Oct 2022 06:05 )  WBC Count : 5.90 K/uL  RBC Count : 3.08 M/uL  Hemoglobin : 8.3 g/dL  Hematocrit : 26.6 %  Platelet Count - Automated : 153 K/uL  Mean Cell Volume : 86.4 fl  Mean Cell Hemoglobin : 26.9 pg  Mean Cell Hemoglobin Concentration : 31.2 gm/dL  Auto Neutrophil # : x  Auto Lymphocyte # : x  Auto Monocyte # : x  Auto Eosinophil # : x  Auto Basophil # : x  Auto Neutrophil % : x  Auto Lymphocyte % : x  Auto Monocyte % : x  Auto Eosinophil % : x  Auto Basophil % : x      10-31    137  |  103  |  16  ----------------------------<  148<H>  4.4   |  24  |  1.20    Ca    8.5      31 Oct 2022 06:05  Phos  3.2     10-30  Mg     1.8     10-30    TPro  5.6<L>  /  Alb  3.1<L>  /  TBili  0.7  /  DBili  x   /  AST  16  /  ALT  25  /  AlkPhos  50  10-30        < from: CT Brain Stroke Protocol (10.24.22 @ 22:50) >  No acute intracranial hemorrhage, mass effect, or CTevidence of an acute   transcortical infarct.    Vague lucency at the genu of the right internal capsule and in the right   thalamus which may represent lacunar infarcts of indeterminate age. No   prior studies are available for comparison.    EEG Classification / Summary:  Abnormal  EEG in the awake / drowsy / asleep state(s).  Occasional repetitive sharp waves max O1/O2  Continuous polymorphic slowing, focal, bilateral posterior head region  Background slowing, generalized, mild     Clinical Impression:  Evidence for focal dysfunction in and increased risk for seizures from the bilateral posterior head region  Mild diffuse/multifocal cerebral dysfunction, not specific as to etiology

## 2022-10-31 NOTE — PROGRESS NOTE ADULT - ASSESSMENT
77  year old  RHD male with HTN, HLD, DM2, former smoker,  hypothyroidism, obesity, LILLY on C-pap,  bladder cancer (2020, s/p resection of bladder tumor/ currently self catheterizes 6x/day ), PAF s/p DCCV /uncomplicated radiofrequency ablation of atrial fibrillation (WACA/PVI, CTI) on 7/13/22,  left atrial appendage & severe MR s/p open heart surgery for mitral valve repair and PFO closure 10/24/22.   Code stroke called for LUE convulsion with residual LUE weakness immediately after. LKW: 9:50 PM 10/24/22.   NIHSS: 12. MRS:2. on day of code stroke    Per primary team, LUE weakness improved. Patient had LUE convulsions that spread to entire body and team witnessed GTC's that lasted for 1.5 minutes. No witnessed gaze deviation per primary team.     On initial exam, patient able to follow commands and able to lift all extremities against gravity. Patient was able to life LUE but less than the RUE. Still, all extremities had drift that hit bed.   Not a tpa candidate given open heart surgery within 14 days. Not a thrombectomy candidate given no LVO.     o/e 10/25 AAOx3, OLIVIER but mild LUE weakness still noted.   10/26 exam baseline.  EEG with occasional sharps and risk for seizures from bilateral posterior head region     CTH: R IC vague lucency noted. may be age indeterminate  CTA H/N limited 2/2 poor opacification of vessels but no LVO.  CTP neg   A1c 6.2  LDL 27     Impression: LUE convulsions with generalized spreading likely seizure. Stroke on differential as well given vague lucency at the genu of the right internal capsule and in the right thalamus of indeterminate age. Likely lacunar infarcts.     Recommendations:    - s/p load with Keppra 2g, now keppra maintenance 750 mg bid ;  was stopped but would continue with AED.  if keppra 750mg BID was making him sleeping I would start vimpat 100mg BID given EEG findings ; now c/w keppra 750mg BID. okay to change keppra to PO, same dose 1:1 conversion 750mg BID PO ; c/w AED until outpatient f/u and repeat EEG   - MR brain wo when stable if able.  otherwise repeat CTH ; will get MRI outpatient if not done in house   - c/w ASA 81mg daily.  should be on statin therapy for secondary stroke prevention. lipitor 20 if no objection.  will hold off high dose as no athero on CTA H/N and LDL at goal  - telemetry  - PT/OT/SS/SLP, OOBC  - BP per CT team .   - check FS, glucose control <180  - GI/DVT ppx  - Thank you for allowing me to participate in the care of this patient. Call with questions.   - spoke [facility]   - spoke with sister at bedside   called wife at [phone] and discussed keppra and interactions.  she is okay with keppra.   - d/c plan home 11/1 if stable. outpatient neuro f/.u   Bucky Sharpe MD  Vascular Neurology  Office: [phone] .

## 2022-10-31 NOTE — PROGRESS NOTE ADULT - PROBLEM SELECTOR PLAN 2
Post op seizure---> continue keppra 750bid  for prophylaxis  Neuro f/u as an outpatient   Outpt MRI as per neuro   continue to monitor

## 2022-10-31 NOTE — PROGRESS NOTE ADULT - PROBLEM SELECTOR PLAN 1
continue postop care  continue amio 200 bid   no bb secondary to sb/ junct 50-60  diuresis  d/c gaitan- allow pt to self cath  continue synthroid  pulm toilet  pain management  increase activity as tolerated  bowel regimen  Discharge planning- home pt mon/ tue

## 2022-10-31 NOTE — PROGRESS NOTE ADULT - SUBJECTIVE AND OBJECTIVE BOX
VITAL SIGNS    Telemetry:  junctional rhythm 55-80  Vital Signs Last 24 Hrs  T(C): 36.8 (10-31-22 @ 04:14), Max: 37.6 (10-30-22 @ 20:05)  T(F): 98.2 (10-31-22 @ 04:14), Max: 99.6 (10-30-22 @ 20:05)  HR: 57 (10-31-22 @ 04:14) (57 - 87)  BP: 128/69 (10-31-22 @ 04:14) (128/69 - 138/69)  RR: 18 (10-31-22 @ 04:14) (18 - 18)  SpO2: 96% (10-31-22 @ 04:14) (96% - 100%)            10-30 @ 07:01  -  10-31 @ 07:00  --------------------------------------------------------  IN: 370 mL / OUT: 3050 mL / NET: -2680 mL    10-31 @ 07:01  -  10-31 @ 09:57  --------------------------------------------------------  IN: 0 mL / OUT: 1000 mL / NET: -1000 mL       Daily     Daily Weight in k.5 (31 Oct 2022 08:31)  Admit Wt: Drug Dosing Weight  Height (cm): 182.9 (24 Oct 2022 06:00)  Weight (kg): 126.9 (24 Oct 2022 06:00)  BMI (kg/m2): 37.9 (24 Oct 2022 06:00)  BSA (m2): 2.46 (24 Oct 2022 06:00)      CAPILLARY BLOOD GLUCOSE      POCT Blood Glucose.: 145 mg/dL (31 Oct 2022 07:34)  POCT Blood Glucose.: 176 mg/dL (30 Oct 2022 21:44)  POCT Blood Glucose.: 176 mg/dL (30 Oct 2022 16:33)  POCT Blood Glucose.: 191 mg/dL (30 Oct 2022 11:35)          MEDICATIONS  acetaminophen     Tablet .. 650 milliGRAM(s) Oral every 6 hours PRN  aMIOdarone    Tablet 200 milliGRAM(s) Oral two times a day  apixaban 2.5 milliGRAM(s) Oral every 12 hours  aspirin enteric coated 81 milliGRAM(s) Oral daily  atorvastatin 40 milliGRAM(s) Oral at bedtime  bisacodyl Suppository 10 milliGRAM(s) Rectal once  chlorhexidine 2% Cloths 1 Application(s) Topical daily  dextrose 5%. 1000 milliLiter(s) IV Continuous <Continuous>  dextrose 5%. 1000 milliLiter(s) IV Continuous <Continuous>  dextrose Oral Gel 15 Gram(s) Oral once PRN  doxazosin 4 milliGRAM(s) Oral at bedtime  glucagon  Injectable 1 milliGRAM(s) IntraMuscular once  guaiFENesin Oral Liquid (Sugar-Free) 100 milliGRAM(s) Oral every 6 hours PRN  insulin glargine Injectable (LANTUS) 5 Unit(s) SubCutaneous at bedtime  insulin lispro (ADMELOG) corrective regimen sliding scale   SubCutaneous three times a day before meals  insulin lispro (ADMELOG) corrective regimen sliding scale   SubCutaneous at bedtime  insulin lispro Injectable (ADMELOG) 3 Unit(s) SubCutaneous three times a day before meals  levETIRAcetam 750 milliGRAM(s) Oral two times a day  levothyroxine 25 MICROGram(s) Oral daily  oxyCODONE    IR 5 milliGRAM(s) Oral every 4 hours PRN  oxyCODONE    IR 10 milliGRAM(s) Oral every 4 hours PRN  pantoprazole    Tablet 40 milliGRAM(s) Oral before breakfast  polyethylene glycol 3350 17 Gram(s) Oral daily  senna 2 Tablet(s) Oral at bedtime  spironolactone 50 milliGRAM(s) Oral two times a day  torsemide 10 milliGRAM(s) Oral daily  trimethoprim  160 mG/sulfamethoxazole 800 mG 1 Tablet(s) Oral two times a day      >>> <<<  PHYSICAL EXAM  Subjective: NAD  Neurology: alert and oriented x 3, nonfocal, no gross deficits  CV : s1s2  Sternal Wound :  CDI , Stable  Lungs: CTA b/l  Abdomen: soft, NT,ND, (+ )BM  :  self cath  Extremities:   + 1edema b/l    LABS  10-31    137  |  103  |  16  ----------------------------<  148<H>  4.4   |  24  |  1.20    Ca    8.5      31 Oct 2022 06:05  Phos  3.2     10-30  Mg     1.8     10-30    TPro  5.6<L>  /  Alb  3.1<L>  /  TBili  0.7  /  DBili  x   /  AST  16  /  ALT  25  /  AlkPhos  50  10-30                                 8.3    5.90  )-----------( 153      ( 31 Oct 2022 06:05 )             26.6                 PAST MEDICAL & SURGICAL HISTORY:  Paroxysmal atrial fibrillation  s/p ablation  on ELIQUIS till 10/13      Hypertension      Hyperlipidemia      Bladder cancer      Diabetes mellitus  2      Hypothyroidism      At risk for sleep apnea  LILLY on C-pap      COVID-19 virus infection  positive with mild infection 10/10/22 , had MAB on 10/10/22 at [facility], surgeon aware      Severe mitral regurgitation  seen in recent ESDRAS      PAF (paroxysmal atrial fibrillation)  s/p DCCV 7 rfa DR Hoover &gt; nsr      Bladder cancer  rsx/ treatment , self cath 3x/day      Dysuria  on self cath 6x/day, UC sent      H/O shoulder surgery  2017      H/O cardiac catheterization  2022      Bladder tumor  s/p resection

## 2022-11-01 ENCOUNTER — TRANSCRIPTION ENCOUNTER (OUTPATIENT)
Age: 77
End: 2022-11-01

## 2022-11-01 DIAGNOSIS — R00.1 BRADYCARDIA, UNSPECIFIED: ICD-10-CM

## 2022-11-01 LAB
-  AMIKACIN: SIGNIFICANT CHANGE UP
-  AMOXICILLIN/CLAVULANIC ACID: SIGNIFICANT CHANGE UP
-  AMPICILLIN/SULBACTAM: SIGNIFICANT CHANGE UP
-  AMPICILLIN: SIGNIFICANT CHANGE UP
-  AZTREONAM: SIGNIFICANT CHANGE UP
-  CEFAZOLIN: SIGNIFICANT CHANGE UP
-  CEFEPIME: SIGNIFICANT CHANGE UP
-  CEFOXITIN: SIGNIFICANT CHANGE UP
-  CEFTRIAXONE: SIGNIFICANT CHANGE UP
-  CIPROFLOXACIN: SIGNIFICANT CHANGE UP
-  ERTAPENEM: SIGNIFICANT CHANGE UP
-  GENTAMICIN: SIGNIFICANT CHANGE UP
-  LEVOFLOXACIN: SIGNIFICANT CHANGE UP
-  MEROPENEM: SIGNIFICANT CHANGE UP
-  NITROFURANTOIN: SIGNIFICANT CHANGE UP
-  PIPERACILLIN/TAZOBACTAM: SIGNIFICANT CHANGE UP
-  TIGECYCLINE: SIGNIFICANT CHANGE UP
-  TOBRAMYCIN: SIGNIFICANT CHANGE UP
-  TRIMETHOPRIM/SULFAMETHOXAZOLE: SIGNIFICANT CHANGE UP
ALBUMIN SERPL ELPH-MCNC: 3.1 G/DL — LOW (ref 3.3–5)
ALP SERPL-CCNC: 56 U/L — SIGNIFICANT CHANGE UP (ref 40–120)
ALT FLD-CCNC: 44 U/L — SIGNIFICANT CHANGE UP (ref 10–45)
ANION GAP SERPL CALC-SCNC: 12 MMOL/L — SIGNIFICANT CHANGE UP (ref 5–17)
AST SERPL-CCNC: 25 U/L — SIGNIFICANT CHANGE UP (ref 10–40)
BASOPHILS # BLD AUTO: 0.03 K/UL — SIGNIFICANT CHANGE UP (ref 0–0.2)
BASOPHILS NFR BLD AUTO: 0.5 % — SIGNIFICANT CHANGE UP (ref 0–2)
BILIRUB SERPL-MCNC: 0.6 MG/DL — SIGNIFICANT CHANGE UP (ref 0.2–1.2)
BUN SERPL-MCNC: 16 MG/DL — SIGNIFICANT CHANGE UP (ref 7–23)
CALCIUM SERPL-MCNC: 8.6 MG/DL — SIGNIFICANT CHANGE UP (ref 8.4–10.5)
CHLORIDE SERPL-SCNC: 101 MMOL/L — SIGNIFICANT CHANGE UP (ref 96–108)
CO2 SERPL-SCNC: 23 MMOL/L — SIGNIFICANT CHANGE UP (ref 22–31)
CREAT SERPL-MCNC: 1.15 MG/DL — SIGNIFICANT CHANGE UP (ref 0.5–1.3)
CULTURE RESULTS: SIGNIFICANT CHANGE UP
EGFR: 66 ML/MIN/1.73M2 — SIGNIFICANT CHANGE UP
EOSINOPHIL # BLD AUTO: 0.24 K/UL — SIGNIFICANT CHANGE UP (ref 0–0.5)
EOSINOPHIL NFR BLD AUTO: 3.9 % — SIGNIFICANT CHANGE UP (ref 0–6)
GLUCOSE SERPL-MCNC: 138 MG/DL — HIGH (ref 70–99)
HCT VFR BLD CALC: 25.5 % — LOW (ref 39–50)
HGB BLD-MCNC: 8.3 G/DL — LOW (ref 13–17)
IMM GRANULOCYTES NFR BLD AUTO: 0.8 % — SIGNIFICANT CHANGE UP (ref 0–0.9)
LYMPHOCYTES # BLD AUTO: 0.69 K/UL — LOW (ref 1–3.3)
LYMPHOCYTES # BLD AUTO: 11.3 % — LOW (ref 13–44)
MAGNESIUM SERPL-MCNC: 1.8 MG/DL — SIGNIFICANT CHANGE UP (ref 1.6–2.6)
MCHC RBC-ENTMCNC: 27.2 PG — SIGNIFICANT CHANGE UP (ref 27–34)
MCHC RBC-ENTMCNC: 32.5 GM/DL — SIGNIFICANT CHANGE UP (ref 32–36)
MCV RBC AUTO: 83.6 FL — SIGNIFICANT CHANGE UP (ref 80–100)
METHOD TYPE: SIGNIFICANT CHANGE UP
MONOCYTES # BLD AUTO: 0.95 K/UL — HIGH (ref 0–0.9)
MONOCYTES NFR BLD AUTO: 15.5 % — HIGH (ref 2–14)
NEUTROPHILS # BLD AUTO: 4.15 K/UL — SIGNIFICANT CHANGE UP (ref 1.8–7.4)
NEUTROPHILS NFR BLD AUTO: 68 % — SIGNIFICANT CHANGE UP (ref 43–77)
NRBC # BLD: 0 /100 WBCS — SIGNIFICANT CHANGE UP (ref 0–0)
ORGANISM # SPEC MICROSCOPIC CNT: SIGNIFICANT CHANGE UP
ORGANISM # SPEC MICROSCOPIC CNT: SIGNIFICANT CHANGE UP
PHOSPHATE SERPL-MCNC: 3.5 MG/DL — SIGNIFICANT CHANGE UP (ref 2.5–4.5)
PLATELET # BLD AUTO: 182 K/UL — SIGNIFICANT CHANGE UP (ref 150–400)
POTASSIUM SERPL-MCNC: 4.3 MMOL/L — SIGNIFICANT CHANGE UP (ref 3.5–5.3)
POTASSIUM SERPL-SCNC: 4.3 MMOL/L — SIGNIFICANT CHANGE UP (ref 3.5–5.3)
PROT SERPL-MCNC: 6 G/DL — SIGNIFICANT CHANGE UP (ref 6–8.3)
RBC # BLD: 3.05 M/UL — LOW (ref 4.2–5.8)
RBC # FLD: 15.5 % — HIGH (ref 10.3–14.5)
SODIUM SERPL-SCNC: 136 MMOL/L — SIGNIFICANT CHANGE UP (ref 135–145)
SPECIMEN SOURCE: SIGNIFICANT CHANGE UP
WBC # BLD: 6.11 K/UL — SIGNIFICANT CHANGE UP (ref 3.8–10.5)
WBC # FLD AUTO: 6.11 K/UL — SIGNIFICANT CHANGE UP (ref 3.8–10.5)

## 2022-11-01 PROCEDURE — 93010 ELECTROCARDIOGRAM REPORT: CPT

## 2022-11-01 PROCEDURE — 99223 1ST HOSP IP/OBS HIGH 75: CPT

## 2022-11-01 RX ORDER — AMIODARONE HYDROCHLORIDE 400 MG/1
200 TABLET ORAL DAILY
Refills: 0 | Status: DISCONTINUED | OUTPATIENT
Start: 2022-11-01 | End: 2022-11-01

## 2022-11-01 RX ORDER — OXYCODONE HYDROCHLORIDE 5 MG/1
5 TABLET ORAL EVERY 6 HOURS
Refills: 0 | Status: DISCONTINUED | OUTPATIENT
Start: 2022-11-01 | End: 2022-11-07

## 2022-11-01 RX ADMIN — OXYCODONE HYDROCHLORIDE 5 MILLIGRAM(S): 5 TABLET ORAL at 15:46

## 2022-11-01 RX ADMIN — SPIRONOLACTONE 50 MILLIGRAM(S): 25 TABLET, FILM COATED ORAL at 17:10

## 2022-11-01 RX ADMIN — LEVETIRACETAM 750 MILLIGRAM(S): 250 TABLET, FILM COATED ORAL at 17:10

## 2022-11-01 RX ADMIN — POLYETHYLENE GLYCOL 3350 17 GRAM(S): 17 POWDER, FOR SOLUTION ORAL at 11:31

## 2022-11-01 RX ADMIN — Medication 3 UNIT(S): at 08:04

## 2022-11-01 RX ADMIN — Medication 1 TABLET(S): at 05:15

## 2022-11-01 RX ADMIN — APIXABAN 2.5 MILLIGRAM(S): 2.5 TABLET, FILM COATED ORAL at 05:15

## 2022-11-01 RX ADMIN — OXYCODONE HYDROCHLORIDE 5 MILLIGRAM(S): 5 TABLET ORAL at 15:16

## 2022-11-01 RX ADMIN — LEVETIRACETAM 750 MILLIGRAM(S): 250 TABLET, FILM COATED ORAL at 05:14

## 2022-11-01 RX ADMIN — AMIODARONE HYDROCHLORIDE 200 MILLIGRAM(S): 400 TABLET ORAL at 05:15

## 2022-11-01 RX ADMIN — Medication 4 MILLIGRAM(S): at 21:55

## 2022-11-01 RX ADMIN — Medication 10 MILLIGRAM(S): at 05:14

## 2022-11-01 RX ADMIN — INSULIN GLARGINE 5 UNIT(S): 100 INJECTION, SOLUTION SUBCUTANEOUS at 21:57

## 2022-11-01 RX ADMIN — Medication 3 UNIT(S): at 17:09

## 2022-11-01 RX ADMIN — ATORVASTATIN CALCIUM 40 MILLIGRAM(S): 80 TABLET, FILM COATED ORAL at 21:55

## 2022-11-01 RX ADMIN — SENNA PLUS 2 TABLET(S): 8.6 TABLET ORAL at 21:55

## 2022-11-01 RX ADMIN — APIXABAN 2.5 MILLIGRAM(S): 2.5 TABLET, FILM COATED ORAL at 17:10

## 2022-11-01 RX ADMIN — PANTOPRAZOLE SODIUM 40 MILLIGRAM(S): 20 TABLET, DELAYED RELEASE ORAL at 05:15

## 2022-11-01 RX ADMIN — Medication 1 TABLET(S): at 17:10

## 2022-11-01 RX ADMIN — Medication 4: at 11:30

## 2022-11-01 RX ADMIN — Medication 3 UNIT(S): at 11:30

## 2022-11-01 RX ADMIN — Medication 81 MILLIGRAM(S): at 11:31

## 2022-11-01 RX ADMIN — SPIRONOLACTONE 50 MILLIGRAM(S): 25 TABLET, FILM COATED ORAL at 05:14

## 2022-11-01 RX ADMIN — CHLORHEXIDINE GLUCONATE 1 APPLICATION(S): 213 SOLUTION TOPICAL at 11:30

## 2022-11-01 RX ADMIN — Medication 25 MICROGRAM(S): at 05:14

## 2022-11-01 NOTE — DISCHARGE NOTE NURSING/CASE MANAGEMENT/SOCIAL WORK - NSDCPEFALRISK_GEN_ALL_CORE
For information on Fall & Injury Prevention, visit: [email or web address] OR  [email or web address] OR  https://www.cdc.gov/steadi/patient.html

## 2022-11-01 NOTE — PROGRESS NOTE ADULT - PROBLEM SELECTOR PLAN 1
continue postop care  decrease amio 200 qd   no bb secondary to sb/ junct 50-60  diuresis  d/c gaitan- allow pt to self cath  continue synthroid  pulm toilet  pain management  increase activity as tolerated  bowel regimen  Discharge planning- home pt

## 2022-11-01 NOTE — DISCHARGE NOTE NURSING/CASE MANAGEMENT/SOCIAL WORK - PATIENT PORTAL LINK FT
You can access the FollowMyHealth Patient Portal offered by [facility] by registering at the following website: [email or web address]. By joining [employer]’s FollowMyHealth portal, you will also be able to view your health information using other applications (apps) compatible with our system.

## 2022-11-01 NOTE — CONSULT NOTE ADULT - SUBJECTIVE AND OBJECTIVE BOX
CHIEF COMPLAINT: post op junctional rhythm     HISTORY OF PRESENT ILLNESS:  77  year old male PMH of HTN, HLD, DM2, former smoker,  hypothyroidism, obesity, LILLY on CPAP,  bladder cancer (2020, s/p resection of bladder tumor/ currently self catheterizes), PAF s/p DCCV/ablation (WACA/PVI, CTI) on 7/13/22. 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 He underwent MVR, MAZE, left atrial appendage clip on 10/24/2022 w/ Dr. Barraza. Post op w/ junctional rhythm noted on telemetry for which EP was consulted.     Allergies    penicillins (Diarrhea)    Intolerances    	    MEDICATIONS:  aMIOdarone    Tablet 200 milliGRAM(s) Oral daily  apixaban 2.5 milliGRAM(s) Oral every 12 hours  aspirin enteric coated 81 milliGRAM(s) Oral daily  doxazosin 4 milliGRAM(s) Oral at bedtime  spironolactone 50 milliGRAM(s) Oral two times a day  torsemide 10 milliGRAM(s) Oral daily    trimethoprim  160 mG/sulfamethoxazole 800 mG 1 Tablet(s) Oral two times a day    guaiFENesin Oral Liquid (Sugar-Free) 100 milliGRAM(s) Oral every 6 hours PRN    acetaminophen     Tablet .. 650 milliGRAM(s) Oral every 6 hours PRN  levETIRAcetam 750 milliGRAM(s) Oral two times a day    bisacodyl Suppository 10 milliGRAM(s) Rectal once  pantoprazole    Tablet 40 milliGRAM(s) Oral before breakfast  polyethylene glycol 3350 17 Gram(s) Oral daily  senna 2 Tablet(s) Oral at bedtime    atorvastatin 40 milliGRAM(s) Oral at bedtime  dextrose Oral Gel 15 Gram(s) Oral once PRN  glucagon  Injectable 1 milliGRAM(s) IntraMuscular once  insulin glargine Injectable (LANTUS) 5 Unit(s) SubCutaneous at bedtime  insulin lispro (ADMELOG) corrective regimen sliding scale   SubCutaneous three times a day before meals  insulin lispro (ADMELOG) corrective regimen sliding scale   SubCutaneous at bedtime  insulin lispro Injectable (ADMELOG) 3 Unit(s) SubCutaneous three times a day before meals  levothyroxine 25 MICROGram(s) Oral daily    chlorhexidine 2% Cloths 1 Application(s) Topical daily  dextrose 5%. 1000 milliLiter(s) IV Continuous <Continuous>  dextrose 5%. 1000 milliLiter(s) IV Continuous <Continuous>      PAST MEDICAL & SURGICAL HISTORY:  Paroxysmal atrial fibrillation  s/p ablation  on ELIQUIS till 10/13      Hypertension      Hyperlipidemia      Bladder cancer      Diabetes mellitus  2      Hypothyroidism      At risk for sleep apnea  LILLY on C-pap      COVID-19 virus infection  positive with mild infection 10/10/22 , had MAB on 10/10/22 at [facility], surgeon aware      Severe mitral regurgitation  seen in recent ESDRAS      PAF (paroxysmal atrial fibrillation)  s/p DCCV 7 rfa DR Hoover &gt; nsr      Bladder cancer  rsx/ treatment 2020, self cath 3x/day      Dysuria  on self cath 6x/day, UC sent      H/O shoulder surgery  1/1/2017      H/O cardiac catheterization  5/2/2022      Bladder tumor  s/p resection 2020          FAMILY HISTORY:  FHx: ovarian cancer (Mother)    FH: breast cancer (Sibling)    FH: heart disease (Father)        SOCIAL HISTORY:    [ ]Non-smoker  [ ] Smoker  [ ] Alcohol      REVIEW OF SYSTEMS:  See HPI. Otherwise, 10 point ROS done and otherwise negative.    PHYSICAL EXAM:  T(C): 36.7 (11-01-22 @ 11:56), Max: 36.8 (10-31-22 @ 19:55)  HR: 61 (11-01-22 @ 11:56) (54 - 64)  BP: 127/72 (11-01-22 @ 11:56) (118/67 - 127/72)  RR: 18 (11-01-22 @ 11:56) (18 - 18)  SpO2: 96% (11-01-22 @ 11:56) (94% - 100%)  Wt(kg): --  I&O's Summary    31 Oct 2022 07:01  -  01 Nov 2022 07:00  --------------------------------------------------------  IN: 1100 mL / OUT: 3700 mL / NET: -2600 mL    01 Nov 2022 07:01  -  01 Nov 2022 13:30  --------------------------------------------------------  IN: 600 mL / OUT: 1800 mL / NET: -1200 mL        Appearance: Alert. NAD	  HEENT:   NC/AT	  Cardiovascular: +S1S2 RRR no m/g/r  Respiratory: CTA B/L	  Psychiatry: A & O x 3, Mood & affect appropriate  Gastrointestinal:  Soft, NT.ND., + BS	  Skin: No rashes	  Neurologic: Non-focal  Extremities: No edema BLE  Vascular: Peripheral pulses palpable 2+ bilaterally      LABS:	 	    CBC Full  -  ( 01 Nov 2022 06:22 )  WBC Count : 6.11 K/uL  Hemoglobin : 8.3 g/dL  Hematocrit : 25.5 %  Platelet Count - Automated : 182 K/uL  Mean Cell Volume : 83.6 fl  Mean Cell Hemoglobin : 27.2 pg  Mean Cell Hemoglobin Concentration : 32.5 gm/dL  Auto Neutrophil # : 4.15 K/uL  Auto Lymphocyte # : 0.69 K/uL  Auto Monocyte # : 0.95 K/uL  Auto Eosinophil # : 0.24 K/uL  Auto Basophil # : 0.03 K/uL  Auto Neutrophil % : 68.0 %  Auto Lymphocyte % : 11.3 %  Auto Monocyte % : 15.5 %  Auto Eosinophil % : 3.9 %  Auto Basophil % : 0.5 %    11-01    136  |  101  |  16  ----------------------------<  138<H>  4.3   |  23  |  1.15  10-31    137  |  103  |  16  ----------------------------<  148<H>  4.4   |  24  |  1.20    Ca    8.6      01 Nov 2022 06:22  Ca    8.5      31 Oct 2022 06:05  Phos  3.5     11-01  Mg     1.8     11-01    TPro  6.0  /  Alb  3.1<L>  /  TBili  0.6  /  DBili  x   /  AST  25  /  ALT  44  /  AlkPhos  56  11-01      proBNP:   Lipid Profile:   HgA1c:   TSH: Thyroid Stimulating Hormone, Serum (10.25.22 @ 02:41)    Thyroid Stimulating Hormone, Serum: 2.44 uIU/mL    CARDIAC MARKERS:      TELEMETRY: 	    ECG:  	  RADIOLOGY:  OTHER: 	  ESDRAS:     PREVIOUS DIAGNOSTIC TESTING:    Echocardiogram: < from: TTE Echo Limited or F/U (07.31.22 @ 09:46) >  Summary:   1. Color Doppler demonstrates the presence of a shunt at the Atrial   level.   2. Moderately enlarged left atrium.   3. There is mild concentric left ventricular hypertrophy.   4. Normal wall motion. Left ventricular ejection fraction, by visual   estimation, is 60 to 65%.  5. Elevated mean left atrial pressure. (LAP > 25 mm Hg_)   6. Moderately enlarged right atrium.   7. Normal right ventricular size and function.   8. Moderate mitral valve regurgitation.   9. There is flail posterior leaflet. There is very eccentric anteriorly   directed mitral regurgitation. The MR is probably underestimated. Based   on 2 D pathology, there is very high likelihood of severe MR. Consider   ESDRAS to evaluate the presence of severe MR.  10. Small pericardial effusion.    < end of copied text >      Catheterization:     Stress Test:  	  	  ASSESSMENT/PLAN: 	       CHIEF COMPLAINT: post op junctional rhythm     HISTORY OF PRESENT ILLNESS:  77  year old male PMH of HTN, HLD, DM2, former smoker,  hypothyroidism, obesity, LILLY on CPAP,  bladder cancer (2020, s/p resection of bladder tumor/ currently self catheterizes), PAF s/p DCCV/ablation on 7/13/22 at [facility]. 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 He underwent MVrepair, MAZE, left atrial appendage clip on 10/24/2022 w/ Dr. Barraza. Post op course significant for seizure episode loaded/maintained on Keppra, and also w/ junctional rhythm noted on telemetry for which EP was consulted. He denies CP, SOB, BLEDSOE, palpitations, dizziness, lightheadedness, syncope/pre syncope. No other arrhythmias noted on telemetry.     Allergies    penicillins (Diarrhea)    Intolerances    	    MEDICATIONS:  aMIOdarone    Tablet 200 milliGRAM(s) Oral daily  apixaban 2.5 milliGRAM(s) Oral every 12 hours  aspirin enteric coated 81 milliGRAM(s) Oral daily  doxazosin 4 milliGRAM(s) Oral at bedtime  spironolactone 50 milliGRAM(s) Oral two times a day  torsemide 10 milliGRAM(s) Oral daily    trimethoprim  160 mG/sulfamethoxazole 800 mG 1 Tablet(s) Oral two times a day    guaiFENesin Oral Liquid (Sugar-Free) 100 milliGRAM(s) Oral every 6 hours PRN    acetaminophen     Tablet .. 650 milliGRAM(s) Oral every 6 hours PRN  levETIRAcetam 750 milliGRAM(s) Oral two times a day    bisacodyl Suppository 10 milliGRAM(s) Rectal once  pantoprazole    Tablet 40 milliGRAM(s) Oral before breakfast  polyethylene glycol 3350 17 Gram(s) Oral daily  senna 2 Tablet(s) Oral at bedtime    atorvastatin 40 milliGRAM(s) Oral at bedtime  dextrose Oral Gel 15 Gram(s) Oral once PRN  glucagon  Injectable 1 milliGRAM(s) IntraMuscular once  insulin glargine Injectable (LANTUS) 5 Unit(s) SubCutaneous at bedtime  insulin lispro (ADMELOG) corrective regimen sliding scale   SubCutaneous three times a day before meals  insulin lispro (ADMELOG) corrective regimen sliding scale   SubCutaneous at bedtime  insulin lispro Injectable (ADMELOG) 3 Unit(s) SubCutaneous three times a day before meals  levothyroxine 25 MICROGram(s) Oral daily    chlorhexidine 2% Cloths 1 Application(s) Topical daily  dextrose 5%. 1000 milliLiter(s) IV Continuous <Continuous>  dextrose 5%. 1000 milliLiter(s) IV Continuous <Continuous>      PAST MEDICAL & SURGICAL HISTORY:  Paroxysmal atrial fibrillation  s/p ablation  on ELIQUIS till 10/13      Hypertension      Hyperlipidemia      Bladder cancer      Diabetes mellitus  2      Hypothyroidism      At risk for sleep apnea  LILLY on C-pap      COVID-19 virus infection  positive with mild infection 10/10/22 , had MAB on 10/10/22 at [facility], surgeon aware      Severe mitral regurgitation  seen in recent ESDRAS      PAF (paroxysmal atrial fibrillation)  s/p DCCV 7 rfa DR Hoover &gt; nsr      Bladder cancer  rsx/ treatment 2020, self cath 3x/day      Dysuria  on self cath 6x/day, UC sent      H/O shoulder surgery  1/1/2017      H/O cardiac catheterization  5/2/2022      Bladder tumor  s/p resection 2020          FAMILY HISTORY:  FHx: ovarian cancer (Mother)    FH: breast cancer (Sibling)    FH: heart disease (Father)        SOCIAL HISTORY:    Denies smoking, EtOH, illicit drug use       REVIEW OF SYSTEMS:  See HPI. Otherwise, 10 point ROS done and otherwise negative.    PHYSICAL EXAM:  T(C): 36.7 (11-01-22 @ 11:56), Max: 36.8 (10-31-22 @ 19:55)  HR: 61 (11-01-22 @ 11:56) (54 - 64)  BP: 127/72 (11-01-22 @ 11:56) (118/67 - 127/72)  RR: 18 (11-01-22 @ 11:56) (18 - 18)  SpO2: 96% (11-01-22 @ 11:56) (94% - 100%)  Wt(kg): --  I&O's Summary    31 Oct 2022 07:01  -  01 Nov 2022 07:00  --------------------------------------------------------  IN: 1100 mL / OUT: 3700 mL / NET: -2600 mL    01 Nov 2022 07:01  -  01 Nov 2022 13:30  --------------------------------------------------------  IN: 600 mL / OUT: 1800 mL / NET: -1200 mL        Appearance: Alert. NAD	  HEENT:   NC/AT	  Cardiovascular: +S1S2 RRR no m/g/r  Respiratory: CTA B/L	  Psychiatry: A & O x 3, Mood & affect appropriate  Gastrointestinal:  Soft, NT.ND., + BS	  Skin: MSI c/d/i THADDEUS   Neurologic: Non-focal  Extremities: No edema BLE  Vascular: Peripheral pulses palpable 2+ bilaterally      LABS:	 	    CBC Full  -  ( 01 Nov 2022 06:22 )  WBC Count : 6.11 K/uL  Hemoglobin : 8.3 g/dL  Hematocrit : 25.5 %  Platelet Count - Automated : 182 K/uL  Mean Cell Volume : 83.6 fl  Mean Cell Hemoglobin : 27.2 pg  Mean Cell Hemoglobin Concentration : 32.5 gm/dL  Auto Neutrophil # : 4.15 K/uL  Auto Lymphocyte # : 0.69 K/uL  Auto Monocyte # : 0.95 K/uL  Auto Eosinophil # : 0.24 K/uL  Auto Basophil # : 0.03 K/uL  Auto Neutrophil % : 68.0 %  Auto Lymphocyte % : 11.3 %  Auto Monocyte % : 15.5 %  Auto Eosinophil % : 3.9 %  Auto Basophil % : 0.5 %    11-01    136  |  101  |  16  ----------------------------<  138<H>  4.3   |  23  |  1.15  10-31    137  |  103  |  16  ----------------------------<  148<H>  4.4   |  24  |  1.20    Ca    8.6      01 Nov 2022 06:22  Ca    8.5      31 Oct 2022 06:05  Phos  3.5     11-01  Mg     1.8     11-01    TPro  6.0  /  Alb  3.1<L>  /  TBili  0.6  /  DBili  x   /  AST  25  /  ALT  44  /  AlkPhos  56  11-01      TSH: Thyroid Stimulating Hormone, Serum (10.25.22 @ 02:41)    Thyroid Stimulating Hormone, Serum: 2.44 uIU/mL    CARDIAC MARKERS:      TELEMETRY: 	  junctional/accelerated junctional 50-80's   ECG:  	< from: 12 Lead ECG (11.01.22 @ 09:43) >  Ventricular Rate 64 BPM    QRS Duration 86 ms    Q-T Interval 408 ms    QTC Calculation(Bazett) 420 ms    R Axis 82 degrees    T Axis 270 degrees    Diagnosis Line JUNCTIONAL RHYTHM  ABNORMAL QRS-T ANGLE, CONSIDER PRIMARY T WAVE ABNORMALITY  ABNORMAL ECG  WHEN COMPARED WITH ECG OF 31-OCT-2022 07:58,  NO SIGNIFICANT CHANGE WAS FOUND    < end of copied text >    RADIOLOGY: < from: Xray Chest 1 View- PORTABLE-Urgent (Xray Chest 1 View- PORTABLE-Urgent .) (10.30.22 @ 11:30) >  IMPRESSION:  No change heart mediastinum. Opacification left base likely represents   small left pleural effusion and underlying atelectasis/consolidation.   Correlate clinically for concomitant infection. No pneumothorax or other   interval change    < end of copied text >    PREVIOUS DIAGNOSTIC TESTING:    Echocardiogram: < from: TTE Echo Limited or F/U (07.31.22 @ 09:46) >  Summary:   1. Color Doppler demonstrates the presence of a shunt at the Atrial   level.   2. Moderately enlarged left atrium.   3. There is mild concentric left ventricular hypertrophy.   4. Normal wall motion. Left ventricular ejection fraction, by visual   estimation, is 60 to 65%.  5. Elevated mean left atrial pressure. (LAP > 25 mm Hg_)   6. Moderately enlarged right atrium.   7. Normal right ventricular size and function.   8. Moderate mitral valve regurgitation.   9. There is flail posterior leaflet. There is very eccentric anteriorly   directed mitral regurgitation. The MR is probably underestimated. Based   on 2 D pathology, there is very high likelihood of severe MR. Consider   ESDRAS to evaluate the presence of severe MR.  10. Small pericardial effusion.    < end of copied text >

## 2022-11-01 NOTE — PROGRESS NOTE ADULT - SUBJECTIVE AND OBJECTIVE BOX
Neurology Progress Note    S: Patient seen and examined. No new events overnight. patient denied CP, SOB, HA or pain.      Medication:    MEDICATIONS  (STANDING):  aMIOdarone    Tablet 200 milliGRAM(s) Oral daily  apixaban 2.5 milliGRAM(s) Oral every 12 hours  aspirin enteric coated 81 milliGRAM(s) Oral daily  atorvastatin 40 milliGRAM(s) Oral at bedtime  bisacodyl Suppository 10 milliGRAM(s) Rectal once  chlorhexidine 2% Cloths 1 Application(s) Topical daily  dextrose 5%. 1000 milliLiter(s) (50 mL/Hr) IV Continuous <Continuous>  dextrose 5%. 1000 milliLiter(s) (100 mL/Hr) IV Continuous <Continuous>  doxazosin 4 milliGRAM(s) Oral at bedtime  glucagon  Injectable 1 milliGRAM(s) IntraMuscular once  insulin glargine Injectable (LANTUS) 5 Unit(s) SubCutaneous at bedtime  insulin lispro (ADMELOG) corrective regimen sliding scale   SubCutaneous three times a day before meals  insulin lispro (ADMELOG) corrective regimen sliding scale   SubCutaneous at bedtime  insulin lispro Injectable (ADMELOG) 3 Unit(s) SubCutaneous three times a day before meals  levETIRAcetam 750 milliGRAM(s) Oral two times a day  levothyroxine 25 MICROGram(s) Oral daily  pantoprazole    Tablet 40 milliGRAM(s) Oral before breakfast  polyethylene glycol 3350 17 Gram(s) Oral daily  senna 2 Tablet(s) Oral at bedtime  spironolactone 50 milliGRAM(s) Oral two times a day  torsemide 10 milliGRAM(s) Oral daily  trimethoprim  160 mG/sulfamethoxazole 800 mG 1 Tablet(s) Oral two times a day    MEDICATIONS  (PRN):  acetaminophen     Tablet .. 650 milliGRAM(s) Oral every 6 hours PRN Mild Pain (1 - 3)  dextrose Oral Gel 15 Gram(s) Oral once PRN Blood Glucose LESS THAN 70 milliGRAM(s)/deciliter  guaiFENesin Oral Liquid (Sugar-Free) 100 milliGRAM(s) Oral every 6 hours PRN Cough    Vitals:      Vital Signs Last 24 Hrs  T(C): 36.5 (11-01-22 @ 04:45), Max: 36.8 (10-31-22 @ 19:55)  T(F): 97.7 (11-01-22 @ 04:45), Max: 98.2 (10-31-22 @ 19:55)  HR: 64 (11-01-22 @ 09:55) (54 - 64)  BP: 118/67 (11-01-22 @ 04:45) (118/67 - 132/63)  BP(mean): --  RR: 18 (11-01-22 @ 04:45) (18 - 18)  SpO2: 95% (11-01-22 @ 09:55) (94% - 100%)          General Exam:   General Appearance: Appropriately dressed and in no acute distress       Head: Normocephalic, atraumatic and no dysmorphic features  Ear, Nose, and Throat: Moist mucous membranes  CVS: S1S2+  Resp: No SOB, no wheeze or rhonchi  Abd: soft NTND  Extremities: No edema, no cyanosis  Skin: No bruises, no rashes     Neurological Exam:  Mental Status: Awake, alert and oriented x 3.  Able to follow simple and complex verbal commands. Able to name and repeat. fluent speech. No obvious aphasia or dysarthria noted.   Cranial Nerves: PERRL, EOMI, VFFC, sensation V1-V3 intact,  no obvious facial asymmetry , equal elevation of palate, scm/trap 5/5, tongue is midline on protrusion. no obvious papilledema on fundoscopic exam. Hearing is grossly intact.   Motor: Normal bulk, tone and strength throughout. Fine finger movements were intact and symmetric. no tremors or drift noted.    Sensation: Intact to light touch and pinprick throughout. no right/left confusion. no extinction to tactile on DSS.    Reflexes: 1+ throughout at biceps, brachioradialis, triceps, patellars and ankles bilaterally and equal. No clonus. R toe and L toe were both downgoing.  Coordination: No dysmetria on FNF or HKS  Gait: deferred     I personally reviewed the below data/images/labs:  CBC Full  -  ( 01 Nov 2022 06:22 )  WBC Count : 6.11 K/uL  RBC Count : 3.05 M/uL  Hemoglobin : 8.3 g/dL  Hematocrit : 25.5 %  Platelet Count - Automated : 182 K/uL  Mean Cell Volume : 83.6 fl  Mean Cell Hemoglobin : 27.2 pg  Mean Cell Hemoglobin Concentration : 32.5 gm/dL  Auto Neutrophil # : 4.15 K/uL  Auto Lymphocyte # : 0.69 K/uL  Auto Monocyte # : 0.95 K/uL  Auto Eosinophil # : 0.24 K/uL  Auto Basophil # : 0.03 K/uL  Auto Neutrophil % : 68.0 %  Auto Lymphocyte % : 11.3 %  Auto Monocyte % : 15.5 %  Auto Eosinophil % : 3.9 %  Auto Basophil % : 0.5 %    11-01    136  |  101  |  16  ----------------------------<  138<H>  4.3   |  23  |  1.15    Ca    8.6      01 Nov 2022 06:22  Phos  3.5     11-01  Mg     1.8     11-01    TPro  6.0  /  Alb  3.1<L>  /  TBili  0.6  /  DBili  x   /  AST  25  /  ALT  44  /  AlkPhos  56  11-01      < from: CT Brain Stroke Protocol (10.24.22 @ 22:50) >  No acute intracranial hemorrhage, mass effect, or CTevidence of an acute   transcortical infarct.    Vague lucency at the genu of the right internal capsule and in the right   thalamus which may represent lacunar infarcts of indeterminate age. No   prior studies are available for comparison.    EEG Classification / Summary:  Abnormal  EEG in the awake / drowsy / asleep state(s).  Occasional repetitive sharp waves max O1/O2  Continuous polymorphic slowing, focal, bilateral posterior head region  Background slowing, generalized, mild     Clinical Impression:  Evidence for focal dysfunction in and increased risk for seizures from the bilateral posterior head region  Mild diffuse/multifocal cerebral dysfunction, not specific as to etiology

## 2022-11-01 NOTE — PROGRESS NOTE ADULT - PROBLEM SELECTOR PLAN 3
serratia marescans sensitivities pending  start bactrim as per Dr. Barraza   monitor ekg- ck in am  allow pt to self cath

## 2022-11-01 NOTE — CONSULT NOTE ADULT - ASSESSMENT
77  year old male PMH of HTN, HLD, DM2, former smoker,  hypothyroidism, obesity, LILLY on CPAP,  bladder cancer (2020, s/p resection of bladder tumor/ currently self catheterizes), PAF s/p DCCV/ablation on 7/13/22 at [facility]. 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 He underwent MVrepair, MAZE, left atrial appendage clip on 10/24/2022 w/ Dr. Barraza. Post op w/ junctional rhythm noted on telemetry for which EP was consulted. He was started on Amiodarone by primary team for ?AF (not documented on tele), has received 2800mg so far.    77  year old male PMH of HTN, HLD, DM2, former smoker,  hypothyroidism, obesity, LILLY on CPAP,  bladder cancer (2020, s/p resection of bladder tumor/ currently self catheterizes), PAF s/p DCCV/ablation on 7/13/22 at [facility]. 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 He underwent MVrepair, MAZE, left atrial appendage clip on 10/24/2022 w/ Dr. Barraza. Post op w/ junctional rhythm noted on telemetry for which EP was consulted. He was started on Amiodarone by primary team for ?AF (not documented on tele), has received 2800mg so far.     1) Post op junctional rhythm   2) severe MR   3) hx pAF     S/p MV repair, NAYELY clip and MAZE 10/24/22 w/ post op junctional/accelerated junctional bradycardia. EKG appear c/w junctional rhythm with VA conduction   D/c Amiodarone  Avoid AVNB   Currently not on therapeutic A/C. Increase Eliquis to 5mg bid given SCr, age, weight   D/c home on Biotel monitor   Continue to monitor on tele while inpatient   Replete lytes for K >4 and Mg >2     D/w EP attending and CTS provider. EP to sign off. Reconsult PRN

## 2022-11-01 NOTE — PROGRESS NOTE ADULT - SUBJECTIVE AND OBJECTIVE BOX
S:  feels ok.  no sob.  mild pain incision    Telemetry:   54-70    Vital Signs Last 24 Hrs  T(C): 36.7 (22 @ 11:56), Max: 36.8 (10-31-22 @ 19:55)  T(F): 98.1 (22 @ 11:56), Max: 98.2 (10-31-22 @ 19:55)  HR: 61 (22 @ 11:56) (54 - 64)  BP: 127/72 (22 @ 11:56) (118/67 - 127/72)  RR: 18 (22 @ 11:56) (18 - 18)  SpO2: 96% (22 @ 11:56) (94% - 100%)                   10-31 @ 07:  -   @ 07:00  --------------------------------------------------------  IN: 1100 mL / OUT: 3700 mL / NET: -2600 mL     @ 07:  -   @ 15:14  --------------------------------------------------------  IN: 600 mL / OUT: 1800 mL / NET: -1200 mL        Daily Weight in k (2022 08:57)        POCT Blood Glucose.: 210 mg/dL (2022 11:09)  POCT Blood Glucose.: 148 mg/dL (2022 07:33)  POCT Blood Glucose.: 148 mg/dL (31 Oct 2022 21:19)  POCT Blood Glucose.: 150 mg/dL (31 Oct 2022 16:30)          Drains:     MS         [  ] Drainage:                 L Pleural  [  ]  Drainage:                R Pleural  [  ]  Drainage:    Pacing Wires        [  ]   Settings:                                  Isolated  [  ]    Coumadin    [ ] YES          [ x ]      NO         Reason:     eliquis                            8.3    6.11  )-----------( 182      ( 2022 06:22 )             25.5           136  |  101  |  16  ----------------------------<  138<H>  4.3   |  23  |  1.15    Ca    8.6      2022 06:22  Phos  3.5       Mg     1.8         TPro  6.0  /  Alb  3.1<L>  /  TBili  0.6  /  DBili  x   /  AST  25  /  ALT  44  /  AlkPhos  56            PHYSICAL EXAM:    Neurology: alert and oriented x 3, nonfocal, no gross deficits    CV : S1S2 heard RRR    Sternal Wound :  CDI , Stable    Lungs: clear to ausc.  no w/r/r    Abdomen: soft, nontender, nondistended, positive bowel sounds, +bowel movement         Extremities:     no edema          acetaminophen     Tablet .. 650 milliGRAM(s) Oral every 6 hours PRN  aMIOdarone    Tablet 200 milliGRAM(s) Oral daily  apixaban 2.5 milliGRAM(s) Oral every 12 hours  aspirin enteric coated 81 milliGRAM(s) Oral daily  atorvastatin 40 milliGRAM(s) Oral at bedtime  bisacodyl Suppository 10 milliGRAM(s) Rectal once  chlorhexidine 2% Cloths 1 Application(s) Topical daily  dextrose 5%. 1000 milliLiter(s) IV Continuous <Continuous>  dextrose 5%. 1000 milliLiter(s) IV Continuous <Continuous>  dextrose Oral Gel 15 Gram(s) Oral once PRN  doxazosin 4 milliGRAM(s) Oral at bedtime  glucagon  Injectable 1 milliGRAM(s) IntraMuscular once  guaiFENesin Oral Liquid (Sugar-Free) 100 milliGRAM(s) Oral every 6 hours PRN  insulin glargine Injectable (LANTUS) 5 Unit(s) SubCutaneous at bedtime  insulin lispro (ADMELOG) corrective regimen sliding scale   SubCutaneous three times a day before meals  insulin lispro (ADMELOG) corrective regimen sliding scale   SubCutaneous at bedtime  insulin lispro Injectable (ADMELOG) 3 Unit(s) SubCutaneous three times a day before meals  levETIRAcetam 750 milliGRAM(s) Oral two times a day  levothyroxine 25 MICROGram(s) Oral daily  oxyCODONE    IR 5 milliGRAM(s) Oral every 6 hours PRN  pantoprazole    Tablet 40 milliGRAM(s) Oral before breakfast  polyethylene glycol 3350 17 Gram(s) Oral daily  senna 2 Tablet(s) Oral at bedtime  spironolactone 50 milliGRAM(s) Oral two times a day  torsemide 10 milliGRAM(s) Oral daily  trimethoprim  160 mG/sulfamethoxazole 800 mG 1 Tablet(s) Oral two times a day                              Physical Therapy Rec:   Home  [  ]   Home w/ PT  [ x ]  Rehab  [  ]    Discussed with Cardiothoracic Team at AM rounds.

## 2022-11-01 NOTE — PROGRESS NOTE ADULT - ASSESSMENT
77  year old  RHD male with HTN, HLD, DM2, former smoker,  hypothyroidism, obesity, LILLY on C-pap,  bladder cancer (2020, s/p resection of bladder tumor/ currently self catheterizes 6x/day ), PAF s/p DCCV /uncomplicated radiofrequency ablation of atrial fibrillation (WACA/PVI, CTI) on 7/13/22,  left atrial appendage & severe MR s/p open heart surgery for mitral valve repair and PFO closure 10/24/22.   Code stroke called for LUE convulsion with residual LUE weakness immediately after. LKW: 9:50 PM 10/24/22.   NIHSS: 12. MRS:2. on day of code stroke    Per primary team, LUE weakness improved. Patient had LUE convulsions that spread to entire body and team witnessed GTC's that lasted for 1.5 minutes. No witnessed gaze deviation per primary team.     On initial exam, patient able to follow commands and able to lift all extremities against gravity. Patient was able to life LUE but less than the RUE. Still, all extremities had drift that hit bed.   Not a tpa candidate given open heart surgery within 14 days. Not a thrombectomy candidate given no LVO.     o/e 10/25 AAOx3, OLIVIER but mild LUE weakness still noted.   10/26 exam baseline.  EEG with occasional sharps and risk for seizures from bilateral posterior head region     CTH: R IC vague lucency noted. may be age indeterminate  CTA H/N limited 2/2 poor opacification of vessels but no LVO.  CTP neg   A1c 6.2  LDL 27     Impression: LUE convulsions with generalized spreading likely seizure. Stroke on differential as well given vague lucency at the genu of the right internal capsule and in the right thalamus of indeterminate age. Likely lacunar infarcts.     Recommendations:    - s/p load with Keppra 2g, now keppra maintenance 750 mg bid ;  was stopped but would continue with AED.  if keppra 750mg BID was making him sleeping I would start vimpat 100mg BID given EEG findings ; now c/w keppra 750mg BID. okay to change keppra to PO, same dose 1:1 conversion 750mg BID PO ; c/w AED until outpatient f/u and repeat EEG   - MR brain wo when stable if able.  otherwise repeat CTH ; will get MRI outpatient if not done in house   - c/w ASA 81mg daily.  should be on statin therapy for secondary stroke prevention. lipitor 20 if no objection.  will hold off high dose as no athero on CTA H/N and LDL at goal  - telemetry  - PT/OT/SS/SLP, OOBC  - BP per CT team .   - check FS, glucose control <180  - GI/DVT ppx  - Thank you for allowing me to participate in the care of this patient. Call with questions.   - spoke [facility] ACP   - spoke with sister at bedside   called wife at [phone] and discussed keppra and interactions.  she is okay with keppra.   - d/c plan home 11/1 if stable. outpatient neuro f/.u   Bucky Sharpe MD  Vascular Neurology  Office: [phone] .

## 2022-11-01 NOTE — PROGRESS NOTE ADULT - ASSESSMENT
77  year old  RHD male PMH of HTN, HLD, DM2, former smoker,  hypothyroidism, obesity, LILLY on C-pap,  bladder cancer (2020, s/p resection of bladder tumor/ currently self catheterizes 6x/day ), PAF s/p DCCV /uncomplicated radiofrequency ablation of atrial fibrillation (WACA/PVI, CTI) on 7/13/22,  left atrial appendage & severe MR . Presents with c/o recent" abnormal echo with fatigue due to mitral valve regurgitation. Presents for  scheduled Mitral valve replacement , MAZE, left atrial appendage closure on 10/20/2022.  10/24/22   MVr - triangular resection with 28mm Bobby band  AtriClip 45mm   Dawn-Maze IV procedure  PFO CLOSURE  Code stroke called for LUE convulsion with residual LUE weakness immediately after, Patient had LUE convulsions that spread to entire body  CT done, neuro following:  Stroke on differential as well given vague lucency at the genu of the right internal capsule and in the right thalamus of indeterminate age. Likely lacunar infarcts.   MRI when capable , eeg and Keppra added  Extubated d 1  + pressors, prbc's  EEG prelim neg.  Pt with lethargy post keppra, d/c since eeg neg  10/ 26 EEG reviewed by neuro, although no seizures  he is at risk for further sezures, recommended resuming keppra and if lethargic ,  change to vimpat  Pt with junctional / afib  Transferred to sdu  10/28 Maintain PW for junctional rhythm, sorbitol for constipation. Transferred to [address] [place] [address]  10/27 Junctional 50-60, remains on amio 200bid, no beta blocker   Cont keppra for now.  Neuro f/u  D/c jared drain.  10/29 Add Eliquis 2.5 bid>PW removed this am Continue diuretics  Amio 1800 mg to date>continue to 5 gram load as per DR Barraza  Magnesium repleted  10/30 VSS; rsr/junct 50-60- no bb; continue amio 200 bid; pt c/o dysuria--> ck ua/ urine cx +l.esterase; neg nitrate; start bactrim as per Dr. Barraza and ck urine cx; monitor ekg- ck in am 10/31  continue diuresis; allow pt to start self cath himself  increase activity as tolerated  10/31 Continue with diuretics. Pt remains 7kg above preop weight. Bactrim for UTI. . Completed 2.2G Amiodarone load. Maintaining junctional rhythm. Continue with amiodarone for 5G load as per Attending   11/1 JR 54-70  Amio decreased to 200 qd--EP called,   serratia marescens --sensitivities pending on bactrim.  -2600cc/24hrs-torsemide 10

## 2022-11-02 DIAGNOSIS — E03.9 HYPOTHYROIDISM, UNSPECIFIED: ICD-10-CM

## 2022-11-02 DIAGNOSIS — E11.9 TYPE 2 DIABETES MELLITUS WITHOUT COMPLICATIONS: ICD-10-CM

## 2022-11-02 DIAGNOSIS — E66.9 OBESITY, UNSPECIFIED: ICD-10-CM

## 2022-11-02 LAB
ANION GAP SERPL CALC-SCNC: 10 MMOL/L — SIGNIFICANT CHANGE UP (ref 5–17)
BUN SERPL-MCNC: 18 MG/DL — SIGNIFICANT CHANGE UP (ref 7–23)
CALCIUM SERPL-MCNC: 8.7 MG/DL — SIGNIFICANT CHANGE UP (ref 8.4–10.5)
CHLORIDE SERPL-SCNC: 102 MMOL/L — SIGNIFICANT CHANGE UP (ref 96–108)
CO2 SERPL-SCNC: 24 MMOL/L — SIGNIFICANT CHANGE UP (ref 22–31)
CREAT SERPL-MCNC: 1.18 MG/DL — SIGNIFICANT CHANGE UP (ref 0.5–1.3)
EGFR: 64 ML/MIN/1.73M2 — SIGNIFICANT CHANGE UP
GLUCOSE SERPL-MCNC: 133 MG/DL — HIGH (ref 70–99)
HCT VFR BLD CALC: 26.3 % — LOW (ref 39–50)
HGB BLD-MCNC: 8.3 G/DL — LOW (ref 13–17)
MAGNESIUM SERPL-MCNC: 1.8 MG/DL — SIGNIFICANT CHANGE UP (ref 1.6–2.6)
MCHC RBC-ENTMCNC: 26.9 PG — LOW (ref 27–34)
MCHC RBC-ENTMCNC: 31.6 GM/DL — LOW (ref 32–36)
MCV RBC AUTO: 85.4 FL — SIGNIFICANT CHANGE UP (ref 80–100)
NRBC # BLD: 0 /100 WBCS — SIGNIFICANT CHANGE UP (ref 0–0)
PLATELET # BLD AUTO: 163 K/UL — SIGNIFICANT CHANGE UP (ref 150–400)
POTASSIUM SERPL-MCNC: 4.5 MMOL/L — SIGNIFICANT CHANGE UP (ref 3.5–5.3)
POTASSIUM SERPL-SCNC: 4.5 MMOL/L — SIGNIFICANT CHANGE UP (ref 3.5–5.3)
RBC # BLD: 3.08 M/UL — LOW (ref 4.2–5.8)
RBC # FLD: 15.4 % — HIGH (ref 10.3–14.5)
SARS-COV-2 RNA SPEC QL NAA+PROBE: SIGNIFICANT CHANGE UP
SODIUM SERPL-SCNC: 136 MMOL/L — SIGNIFICANT CHANGE UP (ref 135–145)
WBC # BLD: 6.37 K/UL — SIGNIFICANT CHANGE UP (ref 3.8–10.5)
WBC # FLD AUTO: 6.37 K/UL — SIGNIFICANT CHANGE UP (ref 3.8–10.5)

## 2022-11-02 PROCEDURE — 99233 SBSQ HOSP IP/OBS HIGH 50: CPT

## 2022-11-02 PROCEDURE — 99221 1ST HOSP IP/OBS SF/LOW 40: CPT

## 2022-11-02 PROCEDURE — 70553 MRI BRAIN STEM W/O & W/DYE: CPT | Mod: 26

## 2022-11-02 RX ORDER — INSULIN LISPRO 100/ML
5 VIAL (ML) SUBCUTANEOUS
Refills: 0 | Status: DISCONTINUED | OUTPATIENT
Start: 2022-11-02 | End: 2022-11-03

## 2022-11-02 RX ORDER — LANOLIN ALCOHOL/MO/W.PET/CERES
5 CREAM (GRAM) TOPICAL ONCE
Refills: 0 | Status: COMPLETED | OUTPATIENT
Start: 2022-11-02 | End: 2022-11-02

## 2022-11-02 RX ORDER — ONDANSETRON 8 MG/1
4 TABLET, FILM COATED ORAL ONCE
Refills: 0 | Status: COMPLETED | OUTPATIENT
Start: 2022-11-02 | End: 2022-11-02

## 2022-11-02 RX ORDER — APIXABAN 2.5 MG/1
5 TABLET, FILM COATED ORAL
Refills: 0 | Status: DISCONTINUED | OUTPATIENT
Start: 2022-11-02 | End: 2022-11-03

## 2022-11-02 RX ORDER — INSULIN GLARGINE 100 [IU]/ML
7 INJECTION, SOLUTION SUBCUTANEOUS AT BEDTIME
Refills: 0 | Status: DISCONTINUED | OUTPATIENT
Start: 2022-11-02 | End: 2022-11-03

## 2022-11-02 RX ADMIN — Medication 4: at 11:25

## 2022-11-02 RX ADMIN — ONDANSETRON 4 MILLIGRAM(S): 8 TABLET, FILM COATED ORAL at 18:14

## 2022-11-02 RX ADMIN — Medication 3 UNIT(S): at 07:45

## 2022-11-02 RX ADMIN — Medication 81 MILLIGRAM(S): at 07:45

## 2022-11-02 RX ADMIN — APIXABAN 2.5 MILLIGRAM(S): 2.5 TABLET, FILM COATED ORAL at 05:59

## 2022-11-02 RX ADMIN — Medication 3 UNIT(S): at 11:25

## 2022-11-02 RX ADMIN — Medication 5 MILLIGRAM(S): at 22:24

## 2022-11-02 RX ADMIN — OXYCODONE HYDROCHLORIDE 5 MILLIGRAM(S): 5 TABLET ORAL at 12:25

## 2022-11-02 RX ADMIN — SPIRONOLACTONE 50 MILLIGRAM(S): 25 TABLET, FILM COATED ORAL at 16:58

## 2022-11-02 RX ADMIN — SENNA PLUS 2 TABLET(S): 8.6 TABLET ORAL at 21:51

## 2022-11-02 RX ADMIN — LEVETIRACETAM 750 MILLIGRAM(S): 250 TABLET, FILM COATED ORAL at 05:59

## 2022-11-02 RX ADMIN — Medication 1 TABLET(S): at 16:57

## 2022-11-02 RX ADMIN — SPIRONOLACTONE 50 MILLIGRAM(S): 25 TABLET, FILM COATED ORAL at 05:59

## 2022-11-02 RX ADMIN — Medication 10 MILLIGRAM(S): at 05:59

## 2022-11-02 RX ADMIN — PANTOPRAZOLE SODIUM 40 MILLIGRAM(S): 20 TABLET, DELAYED RELEASE ORAL at 06:00

## 2022-11-02 RX ADMIN — Medication 5 UNIT(S): at 16:56

## 2022-11-02 RX ADMIN — INSULIN GLARGINE 7 UNIT(S): 100 INJECTION, SOLUTION SUBCUTANEOUS at 22:13

## 2022-11-02 RX ADMIN — ATORVASTATIN CALCIUM 40 MILLIGRAM(S): 80 TABLET, FILM COATED ORAL at 21:52

## 2022-11-02 RX ADMIN — OXYCODONE HYDROCHLORIDE 5 MILLIGRAM(S): 5 TABLET ORAL at 11:25

## 2022-11-02 RX ADMIN — Medication 2: at 07:43

## 2022-11-02 RX ADMIN — APIXABAN 5 MILLIGRAM(S): 2.5 TABLET, FILM COATED ORAL at 16:58

## 2022-11-02 RX ADMIN — Medication 4 MILLIGRAM(S): at 21:51

## 2022-11-02 RX ADMIN — LEVETIRACETAM 750 MILLIGRAM(S): 250 TABLET, FILM COATED ORAL at 16:58

## 2022-11-02 RX ADMIN — Medication 1 TABLET(S): at 05:59

## 2022-11-02 RX ADMIN — POLYETHYLENE GLYCOL 3350 17 GRAM(S): 17 POWDER, FOR SOLUTION ORAL at 07:45

## 2022-11-02 RX ADMIN — Medication 25 MICROGRAM(S): at 06:00

## 2022-11-02 RX ADMIN — Medication 6: at 16:55

## 2022-11-02 RX ADMIN — Medication 10 MILLIGRAM(S): at 18:14

## 2022-11-02 NOTE — PROGRESS NOTE ADULT - PROBLEM SELECTOR PLAN 1
continue postop care  decrease amio 200 qd   no bb secondary to sb/ junct 50-60  diuresis  d/c gaitan- allow pt to self cath  continue synthroid  pulm toilet  pain management  increase activity as tolerated  bowel regimen  Discharge planning- home pt continue postop care  decrease amio 200 qd   no bb secondary to sb/ junct 50-60  diuresis  continue synthroid  pulm toilet  pain management  increase activity as tolerated  bowel regimen  Discharge planning- home pt when stable

## 2022-11-02 NOTE — DIETITIAN INITIAL EVALUATION ADULT - NSFNSGIIOFT_GEN_A_CORE
Pt denies any nausea, vomiting, constipation; yet reports some diarrhea. Per flow sheets, last BM on 11/02: Pt is currently on bowel regimen.  Pt denies any nausea, vomiting, constipation; yet reports some stomach upset. Per flow sheets, last BM on 11/02: Pt is currently on bowel regimen.

## 2022-11-02 NOTE — PROGRESS NOTE ADULT - PROBLEM SELECTOR PLAN 2
Post op seizure---> continue keppra 750bid  for prophylaxis  Neuro f/u as an outpatient   Outpt MRI as per neuro   continue to monitor Post op seizure---> continue keppra 750bid  for prophylaxis  Neuro f/u as an outpatient   MRI brain done this am   continue to monitor

## 2022-11-02 NOTE — DIETITIAN INITIAL EVALUATION ADULT - DIET TYPE
low sodium/consistent carbohydrate (no snacks)/supplement (specify) consistent carbohydrate (no snacks)/DASH/TLC (sodium and cholesterol restricted diet)

## 2022-11-02 NOTE — DIETITIAN INITIAL EVALUATION ADULT - ADD RECOMMEND
1) Continue current [facility] diet. Add extra protein in the tray such as greek yogurt, peanut butter, etc.  3)Encourage pt to consume >75% of meals/ oral supplements.  4) Monitor PO intake, labs, weights, skin integrity, GI tolerance, bowel movement & regularity.  1) Add low sodium to current [facility] diet.  2) Add Glucerna x2/ day (provides 440kcal, 20 g protein) for high protein needs.  3) Recommend vit C 500 mg daily for incision healing, pending contraindication.  3)Encourage pt to consume >75% of meals/ oral supplements.  4) Monitor PO intake, labs, weights, skin integrity, GI tolerance, bowel movement & regularity.  1) Add Dash diet to current [facility] [place] diet.  2) Add Glucerna x2/ day (provides 440kcal, 20 g protein) for high protein needs.  3) Recommend vit C 500 mg daily for incision healing, pending contraindication.  3)Encourage pt to consume >75% of meals/ oral supplements.  4) Monitor PO intake, labs, weights, skin integrity, GI tolerance, bowel movement & regularity.

## 2022-11-02 NOTE — CONSULT NOTE ADULT - SUBJECTIVE AND OBJECTIVE BOX
HPI:  Patient has history of diabetes, A1C 6.2%, on oral meds/Janumet at home, no recent hypoglycemic episodes, no polyuria polydipsia.   Also takes synthroid 25 mcg po daily for hypothyroidism. Patient follows up with PCP for health management.  Endo was consulted for glycemic control.    PAST MEDICAL & SURGICAL HISTORY:  Paroxysmal atrial fibrillation  s/p ablation  on ELIQUIS till 10/13      Hypertension      Hyperlipidemia      Bladder cancer      Diabetes mellitus  2      Hypothyroidism      At risk for sleep apnea  LILLY on C-pap      COVID-19 virus infection  positive with mild infection 10/10/22 , had MAB on 10/10/22 at [facility], surgeon aware      Severe mitral regurgitation  seen in recent ESDRAS      PAF (paroxysmal atrial fibrillation)  s/p DCCV 7 rfa DR Hoover &gt; nsr      Bladder cancer  rsx/ treatment 2020, self cath 3x/day      Dysuria  on self cath 6x/day, UC sent      H/O shoulder surgery  1/1/2017      H/O cardiac catheterization  5/2/2022      Bladder tumor  s/p resection 2020          FAMILY HISTORY:  FHx: ovarian cancer (Mother)    FH: breast cancer (Sibling)    FH: heart disease (Father)        Social History:    Outpatient Medications:    MEDICATIONS  (STANDING):  apixaban 5 milliGRAM(s) Oral two times a day  aspirin enteric coated 81 milliGRAM(s) Oral daily  atorvastatin 40 milliGRAM(s) Oral at bedtime  bisacodyl Suppository 10 milliGRAM(s) Rectal once  chlorhexidine 2% Cloths 1 Application(s) Topical daily  dextrose 5%. 1000 milliLiter(s) (50 mL/Hr) IV Continuous <Continuous>  dextrose 5%. 1000 milliLiter(s) (100 mL/Hr) IV Continuous <Continuous>  doxazosin 4 milliGRAM(s) Oral at bedtime  glucagon  Injectable 1 milliGRAM(s) IntraMuscular once  insulin glargine Injectable (LANTUS) 7 Unit(s) SubCutaneous at bedtime  insulin lispro (ADMELOG) corrective regimen sliding scale   SubCutaneous three times a day before meals  insulin lispro (ADMELOG) corrective regimen sliding scale   SubCutaneous at bedtime  insulin lispro Injectable (ADMELOG) 5 Unit(s) SubCutaneous three times a day before meals  levETIRAcetam 750 milliGRAM(s) Oral two times a day  levothyroxine 25 MICROGram(s) Oral daily  pantoprazole    Tablet 40 milliGRAM(s) Oral before breakfast  polyethylene glycol 3350 17 Gram(s) Oral daily  senna 2 Tablet(s) Oral at bedtime  spironolactone 50 milliGRAM(s) Oral two times a day  torsemide 10 milliGRAM(s) Oral daily  trimethoprim  160 mG/sulfamethoxazole 800 mG 1 Tablet(s) Oral two times a day    MEDICATIONS  (PRN):  acetaminophen     Tablet .. 650 milliGRAM(s) Oral every 6 hours PRN Mild Pain (1 - 3)  dextrose Oral Gel 15 Gram(s) Oral once PRN Blood Glucose LESS THAN 70 milliGRAM(s)/deciliter  guaiFENesin Oral Liquid (Sugar-Free) 100 milliGRAM(s) Oral every 6 hours PRN Cough  oxyCODONE    IR 5 milliGRAM(s) Oral every 6 hours PRN Moderate Pain (4 - 6)      Allergies    penicillins (Diarrhea)    Intolerances      Review of Systems:  Constitutional: No fever, no chills  Eyes: No blurry vision  Neuro: No tremors  HEENT: No pain, no neck swelling  Cardiovascular: No chest pain, no palpitations  Respiratory: Has SOB, no cough  GI: No nausea, vomiting, abdominal pain  : No dysuria  Skin: no rash  MSK: Has leg swelling.  Psych: no depression  Endocrine: no polyuria, polydipsia    ALL OTHER SYSTEMS REVIEWED AND NEGATIVE    UNABLE TO OBTAIN    PHYSICAL EXAM:  VITALS: T(C): 36.8 (11-02-22 @ 05:10)  T(F): 98.2 (11-02-22 @ 05:10), Max: 98.2 (11-02-22 @ 05:10)  HR: 68 (11-02-22 @ 09:37) (53 - 69)  BP: 118/69 (11-02-22 @ 05:10) (118/69 - 137/66)  RR:  (18 - 18)  SpO2:  (92% - 96%)  Wt(kg): --  GENERAL: NAD, well-groomed, well-developed  EYES: No proptosis, no lid lag  HEENT:  Atraumatic, Normocephalic  THYROID: Normal size, no palpable nodules  RESPIRATORY: Clear to auscultation bilaterally; No rales, rhonchi, wheezing  CARDIOVASCULAR: Si S2, No murmurs;  GI: Soft, non distended, normal bowel sounds  SKIN: Dry, intact, No rashes or lesions  MUSCULOSKELETAL: Has BL lower extremity edema.  NEURO:  no tremor, sensation decreased in feet BL,    POCT Blood Glucose.: 224 mg/dL (11-02-22 @ 11:05)  POCT Blood Glucose.: 156 mg/dL (11-02-22 @ 07:28)  POCT Blood Glucose.: 232 mg/dL (11-01-22 @ 21:56)  POCT Blood Glucose.: 139 mg/dL (11-01-22 @ 16:25)  POCT Blood Glucose.: 210 mg/dL (11-01-22 @ 11:09)  POCT Blood Glucose.: 148 mg/dL (11-01-22 @ 07:33)  POCT Blood Glucose.: 148 mg/dL (10-31-22 @ 21:19)  POCT Blood Glucose.: 150 mg/dL (10-31-22 @ 16:30)  POCT Blood Glucose.: 200 mg/dL (10-31-22 @ 11:28)  POCT Blood Glucose.: 145 mg/dL (10-31-22 @ 07:34)  POCT Blood Glucose.: 176 mg/dL (10-30-22 @ 21:44)  POCT Blood Glucose.: 176 mg/dL (10-30-22 @ 16:33)                            8.3    6.37  )-----------( 163      ( 02 Nov 2022 05:51 )             26.3       11-02    136  |  102  |  18  ----------------------------<  133<H>  4.5   |  24  |  1.18    eGFR: 64    Ca    8.7      11-02  Mg     1.8     11-02  Phos  3.5     11-01    TPro  6.0  /  Alb  3.1<L>  /  TBili  0.6  /  DBili  x   /  AST  25  /  ALT  44  /  AlkPhos  56  11-01      Thyroid Function Tests:  10-25 @ 02:41 TSH 2.44 FreeT4 -- T3 -- Anti TPO -- Anti Thyroglobulin Ab -- TSI --          10-24 Chol 65 Direct LDL -- LDL calculated 27 HDL 27<L> Trig 55    Radiology:                HPI:  Patient has history of diabetes, A1C 6.2%, on oral meds/Janumet at home, no recent hypoglycemic episodes, no polyuria polydipsia.   Also takes synthroid 25 mcg po daily for hypothyroidism. Patient follows up with PCP for health management.  Endo was consulted for glycemic control.    PAST MEDICAL & SURGICAL HISTORY:  Paroxysmal atrial fibrillation  s/p ablation  on ELIQUIS till 10/13      Hypertension      Hyperlipidemia      Bladder cancer      Diabetes mellitus  2      Hypothyroidism      At risk for sleep apnea  LILLY on C-pap      COVID-19 virus infection  positive with mild infection 10/10/22 , had MAB on 10/10/22 at [facility], surgeon aware      Severe mitral regurgitation  seen in recent ESDRAS      PAF (paroxysmal atrial fibrillation)  s/p DCCV 7 rfa DR Hoover &gt; nsr      Bladder cancer  rsx/ treatment 2020, self cath 3x/day      Dysuria  on self cath 6x/day, UC sent      H/O shoulder surgery  1/1/2017      H/O cardiac catheterization  5/2/2022      Bladder tumor  s/p resection 2020          FAMILY HISTORY:  FHx: ovarian cancer (Mother)    FH: breast cancer (Sibling)    FH: heart disease (Father)        Social History:    Outpatient Medications:    MEDICATIONS  (STANDING):  apixaban 5 milliGRAM(s) Oral two times a day  aspirin enteric coated 81 milliGRAM(s) Oral daily  atorvastatin 40 milliGRAM(s) Oral at bedtime  bisacodyl Suppository 10 milliGRAM(s) Rectal once  chlorhexidine 2% Cloths 1 Application(s) Topical daily  dextrose 5%. 1000 milliLiter(s) (50 mL/Hr) IV Continuous <Continuous>  dextrose 5%. 1000 milliLiter(s) (100 mL/Hr) IV Continuous <Continuous>  doxazosin 4 milliGRAM(s) Oral at bedtime  glucagon  Injectable 1 milliGRAM(s) IntraMuscular once  insulin glargine Injectable (LANTUS) 7 Unit(s) SubCutaneous at bedtime  insulin lispro (ADMELOG) corrective regimen sliding scale   SubCutaneous three times a day before meals  insulin lispro (ADMELOG) corrective regimen sliding scale   SubCutaneous at bedtime  insulin lispro Injectable (ADMELOG) 5 Unit(s) SubCutaneous three times a day before meals  levETIRAcetam 750 milliGRAM(s) Oral two times a day  levothyroxine 25 MICROGram(s) Oral daily  pantoprazole    Tablet 40 milliGRAM(s) Oral before breakfast  polyethylene glycol 3350 17 Gram(s) Oral daily  senna 2 Tablet(s) Oral at bedtime  spironolactone 50 milliGRAM(s) Oral two times a day  torsemide 10 milliGRAM(s) Oral daily  trimethoprim  160 mG/sulfamethoxazole 800 mG 1 Tablet(s) Oral two times a day    MEDICATIONS  (PRN):  acetaminophen     Tablet .. 650 milliGRAM(s) Oral every 6 hours PRN Mild Pain (1 - 3)  dextrose Oral Gel 15 Gram(s) Oral once PRN Blood Glucose LESS THAN 70 milliGRAM(s)/deciliter  guaiFENesin Oral Liquid (Sugar-Free) 100 milliGRAM(s) Oral every 6 hours PRN Cough  oxyCODONE    IR 5 milliGRAM(s) Oral every 6 hours PRN Moderate Pain (4 - 6)      Allergies    penicillins (Diarrhea)    Intolerances      Review of Systems:  Constitutional: No fever, no chills  Eyes: No blurry vision  Neuro: No tremors  HEENT: No pain, no neck swelling  Cardiovascular: No chest pain, no palpitations  Respiratory: Has SOB, no cough  GI: No nausea, vomiting, abdominal pain  : No dysuria  Skin: no rash  MSK: Has leg swelling.  Psych: no depression  Endocrine: no polyuria, polydipsia    ALL OTHER SYSTEMS REVIEWED AND NEGATIVE    UNABLE TO OBTAIN    PHYSICAL EXAM:  VITALS: T(C): 36.8 (11-02-22 @ 05:10)  T(F): 98.2 (11-02-22 @ 05:10), Max: 98.2 (11-02-22 @ 05:10)  HR: 68 (11-02-22 @ 09:37) (53 - 69)  BP: 118/69 (11-02-22 @ 05:10) (118/69 - 137/66)  RR:  (18 - 18)  SpO2:  (92% - 96%)  Wt(kg): --  GENERAL: NAD, well-groomed, well-developed  EYES: No proptosis, no lid lag  HEENT:  Atraumatic, Normocephalic  THYROID: Normal size, no palpable nodules  RESPIRATORY: Clear to auscultation bilaterally; No rales, rhonchi, wheezing  CARDIOVASCULAR: Si S2, No murmurs;  GI: Soft, non distended, normal bowel sounds  SKIN: Dry, intact, No rashes or lesions  MUSCULOSKELETAL: Has BL lower extremity edema.  NEURO:  no tremor, sensation decreased in feet BL,    POCT Blood Glucose.: 224 mg/dL (11-02-22 @ 11:05)  POCT Blood Glucose.: 156 mg/dL (11-02-22 @ 07:28)  POCT Blood Glucose.: 232 mg/dL (11-01-22 @ 21:56)  POCT Blood Glucose.: 139 mg/dL (11-01-22 @ 16:25)  POCT Blood Glucose.: 210 mg/dL (11-01-22 @ 11:09)  POCT Blood Glucose.: 148 mg/dL (11-01-22 @ 07:33)  POCT Blood Glucose.: 148 mg/dL (10-31-22 @ 21:19)  POCT Blood Glucose.: 150 mg/dL (10-31-22 @ 16:30)  POCT Blood Glucose.: 200 mg/dL (10-31-22 @ 11:28)  POCT Blood Glucose.: 145 mg/dL (10-31-22 @ 07:34)  POCT Blood Glucose.: 176 mg/dL (10-30-22 @ 21:44)  POCT Blood Glucose.: 176 mg/dL (10-30-22 @ 16:33)                            8.3    6.37  )-----------( 163      ( 02 Nov 2022 05:51 )             26.3       11-02    136  |  102  |  18  ----------------------------<  133<H>  4.5   |  24  |  1.18    eGFR: 64    Ca    8.7      11-02  Mg     1.8     11-02  Phos  3.5     11-01    TPro  6.0  /  Alb  3.1<L>  /  TBili  0.6  /  DBili  x   /  AST  25  /  ALT  44  /  AlkPhos  56  11-01      Thyroid Function Tests:  10-25 @ 02:41 TSH 2.44 FreeT4 -- T3 -- Anti TPO -- Anti Thyroglobulin Ab -- TSI --          10-24 Chol 65 Direct LDL -- LDL calculated 27 HDL 27<L> Trig 55    Radiology:

## 2022-11-02 NOTE — CONSULT NOTE ADULT - ASSESSMENT
Assessment  DMT2: 77y Male with DM T2 with hyperglycemia, A1C 6.2%, was on oral meds at home, now on low-dose basal bolus insulin, blood sugars are fluctuating/running high and not at target, no hypoglycemic episodes, eating meals, NAD.  CAD: on medications, no chest pain, stable, monitored.  Hypothyroidism: On 25 Synthroid  mcg po daily, compliant with Synthroid intake, asymptomatic, euthyroid.  Obesity: No strict exercise routines, not on any weight loss program, neither on low calorie diet.          Izabela Grady MD  Cell: [phone]  Office: [phone]               Assessment  DMT2: 77y Male with DM T2 with hyperglycemia, A1C 6.2%, was on oral meds at home, now on low-dose basal bolus insulin, blood sugars are fluctuating/running high and not at target, no  hypoglycemic episodes, eating meals, NAD.  CAD: on medications, no chest pain, stable, monitored.  Hypothyroidism: On 25 Synthroid  mcg po daily, compliant with Synthroid intake, asymptomatic, euthyroid.  Obesity: No strict exercise routines, not on any weight loss program, neither on low calorie diet.          Izabela Grady MD  Cell: [phone]  Office: [phone]

## 2022-11-02 NOTE — CONSULT NOTE ADULT - ASSESSMENT
77 year old with many medical problems:  self caths 4-6 times per day. Hs of bladder cancer, DM, BP, LILLY, admitted for MVR which was done.  Had Gonzalez, it was removed and developed dysuria. Gonzalez  was replaced.    no fever, chills , no sweats.    pt currently on po bactrim   would complete 3 days and dc

## 2022-11-02 NOTE — DIETITIAN INITIAL EVALUATION ADULT - PERTINENT MEDS FT
MEDICATIONS  (STANDING):  apixaban  aspirin   atorvastatin     doxazosin   insulin glargine   insulin lispro   levetiracetam  levothyroxine   pantoprazole    bisacodyl   polyethylene glycol   senna   spironolactone  torsemide   trimethoprim      MEDICATIONS  (PRN):  oxycodone

## 2022-11-02 NOTE — DIETITIAN INITIAL EVALUATION ADULT - ETIOLOGY
related to Increased demands for protein in the context of wound healing Related to lack of knowledge of nutrition related education related to Increased demands for protein in the context of wound healing for midline sternal incision Related to Increased demands for protein in the context of incision healing S/P MVR on (10/24/22)

## 2022-11-02 NOTE — DIETITIAN INITIAL EVALUATION ADULT - REASON FOR ADMISSION
As per Adult CT surgery PA chart note (10/02), "77  year old  RHD male PMH of HTN, HLD, DM2, former smoker,  hypothyroidism, obesity, LILLY on C-pap,  bladder cancer (2020, s/p resection of bladder tumor/ currently self catheterizes 6x/day ), PAF s/p DCCV /uncomplicated radiofrequency ablation of atrial fibrillation ([facility]/PVI, CTI) on 7/13/22,  left atrial appendage & severe MR . Presents with c/o recent" abnormal echo with fatigue due to mitral valve regurgitation. Presents for  scheduled Mitral valve replacement , MAZE, left atrial appendage closure on 10/20/2022.  10/24/22  MVr - triangular resection with 28mm Bobby band  AtriClip 45mm"    Nonrheumatic mitral annular calcification     As per Adult CT surgery PA chart note (10/02), "77  year old  RHD male PMH of HTN, HLD, DM2, former smoker,  hypothyroidism, obesity, LILLY on C-pap,  bladder cancer (2020, s/p resection of bladder tumor/ currently self catheterizes 6x/day ), PAF s/p DCCV /uncomplicated radiofrequency ablation of atrial fibrillation (WACA/PVI, CTI) on 7/13/22,  left atrial appendage & severe MR . Presents with c/o recent" abnormal echo with fatigue due to mitral valve regurgitation. Presents for  scheduled Mitral valve replacement , MAZE, left atrial appendage closure on 10/20/2022.  10/24/22  MVr - triangular resection with 28mm Bobby band  AtriClip 45mm"         As per Adult CT surgery PA chart note (11/02), "77  year old  RHD male PMH of HTN, HLD, DM2, former smoker,  hypothyroidism, obesity, LILLY on C-pap,  bladder cancer (2020, s/p resection of bladder tumor/ currently self catheterizes 6x/day ), PAF s/p DCCV /uncomplicated radiofrequency ablation of atrial fibrillation (WACA/PVI, CTI) on 7/13/22,  left atrial appendage & severe MR . Presents with c/o recent" abnormal echo with fatigue due to mitral valve regurgitation. Presents for  scheduled Mitral valve replacement , MAZE, left atrial appendage closure on 10/20/2022.  10/24/22  MVr - triangular resection with 28mm Bobby band  AtriClip 45mm"

## 2022-11-02 NOTE — DIETITIAN INITIAL EVALUATION ADULT - OTHER INFO
Pt reports UBW: 277lbs or 125.9 kg  Standing wt: 121.3 kg(10/27), 133.3kg (10/30), 129 kg(11/02). Fluctuation in wt due to fluid shifts. RD will continue to monitor wt trends.  Pt reports UBW: 277lbs or 125.9 kg  Per flow sheets, Standing wt: 121.3 kg(10/27), 133.3kg (10/30), 129 kg(11/02). Fluctuation in wt maybe due to fluid shifts.   Per [facility] HIE: 135.6 kg (7/18/22), 140 kg (6/24/22)-? edema present.  Pt reports wt stability PTA.  RD will continue to monitor wt trends.

## 2022-11-02 NOTE — DIETITIAN INITIAL EVALUATION ADULT - OTHER CALCULATIONS
Estimated energy, protein, needs are calculated based on IBW=80.7kg wt  Estimated Fluid needs defer to teams  IBW:80.7 kg    IBW%:160%   UBW:125.9 kg   UBW%: 102%

## 2022-11-02 NOTE — DIETITIAN INITIAL EVALUATION ADULT - NSICDXPASTMEDICALHX_GEN_ALL_CORE_FT
PAST MEDICAL HISTORY:  At risk for sleep apnea LILLY on C-pap    Bladder cancer     Bladder cancer rsx/ treatment 2020, self cath 3x/day    COVID-19 virus infection positive with mild infection 10/10/22 , had MAB on 10/10/22 at [facility], surgeon aware    Diabetes mellitus 2    Dysuria on self cath 6x/day, UC sent    Hyperlipidemia     Hypertension     Hypothyroidism     PAF (paroxysmal atrial fibrillation) s/p DCCV 7 rfa DR Hoover > nsr    Paroxysmal atrial fibrillation s/p ablation  on ELIQUIS till 10/13    Severe mitral regurgitation seen in recent ESDRAS

## 2022-11-02 NOTE — DIETITIAN INITIAL EVALUATION ADULT - REASON
Nutrition-focused physical examination deferred at this time - pt with stable wt hx, reporting good PO intake PTA. No overt signs of fat/muscle wasting visually observed.  Nutrition-focused physical examination deferred at this time - reporting good PO intake PTA. No overt signs of fat/muscle wasting visually observed.

## 2022-11-02 NOTE — CONSULT NOTE ADULT - SUBJECTIVE AND OBJECTIVE BOX
Patient is a 77y old  Male who presents with a chief complaint of MR (31 Oct 2022 09:56)    HPI:      PAST MEDICAL & SURGICAL HISTORY:  Paroxysmal atrial fibrillation  s/p ablation  on ELIQUIS till 10/13      Hypertension      Hyperlipidemia      Bladder cancer      Diabetes mellitus  2      Hypothyroidism      At risk for sleep apnea  LILLY on C-pap      COVID-19 virus infection  positive with mild infection 10/10/22 , had MAB on 10/10/22 at [facility], surgeon aware      Severe mitral regurgitation  seen in recent ESDRAS      PAF (paroxysmal atrial fibrillation)  s/p DCCV 7 rfa DR Hoover &gt; nsr      Bladder cancer  rsx/ treatment 2020, self cath 3x/day      Dysuria  on self cath 6x/day, UC sent      H/O shoulder surgery  1/1/2017      H/O cardiac catheterization  5/2/2022      Bladder tumor  s/p resection 2020          Social history:    FAMILY HISTORY:  FHx: ovarian cancer (Mother)    FH: breast cancer (Sibling)    FH: heart disease (Father)      REVIEW OF SYSTEMS  General:	Denies any malaise fatigue or chills. Fevers absent    Skin:No rash  	  Ophthalmologic:Denies any visual complaints,discharge redness or photophobia  	  ENMT:No nasal discharge,headache,sinus congestion or throat pain.No dental complaints    Respiratory and Thorax:No cough,sputum or chest pain.Denies shortness of breath  	  Cardiovascular:	No chest pain,palpitaions or dizziness    Gastrointestinal:	NO nausea,abdominal pain or diarrhea.    Genitourinary:	No dysuria,frequency. No flank pain    Musculoskeletal:	No joint swelling or pain.No weakness    Neurological:No confusion,diziness.No extremity weakness.No bladder or bowel incontinence	    Psychiatric:No delusions or hallucinations	    Hematology/Lymphatics:	No LN swelling.No gum bleeding     Endocrine:	No recent weight gain or loss.No abnormal heat/cold intolerance    Allergic/Immunologic:	No hives or rash   Allergies    penicillins (Diarrhea)    Intolerances        Antimicrobials:    trimethoprim  160 mG/sulfamethoxazole 800 mG 1 Tablet(s) Oral two times a day        Vital Signs Last 24 Hrs  T(C): 36.8 (02 Nov 2022 05:10), Max: 36.8 (02 Nov 2022 05:10)  T(F): 98.2 (02 Nov 2022 05:10), Max: 98.2 (02 Nov 2022 05:10)  HR: 68 (02 Nov 2022 09:37) (53 - 69)  BP: 118/69 (02 Nov 2022 05:10) (118/69 - 137/66)  BP(mean): --  RR: 18 (02 Nov 2022 05:10) (18 - 18)  SpO2: 92% (02 Nov 2022 09:37) (92% - 96%)    Parameters below as of 02 Nov 2022 05:10  Patient On (Oxygen Delivery Method): BiPAP/CPAP        PHYSICAL EXAM:Pleasant patient in no acute distress.      Constitutional:Comfortable.Awake and alert  No cachexia     Eyes:PERRL EOMI.NO discharge or conjunctival injection    ENMT:No sinus tenderness.No thrush.No pharyngeal exudate or erythema.Fair dental hygiene    Neck:Supple,No LN,no JVD      Respiratory:Good air entry bilaterally,CTA    Cardiovascular:S1 S2 wnl, No murmurs,rub or gallops    Gastrointestinal:Soft BS(+) no tenderness no masses ,No rebound or guarding    Genitourinary:No CVA tendereness     Rectal:    Extremities:No cyanosis,clubbing or edema.    Vascular:peripheral pulses felt    Neurological:AAO X 3,No grossly focal deficits    Skin:No rash     Lymph Nodes:No palpable LNs    Musculoskeletal:No joint swelling or LOM    Psychiatric:Affect normal.                                8.3    6.37  )-----------( 163      ( 02 Nov 2022 05:51 )             26.3         11-02    136  |  102  |  18  ----------------------------<  133<H>  4.5   |  24  |  1.18    Ca    8.7      02 Nov 2022 05:51  Phos  3.5     11-01  Mg     1.8     11-02    TPro  6.0  /  Alb  3.1<L>  /  TBili  0.6  /  DBili  x   /  AST  25  /  ALT  44  /  AlkPhos  56  11-01      RECENT CULTURES:  10-30 @ 11:55  Catheterized Catheterized  Serratia marcescens  Serratia marcescens  ELHAM    50,000 - 99,000 CFU/mL Serratia marcescens  --      MICROBIOLOGY:  Culture Results:   50,000 - 99,000 CFU/mL Serratia marcescens (10-30 @ 11:55)          Radiology:      Assessment:        Recommendations and Plan:    Pager [phone]  After 5 pm/weekends or if no response :[phone]

## 2022-11-02 NOTE — DIETITIAN INITIAL EVALUATION ADULT - NSFNSPHYEXAMSKINFT_GEN_A_CORE
As per flow sheets, WDL ecchymotic, no pressure injury noted. As per flow sheets, midline sternal, no pressure injury noted.

## 2022-11-02 NOTE — PROGRESS NOTE ADULT - PROBLEM SELECTOR PLAN 3
serratia marescans sensitivities pending  start bactrim as per Dr. Barraza   monitor ekg- ck in am  allow pt to self cath UC serratia marescans sensitivities pending  start bactrim as per Dr. Barraza  ID consulted; uti sensitive to bactrim- continue 3 days as per ID

## 2022-11-02 NOTE — DIETITIAN INITIAL EVALUATION ADULT - NUTRITIONGOAL OUTCOME1
Pt to meet >75 % of protein-energy needs via meals/ supplement. Pt to meet >75 % of protein  needs via meals/ supplement.

## 2022-11-02 NOTE — DIETITIAN INITIAL EVALUATION ADULT - NSFNSADHERENCEPTAFT_GEN_A_CORE
Pt does not report following any dietary restrictions at home Pt does not report following any dietary restrictions at home. Pt reports taking  Janumet (metformin) at home x2/day; does not do FS daily. HgbA1C: 6.2% indicates good BG control. Pt does not report following any dietary restrictions at home. Pt reports taking  Janumet (metformin) at home x2/day; does not do FS daily. Pt's HgbA1C: 6.2% (10/18/22) indicates good BG control. Pt does not report following any dietary restrictions at home. Pt reports taking  Janumet (metformin) at home x2/day; does not do FS daily only occasionally. Pt's HgbA1C: 6.2% (10/18/22) indicates good BG control for Age.

## 2022-11-02 NOTE — DIETITIAN INITIAL EVALUATION ADULT - NSICDXPASTSURGICALHX_GEN_ALL_CORE_FT
PAST SURGICAL HISTORY:  Bladder tumor s/p resection 2020    H/O cardiac catheterization 5/2/2022    H/O shoulder surgery 1/1/2017

## 2022-11-02 NOTE — PROGRESS NOTE ADULT - SUBJECTIVE AND OBJECTIVE BOX
VITAL SIGNS    Telemetry:    Vital Signs Last 24 Hrs  T(C): 36.8 (22 @ 05:10), Max: 36.8 (22 @ 05:10)  T(F): 98.2 (22 @ 05:10), Max: 98.2 (22 @ 05:10)  HR: 68 (22 @ 09:37) (53 - 69)  BP: 118/69 (22 @ 05:10) (118/69 - 137/66)  RR: 18 (22 @ 05:10) (18 - 18)  SpO2: 92% (22 @ 09:37) (92% - 96%)             @ 07:01  -   @ 07:00  --------------------------------------------------------  IN: 840 mL / OUT: 3600 mL / NET: -2760 mL     @ 07:01  -   @ 10:02  --------------------------------------------------------  IN: 240 mL / OUT: 0 mL / NET: 240 mL       Daily     Daily Weight in k (2022 07:00)  Admit Wt: Drug Dosing Weight  Height (cm): 182.9 (24 Oct 2022 06:00)  Weight (kg): 126.9 (24 Oct 2022 06:00)  BMI (kg/m2): 37.9 (24 Oct 2022 06:00)  BSA (m2): 2.46 (24 Oct 2022 06:00)      CAPILLARY BLOOD GLUCOSE      POCT Blood Glucose.: 156 mg/dL (2022 07:28)  POCT Blood Glucose.: 232 mg/dL (2022 21:56)  POCT Blood Glucose.: 139 mg/dL (2022 16:25)  POCT Blood Glucose.: 210 mg/dL (2022 11:09)          acetaminophen     Tablet .. 650 milliGRAM(s) Oral every 6 hours PRN  apixaban 5 milliGRAM(s) Oral two times a day  aspirin enteric coated 81 milliGRAM(s) Oral daily  atorvastatin 40 milliGRAM(s) Oral at bedtime  bisacodyl Suppository 10 milliGRAM(s) Rectal once  chlorhexidine 2% Cloths 1 Application(s) Topical daily  dextrose 5%. 1000 milliLiter(s) IV Continuous <Continuous>  dextrose 5%. 1000 milliLiter(s) IV Continuous <Continuous>  dextrose Oral Gel 15 Gram(s) Oral once PRN  doxazosin 4 milliGRAM(s) Oral at bedtime  glucagon  Injectable 1 milliGRAM(s) IntraMuscular once  guaiFENesin Oral Liquid (Sugar-Free) 100 milliGRAM(s) Oral every 6 hours PRN  insulin glargine Injectable (LANTUS) 5 Unit(s) SubCutaneous at bedtime  insulin lispro (ADMELOG) corrective regimen sliding scale   SubCutaneous three times a day before meals  insulin lispro (ADMELOG) corrective regimen sliding scale   SubCutaneous at bedtime  insulin lispro Injectable (ADMELOG) 3 Unit(s) SubCutaneous three times a day before meals  levETIRAcetam 750 milliGRAM(s) Oral two times a day  levothyroxine 25 MICROGram(s) Oral daily  oxyCODONE    IR 5 milliGRAM(s) Oral every 6 hours PRN  pantoprazole    Tablet 40 milliGRAM(s) Oral before breakfast  polyethylene glycol 3350 17 Gram(s) Oral daily  senna 2 Tablet(s) Oral at bedtime  spironolactone 50 milliGRAM(s) Oral two times a day  torsemide 10 milliGRAM(s) Oral daily  trimethoprim  160 mG/sulfamethoxazole 800 mG 1 Tablet(s) Oral two times a day      PHYSICAL EXAM    Subjective: "Hi.   Neurology: alert and oriented x 3, nonfocal, no gross deficits  CV : tele:  RSR  Sternal Wound :  CDI with dressing , Stable  Lungs: clear. RR easy, unlabored   Abdomen: soft, nontender, nondistended, positive bowel sounds, bowel movement   Neg N/V/D   :  pt voiding without difficulty   Extremities:   OLIVIER; edema, neg calf tenderness.   PPP bilaterally      PW:  Chest tubes:                 VITAL SIGNS    Telemetry: acc junct 50-80  Vital Signs Last 24 Hrs  T(C): 36.8 (22 @ 05:10), Max: 36.8 (22 @ 05:10)  T(F): 98.2 (22 @ 05:10), Max: 98.2 (22 @ 05:10)  HR: 68 (22 @ 09:37) (53 - 69)  BP: 118/69 (22 @ 05:10) (118/69 - 137/66)  RR: 18 (22 @ 05:10) (18 - 18)  SpO2: 92% (22 @ 09:37) (92% - 96%)             @ 07:01  -   @ 07:00  --------------------------------------------------------  IN: 840 mL / OUT: 3600 mL / NET: -2760 mL     @ 07:01  -   @ 10:02  --------------------------------------------------------  IN: 240 mL / OUT: 0 mL / NET: 240 mL       Daily     Daily Weight in k (2022 07:00)  Admit Wt: Drug Dosing Weight  Height (cm): 182.9 (24 Oct 2022 06:00)  Weight (kg): 126.9 (24 Oct 2022 06:00)  BMI (kg/m2): 37.9 (24 Oct 2022 06:00)  BSA (m2): 2.46 (24 Oct 2022 06:00)      CAPILLARY BLOOD GLUCOSE      POCT Blood Glucose.: 156 mg/dL (2022 07:28)  POCT Blood Glucose.: 232 mg/dL (2022 21:56)  POCT Blood Glucose.: 139 mg/dL (2022 16:25)  POCT Blood Glucose.: 210 mg/dL (2022 11:09)          acetaminophen     Tablet .. 650 milliGRAM(s) Oral every 6 hours PRN  apixaban 5 milliGRAM(s) Oral two times a day  aspirin enteric coated 81 milliGRAM(s) Oral daily  atorvastatin 40 milliGRAM(s) Oral at bedtime  bisacodyl Suppository 10 milliGRAM(s) Rectal once  chlorhexidine 2% Cloths 1 Application(s) Topical daily  dextrose 5%. 1000 milliLiter(s) IV Continuous <Continuous>  dextrose 5%. 1000 milliLiter(s) IV Continuous <Continuous>  dextrose Oral Gel 15 Gram(s) Oral once PRN  doxazosin 4 milliGRAM(s) Oral at bedtime  glucagon  Injectable 1 milliGRAM(s) IntraMuscular once  guaiFENesin Oral Liquid (Sugar-Free) 100 milliGRAM(s) Oral every 6 hours PRN  insulin glargine Injectable (LANTUS) 5 Unit(s) SubCutaneous at bedtime  insulin lispro (ADMELOG) corrective regimen sliding scale   SubCutaneous three times a day before meals  insulin lispro (ADMELOG) corrective regimen sliding scale   SubCutaneous at bedtime  insulin lispro Injectable (ADMELOG) 3 Unit(s) SubCutaneous three times a day before meals  levETIRAcetam 750 milliGRAM(s) Oral two times a day  levothyroxine 25 MICROGram(s) Oral daily  oxyCODONE    IR 5 milliGRAM(s) Oral every 6 hours PRN  pantoprazole    Tablet 40 milliGRAM(s) Oral before breakfast  polyethylene glycol 3350 17 Gram(s) Oral daily  senna 2 Tablet(s) Oral at bedtime  spironolactone 50 milliGRAM(s) Oral two times a day  torsemide 10 milliGRAM(s) Oral daily  trimethoprim  160 mG/sulfamethoxazole 800 mG 1 Tablet(s) Oral two times a day         PHYSICAL EXAM    Subjective: "I feel ok."  Neurology: alert and oriented x 3, nonfocal, no gross deficits  CV : tele:  junct 50-80  Sternal Wound :  CDI THADDEUS, sternum Stable  Lungs: clear diminished at the bases; RR easy, unlabored   Abdomen: soft, nontender, nondistended, positive bowel sounds, + bowel movement   Neg N/V/D; obese abdomen   :  + gaitan--> cloudy yellow urine- pt self caths at home   Extremities:   OLIVIER; trace LE edema, neg calf tenderness.   PPP bilaterally        PW: no  Chest tubes: none

## 2022-11-02 NOTE — DIETITIAN INITIAL EVALUATION ADULT - NSFNSNUTRHOMESUPPLEMENTFT_GEN_A_CORE
Pt does not report taking any supplements at home No vitamins, minerals & supplements noted in outpatient medications

## 2022-11-02 NOTE — CONSULT NOTE ADULT - PROBLEM SELECTOR RECOMMENDATION 9
Will increase Lantus to 7u at bedtime.  Will increase Admelog to 5u before each meal and continue Admelog correction scale coverage. Will continue monitoring FS and FU.  Suggest DC on prior Janumet 50/1000 BID. FU 4 weeks.  Patient counseled for compliance with consistent low carb diet and exercise as tolerated outpatient.

## 2022-11-02 NOTE — DIETITIAN INITIAL EVALUATION ADULT - SIGNS/SYMPTOMS
As evidenced by 10/24/22  Mitral valve replacement - triangular resection with 28mm Bobby band The neurology physician requests to provide the pt with nutrition education Midsternal incision present

## 2022-11-02 NOTE — DIETITIAN INITIAL EVALUATION ADULT - PERSON TAUGHT/METHOD
Pt was being taken for some test, therefore left the educational material to RN for the pt./written material/patient instructed Pt was being taken for testing, therefore left the educational material for the [facility] on [facility], Heart healthy nutrition therapy to RN./written material/patient instructed

## 2022-11-02 NOTE — DIETITIAN INITIAL EVALUATION ADULT - PERTINENT LABORATORY DATA
11-02    POCT Blood Glucose.: 224 mg/dL   A1C with Estimated Average Glucose Result: 6.2 % (10/18)   11-02  POCT Blood Glucose.: 224 mg/dL   A1C with Estimated Average Glucose Result: 6.2 % (10/18)  HDL: 27 <L> (10/24)

## 2022-11-02 NOTE — PROGRESS NOTE ADULT - ASSESSMENT
77  year old  RHD male PMH of HTN, HLD, DM2, former smoker,  hypothyroidism, obesity, LILLY on C-pap,  bladder cancer (2020, s/p resection of bladder tumor/ currently self catheterizes 6x/day ), PAF s/p DCCV /uncomplicated radiofrequency ablation of atrial fibrillation ([facility]/PVI, CTI) on 7/13/22,  left atrial appendage & severe MR . Presents with c/o recent" abnormal echo with fatigue due to mitral valve regurgitation. Presents for  scheduled Mitral valve replacement , MAZE, left atrial appendage closure on 10/20/2022.  10/24/22   MVr - triangular resection with 28mm Bobby band  AtriClip 45mm   Dawn-Maze IV procedure  PFO CLOSURE  Code stroke called for LUE convulsion with residual LUE weakness immediately after, Patient had LUE convulsions that spread to entire body  CT done, neuro following:  Stroke on differential as well given vague lucency at the genu of the right internal capsule and in the right thalamus of indeterminate age. Likely lacunar infarcts.   MRI when capable , eeg and Keppra added  Extubated d 1  + pressors, prbc's  EEG prelim neg.  Pt with lethargy post keppra, d/c since eeg neg  10/ 26 EEG reviewed by neuro, although no seizures  he is at risk for further sezures, recommended resuming keppra and if lethargic ,  change to vimpat  Pt with junctional / afib  Transferred to sdu  10/28 Maintain PW for junctional rhythm, sorbitol for constipation. Transferred to [address] [place] [address]  10/27 Junctional 50-60, remains on amio 200bid, no beta blocker   Cont keppra for now.  Neuro f/u  D/c jared drain.  10/29 Add Eliquis 2.5 bid>PW removed this am Continue diuretics  Amio 1800 mg to date>continue to 5 gram load as per DR Barraza  Magnesium repleted  10/30 VSS; rsr/junct 50-60- no bb; continue amio 200 bid; pt c/o dysuria--> ck ua/ urine cx +l.esterase; neg nitrate; start bactrim as per Dr. Barraza and ck urine cx; monitor ekg- ck in am 10/31  continue diuresis; allow pt to start self cath himself  increase activity as tolerated  10/31 Continue with diuretics. Pt remains 7kg above preop weight. Bactrim for UTI. . Completed 2.2G Amiodarone load. Maintaining junctional rhythm. Continue with amiodarone for 5G load as per Attending   11/1 JR 54-70  Amio decreased to 200 qd--EP called,   serratia marescens --sensitivities pending on bactrim.  -2600cc/24hrs-torsemide 10       77  year old  RHD male PMH of HTN, HLD, DM2, former smoker,  hypothyroidism, obesity, LILLY on C-pap,  bladder cancer (2020, s/p resection of bladder tumor/ currently self catheterizes 6x/day ), PAF s/p DCCV /uncomplicated radiofrequency ablation of atrial fibrillation ([facility]/PVI, CTI) on 7/13/22,  left atrial appendage & severe MR . Presents with c/o recent" abnormal echo with fatigue due to mitral valve regurgitation. Presents for  scheduled Mitral valve replacement , MAZE, left atrial appendage closure on 10/20/2022.  10/24/22   MVr - triangular resection with 28mm Bobby band  AtriClip 45mm   Dawn-Maze IV procedure  PFO CLOSURE  Code stroke called for LUE convulsion with residual LUE weakness immediately after, Patient had LUE convulsions that spread to entire body  CT done, neuro following:  Stroke on differential as well given vague lucency at the genu of the right internal capsule and in the right thalamus of indeterminate age. Likely lacunar infarcts.   MRI when capable , eeg and Keppra added  Extubated d 1  + pressors, prbc's  EEG prelim neg.  Pt with lethargy post keppra, d/c since eeg neg  10/ 26 EEG reviewed by neuro, although no seizures  he is at risk for further sezures, recommended resuming keppra and if lethargic ,  change to vimpat  Pt with junctional / afib  Transferred to sdu  10/28 Maintain PW for junctional rhythm, sorbitol for constipation. Transferred to [address] [place] [address]  10/27 Junctional 50-60, remains on amio 200bid, no beta blocker   Cont keppra for now.  Neuro f/u  D/c jared drain.  10/29 Add Eliquis 2.5 bid>PW removed this am Continue diuretics  Amio 1800 mg to date>continue to 5 gram load as per DR Barraza  Magnesium repleted  10/30 VSS; rsr/junct 50-60- no bb; continue amio 200 bid; pt c/o dysuria--> ck ua/ urine cx +l.esterase; neg nitrate; start bactrim as per Dr. Barraza and ck urine cx; monitor ekg- ck in am 10/31  continue diuresis; allow pt to start self cath himself  increase activity as tolerated  10/31 Continue with diuretics. Pt remains 7kg above preop weight. Bactrim for UTI. . Completed 2.2G Amiodarone load. Maintaining junctional rhythm. Continue with amiodarone for 5G load as per Attending   11/1 JR 54-70  Amio decreased to 200 qd--EP called,   serratia marescens --sensitivities pending on bactrim.  -2600cc/24hrs-torsemide 10; amio and bb d/c as per EP- discharge pt home with mcot  11/2 VSS: junc 50-80- no av nodals as per EP; ID consulted; uti sensitive to bactrim- continue 3 days as per ID; continue diuresis; endo consulted for uncontrolled dm  mri brain done this am - results pending  discharge planning- home pt w/mcot when stable

## 2022-11-02 NOTE — CONSULT NOTE ADULT - NS ATTEND AMEND GEN_ALL_CORE FT
s/p biatrial maze and MVR with hx pAF now with sinus rhythm with accelerated jxnl at 60s-70s. Asymptomatic. Will monitor for recovery of sinus node function.
Patient seen and examined today at bedside. Chart, labs, vitals, radiology reviewed. Above H&P reviewed and edited where appropriate. Agree with history and physical exam. Agree with assessment and plan. I reviewed the overnight course of events and discussed the care with the patient and their family  35 minutes spent on total encounter of which more than fifty percent of the encounter was spent counseling and/or coordinating care by the attending physician.

## 2022-11-02 NOTE — PROGRESS NOTE ADULT - PROBLEM SELECTOR PLAN 4
EP consult called  amio decreased to 200 QD  off BB EP consulted   no bb/ amio as per EP  no AV nodals  discharge pt home w/ mcot when stable

## 2022-11-03 LAB
ANION GAP SERPL CALC-SCNC: 13 MMOL/L — SIGNIFICANT CHANGE UP (ref 5–17)
BLD GP AB SCN SERPL QL: NEGATIVE — SIGNIFICANT CHANGE UP
BUN SERPL-MCNC: 23 MG/DL — SIGNIFICANT CHANGE UP (ref 7–23)
CALCIUM SERPL-MCNC: 9.2 MG/DL — SIGNIFICANT CHANGE UP (ref 8.4–10.5)
CHLORIDE SERPL-SCNC: 98 MMOL/L — SIGNIFICANT CHANGE UP (ref 96–108)
CO2 SERPL-SCNC: 21 MMOL/L — LOW (ref 22–31)
CREAT SERPL-MCNC: 1.55 MG/DL — HIGH (ref 0.5–1.3)
EGFR: 46 ML/MIN/1.73M2 — LOW
GLUCOSE SERPL-MCNC: 189 MG/DL — HIGH (ref 70–99)
HCT VFR BLD CALC: 27.8 % — LOW (ref 39–50)
HGB BLD-MCNC: 8.9 G/DL — LOW (ref 13–17)
MCHC RBC-ENTMCNC: 27.1 PG — SIGNIFICANT CHANGE UP (ref 27–34)
MCHC RBC-ENTMCNC: 32 GM/DL — SIGNIFICANT CHANGE UP (ref 32–36)
MCV RBC AUTO: 84.5 FL — SIGNIFICANT CHANGE UP (ref 80–100)
NRBC # BLD: 0 /100 WBCS — SIGNIFICANT CHANGE UP (ref 0–0)
PLATELET # BLD AUTO: 226 K/UL — SIGNIFICANT CHANGE UP (ref 150–400)
POTASSIUM SERPL-MCNC: 5.2 MMOL/L — SIGNIFICANT CHANGE UP (ref 3.5–5.3)
POTASSIUM SERPL-SCNC: 5.2 MMOL/L — SIGNIFICANT CHANGE UP (ref 3.5–5.3)
RBC # BLD: 3.29 M/UL — LOW (ref 4.2–5.8)
RBC # FLD: 15.4 % — HIGH (ref 10.3–14.5)
RH IG SCN BLD-IMP: POSITIVE — SIGNIFICANT CHANGE UP
SODIUM SERPL-SCNC: 132 MMOL/L — LOW (ref 135–145)
WBC # BLD: 8.05 K/UL — SIGNIFICANT CHANGE UP (ref 3.8–10.5)
WBC # FLD AUTO: 8.05 K/UL — SIGNIFICANT CHANGE UP (ref 3.8–10.5)

## 2022-11-03 PROCEDURE — 74018 RADEX ABDOMEN 1 VIEW: CPT | Mod: 26

## 2022-11-03 PROCEDURE — 99233 SBSQ HOSP IP/OBS HIGH 50: CPT

## 2022-11-03 PROCEDURE — 93010 ELECTROCARDIOGRAM REPORT: CPT

## 2022-11-03 PROCEDURE — 93010 ELECTROCARDIOGRAM REPORT: CPT | Mod: 77

## 2022-11-03 RX ORDER — ONDANSETRON 8 MG/1
4 TABLET, FILM COATED ORAL ONCE
Refills: 0 | Status: COMPLETED | OUTPATIENT
Start: 2022-11-03 | End: 2022-11-03

## 2022-11-03 RX ORDER — INSULIN LISPRO 100/ML
7 VIAL (ML) SUBCUTANEOUS
Refills: 0 | Status: DISCONTINUED | OUTPATIENT
Start: 2022-11-03 | End: 2022-11-04

## 2022-11-03 RX ORDER — SPIRONOLACTONE 25 MG/1
25 TABLET, FILM COATED ORAL DAILY
Refills: 0 | Status: DISCONTINUED | OUTPATIENT
Start: 2022-11-03 | End: 2022-11-04

## 2022-11-03 RX ORDER — LACOSAMIDE 50 MG/1
100 TABLET ORAL
Refills: 0 | Status: DISCONTINUED | OUTPATIENT
Start: 2022-11-03 | End: 2022-11-07

## 2022-11-03 RX ORDER — INSULIN GLARGINE 100 [IU]/ML
12 INJECTION, SOLUTION SUBCUTANEOUS AT BEDTIME
Refills: 0 | Status: DISCONTINUED | OUTPATIENT
Start: 2022-11-03 | End: 2022-11-04

## 2022-11-03 RX ORDER — METOCLOPRAMIDE HCL 10 MG
10 TABLET ORAL ONCE
Refills: 0 | Status: COMPLETED | OUTPATIENT
Start: 2022-11-03 | End: 2022-11-03

## 2022-11-03 RX ORDER — SIMETHICONE 80 MG/1
80 TABLET, CHEWABLE ORAL ONCE
Refills: 0 | Status: COMPLETED | OUTPATIENT
Start: 2022-11-03 | End: 2022-11-03

## 2022-11-03 RX ORDER — SOD SULF/SODIUM/NAHCO3/KCL/PEG
500 SOLUTION, RECONSTITUTED, ORAL ORAL ONCE
Refills: 0 | Status: COMPLETED | OUTPATIENT
Start: 2022-11-03 | End: 2022-11-03

## 2022-11-03 RX ORDER — METOPROLOL TARTRATE 50 MG
2.5 TABLET ORAL ONCE
Refills: 0 | Status: COMPLETED | OUTPATIENT
Start: 2022-11-03 | End: 2022-11-03

## 2022-11-03 RX ORDER — FUROSEMIDE 40 MG
20 TABLET ORAL DAILY
Refills: 0 | Status: DISCONTINUED | OUTPATIENT
Start: 2022-11-03 | End: 2022-11-04

## 2022-11-03 RX ORDER — AMIODARONE HYDROCHLORIDE 400 MG/1
TABLET ORAL
Refills: 0 | Status: DISCONTINUED | OUTPATIENT
Start: 2022-11-03 | End: 2022-11-04

## 2022-11-03 RX ORDER — AMIODARONE HYDROCHLORIDE 400 MG/1
400 TABLET ORAL EVERY 8 HOURS
Refills: 0 | Status: DISCONTINUED | OUTPATIENT
Start: 2022-11-03 | End: 2022-11-04

## 2022-11-03 RX ADMIN — Medication 1 TABLET(S): at 18:43

## 2022-11-03 RX ADMIN — SPIRONOLACTONE 50 MILLIGRAM(S): 25 TABLET, FILM COATED ORAL at 05:13

## 2022-11-03 RX ADMIN — Medication 2: at 19:06

## 2022-11-03 RX ADMIN — Medication 10 MILLIGRAM(S): at 05:13

## 2022-11-03 RX ADMIN — Medication 2.5 MILLIGRAM(S): at 18:39

## 2022-11-03 RX ADMIN — Medication 5 UNIT(S): at 08:01

## 2022-11-03 RX ADMIN — ONDANSETRON 4 MILLIGRAM(S): 8 TABLET, FILM COATED ORAL at 01:38

## 2022-11-03 RX ADMIN — Medication 30 MILLILITER(S): at 10:13

## 2022-11-03 RX ADMIN — Medication 4 MILLIGRAM(S): at 21:44

## 2022-11-03 RX ADMIN — Medication 10 MILLIGRAM(S): at 10:13

## 2022-11-03 RX ADMIN — LACOSAMIDE 100 MILLIGRAM(S): 50 TABLET ORAL at 16:16

## 2022-11-03 RX ADMIN — APIXABAN 5 MILLIGRAM(S): 2.5 TABLET, FILM COATED ORAL at 05:13

## 2022-11-03 RX ADMIN — Medication 7 UNIT(S): at 12:24

## 2022-11-03 RX ADMIN — APIXABAN 5 MILLIGRAM(S): 2.5 TABLET, FILM COATED ORAL at 18:39

## 2022-11-03 RX ADMIN — Medication 2: at 08:02

## 2022-11-03 RX ADMIN — PANTOPRAZOLE SODIUM 40 MILLIGRAM(S): 20 TABLET, DELAYED RELEASE ORAL at 05:13

## 2022-11-03 RX ADMIN — Medication 500 MILLILITER(S): at 14:27

## 2022-11-03 RX ADMIN — Medication 1 ENEMA: at 14:27

## 2022-11-03 RX ADMIN — SENNA PLUS 2 TABLET(S): 8.6 TABLET ORAL at 21:44

## 2022-11-03 RX ADMIN — AMIODARONE HYDROCHLORIDE 400 MILLIGRAM(S): 400 TABLET ORAL at 21:50

## 2022-11-03 RX ADMIN — INSULIN GLARGINE 12 UNIT(S): 100 INJECTION, SOLUTION SUBCUTANEOUS at 21:51

## 2022-11-03 RX ADMIN — Medication 25 MICROGRAM(S): at 05:13

## 2022-11-03 RX ADMIN — ATORVASTATIN CALCIUM 40 MILLIGRAM(S): 80 TABLET, FILM COATED ORAL at 21:44

## 2022-11-03 RX ADMIN — AMIODARONE HYDROCHLORIDE 400 MILLIGRAM(S): 400 TABLET ORAL at 16:08

## 2022-11-03 RX ADMIN — SIMETHICONE 80 MILLIGRAM(S): 80 TABLET, CHEWABLE ORAL at 22:09

## 2022-11-03 RX ADMIN — Medication 1 TABLET(S): at 05:14

## 2022-11-03 NOTE — PROVIDER CONTACT NOTE (OTHER) - ASSESSMENT
Pt in bed during the episode c/o of vague  symptoms of not feeling well nausea, and SOB.  /70, HR 79, T 97.7 orally,  Poxi  99% on 1 Liter via NC.
Patient is alert and oriented x 4. V/S: /72  temp 97.4 RR 18 and SPO2 96% on 2L NC. Patient denies chest pain, sob.

## 2022-11-03 NOTE — PROGRESS NOTE ADULT - PROBLEM SELECTOR PLAN 1
continue postop care  no bb/amio secondary to sb/ junct 50-60 as d/w EP  diuresis  continue synthroid  pulm toilet  pain management  increase activity as tolerated  bowel regimen  Discharge planning- home pt when stable

## 2022-11-03 NOTE — PROVIDER CONTACT NOTE (MEDICATION) - REASON
Patient doesn't want to eat lunch; fingerstick 199. Patient has 7 units premeal & sliding scale.
Fingerstick 186; patient doesn't want to eat dinner.

## 2022-11-03 NOTE — PROVIDER CONTACT NOTE (MEDICATION) - SITUATION
Patient doesn't want to eat lunch; fingerstick 199. Patient is scheduled to receive 7 units of Admelog premeal plus 2 units sliding scale/
Fingerstick 186; patient doesn't want to eat dinner.

## 2022-11-03 NOTE — PROGRESS NOTE ADULT - PROBLEM SELECTOR PLAN 3
UC serratia marescans sensitivities pending  start bactrim as per Dr. Barraza  ID consulted; uti sensitive to bactrim- continue 3 days as per ID

## 2022-11-03 NOTE — PROVIDER CONTACT NOTE (CHANGE IN STATUS NOTIFICATION) - ASSESSMENT
Patient appears to be having full body myoclonus/fasiculations. Post episode has new onset LUE and LLE hemiparesis.
Patient is A&O x 4. Vital signs; /74  RR 18 SPO2 96% on 2L NC.

## 2022-11-03 NOTE — CHART NOTE - NSCHARTNOTEFT_GEN_A_CORE
Tele intermittent CHELSI 130-140s despite initiation amio load  BP  11/70  Pt resting in bed  Discussed w/ EP Radhika>lopressor 2.5 IV X 1  She will discuss w/ Davonte  Will continue to monitor

## 2022-11-03 NOTE — PROGRESS NOTE ADULT - ASSESSMENT
77  year old  RHD male PMH of HTN, HLD, DM2, former smoker,  hypothyroidism, obesity, LILLY on C-pap,  bladder cancer (2020, s/p resection of bladder tumor/ currently self catheterizes 6x/day ), PAF s/p DCCV /uncomplicated radiofrequency ablation of atrial fibrillation ([facility]/PVI, CTI) on 7/13/22,  left atrial appendage & severe MR . Presents with c/o recent" abnormal echo with fatigue due to mitral valve regurgitation. Presents for  scheduled Mitral valve replacement , MAZE, left atrial appendage closure on 10/20/2022.  10/24/22   MVr - triangular resection with 28mm Bobby band  AtriClip 45mm   Dawn-Maze IV procedure  PFO CLOSURE  Code stroke called for LUE convulsion with residual LUE weakness immediately after, Patient had LUE convulsions that spread to entire body  CT done, neuro following:  Stroke on differential as well given vague lucency at the genu of the right internal capsule and in the right thalamus of indeterminate age. Likely lacunar infarcts.   MRI when capable , eeg and Keppra added  Extubated d 1  + pressors, prbc's  EEG prelim neg.  Pt with lethargy post keppra, d/c since eeg neg  10/ 26 EEG reviewed by neuro, although no seizures  he is at risk for further sezures, recommended resuming keppra and if lethargic ,  change to vimpat  Pt with junctional / afib  Transferred to [facility]du  10/28 Maintain PW for junctional rhythm, sorbitol for constipation. Transferred to [address] [place] [address]  10/27 Junctional 50-60, remains on amio 200bid, no beta blocker   Cont keppra for now.  Neuro f/u  D/c jared drain.  10/29 Add Eliquis 2.5 bid>PW removed this am Continue diuretics  Amio 1800 mg to date>continue to 5 gram load as per DR Barraza  Magnesium repleted  10/30 VSS; rsr/junct 50-60- no bb; continue amio 200 bid; pt c/o dysuria--> ck ua/ urine cx +l.esterase; neg nitrate; start bactrim as per Dr. Barraza and ck urine cx; monitor ekg- ck in am 10/31  continue diuresis; allow pt to start self cath himself  increase activity as tolerated  10/31 Continue with diuretics. Pt remains 7kg above preop weight. Bactrim for UTI. . Completed 2.2G Amiodarone load. Maintaining junctional rhythm. Continue with amiodarone for 5G load as per Attending   11/1 JR 54-70  Amio decreased to 200 qd--EP called,   serratia marescens --sensitivities pending on bactrim.  -2600cc/24hrs-torsemide 10; amio and bb d/c as per EP- discharge pt home with mcot  11/2 VSS: junc 50-80- no av nodals as per EP; ID consulted; uti sensitive to bactrim- continue 3 days as per ID; continue diuresis; endo consulted for uncontrolled dm  mri brain done this am - results pending  discharge planning- home pt w/mcot when stable   11/3: MRI Neg for bleed or infarct, Last day of abx today(serratia UTI)-d/c after last dose today for total 5/5 days.

## 2022-11-03 NOTE — PROVIDER CONTACT NOTE (MEDICATION) - BACKGROUND
Patient admitted on 10/24/22 for nonrheumatic mitral annular calcification.
Patient admitted on 10/24/22 for nonrheumatic mitral annular calcification.

## 2022-11-03 NOTE — PROGRESS NOTE ADULT - SUBJECTIVE AND OBJECTIVE BOX
Chief complaint  Patient is a 77y old  Male who presents with a chief complaint of As per Adult CT surgery PA chart note (11/02), "77  year old  RHD male PMH of HTN, HLD, DM2, former smoker,  hypothyroidism, obesity, LILLY on C-pap,  bladder cancer (2020, s/p resection of bladder tumor/ currently self catheterizes 6x/day ), PAF s/p DCCV /uncomplicated radiofrequency ablation of atrial fibrillation (WACA/PVI, CTI) on 7/13/22,  left atrial appendage & severe MR . Presents with c/o recent" abnormal echo with fatigue due to mitral valve regurgitation. Presents for  scheduled Mitral valve replacement , MAZE, left atrial appendage closure on 10/20/2022.  10/24/22  MVr - triangular resection with 28mm Bobby band  AtriClip 45mm"         (02 Nov 2022 11:23)   Review of systems  Patient resting in chair, looks comfortable, no hypoglycemic episodes.    Labs and Fingersticks  CAPILLARY BLOOD GLUCOSE      POCT Blood Glucose.: 199 mg/dL (03 Nov 2022 11:15)  POCT Blood Glucose.: 195 mg/dL (03 Nov 2022 07:39)  POCT Blood Glucose.: 184 mg/dL (02 Nov 2022 21:39)  POCT Blood Glucose.: 268 mg/dL (02 Nov 2022 16:37)      Anion Gap, Serum: 13 (11-03 @ 05:48)  Anion Gap, Serum: 10 (11-02 @ 05:51)      Calcium, Total Serum: 9.2 (11-03 @ 05:48)  Calcium, Total Serum: 8.7 (11-02 @ 05:51)          11-03    132<L>  |  98  |  23  ----------------------------<  189<H>  5.2   |  21<L>  |  1.55<H>    Ca    9.2      03 Nov 2022 05:48  Mg     1.8     11-02                          8.9    8.05  )-----------( 226      ( 03 Nov 2022 05:52 )             27.8     Medications  MEDICATIONS  (STANDING):  apixaban 5 milliGRAM(s) Oral two times a day  aspirin enteric coated 81 milliGRAM(s) Oral daily  atorvastatin 40 milliGRAM(s) Oral at bedtime  chlorhexidine 2% Cloths 1 Application(s) Topical daily  dextrose 5%. 1000 milliLiter(s) (50 mL/Hr) IV Continuous <Continuous>  dextrose 5%. 1000 milliLiter(s) (100 mL/Hr) IV Continuous <Continuous>  doxazosin 4 milliGRAM(s) Oral at bedtime  furosemide    Tablet 20 milliGRAM(s) Oral daily  glucagon  Injectable 1 milliGRAM(s) IntraMuscular once  insulin glargine Injectable (LANTUS) 12 Unit(s) SubCutaneous at bedtime  insulin lispro (ADMELOG) corrective regimen sliding scale   SubCutaneous three times a day before meals  insulin lispro (ADMELOG) corrective regimen sliding scale   SubCutaneous at bedtime  insulin lispro Injectable (ADMELOG) 7 Unit(s) SubCutaneous three times a day before meals  levETIRAcetam 750 milliGRAM(s) Oral two times a day  levothyroxine 25 MICROGram(s) Oral daily  pantoprazole    Tablet 40 milliGRAM(s) Oral before breakfast  polyethylene glycol 3350 17 Gram(s) Oral daily  senna 2 Tablet(s) Oral at bedtime  spironolactone 25 milliGRAM(s) Oral daily  trimethoprim  160 mG/sulfamethoxazole 800 mG 1 Tablet(s) Oral two times a day      Physical Exam  General: Patient comfortable in bed  Vital Signs Last 12 Hrs  T(F): 98.1 (11-03-22 @ 04:52), Max: 98.1 (11-03-22 @ 04:52)  HR: 76 (11-03-22 @ 11:44) (70 - 79)  BP: 115/76 (11-03-22 @ 04:52) (112/70 - 115/76)  BP(mean): 89 (11-03-22 @ 04:52) (89 - 89)  RR: 20 (11-03-22 @ 04:52) (20 - 20)  SpO2: 97% (11-03-22 @ 11:44) (97% - 99%)  Neck: No palpable thyroid nodules.  CVS: S1S2, No murmurs  Respiratory: No wheezing, no crepitations  GI: Abdomen soft, bowel sounds positive  Musculoskeletal:  edema lower extremities.   Skin: No skin rashes, no ecchymosis    Diagnostics             Chief complaint  Patient is a 77y old  Male who presents with a chief complaint of As per Adult CT surgery PA chart note (11/02), "77  year old  RHD male PMH of HTN, HLD, DM2, former smoker,  hypothyroidism, obesity, LILLY on C-pap,  bladder cancer (2020, s/p resection of bladder tumor/ currently self catheterizes 6x/day ), PAF s/p DCCV /uncomplicated radiofrequency ablation of atrial fibrillation (WACA/PVI, CTI) on 7/13/22,  left atrial appendage & severe MR . Presents with c/o recent" abnormal echo with fatigue due to mitral valve regurgitation. Presents for  scheduled Mitral valve replacement , MAZE, left atrial appendage closure on 10/20/2022.  10/24/22  MVr - triangular resection with 28mm Bobby band  AtriClip 45mm"         (02 Nov 2022 11:23)   Review of systems  Patient resting in chair, looks comfortable, no hypoglycemic episodes.    Labs and Fingersticks  CAPILLARY BLOOD GLUCOSE      POCT Blood Glucose.: 199 mg/dL (03 Nov 2022 11:15)  POCT Blood Glucose.: 195 mg/dL (03 Nov 2022 07:39)  POCT Blood Glucose.: 184 mg/dL (02 Nov 2022 21:39)  POCT Blood Glucose.: 268 mg/dL (02 Nov 2022 16:37)      Anion Gap, Serum: 13 (11-03 @ 05:48)  Anion Gap, Serum: 10 (11-02 @ 05:51)      Calcium, Total Serum: 9.2 (11-03 @ 05:48)  Calcium, Total Serum: 8.7 (11-02 @ 05:51)          11-03    132<L>  |  98  |  23  ----------------------------<  189<H>  5.2   |  21<L>  |  1.55<H>    Ca    9.2      03 Nov 2022 05:48  Mg     1.8     11-02                          8.9    8.05  )-----------( 226      ( 03 Nov 2022 05:52 )             27.8     Medications  MEDICATIONS  (STANDING):  apixaban 5 milliGRAM(s) Oral two times a day  aspirin enteric coated 81 milliGRAM(s) Oral daily  atorvastatin 40 milliGRAM(s) Oral at bedtime  chlorhexidine 2% Cloths 1 Application(s) Topical daily  dextrose 5%. 1000 milliLiter(s) (50 mL/Hr) IV Continuous <Continuous>  dextrose 5%. 1000 milliLiter(s) (100 mL/Hr) IV Continuous <Continuous>  doxazosin 4 milliGRAM(s) Oral at bedtime  furosemide    Tablet 20 milliGRAM(s) Oral daily  glucagon  Injectable 1 milliGRAM(s) IntraMuscular once  insulin glargine Injectable (LANTUS) 12 Unit(s) SubCutaneous at bedtime  insulin lispro (ADMELOG) corrective regimen sliding scale   SubCutaneous three times a day before meals  insulin lispro (ADMELOG) corrective regimen sliding scale   SubCutaneous at bedtime  insulin lispro Injectable (ADMELOG) 7 Unit(s) SubCutaneous three times a day before meals  levETIRAcetam 750 milliGRAM(s) Oral two times a day  levothyroxine 25 MICROGram(s) Oral daily  pantoprazole    Tablet 40 milliGRAM(s) Oral before breakfast  polyethylene glycol 3350 17 Gram(s) Oral daily  senna 2 Tablet(s) Oral at bedtime  spironolactone 25 milliGRAM(s) Oral daily  trimethoprim  160 mG/sulfamethoxazole 800 mG 1 Tablet(s) Oral two times a day      Physical Exam  General: Patient comfortable in bed  Vital Signs Last 12 Hrs  T(F): 98.1 (11-03-22 @ 04:52), Max: 98.1 (11-03-22 @ 04:52)  HR: 76 (11-03-22 @ 11:44) (70 - 79)  BP: 115/76 (11-03-22 @ 04:52) (112/70 - 115/76)  BP(mean): 89 (11-03-22 @ 04:52) (89 - 89)  RR: 20 (11-03-22 @ 04:52) (20 - 20)  SpO2: 97% (11-03-22 @ 11:44) (97% - 99%)  Neck: No palpable thyroid nodules.  CVS: S1S2, No murmurs  Respiratory: No wheezing, no crepitations  GI: Abdomen soft, bowel sounds positive  Musculoskeletal:  edema lower extremities.   Skin: No skin rashes, no ecchymosis    Diagnostics

## 2022-11-03 NOTE — PROGRESS NOTE ADULT - PROBLEM SELECTOR PLAN 1
Will increase Lantus to 12u at bedtime.  Will increase Admelog to 7u before each meal and continue Admelog correction scale coverage. Will continue monitoring FS and FU.  Will likely DC on his prior Janumet 50/1000 BID. FU 2 weeks.  Patient counseled for compliance with consistent low carb diet and exercise as tolerated outpatient.

## 2022-11-03 NOTE — PROVIDER CONTACT NOTE (OTHER) - SITUATION
Patient 's -150's on tele. Patient has episode of SVT 's -150's on tele. Patient has episode of CHELSI 's -150's on tele.

## 2022-11-03 NOTE — PROGRESS NOTE ADULT - ASSESSMENT
"[facility]  [facility] - Family Medicine/ Internal Medicine  Clinic Note      Subjective:       Patient ID: Lorena Vivas is a 69 y.o. female.    Chief Complaint: Hospital Follow Up    Patient is here today for hospital follow-up visit.  She was recently discharged after inpatient treatment for acute recurrent diverticulitis.  She was discharged on typical Cipro/Flagyl course, but was seen 2 days after discharge at urgent care for evaluation of swelling, redness, and pain over left forearm in area of IV placement.  She was felt to have cellulitis, and her antibiotic regimen was changed from the above to Bactrim after consulting with GI.  She continues on Bactrim, and notes her abdominal symptoms have resolved and diet is improving.  No fever, abdominal pain, diarrhea, or melena.  She notes however the swelling and redness of the left forearm have persisted, though improved.  She is afebrile.  She is not using any warm compresses over this area, no discharge/drainage.  The patient is intermittently tearful today, notes she has been experiencing episodes of confusion and dysarthria over the last 2 days.  She and family note this is an entirely new finding with abrupt onset.  She is worried about potential stroke, but did not present to the ER for evaluation.  CT obtained while inpatient was normal, was done for report of headache.  She has no stroke history, but known ASCVD.  She denies any focal motor weakness or facial droop, notes difficulty "getting words out" and finding them, and relative confusion.    Review of Systems   Constitutional: Positive for fatigue. Negative for fever.   HENT: Negative for rhinorrhea and sore throat.    Respiratory: Negative for cough and shortness of breath.    Cardiovascular: Negative for chest pain and palpitations.   Gastrointestinal: Negative for abdominal pain, blood in stool, diarrhea, nausea and vomiting.   Skin: Negative for rash and wound.   Neurological: " "Positive for speech difficulty. Negative for dizziness, tremors, syncope, weakness, numbness and headaches.       Objective:      /62 (BP Location: Left arm, Patient Position: Sitting, BP Method: Large (Manual))   Pulse 95   Temp 97.8 °F (36.6 °C)   Ht 5' 6" (1.676 m)   Wt 69.4 kg (153 lb 1.8 oz)   SpO2 100%   BMI 24.71 kg/m²   Physical Exam   Constitutional: She is oriented to person, place, and time. She appears well-developed and well-nourished. No distress.   HENT:   Head: Normocephalic and atraumatic.   Neck: Normal range of motion.   Cardiovascular: Normal rate and regular rhythm.   No murmur heard.  Pulmonary/Chest: Effort normal and breath sounds normal. No respiratory distress. She has no wheezes. She has no rales.   Abdominal: Soft. Bowel sounds are normal. She exhibits no distension. There is no tenderness.   Musculoskeletal: Normal range of motion. She exhibits no edema.   Neurological: She is alert and oriented to person, place, and time. No cranial nerve deficit or sensory deficit. She exhibits normal muscle tone.   CN intact bilaterally, BUE 5/5 motor, BLE 5/5 motor and all neuro testing is symmetric   Skin: Skin is warm and dry. No rash noted.   Psychiatric: She has a normal mood and affect.   Vitals reviewed.      Assessment:       1. Focal neurological deficit    2. Neurological deficit present    3. Speech and language deficits    4. Superficial thrombophlebitis of left upper extremity    5. Diverticulitis        Plan:     1. Focal neurological deficit  2. Neurological deficit present  3. Speech and language deficits  - history and exam findings reviewed, no active focal neuromuscular deficit present on neuro exam, however, patient with intermittent dysarthria and apparent word-finding difficulty, which she notes is a new symptom over the last 2 days  - - ER evaluation recommendation reviewed today, patient defers going to the ER at this time  - recent CT reviewed, no acute findings " noted, will check MRI to rule out vascular insult/dementia given known ASCVD history  - - MRI Brain Without Contrast; Future  - will refer to Neurology as outpatient, unless any symptoms worsen and indeed we again have discussed presenting to the ER immediately in this case, stroke signs/symptoms reviewed in detail  - - Ambulatory referral to Neurology  - questions answered, warning signs reviewed, patient is comfortable with this plan and was encouraged to call the office for any concerns or worsening of condition    4. Superficial thrombophlebitis of left upper extremity  - history and exam findings reviewed, currently on antibiotic but infection not resolved, will check US to rule out VTE/thrombophlebitis  - - CV Ultrasound doppler venous arm left; Future  - supportive care measures reviewed     5. Diverticulitis  - benign abdominal exam, recurrent diverticulitis, will refer back to GI to discuss additional evaluation and any need for interval colonoscopy  - - Ambulatory referral to Gastroenterology     - Follow up in 2 weeks with PCP, for follow-up speech deficit, altered mental status.     Jose Mccullough MD  1/4/2020          Medication List with Changes/Refills   Current Medications    AMLODIPINE (NORVASC) 10 MG TABLET    Take 1 tablet (10 mg total) by mouth every morning.    ASPIRIN (ECOTRIN) 81 MG EC TABLET    Take 81 mg by mouth once daily.    ATORVASTATIN (LIPITOR) 40 MG TABLET    Take 1 tablet (40 mg total) by mouth once daily.    BUPROPION (WELLBUTRIN) 75 MG TABLET    Take 1 tablet (75 mg total) by mouth 2 (two) times daily.    BUSPIRONE (BUSPAR) 15 MG TABLET    Take 1 tablet (15 mg total) by mouth 2 (two) times daily as needed (panic attack).    CARVEDILOL (COREG) 12.5 MG TABLET    TAKE 1 TABLET BY MOUTH TWICE A DAY WITH MEALS    CILOSTAZOL (PLETAL) 50 MG TAB    Take 1 tablet (50 mg total) by mouth 2 (two) times daily. To improve blood flow to legs    CITALOPRAM (CELEXA) 20 MG TABLET    Take 1 tablet  (20 mg total) by mouth once daily.    FLUTICASONE (FLONASE) 50 MCG/ACTUATION NASAL SPRAY    2 sprays (100 mcg total) by Each Nare route once daily.    GAVILYTE-C 240-22.72-6.72 -5.84 GRAM SOLR    AS DIRECTED    HYDROCHLOROTHIAZIDE (HYDRODIURIL) 25 MG TABLET    Take 1 tablet (25 mg total) by mouth once daily.    OMEPRAZOLE (PRILOSEC) 10 MG CAPSULE    Take 1 capsule (10 mg total) by mouth once daily. GERD    SULFAMETHOXAZOLE-TRIMETHOPRIM 800-160MG (BACTRIM DS) 800-160 MG TAB    Take 1 tablet by mouth 2 (two) times daily. for 7 days   Discontinued Medications    CIPROFLOXACIN HCL (CIPRO) 500 MG TABLET    Take 1 tablet (500 mg total) by mouth 2 (two) times daily. for 10 days    METRONIDAZOLE (FLAGYL) 500 MG TABLET    Take 1 tablet (500 mg total) by mouth every 8 (eight) hours. for 10 days    OXYCODONE (ROXICODONE) 5 MG IMMEDIATE RELEASE TABLET    Take 1 tablet (5 mg total) by mouth every 6 (six) hours as needed.         Assessment  DMT2: 77y Male with DM T2 with hyperglycemia, A1C 6.2%, was on oral meds at home, now on basal bolus insulin, increased dose yesterday, blood sugars are still running high and not at target, no hypoglycemic episodes, eating meals, appears comfortable.  CAD: on medications, no chest pain, stable, monitored.  Hypothyroidism: On 25 Synthroid  mcg po daily, compliant with Synthroid intake, asymptomatic, euthyroid.  Obesity: No strict exercise routines, not on any weight loss program, neither on low calorie diet.          Izabela Grady MD  Cell: [phone]  Office: [phone]               Assessment  DMT2: 77y Male with DM T2 with hyperglycemia, A1C 6.2%, was on oral meds at home, now on basal bolus insulin, increased dose yesterday, blood sugars are still running  high and not at target, no hypoglycemic episodes, eating meals, appears comfortable.  CAD: on medications, no chest pain, stable, monitored.  Hypothyroidism: On 25 Synthroid  mcg po daily, compliant with Synthroid intake, asymptomatic, euthyroid.  Obesity: No strict exercise routines, not on any weight loss program, neither on low calorie diet.          Izabela Grady MD  Cell: [phone]  Office: [phone]

## 2022-11-03 NOTE — PROVIDER CONTACT NOTE (OTHER) - BACKGROUND
Patient admitted on 10/24/22 for nonrheumatic mitral annular calcification.
10/24 s/p MVR/NAYELY closure and Maze procedure, postop LUE convulsion with residual LUE weakness. Code stroke.

## 2022-11-03 NOTE — PROGRESS NOTE ADULT - PROBLEM SELECTOR PLAN 2
Post op seizure---> continue keppra 750bid  for prophylaxis  Neuro f/u as an outpatient   MRI brain neg  continue to monitor

## 2022-11-03 NOTE — PROVIDER CONTACT NOTE (CHANGE IN STATUS NOTIFICATION) - DATE AND TIME:
24-Oct-2022 22:00
The patient is a 67y Male complaining of brought in for evaluation after fall.
03-Nov-2022 18:30

## 2022-11-03 NOTE — PROVIDER CONTACT NOTE (OTHER) - SITUATION
Pt c/o nausea no vomiting and vague symptoms of not "feeling well".  Pt also stated that Keppra is causing him uneasiness.

## 2022-11-03 NOTE — PROGRESS NOTE ADULT - SUBJECTIVE AND OBJECTIVE BOX
VITAL SIGNS    Telemetry:    Vital Signs Last 24 Hrs  T(C): 36.7 (22 @ 04:52), Max: 36.9 (22 @ 20:03)  T(F): 98.1 (22 @ 04:52), Max: 98.4 (22 @ 20:03)  HR: 70 (22 @ 04:52) (68 - 87)  BP: 115/76 (22 @ 04:52) (97/67 - 148/72)  RR: 20 (22 @ 04:52) (18 - 20)  SpO2: 97% (22 @ 04:52) (92% - 99%)             @ 07:01  -   @ 07:00  --------------------------------------------------------  IN: 840 mL / OUT: 3600 mL / NET: -2760 mL     @ 07:01  -   @ 06:34  --------------------------------------------------------  IN: 980 mL / OUT: 1925 mL / NET: -945 mL       Daily     Daily Weight in k (2022 07:00)  Admit Wt: Drug Dosing Weight  Height (cm): 182.9 (24 Oct 2022 06:00)  Weight (kg): 126.9 (24 Oct 2022 06:00)  BMI (kg/m2): 37.9 (24 Oct 2022 06:00)  BSA (m2): 2.46 (24 Oct 2022 06:00)     Daily Weight in k (22 @ 07:00)    [facility]      136  |  102  |  18  ----------------------------<  133<H>  4.5   |  24  |  1.18    Ca    8.7      2022 05:51  Mg     1.8     11                                   8.9    8.05  )-----------( 226      ( 2022 05:52 )             27.8                    CAPILLARY BLOOD GLUCOSE      POCT Blood Glucose.: 184 mg/dL (2022 21:39)  POCT Blood Glucose.: 268 mg/dL (2022 16:37)  POCT Blood Glucose.: 224 mg/dL (2022 11:05)  POCT Blood Glucose.: 156 mg/dL (2022 07:28)          Drains:     MS       [  ]   [  ]            L Pleural [  ]            R Pleural  [  ]            JEM  [  ]           Gaitan  [  ]    Pacing Wires      [  ]   Settings:                                  Isolated  [  ]                    CXR:      MEDICATIONS  acetaminophen     Tablet .. 650 milliGRAM(s) Oral every 6 hours PRN  apixaban 5 milliGRAM(s) Oral two times a day  aspirin enteric coated 81 milliGRAM(s) Oral daily  atorvastatin 40 milliGRAM(s) Oral at bedtime  chlorhexidine 2% Cloths 1 Application(s) Topical daily  dextrose 5%. 1000 milliLiter(s) IV Continuous <Continuous>  dextrose 5%. 1000 milliLiter(s) IV Continuous <Continuous>  dextrose Oral Gel 15 Gram(s) Oral once PRN  doxazosin 4 milliGRAM(s) Oral at bedtime  glucagon  Injectable 1 milliGRAM(s) IntraMuscular once  guaiFENesin Oral Liquid (Sugar-Free) 100 milliGRAM(s) Oral every 6 hours PRN  insulin glargine Injectable (LANTUS) 7 Unit(s) SubCutaneous at bedtime  insulin lispro (ADMELOG) corrective regimen sliding scale   SubCutaneous three times a day before meals  insulin lispro (ADMELOG) corrective regimen sliding scale   SubCutaneous at bedtime  insulin lispro Injectable (ADMELOG) 5 Unit(s) SubCutaneous three times a day before meals  levETIRAcetam 750 milliGRAM(s) Oral two times a day  levothyroxine 25 MICROGram(s) Oral daily  oxyCODONE    IR 5 milliGRAM(s) Oral every 6 hours PRN  pantoprazole    Tablet 40 milliGRAM(s) Oral before breakfast  polyethylene glycol 3350 17 Gram(s) Oral daily  senna 2 Tablet(s) Oral at bedtime  spironolactone 50 milliGRAM(s) Oral two times a day  torsemide 10 milliGRAM(s) Oral daily  trimethoprim  160 mG/sulfamethoxazole 800 mG 1 Tablet(s) Oral two times a day      PHYSICAL EXAM      Neurology: alert and oriented x 3, nonfocal, no gross deficits  CV :S1S2  Sternal Wound :  CDI , Stable  Lungs: cta  Abdomen: soft, nontender, nondistended, positive bowel sounds, last bowel movement   :    gaitan   Extremities:   warm to touc               PAST MEDICAL & SURGICAL HISTORY:  Paroxysmal atrial fibrillation  s/p ablation  on ELIQUIS till 10/13      Hypertension      Hyperlipidemia      Bladder cancer      Diabetes mellitus  2      Hypothyroidism      At risk for sleep apnea  LILLY on C-pap      COVID-19 virus infection  positive with mild infection 10/10/22 , had MAB on 10/10/22 at [facility], surgeon aware      Severe mitral regurgitation  seen in recent ESDRAS      PAF (paroxysmal atrial fibrillation)  s/p DCCV 7 rfa DR Hoover &gt; nsr      Bladder cancer  rsx/ treatment , self cath 3x/day      Dysuria  on self cath 6x/day, UC sent      H/O shoulder surgery  2017      H/O cardiac catheterization  2022      Bladder tumor  s/p resection                      Discussed with Cardiothoracic Team at AM rounds.

## 2022-11-03 NOTE — PROVIDER CONTACT NOTE (CHANGE IN STATUS NOTIFICATION) - ACTION/TREATMENT ORDERED:
NP contacted and aware. Metoprolol 2.5mg IV push given.
Code stroke called, assessed at bedside by neuro/stroke team. Patient taken for CTH/CTA, Keppra loading dose ordered and administered with maintenance doses to follow.

## 2022-11-03 NOTE — CHART NOTE - NSCHARTNOTEFT_GEN_A_CORE
77  year old male PMH of HTN, HLD, DM2, former smoker,  hypothyroidism, obesity, LILLY on CPAP,  bladder cancer (2020, s/p resection of bladder tumor/ currently self catheterizes), PAF s/p DCCV/ablation on 7/13/22 at [facility]. 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 He underwent MVrepair, MAZE, left atrial appendage clip on 10/24/2022 w/ Dr. Barraza. Post op w/ junctional rhythm noted on telemetry for which EP was initially consulted 11/1, at that time he was on Amiodarone for post op AF however tele strips of this were not documented. On 11/1 Amio was discontinued (at that time had received 2800mg). At 1500 on 11/3 patient went into AF with rates 130-160's. Prior to this was junctional.     1) Post op junctional/accelerated junctional rhythm w/ VA conduction  2) severe MR S/p MV repair, NAYELY clip and MAZE 10/24/22   3) hx pAF, now w/ post op AF w/ RVR     Resume Amiodarone load   Trial BB, giving 2.5mg IV push Lopressor for rate control as patient sustaining 150-160's. BB as able for rate control   Continue Eliquis 5mg bid   Continue to monitor on tele while inpatient   Replete lytes for K >4 and Mg >2 77  year old male PMH of HTN, HLD, DM2, former smoker,  hypothyroidism, obesity, LILLY on CPAP,  bladder cancer (2020, s/p resection of bladder tumor/ currently self catheterizes), PAF s/p DCCV/ablation on 7/13/22 at [facility]. 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 He underwent MVrepair, MAZE, left atrial appendage clip on 10/24/2022 w/ Dr. Barraza. Post op w/ junctional rhythm noted on telemetry for which EP was initially consulted 11/1, at that time he was on Amiodarone for post op AF however tele strips of this were not documented. On 11/1 Amio was discontinued (at that time had received 2800mg). At 1500 on 11/3 patient went into AF with rates 130-160's. Prior to this was junctional. Converted after 2.5mg Lopressor IV to junctional 50's at ~1700.     1) Post op junctional/accelerated junctional rhythm w/ VA conduction  2) severe MR S/p MV repair, NAYELY clip and MAZE 10/24/22   3) hx pAF, now w/ post op AF w/ RVR   4) Tachy andrew    NPO for PPM implant 11/4, consent in chart   Ensure active T&S, COVID PCR   Resume Amiodarone load. Avoid AVNB   Hold Eliquis 11/4 in AM   Continue to monitor on tele while inpatient   Replete lytes for K >4 and Mg >2 77  year old male PMH of HTN, HLD, DM2, former smoker,  hypothyroidism, obesity, LILLY on CPAP,  bladder cancer (2020, s/p resection of bladder tumor/ currently self catheterizes), PAF s/p DCCV/ablation on 7/13/22 at [facility]. 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 He underwent MVrepair, MAZE, left atrial appendage clip on 10/24/2022 w/ Dr. Barraza. Post op w/ junctional rhythm noted on telemetry for which EP was initially consulted 11/1, at that time he was on Amiodarone for post op AF however tele strips of this were not documented. On 11/1 Amio was discontinued (at that time had received 2800mg). At 1500 on 11/3 patient went into AF with rates 130-160's. Prior to this was junctional. Converted after 2.5mg Lopressor IV to junctional 50's at ~1844.     1) Post op junctional/accelerated junctional rhythm w/ VA conduction  2) severe MR S/p MV repair, NAYELY clip and MAZE 10/24/22   3) hx pAF, now w/ post op AF w/ RVR   4) Tachy andrew    NPO for PPM implant 11/4, consent in chart   Ensure active T&S, COVID PCR   Resume Amiodarone load. Avoid AVNB   Hold Eliquis 11/4 in AM   Continue to monitor on tele while inpatient   Replete lytes for K >4 and Mg >2

## 2022-11-03 NOTE — PROVIDER CONTACT NOTE (OTHER) - REASON
Patient 's -150's on tele Patient has episode of SVT 's -150's on tele Patient has episode of CHELSI 's -150's on tele

## 2022-11-03 NOTE — PROVIDER CONTACT NOTE (OTHER) - ACTION/TREATMENT ORDERED:
NP contacted and aware; patient assessed at bedside. EKG ordered.
Geovanni [place] NP notified of the c/o nausea, SOB and "not feeling well".  Pt had Zofran 4mg ivpx1 at 0130 .   No orders at this time.  Pt sitting in chair at this time, call bell in reach.

## 2022-11-04 LAB
ALBUMIN SERPL ELPH-MCNC: 3.5 G/DL — SIGNIFICANT CHANGE UP (ref 3.3–5)
ALP SERPL-CCNC: 71 U/L — SIGNIFICANT CHANGE UP (ref 40–120)
ALT FLD-CCNC: 301 U/L — HIGH (ref 10–45)
ANION GAP SERPL CALC-SCNC: 17 MMOL/L — SIGNIFICANT CHANGE UP (ref 5–17)
APTT BLD: 28.8 SEC — SIGNIFICANT CHANGE UP (ref 27.5–35.5)
AST SERPL-CCNC: 214 U/L — HIGH (ref 10–40)
BASOPHILS # BLD AUTO: 0.02 K/UL — SIGNIFICANT CHANGE UP (ref 0–0.2)
BASOPHILS NFR BLD AUTO: 0.2 % — SIGNIFICANT CHANGE UP (ref 0–2)
BILIRUB SERPL-MCNC: 0.8 MG/DL — SIGNIFICANT CHANGE UP (ref 0.2–1.2)
BLD GP AB SCN SERPL QL: NEGATIVE — SIGNIFICANT CHANGE UP
BUN SERPL-MCNC: 34 MG/DL — HIGH (ref 7–23)
CALCIUM SERPL-MCNC: 8.9 MG/DL — SIGNIFICANT CHANGE UP (ref 8.4–10.5)
CHLORIDE SERPL-SCNC: 97 MMOL/L — SIGNIFICANT CHANGE UP (ref 96–108)
CO2 SERPL-SCNC: 20 MMOL/L — LOW (ref 22–31)
CREAT SERPL-MCNC: 1.88 MG/DL — HIGH (ref 0.5–1.3)
EGFR: 37 ML/MIN/1.73M2 — LOW
EOSINOPHIL # BLD AUTO: 0.03 K/UL — SIGNIFICANT CHANGE UP (ref 0–0.5)
EOSINOPHIL NFR BLD AUTO: 0.3 % — SIGNIFICANT CHANGE UP (ref 0–6)
GLUCOSE SERPL-MCNC: 161 MG/DL — HIGH (ref 70–99)
HCT VFR BLD CALC: 26.5 % — LOW (ref 39–50)
HGB BLD-MCNC: 8.5 G/DL — LOW (ref 13–17)
IMM GRANULOCYTES NFR BLD AUTO: 1.1 % — HIGH (ref 0–0.9)
INR BLD: 2.35 RATIO — HIGH (ref 0.88–1.16)
LYMPHOCYTES # BLD AUTO: 0.7 K/UL — LOW (ref 1–3.3)
LYMPHOCYTES # BLD AUTO: 6.7 % — LOW (ref 13–44)
MAGNESIUM SERPL-MCNC: 2 MG/DL — SIGNIFICANT CHANGE UP (ref 1.6–2.6)
MCHC RBC-ENTMCNC: 27.3 PG — SIGNIFICANT CHANGE UP (ref 27–34)
MCHC RBC-ENTMCNC: 32.1 GM/DL — SIGNIFICANT CHANGE UP (ref 32–36)
MCV RBC AUTO: 85.2 FL — SIGNIFICANT CHANGE UP (ref 80–100)
MONOCYTES # BLD AUTO: 1.13 K/UL — HIGH (ref 0–0.9)
MONOCYTES NFR BLD AUTO: 10.9 % — SIGNIFICANT CHANGE UP (ref 2–14)
NEUTROPHILS # BLD AUTO: 8.41 K/UL — HIGH (ref 1.8–7.4)
NEUTROPHILS NFR BLD AUTO: 80.8 % — HIGH (ref 43–77)
NRBC # BLD: 0 /100 WBCS — SIGNIFICANT CHANGE UP (ref 0–0)
PHOSPHATE SERPL-MCNC: 5.6 MG/DL — HIGH (ref 2.5–4.5)
PLATELET # BLD AUTO: 247 K/UL — SIGNIFICANT CHANGE UP (ref 150–400)
POTASSIUM SERPL-MCNC: 4.8 MMOL/L — SIGNIFICANT CHANGE UP (ref 3.5–5.3)
POTASSIUM SERPL-SCNC: 4.8 MMOL/L — SIGNIFICANT CHANGE UP (ref 3.5–5.3)
PROT SERPL-MCNC: 6.5 G/DL — SIGNIFICANT CHANGE UP (ref 6–8.3)
PROTHROM AB SERPL-ACNC: 27.3 SEC — HIGH (ref 10.5–13.4)
RBC # BLD: 3.11 M/UL — LOW (ref 4.2–5.8)
RBC # FLD: 15.4 % — HIGH (ref 10.3–14.5)
RH IG SCN BLD-IMP: POSITIVE — SIGNIFICANT CHANGE UP
SODIUM SERPL-SCNC: 134 MMOL/L — LOW (ref 135–145)
WBC # BLD: 10.4 K/UL — SIGNIFICANT CHANGE UP (ref 3.8–10.5)
WBC # FLD AUTO: 10.4 K/UL — SIGNIFICANT CHANGE UP (ref 3.8–10.5)

## 2022-11-04 PROCEDURE — 71045 X-RAY EXAM CHEST 1 VIEW: CPT | Mod: 26

## 2022-11-04 PROCEDURE — 71250 CT THORAX DX C-: CPT | Mod: 26

## 2022-11-04 PROCEDURE — 93306 TTE W/DOPPLER COMPLETE: CPT | Mod: 26

## 2022-11-04 PROCEDURE — 93010 ELECTROCARDIOGRAM REPORT: CPT

## 2022-11-04 PROCEDURE — 33208 INSRT HEART PM ATRIAL & VENT: CPT

## 2022-11-04 RX ORDER — SODIUM CHLORIDE 9 MG/ML
1000 INJECTION INTRAMUSCULAR; INTRAVENOUS; SUBCUTANEOUS
Refills: 0 | Status: DISCONTINUED | OUTPATIENT
Start: 2022-11-04 | End: 2022-11-07

## 2022-11-04 RX ORDER — SODIUM CHLORIDE 9 MG/ML
1000 INJECTION INTRAMUSCULAR; INTRAVENOUS; SUBCUTANEOUS
Refills: 0 | Status: COMPLETED | OUTPATIENT
Start: 2022-11-04 | End: 2022-11-04

## 2022-11-04 RX ORDER — INSULIN GLARGINE 100 [IU]/ML
15 INJECTION, SOLUTION SUBCUTANEOUS AT BEDTIME
Refills: 0 | Status: DISCONTINUED | OUTPATIENT
Start: 2022-11-04 | End: 2022-11-07

## 2022-11-04 RX ORDER — INSULIN LISPRO 100/ML
8 VIAL (ML) SUBCUTANEOUS
Refills: 0 | Status: DISCONTINUED | OUTPATIENT
Start: 2022-11-04 | End: 2022-11-07

## 2022-11-04 RX ORDER — CEPHALEXIN 500 MG
250 CAPSULE ORAL EVERY 6 HOURS
Refills: 0 | Status: COMPLETED | OUTPATIENT
Start: 2022-11-05 | End: 2022-11-06

## 2022-11-04 RX ORDER — CEFAZOLIN SODIUM 1 G
3000 VIAL (EA) INJECTION EVERY 8 HOURS
Refills: 0 | Status: COMPLETED | OUTPATIENT
Start: 2022-11-04 | End: 2022-11-05

## 2022-11-04 RX ADMIN — Medication 8 UNIT(S): at 20:03

## 2022-11-04 RX ADMIN — SODIUM CHLORIDE 50 MILLILITER(S): 9 INJECTION INTRAMUSCULAR; INTRAVENOUS; SUBCUTANEOUS at 12:32

## 2022-11-04 RX ADMIN — Medication 4 MILLIGRAM(S): at 21:35

## 2022-11-04 RX ADMIN — Medication 2: at 20:03

## 2022-11-04 RX ADMIN — CHLORHEXIDINE GLUCONATE 1 APPLICATION(S): 213 SOLUTION TOPICAL at 12:32

## 2022-11-04 RX ADMIN — OXYCODONE HYDROCHLORIDE 5 MILLIGRAM(S): 5 TABLET ORAL at 08:21

## 2022-11-04 RX ADMIN — Medication 25 MICROGRAM(S): at 05:04

## 2022-11-04 RX ADMIN — Medication 200 MILLIGRAM(S): at 23:52

## 2022-11-04 RX ADMIN — Medication 20 MILLIGRAM(S): at 05:04

## 2022-11-04 RX ADMIN — SPIRONOLACTONE 25 MILLIGRAM(S): 25 TABLET, FILM COATED ORAL at 05:04

## 2022-11-04 RX ADMIN — LACOSAMIDE 100 MILLIGRAM(S): 50 TABLET ORAL at 21:40

## 2022-11-04 RX ADMIN — OXYCODONE HYDROCHLORIDE 5 MILLIGRAM(S): 5 TABLET ORAL at 09:00

## 2022-11-04 RX ADMIN — LACOSAMIDE 100 MILLIGRAM(S): 50 TABLET ORAL at 05:04

## 2022-11-04 RX ADMIN — SENNA PLUS 2 TABLET(S): 8.6 TABLET ORAL at 21:34

## 2022-11-04 RX ADMIN — AMIODARONE HYDROCHLORIDE 400 MILLIGRAM(S): 400 TABLET ORAL at 05:05

## 2022-11-04 RX ADMIN — PANTOPRAZOLE SODIUM 40 MILLIGRAM(S): 20 TABLET, DELAYED RELEASE ORAL at 05:04

## 2022-11-04 RX ADMIN — Medication 81 MILLIGRAM(S): at 21:34

## 2022-11-04 RX ADMIN — SODIUM CHLORIDE 50 MILLILITER(S): 9 INJECTION INTRAMUSCULAR; INTRAVENOUS; SUBCUTANEOUS at 09:32

## 2022-11-04 RX ADMIN — INSULIN GLARGINE 15 UNIT(S): 100 INJECTION, SOLUTION SUBCUTANEOUS at 21:34

## 2022-11-04 NOTE — PROGRESS NOTE ADULT - ASSESSMENT
77  year old  RHD male PMH of HTN, HLD, DM2, former smoker,  hypothyroidism, obesity, LILLY on C-pap,  bladder cancer (2020, s/p resection of bladder tumor/ currently self catheterizes 6x/day ), PAF s/p DCCV /uncomplicated radiofrequency ablation of atrial fibrillation (WACA/PVI, CTI) on 7/13/22,  left atrial appendage & severe MR . Presents with c/o recent" abnormal echo with fatigue due to mitral valve regurgitation. Presents for  scheduled Mitral valve replacement , MAZE, left atrial appendage closure on 10/20/2022.  10/24/22   MVr - triangular resection with 28mm Bobby band  AtriClip 45mm   Dawn-Maze IV procedure  PFO CLOSURE  Code stroke called for LUE convulsion with residual LUE weakness immediately after, Patient had LUE convulsions that spread to entire body  CT done, neuro following:  Stroke on differential as well given vague lucency at the genu of the right internal capsule and in the right thalamus of indeterminate age. Likely lacunar infarcts.   MRI when capable , eeg and Keppra added  Extubated d 1  + pressors, prbc's  EEG prelim neg.  Pt with lethargy post keppra, d/c since eeg neg  10/ 26 EEG reviewed by neuro, although no seizures  he is at risk for further sezures, recommended resuming keppra and if lethargic ,  change to vimpat  Pt with junctional / afib  Transferred to sdu  10/28 Maintain PW for junctional rhythm, sorbitol for constipation. Transferred to [address] [place] [address]  10/27 Junctional 50-60, remains on amio 200bid, no beta blocker   Cont keppra for now.  Neuro f/u  D/c jared drain.  10/29 Add Eliquis 2.5 bid>PW removed this am Continue diuretics  Amio 1800 mg to date>continue to 5 gram load as per DR Barraza  Magnesium repleted  10/30 VSS; rsr/junct 50-60- no bb; continue amio 200 bid; pt c/o dysuria--> ck ua/ urine cx +l.esterase; neg nitrate; start bactrim as per Dr. Barraza and ck urine cx; monitor ekg- ck in am 10/31  continue diuresis; allow pt to start self cath himself  increase activity as tolerated  10/31 Continue with diuretics. Pt remains 7kg above preop weight. Bactrim for UTI. . Completed 2.2G Amiodarone load. Maintaining junctional rhythm. Continue with amiodarone for 5G load as per Attending   11/1 JR 54-70  Amio decreased to 200 qd--EP called,   serratia marescens --sensitivities pending on bactrim.  -2600cc/24hrs-torsemide 10; amio and bb d/c as per EP- discharge pt home with mcot  11/2 VSS: junc 50-80- no av nodals as per EP; ID consulted; uti sensitive to bactrim- continue 3 days as per ID; continue diuresis; endo consulted for uncontrolled dm  mri brain done this am - results pending  discharge planning- home pt w/mcot when stable          77  year old  RHD male PMH of HTN, HLD, DM2, former smoker,  hypothyroidism, obesity, LILLY on C-pap,  bladder cancer (2020, s/p resection of bladder tumor/ currently self catheterizes 6x/day ), PAF s/p DCCV /uncomplicated radiofrequency ablation of atrial fibrillation ([facility]ACA/PVI, CTI) on 7/13/22,  left atrial appendage & severe MR . Presents with c/o recent" abnormal echo with fatigue due to mitral valve regurgitation. Presents for  scheduled Mitral valve replacement , MAZE, left atrial appendage closure on 10/20/2022.  10/24/22   MVr - triangular resection with 28mm Bobby band  AtriClip 45mm   Dawn-Maze IV procedure  PFO CLOSURE  Code stroke called for LUE convulsion with residual LUE weakness immediately after, Patient had LUE convulsions that spread to entire body  CT done, neuro following:  Stroke on differential as well given vague lucency at the genu of the right internal capsule and in the right thalamus of indeterminate age. Likely lacunar infarcts.   MRI when capable , eeg and Keppra added  Extubated d 1  + pressors, prbc's  EEG prelim neg.  Pt with lethargy post keppra, d/c since eeg neg  10/ 26 EEG reviewed by neuro, although no seizures  he is at risk for further sezures, recommended resuming keppra and if lethargic ,  change to vimpat  Pt with junctional / afib  Transferred to sdu  10/28 Maintain PW for junctional rhythm, sorbitol for constipation. Transferred to [address] [place] [address]  10/27 Junctional 50-60, remains on amio 200bid, no beta blocker   Cont keppra for now.  Neuro f/u  D/c jared drain.  10/29 Add Eliquis 2.5 bid>PW removed this am Continue diuretics  Amio 1800 mg to date>continue to 5 gram load as per DR Barraza  Magnesium repleted  10/30 VSS; rsr/junct 50-60- no bb; continue amio 200 bid; pt c/o dysuria--> ck ua/ urine cx +l.esterase; neg nitrate; start bactrim as per Dr. Barraza and ck urine cx; monitor ekg- ck in am 10/31  continue diuresis; allow pt to start self cath himself  increase activity as tolerated  10/31 Continue with diuretics. Pt remains 7kg above preop weight. Bactrim for UTI. . Completed 2.2G Amiodarone load. Maintaining junctional rhythm. Continue with amiodarone for 5G load as per Attending   11/1 JR 54-70  Amio decreased to 200 qd--EP called,   serratia marescens --sensitivities pending on bactrim.  -2600cc/24hrs-torsemide 10; amio and bb d/c as per EP- discharge pt home with mcot  11/2 VSS: junc 50-80- no av nodals as per EP; ID consulted; uti sensitive to bactrim- continue 3 days as per ID; continue diuresis; endo consulted for uncontrolled dm  mri brain done this am - results pending  11/3 tachy andrew--> afib up to 150- amio load; EP consulted; echo recommended  11/4 [facility] junct 60-80; npo for PPM today; amio d/c secondary to elevated lft's and diuretics d/c bun/ cr 34/1.8-- IVF .9 NS @ 50 cc/ hr for 1 L; echo done this am- + circ pericardial effusion; no rv compromise or tamponade noted; d/w Dr. Barraza; ct chest done--> likely substernal collection/ clot; no drainage at this time; continue to monitor as per DR. Barraza and no further eliquis/ ac           77  year old  RHD male PMH of HTN, HLD, DM2, former smoker,  hypothyroidism, obesity, LILLY on C-pap,  bladder cancer (2020, s/p resection of bladder tumor/ currently self catheterizes 6x/day ), PAF s/p DCCV /uncomplicated radiofrequency ablation of atrial fibrillation (WACA/PVI, CTI) on 7/13/22,  left atrial appendage & severe MR . Presents with c/o recent" abnormal echo with fatigue due to mitral valve regurgitation. Presents for  scheduled Mitral valve replacement , MAZE, left atrial appendage closure on 10/20/2022.  10/24/22   MVr - triangular resection with 28mm Bobby band  AtriClip 45mm   Dawn-Maze IV procedure  PFO CLOSURE  Code stroke called for LUE convulsion with residual LUE weakness immediately after, Patient had LUE convulsions that spread to entire body  CT done, neuro following:  Stroke on differential as well given vague lucency at the genu of the right internal capsule and in the right thalamus of indeterminate age. Likely lacunar infarcts.   MRI when capable , eeg and Keppra added  Extubated d 1  + pressors, prbc's  EEG prelim neg.  Pt with lethargy post keppra, d/c since eeg neg  10/ 26 EEG reviewed by neuro, although no seizures  he is at risk for further sezures, recommended resuming keppra and if lethargic ,  change to vimpat  Pt with junctional / afib  Transferred to sdu  10/28 Maintain PW for junctional rhythm, sorbitol for constipation. Transferred to [address] [place] [address]  10/27 Junctional 50-60, remains on amio 200bid, no beta blocker   Cont keppra for now.  Neuro f/u  D/c jared drain.  10/29 Add Eliquis 2.5 bid>PW removed this am Continue diuretics  Amio 1800 mg to date>continue to 5 gram load as per DR Barraza  Magnesium repleted  10/30 VSS; rsr/junct 50-60- no bb; continue amio 200 bid; pt c/o dysuria--> ck ua/ urine cx +l.esterase; neg nitrate; start bactrim as per Dr. Barraza and ck urine cx; monitor ekg- ck in am 10/31  continue diuresis; allow pt to start self cath himself  increase activity as tolerated  10/31 Continue with diuretics. Pt remains 7kg above preop weight. Bactrim for UTI. . Completed 2.2G Amiodarone load. Maintaining junctional rhythm. Continue with amiodarone for 5G load as per Attending   11/1 JR 54-70  Amio decreased to 200 qd--EP called,   serratia marescens --sensitivities pending on bactrim.  -2600cc/24hrs-torsemide 10; amio and bb d/c as per EP- discharge pt home with mcot  11/2 VSS: junc 50-80- no av nodals as per EP; ID consulted; uti sensitive to bactrim- continue 3 days as per ID; continue diuresis; endo consulted for uncontrolled dm  mri brain done this am - results pending  11/3 tachy andrew--> afib up to 150- amio load; EP consulted; echo recommended  11/4 [facility] junct 60-80; npo for PPM today; amio d/c secondary to elevated lft's and diuretics d/c bun/ cr 34/1.8-- IVF .9 NS @ 50 cc/ hr for 1 L; echo done this am- + circ pericardial effusion; no rv compromise or tamponade noted; d/w Dr. Barraza; ct chest done--> likely substernal collection/ clot; no drainage at this time; bp stable & pt asymptomatic; continue to monitor as per DR. Barraza and no further eliquis/ ac

## 2022-11-04 NOTE — PROGRESS NOTE ADULT - PROBLEM SELECTOR PLAN 1
continue postop care  decrease amio 200 qd   no bb secondary to sb/ junct 50-60  diuresis  continue synthroid  pulm toilet  pain management  increase activity as tolerated  bowel regimen  Discharge planning- home pt when stable continue postop care  av nodals d/c- pt npo   diuretics d/c- elevated bun/ cr today   continue synthroid  pulm toilet  pain management  increase activity as tolerated  bowel regimen  Discharge planning- home pt when stable

## 2022-11-04 NOTE — PROGRESS NOTE ADULT - SUBJECTIVE AND OBJECTIVE BOX
Chief complaint  Patient is a 77y old  Male who presents with a chief complaint of As per Adult CT surgery PA chart note (11/02), "77  year old  RHD male PMH of HTN, HLD, DM2, former smoker,  hypothyroidism, obesity, LILLY on C-pap,  bladder cancer (2020, s/p resection of bladder tumor/ currently self catheterizes 6x/day ), PAF s/p DCCV /uncomplicated radiofrequency ablation of atrial fibrillation (WACA/PVI, CTI) on 7/13/22,  left atrial appendage & severe MR . Presents with c/o recent" abnormal echo with fatigue due to mitral valve regurgitation. Presents for  scheduled Mitral valve replacement , MAZE, left atrial appendage closure on 10/20/2022.  10/24/22  MVr - triangular resection with 28mm Bobby band  AtriClip 45mm"         (02 Nov 2022 11:23)   Review of systems  Patient in bed, looks comfortable, no hypoglycemic episodes.    Labs and Fingersticks  CAPILLARY BLOOD GLUCOSE      POCT Blood Glucose.: 171 mg/dL (04 Nov 2022 07:25)  POCT Blood Glucose.: 203 mg/dL (03 Nov 2022 21:47)  POCT Blood Glucose.: 186 mg/dL (03 Nov 2022 18:37)  POCT Blood Glucose.: 196 mg/dL (03 Nov 2022 16:29)  POCT Blood Glucose.: 199 mg/dL (03 Nov 2022 12:21)  POCT Blood Glucose.: 199 mg/dL (03 Nov 2022 11:15)      Anion Gap, Serum: 17 (11-04 @ 06:57)  Anion Gap, Serum: 13 (11-03 @ 05:48)      Calcium, Total Serum: 8.9 (11-04 @ 06:57)  Calcium, Total Serum: 9.2 (11-03 @ 05:48)  Albumin, Serum: 3.5 (11-04 @ 06:57)    Alanine Aminotransferase (ALT/SGPT): 301 *H* (11-04 @ 06:57)  Alkaline Phosphatase, Serum: 71 (11-04 @ 06:57)  Aspartate Aminotransferase (AST/SGOT): 214 *H* (11-04 @ 06:57)        11-04    134<L>  |  97  |  34<H>  ----------------------------<  161<H>  4.8   |  20<L>  |  1.88<H>    Ca    8.9      04 Nov 2022 06:57  Phos  5.6     11-04  Mg     2.0     11-04    TPro  6.5  /  Alb  3.5  /  TBili  0.8  /  DBili  x   /  AST  214<H>  /  ALT  301<H>  /  AlkPhos  71  11-04                        8.5    10.40 )-----------( 247      ( 04 Nov 2022 06:59 )             26.5     Medications  MEDICATIONS  (STANDING):  aspirin enteric coated 81 milliGRAM(s) Oral daily  chlorhexidine 2% Cloths 1 Application(s) Topical daily  dextrose 5%. 1000 milliLiter(s) (50 mL/Hr) IV Continuous <Continuous>  dextrose 5%. 1000 milliLiter(s) (100 mL/Hr) IV Continuous <Continuous>  doxazosin 4 milliGRAM(s) Oral at bedtime  glucagon  Injectable 1 milliGRAM(s) IntraMuscular once  insulin glargine Injectable (LANTUS) 15 Unit(s) SubCutaneous at bedtime  insulin lispro (ADMELOG) corrective regimen sliding scale   SubCutaneous three times a day before meals  insulin lispro (ADMELOG) corrective regimen sliding scale   SubCutaneous at bedtime  insulin lispro Injectable (ADMELOG) 8 Unit(s) SubCutaneous three times a day before meals  lacosamide 100 milliGRAM(s) Oral two times a day  levothyroxine 25 MICROGram(s) Oral daily  pantoprazole    Tablet 40 milliGRAM(s) Oral before breakfast  polyethylene glycol 3350 17 Gram(s) Oral daily  senna 2 Tablet(s) Oral at bedtime      Physical Exam  General: Patient comfortable in bed  Vital Signs Last 12 Hrs  T(F): 98.1 (11-04-22 @ 05:01), Max: 98.8 (11-04-22 @ 00:08)  HR: 65 (11-04-22 @ 07:30) (61 - 66)  BP: 144/74 (11-04-22 @ 05:01) (124/77 - 144/74)  BP(mean): --  RR: 17 (11-04-22 @ 07:30) (17 - 18)  SpO2: 95% (11-04-22 @ 07:30) (95% - 97%)  Neck: No palpable thyroid nodules.  CVS: S1S2, No murmurs  Respiratory: No wheezing, no crepitations  GI: Abdomen soft, bowel sounds positive  Musculoskeletal:  edema lower extremities.   Skin: No skin rashes, no ecchymosis    Diagnostics             Chief complaint  Patient is a 77y old  Male who presents with a chief complaint of As per Adult CT surgery PA chart note (11/02), "77  year old  RHD male PMH of HTN, HLD, DM2, former smoker,  hypothyroidism, obesity, LILLY on C-pap,  bladder cancer (2020, s/p resection of bladder tumor/ currently self catheterizes 6x/day ), PAF s/p DCCV /uncomplicated radiofrequency ablation of atrial fibrillation (WACA/PVI, CTI) on 7/13/22,  left atrial appendage & severe MR . Presents with c/o recent" abnormal echo with fatigue due to mitral valve regurgitation. Presents for  scheduled Mitral valve replacement , MAZE, left atrial appendage closure on 10/20/2022.  10/24/22  MVr - triangular resection with 28mm Bobby band  AtriClip 45mm"         (02 Nov 2022 11:23)   Review of systems  Patient in bed, looks comfortable, no hypoglycemic episodes.    Labs and Fingersticks  CAPILLARY BLOOD GLUCOSE      POCT Blood Glucose.: 171 mg/dL (04 Nov 2022 07:25)  POCT Blood Glucose.: 203 mg/dL (03 Nov 2022 21:47)  POCT Blood Glucose.: 186 mg/dL (03 Nov 2022 18:37)  POCT Blood Glucose.: 196 mg/dL (03 Nov 2022 16:29)  POCT Blood Glucose.: 199 mg/dL (03 Nov 2022 12:21)  POCT Blood Glucose.: 199 mg/dL (03 Nov 2022 11:15)      Anion Gap, Serum: 17 (11-04 @ 06:57)  Anion Gap, Serum: 13 (11-03 @ 05:48)      Calcium, Total Serum: 8.9 (11-04 @ 06:57)  Calcium, Total Serum: 9.2 (11-03 @ 05:48)  Albumin, Serum: 3.5 (11-04 @ 06:57)    Alanine Aminotransferase (ALT/SGPT): 301 *H* (11-04 @ 06:57)  Alkaline Phosphatase, Serum: 71 (11-04 @ 06:57)  Aspartate Aminotransferase (AST/SGOT): 214 *H* (11-04 @ 06:57)        11-04    134<L>  |  97  |  34<H>  ----------------------------<  161<H>  4.8   |  20<L>  |  1.88<H>    Ca    8.9      04 Nov 2022 06:57  Phos  5.6     11-04  Mg     2.0     11-04    TPro  6.5  /  Alb  3.5  /  TBili  0.8  /  DBili  x   /  AST  214<H>  /  ALT  301<H>  /  AlkPhos  71  11-04                        8.5    10.40 )-----------( 247      ( 04 Nov 2022 06:59 )             26.5     Medications  MEDICATIONS  (STANDING):  aspirin enteric coated 81 milliGRAM(s) Oral daily  chlorhexidine 2% Cloths 1 Application(s) Topical daily  dextrose 5%. 1000 milliLiter(s) (50 mL/Hr) IV Continuous <Continuous>  dextrose 5%. 1000 milliLiter(s) (100 mL/Hr) IV Continuous <Continuous>  doxazosin 4 milliGRAM(s) Oral at bedtime  glucagon  Injectable 1 milliGRAM(s) IntraMuscular once  insulin glargine Injectable (LANTUS) 15 Unit(s) SubCutaneous at bedtime  insulin lispro (ADMELOG) corrective regimen sliding scale   SubCutaneous three times a day before meals  insulin lispro (ADMELOG) corrective regimen sliding scale   SubCutaneous at bedtime  insulin lispro Injectable (ADMELOG) 8 Unit(s) SubCutaneous three times a day before meals  lacosamide 100 milliGRAM(s) Oral two times a day  levothyroxine 25 MICROGram(s) Oral daily  pantoprazole    Tablet 40 milliGRAM(s) Oral before breakfast  polyethylene glycol 3350 17 Gram(s) Oral daily  senna 2 Tablet(s) Oral at bedtime      Physical Exam  General: Patient comfortable in bed  Vital Signs Last 12 Hrs  T(F): 98.1 (11-04-22 @ 05:01), Max: 98.8 (11-04-22 @ 00:08)  HR: 65 (11-04-22 @ 07:30) (61 - 66)  BP: 144/74 (11-04-22 @ 05:01) (124/77 - 144/74)  BP(mean): --  RR: 17 (11-04-22 @ 07:30) (17 - 18)  SpO2: 95% (11-04-22 @ 07:30) (95% - 97%)  Neck: No palpable thyroid nodules.  CVS: S1S2, No murmurs  Respiratory: No wheezing, no crepitations  GI: Abdomen soft, bowel sounds positive  Musculoskeletal:  edema lower extremities.   Skin: No skin rashes, no ecchymosis    Diagnostics

## 2022-11-04 NOTE — PRE-ANESTHESIA EVALUATION ADULT - NSANTHPMHFT_GEN_ALL_CORE
76yo man with HTN, HLD, DM, former smoker, obesity, afib s/p ablation, severe LILLY on CPAP, bladder CA, MR s/p MV repair and NAYELY closure now with bradycardia and stable pericardial effusion

## 2022-11-04 NOTE — PROGRESS NOTE ADULT - PROBLEM SELECTOR PLAN 4
EP consulted   no bb/ amio as per EP  no AV nodals  discharge pt home w/ mcot when stable EP consulted   no bb/ amio as per EP  no AV nodals  npo for ppm today

## 2022-11-04 NOTE — CONSULT NOTE ADULT - ASSESSMENT
Interventional Radiology    Evaluate for Procedure: pericardial effusion drainage     HPI:77  year old  RHD male PMH of HTN, HLD, DM2, former smoker,  hypothyroidism, obesity, LILLY on C-pap,  bladder cancer (, s/p resection of bladder tumor/ currently self catheterizes 6x/day ), PAF s/p DCCV /uncomplicated radiofrequency ablation of atrial fibrillation (WACA/PVI, CTI) on 22,  left atrial appendage & severe MR . Presents with c/o recent" abnormal echo with fatigue due to mitral valve regurgitation.  Pt s/p Mitral valve replacement , MAZE, left atrial appendage closure on 10/24/2022 now found to have pericardial effusion. IR consulted for drainage.    Allergies: penicillins (Diarrhea)    Medications (Abx/Cardiac/Anticoagulation/Blood Products)    aMIOdarone    Tablet: 400 milliGRAM(s) Oral ( @ 05:05)  apixaban: 5 milliGRAM(s) Oral ( @ 18:39)  aspirin enteric coated: 81 milliGRAM(s) Oral ( @ 11:57)  doxazosin: 4 milliGRAM(s) Oral ( @ 21:44)  furosemide    Tablet: 20 milliGRAM(s) Oral ( @ 05:04)  metoprolol tartrate Injectable: 2.5 milliGRAM(s) IV Push ( @ 18:39)  spironolactone: 50 milliGRAM(s) Oral ( @ 05:13)  spironolactone: 25 milliGRAM(s) Oral ( @ 05:04)  torsemide: 10 milliGRAM(s) Oral ( @ 05:13)  trimethoprim  160 mG/sulfamethoxazole 800 m Tablet(s) Oral ( @ 05:14)  trimethoprim  160 mG/sulfamethoxazole 800 m Tablet(s) Oral ( @ 18:43)    Data:  182.9  126.9  T(C): 36.7  HR: 65  BP: 142/76  RR: 18  SpO2: 95%    -WBC 10.40 / HgB 8.5 / Hct 26.5 / Plt 247  -Na 134 / Cl 97 / BUN 34 / Glucose 161  -K 4.8 / CO2 20 / Cr 1.88  - / Alk Phos 71 / T.Bili 0.8  -INR 2.35 / PTT 28.8    < from: CT Chest No Cont (22 @ 11:20) >  IMPRESSION:    Large hemopericardium with associated large substernal hematoma    < end of copied text >       Assessment/Plan: 77  year old  RHD male PMH of HTN, HLD, DM2, former smoker,  hypothyroidism, obesity, LILLY on C-pap,  bladder cancer (, s/p resection of bladder tumor/ currently self catheterizes 6x/day ), PAF s/p DCCV /uncomplicated radiofrequency ablation of atrial fibrillation (WACA/PVI, CTI) on 22,  left atrial appendage & severe MR . Presents with c/o recent" abnormal echo with fatigue due to mitral valve regurgitation.  Pt s/p Mitral valve replacement , MAZE, left atrial appendage closure on 10/24/2022 now found to have pericardial effusion. IR consulted for drainage.    - CT chest reviewed with Dr. Levi and Dr. Tena. CT demonstrates a Large hemopericardium that is contiguous with large substernal hematoma. Unlikely to drain well with a drainage catheter.  - Pt also received Eliquis on 11/3 at 1839.  - per discussion with thoracic surgery team , will defer drainage at this time.

## 2022-11-04 NOTE — PROGRESS NOTE ADULT - PROBLEM SELECTOR PLAN 2
Post op seizure---> continue keppra 750bid  for prophylaxis  Neuro f/u as an outpatient   MRI brain done this am   continue to monitor Post op seizure---> vimpat as per neuro  keppra d/c 11/3 secondary to nausea  Neuro f/u as an outpatient   MRI brain done this am   continue to monitor

## 2022-11-04 NOTE — PROGRESS NOTE ADULT - PROBLEM SELECTOR PLAN 1
Will increase Lantus to 15u at bedtime.  Will increase Admelog to 8u before each meal and continue Admelog correction scale coverage. Will continue monitoring FS and FU.  Will likely DC on his prior Janumet 50/1000 BID. FU 2 weeks.  Patient counseled for compliance with consistent low carb diet and exercise as tolerated outpatient. Will increase Lantus to 15u at bedtime.  Will increase Admelog to 8u before each meal and continue Admelog correction scale coverage. Will continue monitoring FS and FU.    DC Planning: Patient previously well-controlled on Janumet, A1C 6.2%. Cr now trending up. Will continue monitoring and FU DC recommendations.  Patient counseled for compliance with consistent low carb diet and exercise as tolerated outpatient.

## 2022-11-04 NOTE — PROGRESS NOTE ADULT - SUBJECTIVE AND OBJECTIVE BOX
VITAL SIGNS    Telemetry:    Vital Signs Last 24 Hrs  T(C): 36.7 (22 @ 05:01), Max: 37.1 (22 @ 00:08)  T(F): 98.1 (22 @ 05:01), Max: 98.8 (22 @ 00:08)  HR: 65 (22 07:30) (60 - 153)  BP: 144/74 (22 @ 05:01) (103/64 - 152/72)  RR: 17 (22 07:30) (17 - 18)  SpO2: 95% (22 07:30) (95% - 99%)             @ 07:01  -   @ 07:00  --------------------------------------------------------  IN: 220 mL / OUT: 900 mL / NET: -680 mL     @ 07:01  -   @ 11:44  --------------------------------------------------------  IN: 150 mL / OUT: 500 mL / NET: -350 mL       Daily Height in cm: 182.9 (2022 02:19)    Daily Weight in k.6 (2022 10:06)  Admit Wt: Drug Dosing Weight  Height (cm): 182.9 (2022 04:59)  Weight (kg): 126.9 (2022 04:59)  BMI (kg/m2): 37.9 (2022 04:59)  BSA (m2): 2.46 (2022 04:59)    Bilirubin Total, Serum: 0.8 mg/dL ( 06:57)    CAPILLARY BLOOD GLUCOSE      POCT Blood Glucose.: 171 mg/dL (2022 07:25)  POCT Blood Glucose.: 203 mg/dL (2022 21:47)  POCT Blood Glucose.: 186 mg/dL (2022 18:37)  POCT Blood Glucose.: 196 mg/dL (2022 16:29)  POCT Blood Glucose.: 199 mg/dL (2022 12:21)          acetaminophen     Tablet .. 650 milliGRAM(s) Oral every 6 hours PRN  aspirin enteric coated 81 milliGRAM(s) Oral daily  chlorhexidine 2% Cloths 1 Application(s) Topical daily  dextrose 5%. 1000 milliLiter(s) IV Continuous <Continuous>  dextrose 5%. 1000 milliLiter(s) IV Continuous <Continuous>  dextrose Oral Gel 15 Gram(s) Oral once PRN  doxazosin 4 milliGRAM(s) Oral at bedtime  glucagon  Injectable 1 milliGRAM(s) IntraMuscular once  guaiFENesin Oral Liquid (Sugar-Free) 100 milliGRAM(s) Oral every 6 hours PRN  insulin glargine Injectable (LANTUS) 15 Unit(s) SubCutaneous at bedtime  insulin lispro (ADMELOG) corrective regimen sliding scale   SubCutaneous three times a day before meals  insulin lispro (ADMELOG) corrective regimen sliding scale   SubCutaneous at bedtime  insulin lispro Injectable (ADMELOG) 8 Unit(s) SubCutaneous three times a day before meals  lacosamide 100 milliGRAM(s) Oral two times a day  levothyroxine 25 MICROGram(s) Oral daily  oxyCODONE    IR 5 milliGRAM(s) Oral every 6 hours PRN  pantoprazole    Tablet 40 milliGRAM(s) Oral before breakfast  polyethylene glycol 3350 17 Gram(s) Oral daily  senna 2 Tablet(s) Oral at bedtime  sodium chloride 0.9%. 1000 milliLiter(s) IV Continuous <Continuous>      PHYSICAL EXAM    Subjective: "Hi.   Neurology: alert and oriented x 3, nonfocal, no gross deficits  CV : tele:  RSR  Sternal Wound :  CDI with dressing , Stable  Lungs: clear. RR easy, unlabored   Abdomen: soft, nontender, nondistended, positive bowel sounds, bowel movement   Neg N/V/D   :  pt voiding without difficulty   Extremities:   OLIVIER; edema, neg calf tenderness.   PPP bilaterally      PW:  Chest tubes:                 VITAL SIGNS    Telemetry:  acc junct 60-70  Vital Signs Last 24 Hrs  T(C): 36.7 (22 @ 05:01), Max: 37.1 (22 @ 00:08)  T(F): 98.1 (22 @ 05:01), Max: 98.8 (22 @ 00:08)  HR: 65 (22 07:30) (60 - 153)  BP: 144/74 (22 @ 05:01) (103/64 - 152/72)  RR: 17 (22 @ 07:30) (17 - 18)  SpO2: 95% (22 07:30) (95% - 99%)             @ 07:01  -   @ 07:00  --------------------------------------------------------  IN: 220 mL / OUT: 900 mL / NET: -680 mL     @ 07:01  -   @ 11:44  --------------------------------------------------------  IN: 150 mL / OUT: 500 mL / NET: -350 mL       Daily Height in cm: 182.9 (2022 02:19)    Daily Weight in k.6 (2022 10:06)  Admit Wt: Drug Dosing Weight  Height (cm): 182.9 (2022 04:59)  Weight (kg): 126.9 (2022 04:59)  BMI (kg/m2): 37.9 (2022 04:59)  BSA (m2): 2.46 (2022 04:59)    Bilirubin Total, Serum: 0.8 mg/dL ( 06:57)    CAPILLARY BLOOD GLUCOSE      POCT Blood Glucose.: 171 mg/dL (2022 07:25)  POCT Blood Glucose.: 203 mg/dL (2022 21:47)  POCT Blood Glucose.: 186 mg/dL (2022 18:37)  POCT Blood Glucose.: 196 mg/dL (2022 16:29)  POCT Blood Glucose.: 199 mg/dL (2022 12:21)          acetaminophen     Tablet .. 650 milliGRAM(s) Oral every 6 hours PRN  aspirin enteric coated 81 milliGRAM(s) Oral daily  chlorhexidine 2% Cloths 1 Application(s) Topical daily  dextrose 5%. 1000 milliLiter(s) IV Continuous <Continuous>  dextrose 5%. 1000 milliLiter(s) IV Continuous <Continuous>  dextrose Oral Gel 15 Gram(s) Oral once PRN  doxazosin 4 milliGRAM(s) Oral at bedtime  glucagon  Injectable 1 milliGRAM(s) IntraMuscular once  guaiFENesin Oral Liquid (Sugar-Free) 100 milliGRAM(s) Oral every 6 hours PRN  insulin glargine Injectable (LANTUS) 15 Unit(s) SubCutaneous at bedtime  insulin lispro (ADMELOG) corrective regimen sliding scale   SubCutaneous three times a day before meals  insulin lispro (ADMELOG) corrective regimen sliding scale   SubCutaneous at bedtime  insulin lispro Injectable (ADMELOG) 8 Unit(s) SubCutaneous three times a day before meals  lacosamide 100 milliGRAM(s) Oral two times a day  levothyroxine 25 MICROGram(s) Oral daily  oxyCODONE    IR 5 milliGRAM(s) Oral every 6 hours PRN  pantoprazole    Tablet 40 milliGRAM(s) Oral before breakfast  polyethylene glycol 3350 17 Gram(s) Oral daily  senna 2 Tablet(s) Oral at bedtime  sodium chloride 0.9%. 1000 milliLiter(s) IV Continuous <Continuous>        PHYSICAL EXAM    Subjective: "I feel ok."  Neurology: alert and oriented x 3, nonfocal, no gross deficits  CV : tele:  acc junct 50-80  Sternal Wound :  CDI THADDEUS, sternum Stable  Lungs: clear diminished at the bases; RR easy, unlabored   Abdomen: soft, nontender, nondistended, positive bowel sounds, + bowel movement   Neg N/V/D; obese abdomen   :  + gaitan--> cloudy yellow urine- pt self caths at home   Extremities:   OLIVIER; trace LE edema, neg calf tenderness.   PPP bilaterally        PW: no  Chest tubes: none

## 2022-11-04 NOTE — PROGRESS NOTE ADULT - SUBJECTIVE AND OBJECTIVE BOX
24H hour events: Patient resting comfortably in bed.  Denies c/o CP, palpitations, dizziness, lightheadedness or SOB.     MEDICATIONS:  aspirin enteric coated 81 milliGRAM(s) Oral daily  doxazosin 4 milliGRAM(s) Oral at bedtime    guaiFENesin Oral Liquid (Sugar-Free) 100 milliGRAM(s) Oral every 6 hours PRN    acetaminophen     Tablet .. 650 milliGRAM(s) Oral every 6 hours PRN  lacosamide 100 milliGRAM(s) Oral two times a day  oxyCODONE    IR 5 milliGRAM(s) Oral every 6 hours PRN    pantoprazole    Tablet 40 milliGRAM(s) Oral before breakfast  polyethylene glycol 3350 17 Gram(s) Oral daily  senna 2 Tablet(s) Oral at bedtime    dextrose Oral Gel 15 Gram(s) Oral once PRN  glucagon  Injectable 1 milliGRAM(s) IntraMuscular once  insulin glargine Injectable (LANTUS) 15 Unit(s) SubCutaneous at bedtime  insulin lispro (ADMELOG) corrective regimen sliding scale   SubCutaneous three times a day before meals  insulin lispro (ADMELOG) corrective regimen sliding scale   SubCutaneous at bedtime  insulin lispro Injectable (ADMELOG) 8 Unit(s) SubCutaneous three times a day before meals  levothyroxine 25 MICROGram(s) Oral daily    chlorhexidine 2% Cloths 1 Application(s) Topical daily  dextrose 5%. 1000 milliLiter(s) IV Continuous <Continuous>  dextrose 5%. 1000 milliLiter(s) IV Continuous <Continuous>  sodium chloride 0.9%. 1000 milliLiter(s) IV Continuous <Continuous>      REVIEW OF SYSTEMS:  Complete 12point ROS negative.    PHYSICAL EXAM:  T(C): 36.7 (11-04-22 @ 05:01), Max: 37.1 (11-04-22 @ 00:08)  HR: 65 (11-04-22 @ 07:30) (60 - 153)  BP: 144/74 (11-04-22 @ 05:01) (103/64 - 152/72)  RR: 17 (11-04-22 @ 07:30) (17 - 18)  SpO2: 95% (11-04-22 @ 07:30) (95% - 99%)    03 Nov 2022 07:01  -  04 Nov 2022 07:00  --------------------------------------------------------  IN: 220 mL / OUT: 900 mL / NET: -680 mL    04 Nov 2022 07:01  -  04 Nov 2022 11:36  --------------------------------------------------------  IN: 150 mL / OUT: 500 mL / NET: -350 mL    Appearance: Normal	  HEENT:   Normal oral mucosa, PERRL, EOMI	  Cardiovascular: Normal S1 S2, regular.    Respiratory: Lungs clear to auscultation	  Psychiatry: A & O x 3, Mood & affect appropriate  Gastrointestinal:  Soft, Non-tender, + BS	  Skin: midsternal incision open to air, clean and intact.   Extremities: Normal range of motion, No clubbing, cyanosis or edema  Vascular: Peripheral pulses palpable 2+ bilaterally    LABS:	 	    CBC Full  -  ( 04 Nov 2022 06:59 )  WBC Count : 10.40 K/uL  Hemoglobin : 8.5 g/dL  Hematocrit : 26.5 %  Platelet Count - Automated : 247 K/uL  Mean Cell Volume : 85.2 fl  Mean Cell Hemoglobin : 27.3 pg  Mean Cell Hemoglobin Concentration : 32.1 gm/dL  Auto Neutrophil # : 8.41 K/uL  Auto Lymphocyte # : 0.70 K/uL  Auto Monocyte # : 1.13 K/uL  Auto Eosinophil # : 0.03 K/uL  Auto Basophil # : 0.02 K/uL  Auto Neutrophil % : 80.8 %  Auto Lymphocyte % : 6.7 %  Auto Monocyte % : 10.9 %  Auto Eosinophil % : 0.3 %  Auto Basophil % : 0.2 %    11-04    134<L>  |  97  |  34<H>  ----------------------------<  161<H>  4.8   |  20<L>  |  1.88<H>  11-03    132<L>  |  98  |  23  ----------------------------<  189<H>  5.2   |  21<L>  |  1.55<H>    Ca    8.9      04 Nov 2022 06:57  Ca    9.2      03 Nov 2022 05:48  Phos  5.6     11-04  Mg     2.0     11-04    TPro  6.5  /  Alb  3.5  /  TBili  0.8  /  DBili  x   /  AST  214<H>  /  ALT  301<H>  /  AlkPhos  71  11-04        TELEMETRY: 	NSR/ Junctional 60's       TTE:  Patient name: MECHE CHACKO  YOB: 1945   Age: 77 (M)   MR#: [id number]  Study Date: 11/4/2022  Location: [address] [place], [place] [id number]: Alonzo Kolb RDCS  Study quality: Technically fair  Referring Physician: Escobar Barraza MD  Blood Pressure: 144/74 mmHg  Height: 183 cm  Weight: 127 kg  BSA: 2.5 m2  ------------------------------------------------------------------------  PROCEDURE: Transthoracic echocardiogram with 2-D, M-Mode  and complete spectral and color flow Doppler. Verbal  consent was obtained for injection of  Ultrasonic Enhancing  Agent following a discussion of risks and benefits.  Following intravenous injection of Ultrasonic Enhancing  Agent, harmonic imaging was performed.  INDICATION: Cardiac murmur, unspecified (R01.1)  ------------------------------------------------------------------------  Dimensions:    Normal Values:  LA:     4.7    2.0 - 4.0 cm  Ao:     3.6    2.0 - 3.8 cm  SEPTUM: 1.0    0.6 - 1.2 cm  PWT:    13.0    0.6 - 1.1 cm  LVIDd:  4.8    3.0 - 5.6 cm  LVIDs:  3.2    1.8 - 4.0 cm  Derived variables:  LVMI: 2210 g/m2  RWT: 5.41  EF (Visual Estimate): 65 %  Doppler Peak Velocity (m/sec): AoV=1.6 TV=2.5  ------------------------------------------------------------------------  Observations:  Mitral Valve: s/p mitral valve repair. No mitral  regurgitation seen.  Aortic Valve/Aorta: Normal aortic valve.  Normal aortic root size.  Left Atrium: Severely dilated left atrium.  Left Ventricle: Endocardial visualization enhanced with  intravenous injection of Ultrasonic Enhancing Agent  (Lumason).  Normal left ventricular internal dimensions and wall  thicknesses.  Normal left ventricular systolic function. No segmental  wall motion abnormalities.  Right Heart: Normal right atrium. Normal right ventricular  size and function.  Normal tricuspid valve. Minimal tricuspid regurgitation.  Normal pulmonic valve.  Pericardium/Pleura: Circumferential pericardial effusion,  which is largest adjacent to the right ventricle (as imaged   in the subcostal view, where it measures approximately 3.0  cm).  Minimal diastolic compression of the right ventricle is  noted (loop #46).  Bilateral pleural effusions.  ------------------------------------------------------------------------  Conclusions:  Endocardial visualization enhanced with intravenous  injection of Ultrasonic Enhancing Agent (Lumason).  Normal left ventricular systolic function. No segmental  wall motion abnormalities.  s/p mitral valve repair. No mitral regurgitation seen.  Circumferential pericardial effusion, which is largest  adjacent to the right ventricle (as imaged  in the  subcostal view, where it measures approximately 3.0 cm).  Minimal diastolic compression of the right ventricle is  noted (loop #46).  Results discussed with CT surgeon at  10:37 AM, 11/04/2022.  ------------------------------------------------------------------------  Confirmed on  11/4/2022 - 11:07:25 by Armand Ramirez MD, FASE

## 2022-11-04 NOTE — PROGRESS NOTE ADULT - ASSESSMENT
77  year old male PMH of HTN, HLD, DM2, former smoker,  hypothyroidism, obesity, LILLY on CPAP,  bladder cancer (2020, s/p resection of bladder tumor/ currently self catheterizes), PAF s/p DCCV/ablation on 7/13/22 at [facility]. 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 He underwent MVrepair, MAZE, left atrial appendage clip on 10/24/2022 w/ Dr. Barraza. Post op w/ junctional rhythm noted on telemetry for which EP was initially consulted 11/1, at that time he was on Amiodarone for post op AF however tele strips of this were not documented. On 11/1 Amio was discontinued (at that time had received 2800mg). At 1500 on 11/3 patient went into AF with rates 130-160's. Prior to this was junctional. Converted after 2.5mg Lopressor IV to junctional 50's.     1) Post op junctional/accelerated junctional rhythm w/ VA conduction  2) severe MR S/p MV repair, NAYELY clip and MAZE 10/24/22   3) hx pAF, now w/ post op paroxysmal AF w/ RVR   4) Tachy andrew syndrome   5) New pericardial effusion noted on TTE today, EF 65%     - Repeat TTE this AM to assess LV/EF revealed Circumferential pericardial effusion, which is largest adjacent to the right ventricle   Patient is pending IR drainage today per CT surgery team   - maintain NPO for possible PPM implant post IR drainage, consent in chart   - T&S and COVID PCR active   - Discontinue Amiodarone load in setting of elevated LFT's today (/ ).   - Avoiding AVNB   - Holding Eliquis since 11/4 AM     ELIEZER Busch [facility]  [phone]

## 2022-11-05 LAB
ALBUMIN SERPL ELPH-MCNC: 3.4 G/DL — SIGNIFICANT CHANGE UP (ref 3.3–5)
ALP SERPL-CCNC: 72 U/L — SIGNIFICANT CHANGE UP (ref 40–120)
ALT FLD-CCNC: 211 U/L — HIGH (ref 10–45)
ANION GAP SERPL CALC-SCNC: 14 MMOL/L — SIGNIFICANT CHANGE UP (ref 5–17)
AST SERPL-CCNC: 85 U/L — HIGH (ref 10–40)
BASOPHILS # BLD AUTO: 0.06 K/UL — SIGNIFICANT CHANGE UP (ref 0–0.2)
BASOPHILS NFR BLD AUTO: 0.7 % — SIGNIFICANT CHANGE UP (ref 0–2)
BILIRUB SERPL-MCNC: 0.6 MG/DL — SIGNIFICANT CHANGE UP (ref 0.2–1.2)
BUN SERPL-MCNC: 39 MG/DL — HIGH (ref 7–23)
CALCIUM SERPL-MCNC: 8.6 MG/DL — SIGNIFICANT CHANGE UP (ref 8.4–10.5)
CHLORIDE SERPL-SCNC: 99 MMOL/L — SIGNIFICANT CHANGE UP (ref 96–108)
CO2 SERPL-SCNC: 22 MMOL/L — SIGNIFICANT CHANGE UP (ref 22–31)
CREAT SERPL-MCNC: 1.73 MG/DL — HIGH (ref 0.5–1.3)
EGFR: 40 ML/MIN/1.73M2 — LOW
EOSINOPHIL # BLD AUTO: 0.28 K/UL — SIGNIFICANT CHANGE UP (ref 0–0.5)
EOSINOPHIL NFR BLD AUTO: 3.3 % — SIGNIFICANT CHANGE UP (ref 0–6)
GLUCOSE SERPL-MCNC: 108 MG/DL — HIGH (ref 70–99)
HCT VFR BLD CALC: 24.9 % — LOW (ref 39–50)
HCT VFR BLD CALC: 28.8 % — LOW (ref 39–50)
HGB BLD-MCNC: 7.8 G/DL — LOW (ref 13–17)
HGB BLD-MCNC: 8.7 G/DL — LOW (ref 13–17)
IMM GRANULOCYTES NFR BLD AUTO: 0.8 % — SIGNIFICANT CHANGE UP (ref 0–0.9)
LYMPHOCYTES # BLD AUTO: 0.7 K/UL — LOW (ref 1–3.3)
LYMPHOCYTES # BLD AUTO: 8.3 % — LOW (ref 13–44)
MCHC RBC-ENTMCNC: 26.7 PG — LOW (ref 27–34)
MCHC RBC-ENTMCNC: 27.7 PG — SIGNIFICANT CHANGE UP (ref 27–34)
MCHC RBC-ENTMCNC: 30.2 GM/DL — LOW (ref 32–36)
MCHC RBC-ENTMCNC: 31.3 GM/DL — LOW (ref 32–36)
MCV RBC AUTO: 85.3 FL — SIGNIFICANT CHANGE UP (ref 80–100)
MCV RBC AUTO: 91.7 FL — SIGNIFICANT CHANGE UP (ref 80–100)
MONOCYTES # BLD AUTO: 1.02 K/UL — HIGH (ref 0–0.9)
MONOCYTES NFR BLD AUTO: 12.1 % — SIGNIFICANT CHANGE UP (ref 2–14)
NEUTROPHILS # BLD AUTO: 6.3 K/UL — SIGNIFICANT CHANGE UP (ref 1.8–7.4)
NEUTROPHILS NFR BLD AUTO: 74.8 % — SIGNIFICANT CHANGE UP (ref 43–77)
NRBC # BLD: 0 /100 WBCS — SIGNIFICANT CHANGE UP (ref 0–0)
NRBC # BLD: 0 /100 WBCS — SIGNIFICANT CHANGE UP (ref 0–0)
PLATELET # BLD AUTO: 189 K/UL — SIGNIFICANT CHANGE UP (ref 150–400)
PLATELET # BLD AUTO: 244 K/UL — SIGNIFICANT CHANGE UP (ref 150–400)
POTASSIUM SERPL-MCNC: 4.4 MMOL/L — SIGNIFICANT CHANGE UP (ref 3.5–5.3)
POTASSIUM SERPL-SCNC: 4.4 MMOL/L — SIGNIFICANT CHANGE UP (ref 3.5–5.3)
PROT SERPL-MCNC: 6.4 G/DL — SIGNIFICANT CHANGE UP (ref 6–8.3)
RBC # BLD: 2.92 M/UL — LOW (ref 4.2–5.8)
RBC # BLD: 3.14 M/UL — LOW (ref 4.2–5.8)
RBC # FLD: 15.4 % — HIGH (ref 10.3–14.5)
RBC # FLD: 15.6 % — HIGH (ref 10.3–14.5)
SODIUM SERPL-SCNC: 135 MMOL/L — SIGNIFICANT CHANGE UP (ref 135–145)
WBC # BLD: 10.83 K/UL — HIGH (ref 3.8–10.5)
WBC # BLD: 8.43 K/UL — SIGNIFICANT CHANGE UP (ref 3.8–10.5)
WBC # FLD AUTO: 10.83 K/UL — HIGH (ref 3.8–10.5)
WBC # FLD AUTO: 8.43 K/UL — SIGNIFICANT CHANGE UP (ref 3.8–10.5)

## 2022-11-05 PROCEDURE — 71046 X-RAY EXAM CHEST 2 VIEWS: CPT | Mod: 26

## 2022-11-05 PROCEDURE — 93010 ELECTROCARDIOGRAM REPORT: CPT

## 2022-11-05 RX ORDER — FOLIC ACID 0.8 MG
1 TABLET ORAL DAILY
Refills: 0 | Status: DISCONTINUED | OUTPATIENT
Start: 2022-11-05 | End: 2022-11-07

## 2022-11-05 RX ORDER — METOPROLOL TARTRATE 50 MG
12.5 TABLET ORAL
Refills: 0 | Status: DISCONTINUED | OUTPATIENT
Start: 2022-11-05 | End: 2022-11-07

## 2022-11-05 RX ORDER — FERROUS SULFATE 325(65) MG
325 TABLET ORAL DAILY
Refills: 0 | Status: DISCONTINUED | OUTPATIENT
Start: 2022-11-05 | End: 2022-11-07

## 2022-11-05 RX ORDER — THIAMINE MONONITRATE (VIT B1) 100 MG
100 TABLET ORAL DAILY
Refills: 0 | Status: DISCONTINUED | OUTPATIENT
Start: 2022-11-05 | End: 2022-11-07

## 2022-11-05 RX ADMIN — Medication 4 MILLIGRAM(S): at 21:17

## 2022-11-05 RX ADMIN — POLYETHYLENE GLYCOL 3350 17 GRAM(S): 17 POWDER, FOR SOLUTION ORAL at 11:33

## 2022-11-05 RX ADMIN — INSULIN GLARGINE 15 UNIT(S): 100 INJECTION, SOLUTION SUBCUTANEOUS at 21:16

## 2022-11-05 RX ADMIN — SENNA PLUS 2 TABLET(S): 8.6 TABLET ORAL at 21:17

## 2022-11-05 RX ADMIN — LACOSAMIDE 100 MILLIGRAM(S): 50 TABLET ORAL at 17:20

## 2022-11-05 RX ADMIN — Medication 25 MICROGRAM(S): at 05:14

## 2022-11-05 RX ADMIN — Medication 250 MILLIGRAM(S): at 16:36

## 2022-11-05 RX ADMIN — Medication 100 MILLIGRAM(S): at 12:03

## 2022-11-05 RX ADMIN — LACOSAMIDE 100 MILLIGRAM(S): 50 TABLET ORAL at 05:14

## 2022-11-05 RX ADMIN — CHLORHEXIDINE GLUCONATE 1 APPLICATION(S): 213 SOLUTION TOPICAL at 11:38

## 2022-11-05 RX ADMIN — Medication 250 MILLIGRAM(S): at 21:16

## 2022-11-05 RX ADMIN — Medication 200 MILLIGRAM(S): at 07:03

## 2022-11-05 RX ADMIN — Medication 1 MILLIGRAM(S): at 11:34

## 2022-11-05 RX ADMIN — Medication 81 MILLIGRAM(S): at 11:33

## 2022-11-05 RX ADMIN — Medication 12.5 MILLIGRAM(S): at 17:20

## 2022-11-05 RX ADMIN — PANTOPRAZOLE SODIUM 40 MILLIGRAM(S): 20 TABLET, DELAYED RELEASE ORAL at 05:14

## 2022-11-05 RX ADMIN — Medication 12.5 MILLIGRAM(S): at 08:45

## 2022-11-05 RX ADMIN — Medication 8 UNIT(S): at 08:02

## 2022-11-05 RX ADMIN — Medication 8 UNIT(S): at 11:33

## 2022-11-05 RX ADMIN — Medication 325 MILLIGRAM(S): at 11:34

## 2022-11-05 RX ADMIN — Medication 8 UNIT(S): at 16:36

## 2022-11-05 NOTE — PROGRESS NOTE ADULT - SUBJECTIVE AND OBJECTIVE BOX
VITAL SIGNS-Telemetry:  AP 7-120  Vital Signs Last 24 Hrs  T(C): 36.5 (22 @ 04:14), Max: 36.7 (22 @ 12:03)  T(F): 97.7 (22 @ 04:14), Max: 98 (22 @ 12:03)  HR: 71 (22 @ 04:14) (62 - 71)  BP: 122/65 (22 @ 04:14) (120/56 - 142/76)  RR: 18 (22 @ 04:14) (17 - 20)  SpO2: 95% (22 @ 04:14) (93% - 98%)          @ 07:01  -   @ 07:00  --------------------------------------------------------  IN: 1070 mL / OUT: 1925 mL / NET: -855 mL    Daily Height in cm: 182.88 (2022 15:26)    Daily Weight in k.6 (2022 10:06)    CAPILLARY BLOOD GLUCOSE  POCT Blood Glucose.: 128 mg/dL (2022 07:44)  POCT Blood Glucose.: 208 mg/dL (2022 21:27)  POCT Blood Glucose.: 179 mg/dL (2022 19:33)  POCT Blood Glucose.: 167 mg/dL (2022 11:53)        PHYSICAL EXAM:  Neurology: alert and oriented x 3, nonfocal, no gross deficits  CV : S1S2  Sternal Wound :  CDI , Stable  LCW site cdi w/ dressing in place  Lungs: cta  Abdomen: soft, nontender, nondistended, positive bowel sounds, last bowel movement      11/3   Extremities:    trace edema, no calf tenderness    acetaminophen     Tablet .. 650 milliGRAM(s) Oral every 6 hours PRN  aspirin enteric coated 81 milliGRAM(s) Oral daily  cephalexin 250 milliGRAM(s) Oral every 6 hours  chlorhexidine 2% Cloths 1 Application(s) Topical daily  dextrose 5%. 1000 milliLiter(s) IV Continuous <Continuous>  dextrose 5%. 1000 milliLiter(s) IV Continuous <Continuous>  dextrose Oral Gel 15 Gram(s) Oral once PRN  doxazosin 4 milliGRAM(s) Oral at bedtime  glucagon  Injectable 1 milliGRAM(s) IntraMuscular once  guaiFENesin Oral Liquid (Sugar-Free) 100 milliGRAM(s) Oral every 6 hours PRN  insulin glargine Injectable (LANTUS) 15 Unit(s) SubCutaneous at bedtime  insulin lispro (ADMELOG) corrective regimen sliding scale   SubCutaneous three times a day before meals  insulin lispro (ADMELOG) corrective regimen sliding scale   SubCutaneous at bedtime  insulin lispro Injectable (ADMELOG) 8 Unit(s) SubCutaneous three times a day before meals  lacosamide 100 milliGRAM(s) Oral two times a day  levothyroxine 25 MICROGram(s) Oral daily  oxyCODONE    IR 5 milliGRAM(s) Oral every 6 hours PRN  pantoprazole    Tablet 40 milliGRAM(s) Oral before breakfast  polyethylene glycol 3350 17 Gram(s) Oral daily  senna 2 Tablet(s) Oral at bedtime  sodium chloride 0.9%. 1000 milliLiter(s) IV Continuous <Continuous>      Physical Therapy Rec:   Home  [  ]   Home w/ PT  [ x ]  Rehab  [  ]  Discussed with Cardiothoracic Team at AM rounds.

## 2022-11-05 NOTE — PROGRESS NOTE ADULT - ASSESSMENT
77  year old  RHD male with HTN, HLD, DM2, former smoker,  hypothyroidism, obesity, LILLY on C-pap,  bladder cancer (2020, s/p resection of bladder tumor/ currently self catheterizes 6x/day ), PAF s/p DCCV /uncomplicated radiofrequency ablation of atrial fibrillation (WACA/PVI, CTI) on 7/13/22,  left atrial appendage & severe MR s/p open heart surgery for mitral valve repair and PFO closure 10/24/22.   Code stroke called for LUE convulsion with residual LUE weakness immediately after. LKW: 9:50 PM 10/24/22.   NIHSS: 12. MRS:2. on day of code stroke    Per primary team, LUE weakness improved. Patient had LUE convulsions that spread to entire body and team witnessed GTC's that lasted for 1.5 minutes. No witnessed gaze deviation per primary team.     On initial exam, patient able to follow commands and able to lift all extremities against gravity. Patient was able to life LUE but less than the RUE. Still, all extremities had drift that hit bed.   Not a tpa candidate given open heart surgery within 14 days. Not a thrombectomy candidate given no LVO.     o/e 10/25 AAOx3, OLIVIER but mild LUE weakness still noted.   10/26 exam baseline.  EEG with occasional sharps and risk for seizures from bilateral posterior head region     CTH: R IC vague lucency noted. may be age indeterminate  CTA H/N limited 2/2 poor opacification of vessels but no LVO.  CTP neg   A1c 6.2  LDL 27   s/p PPM     Impression: LUE convulsions with generalized spreading likely seizure. Stroke on differential as well given vague lucency at the genu of the right internal capsule and in the right thalamus of indeterminate age. Likely lacunar infarcts.     Recommendations:   - needs to be on AED.  if keppra 750mg BID was making him sleeping or causing other side effects such as GI upset you can change keppra to  vimpat 100mg BID given EEG findings ;   - MR brain wo when stable if able.  otherwise repeat CTH ; will get MRI outpatient if not done in house   - c/w ASA 81mg daily.  should be on statin therapy for secondary stroke prevention. lipitor 20 if no objection.  will hold off high dose as no athero on CTA H/N and LDL at goal  - telemetry  - PT/OT/SS/SLP, OOBC  - BP per CT team .   - check FS, glucose control <180  - GI/DVT ppx  - Thank you for allowing me to participate in the care of this patient. Call with questions.   - spoke jcarlos ACP   - spoke with sister at bedside   called wife at [phone] and discussed keppra and interactions.  she is okay with keppra.   -. outpatient neuro f/.u   Bucky Sharpe MD  Vascular Neurology  Office: [phone] .

## 2022-11-05 NOTE — PROGRESS NOTE ADULT - ASSESSMENT
77  year old male PMH of HTN, HLD, DM2, former smoker,  hypothyroidism, obesity, LILLY on CPAP,  bladder cancer (2020, s/p resection of bladder tumor/ currently self catheterizes), PAF s/p DCCV/ablation on 7/13/22 at [facility]. 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 He underwent MVrepair, MAZE, left atrial appendage clip on 10/24/2022 w/ Dr. Barraza. Post op w/ junctional rhythm noted on telemetry for which EP was initially consulted 11/1, at that time he was on Amiodarone for post op AF however tele strips of this were not documented. On 11/1 Amio was discontinued (at that time had received 2800mg). At 1500 on 11/3 patient went into AF with rates 130-160's. Prior to this was junctional. Converted after 2.5mg Lopressor IV to junctional 50's.     1) Post op junctional/accelerated junctional rhythm w/ VA conduction  2) severe MR S/p MV repair, NAYELY clip and MAZE 10/24/22   3) hx pAF, now w/ post op paroxysmal AF w/ RVR   4) Tachy andrew syndrome   5) New pericardial effusion noted on TTE today, EF 65%     - s/p [employer] dual chamber PPM on 11/4/22  - Interrogation performed by device rep with normal PPM function  - Await PA/Lat CXR this AM  - Continue Metoprolol   - PO Keflex 250 mg Q6H x 4 doses  - No Heparin or Lovenox post procedure  - Resume Eliquis 48 hours post PPM - 11/6 PM  - Hold further Amiodarone in setting of elevated LFT's  - TTE yesterday revealed large hemopericardium that is contiguous with large substernal hematoma - no plans for drain/evacuation  - Post PPM teaching enforced with patient   - PPM booklet, Fact sheet, Temp ID card and follow up appointment card given to the patient  - Patient to follow up in EP clinic for wound check as scheduled on 11/18/22 at 1:40 pm   - Can discharge home today from EP perspective after CXR     77  year old male PMH of HTN, HLD, DM2, former smoker,  hypothyroidism, obesity, LILLY on CPAP,  bladder cancer (2020, s/p resection of bladder tumor/ currently self catheterizes), PAF s/p DCCV/ablation on 7/13/22 at [facility]. 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 He underwent MVrepair, MAZE, left atrial appendage clip on 10/24/2022 w/ Dr. Barraza. Post op w/ junctional rhythm noted on telemetry for which EP was initially consulted 11/1, at that time he was on Amiodarone for post op AF however tele strips of this were not documented. On 11/1 Amio was discontinued (at that time had received 2800mg). At 1500 on 11/3 patient went into AF with rates 130-160's. Prior to this was junctional. Converted after 2.5mg Lopressor IV to junctional 50's.     1) Post op junctional/accelerated junctional rhythm w/ VA conduction  2) severe MR S/p MV repair, NAYELY clip and MAZE 10/24/22   3) hx pAF, now w/ post op paroxysmal AF w/ RVR   4) Tachy andrew syndrome   5) New pericardial effusion noted on TTE today, EF 65%     - s/p [employer] dual chamber PPM on 11/4/22  - Interrogation performed by device rep with normal PPM function  - PA/Lat CXR with good lead placement  - Continue Metoprolol   - PO Keflex 250 mg Q6H x 4 doses  - No Heparin or Lovenox post procedure  - Resume Eliquis 48 hours post PPM - 11/6 PM  - Hold further Amiodarone in setting of elevated LFT's  - TTE yesterday revealed large hemopericardium that is contiguous with large substernal hematoma - no plans for drain/evacuation  - Post PPM teaching enforced with patient   - PPM booklet, Fact sheet, Temp ID card and follow up appointment card given to the patient  - Patient to follow up in EP clinic for wound check as scheduled on 11/18/22 at 1:40 pm   - Can discharge home today from EP perspective  - EP to sign off, reconsult as needed

## 2022-11-05 NOTE — PROGRESS NOTE ADULT - SUBJECTIVE AND OBJECTIVE BOX
24H hour events: No acute events    MEDICATIONS:  aspirin enteric coated 81 milliGRAM(s) Oral daily  doxazosin 4 milliGRAM(s) Oral at bedtime  metoprolol tartrate 12.5 milliGRAM(s) Oral two times a day  cephalexin 250 milliGRAM(s) Oral every 6 hours  guaiFENesin Oral Liquid (Sugar-Free) 100 milliGRAM(s) Oral every 6 hours PRN  acetaminophen     Tablet .. 650 milliGRAM(s) Oral every 6 hours PRN  lacosamide 100 milliGRAM(s) Oral two times a day  oxyCODONE    IR 5 milliGRAM(s) Oral every 6 hours PRN  pantoprazole    Tablet 40 milliGRAM(s) Oral before breakfast  polyethylene glycol 3350 17 Gram(s) Oral daily  senna 2 Tablet(s) Oral at bedtime  dextrose Oral Gel 15 Gram(s) Oral once PRN  glucagon  Injectable 1 milliGRAM(s) IntraMuscular once  insulin glargine Injectable (LANTUS) 15 Unit(s) SubCutaneous at bedtime  insulin lispro (ADMELOG) corrective regimen sliding scale   SubCutaneous three times a day before meals  insulin lispro (ADMELOG) corrective regimen sliding scale   SubCutaneous at bedtime  insulin lispro Injectable (ADMELOG) 8 Unit(s) SubCutaneous three times a day before meals  levothyroxine 25 MICROGram(s) Oral daily  chlorhexidine 2% Cloths 1 Application(s) Topical daily  dextrose 5%. 1000 milliLiter(s) IV Continuous <Continuous>  dextrose 5%. 1000 milliLiter(s) IV Continuous <Continuous>  ferrous    sulfate 325 milliGRAM(s) Oral daily  folic acid 1 milliGRAM(s) Oral daily  sodium chloride 0.9%. 1000 milliLiter(s) IV Continuous <Continuous>  thiamine 100 milliGRAM(s) Oral daily      REVIEW OF SYSTEMS:  Complete 12 point ROS negative.    PHYSICAL EXAM:  T(C): 36.5 (11-05-22 @ 04:14), Max: 36.7 (11-04-22 @ 12:03)  HR: 71 (11-05-22 @ 04:14) (62 - 71)  BP: 122/65 (11-05-22 @ 04:14) (120/56 - 142/76)  RR: 18 (11-05-22 @ 04:14) (17 - 20)  SpO2: 95% (11-05-22 @ 04:14) (93% - 98%)  Wt(kg): --  I&O's Summary    04 Nov 2022 07:01  -  05 Nov 2022 07:00  --------------------------------------------------------  IN: 1070 mL / OUT: 1925 mL / NET: -855 mL        Appearance: Normal	  HEENT:  PERRL, EOMI	  Cardiovascular: Normal S1 S2, No JVD, No murmurs, No edema  Respiratory: Lungs clear to auscultation	  Psychiatry: A & O x 3, Mood & affect appropriate  Gastrointestinal:  Soft, Non-tender, + BS	  Skin: Left chest dressing removed, incision C/D/I No rashes, No ecchymoses, No hematoma	  Neurologic: Non-focal  Extremities: No clubbing, cyanosis or edema  Vascular: Peripheral pulses palpable 2+ bilaterally        LABS:	 	    CBC Full  -  ( 05 Nov 2022 06:10 )  WBC Count : 8.43 K/uL  Hemoglobin : 7.8 g/dL  Hematocrit : 24.9 %  Platelet Count - Automated : 244 K/uL  Mean Cell Volume : 85.3 fl  Mean Cell Hemoglobin : 26.7 pg  Mean Cell Hemoglobin Concentration : 31.3 gm/dL  Auto Neutrophil # : 6.30 K/uL  Auto Lymphocyte # : 0.70 K/uL  Auto Monocyte # : 1.02 K/uL  Auto Eosinophil # : 0.28 K/uL  Auto Basophil # : 0.06 K/uL  Auto Neutrophil % : 74.8 %  Auto Lymphocyte % : 8.3 %  Auto Monocyte % : 12.1 %  Auto Eosinophil % : 3.3 %  Auto Basophil % : 0.7 %    11-05    135  |  99  |  39<H>  ----------------------------<  108<H>  4.4   |  22  |  1.73<H>  11-04    134<L>  |  97  |  34<H>  ----------------------------<  161<H>  4.8   |  20<L>  |  1.88<H>    Ca    8.6      05 Nov 2022 06:10  Ca    8.9      04 Nov 2022 06:57  Phos  5.6     11-04  Mg     2.0     11-04    TPro  6.4  /  Alb  3.4  /  TBili  0.6  /  DBili  x   /  AST  85<H>  /  ALT  211<H>  /  AlkPhos  72  11-05  TPro  6.5  /  Alb  3.5  /  TBili  0.8  /  DBili  x   /  AST  214<H>  /  ALT  301<H>  /  AlkPhos  71  11-04    TELEMETRY: Atrial pacing 70-90s	      < from: TTE with Doppler (w/Cont) (11.04.22 @ 08:56) >  PROCEDURE: Transthoracic echocardiogram with 2-D, M-Mode  and complete spectral and color flow Doppler. Verbal  consent was obtained for injection of  Ultrasonic Enhancing  Agent following a discussion of risks and benefits.  Following intravenous injection of Ultrasonic Enhancing  Agent, harmonic imaging was performed.  INDICATION: Cardiac murmur, unspecified (R01.1)  ------------------------------------------------------------------------  Dimensions:    Normal Values:  LA:     4.7    2.0 - 4.0 cm  Ao:     3.6    2.0 - 3.8 cm  SEPTUM: 1.0    0.6 - 1.2 cm  PWT:    13.0    0.6 - 1.1 cm  LVIDd:  4.8    3.0 - 5.6 cm  LVIDs:  3.2    1.8 - 4.0 cm  Derived variables:  LVMI: 2210 g/m2  RWT: 5.41  EF (Visual Estimate): 65 %  Doppler Peak Velocity (m/sec): AoV=1.6 TV=2.5  ------------------------------------------------------------------------  Observations:  Mitral Valve: s/p mitral valve repair. No mitral  regurgitation seen.  Aortic Valve/Aorta: Normal aortic valve.  Normal aortic root size.  Left Atrium: Severely dilated left atrium.  Left Ventricle: Endocardial visualization enhanced with  intravenous injection of Ultrasonic Enhancing Agent  (Lumason).  Normal left ventricular internal dimensions and wall  thicknesses.  Normal left ventricular systolic function. No segmental  wall motion abnormalities.  Right Heart: Normal right atrium. Normal right ventricular  size and function.  Normal tricuspid valve. Minimal tricuspid regurgitation.  Normal pulmonic valve.  Pericardium/Pleura: Circumferential pericardial effusion,  which is largest adjacent to the right ventricle (as imaged   in the subcostal view, where it measures approximately 3.0  cm).  Minimal diastolic compression of the right ventricle is  noted (loop #46).  Bilateral pleural effusions.  ------------------------------------------------------------------------  Conclusions:  Endocardial visualization enhanced with intravenous  injection of Ultrasonic Enhancing Agent (Lumason).  Normal left ventricular systolic function. No segmental  wall motion abnormalities.  s/p mitral valve repair. No mitral regurgitation seen.  Circumferential pericardial effusion, which is largest  adjacent to the right ventricle (as imaged  in the  subcostal view, where it measures approximately 3.0 cm).  Minimal diastolic compression of the right ventricle is  noted (loop #46).  Results discussed with CT surgeon at  10:37 AM, 11/04/2022.    < end of copied text >

## 2022-11-05 NOTE — PROGRESS NOTE ADULT - PROBLEM SELECTOR PLAN 1
continue postop care  av nodals d/c- pt npo   diuretics d/c- elevated bun/ cr today   continue synthroid  pulm toilet  pain management  increase activity as tolerated  bowel regimen  Discharge planning- home pt when stable

## 2022-11-05 NOTE — PROGRESS NOTE ADULT - SUBJECTIVE AND OBJECTIVE BOX
Neurology Progress Note    S: Patient seen and examined. No new events overnight. patient denied CP, SOB, HA or pain.        Medication:    MEDICATIONS  (STANDING):  aMIOdarone    Tablet 200 milliGRAM(s) Oral daily  apixaban 2.5 milliGRAM(s) Oral every 12 hours  aspirin enteric coated 81 milliGRAM(s) Oral daily  atorvastatin 40 milliGRAM(s) Oral at bedtime  bisacodyl Suppository 10 milliGRAM(s) Rectal once  chlorhexidine 2% Cloths 1 Application(s) Topical daily  dextrose 5%. 1000 milliLiter(s) (50 mL/Hr) IV Continuous <Continuous>  dextrose 5%. 1000 milliLiter(s) (100 mL/Hr) IV Continuous <Continuous>  doxazosin 4 milliGRAM(s) Oral at bedtime  glucagon  Injectable 1 milliGRAM(s) IntraMuscular once  insulin glargine Injectable (LANTUS) 5 Unit(s) SubCutaneous at bedtime  insulin lispro (ADMELOG) corrective regimen sliding scale   SubCutaneous three times a day before meals  insulin lispro (ADMELOG) corrective regimen sliding scale   SubCutaneous at bedtime  insulin lispro Injectable (ADMELOG) 3 Unit(s) SubCutaneous three times a day before meals  levETIRAcetam 750 milliGRAM(s) Oral two times a day  levothyroxine 25 MICROGram(s) Oral daily  pantoprazole    Tablet 40 milliGRAM(s) Oral before breakfast  polyethylene glycol 3350 17 Gram(s) Oral daily  senna 2 Tablet(s) Oral at bedtime  spironolactone 50 milliGRAM(s) Oral two times a day  torsemide 10 milliGRAM(s) Oral daily  trimethoprim  160 mG/sulfamethoxazole 800 mG 1 Tablet(s) Oral two times a day    MEDICATIONS  (PRN):  acetaminophen     Tablet .. 650 milliGRAM(s) Oral every 6 hours PRN Mild Pain (1 - 3)  dextrose Oral Gel 15 Gram(s) Oral once PRN Blood Glucose LESS THAN 70 milliGRAM(s)/deciliter  guaiFENesin Oral Liquid (Sugar-Free) 100 milliGRAM(s) Oral every 6 hours PRN Cough    Vitals:      Vital Signs Last 24 Hrs  T(C): 36.5 (05 Nov 2022 04:14), Max: 36.7 (04 Nov 2022 12:03)  T(F): 97.7 (05 Nov 2022 04:14), Max: 98 (04 Nov 2022 12:03)  HR: 71 (05 Nov 2022 04:14) (64 - 71)  BP: 122/65 (05 Nov 2022 04:14) (120/56 - 142/76)  BP(mean): 84 (05 Nov 2022 04:14) (84 - 89)  RR: 18 (05 Nov 2022 04:14) (17 - 20)  SpO2: 95% (05 Nov 2022 04:14) (93% - 98%)    Parameters below as of 05 Nov 2022 04:14  Patient On (Oxygen Delivery Method): BiPAP/CPAP            General Exam:   General Appearance: Appropriately dressed and in no acute distress       Head: Normocephalic, atraumatic and no dysmorphic features  Ear, Nose, and Throat: Moist mucous membranes  CVS: S1S2+  Resp: No SOB, no wheeze or rhonchi  Abd: soft NTND  Extremities: No edema, no cyanosis  Skin: No bruises, no rashes     Neurological Exam:  Mental Status: Awake, alert and oriented x 3.  Able to follow simple and complex verbal commands. Able to name and repeat. fluent speech. No obvious aphasia or dysarthria noted.   Cranial Nerves: PERRL, EOMI, VFFC, sensation V1-V3 intact,  no obvious facial asymmetry , equal elevation of palate, scm/trap 5/5, tongue is midline on protrusion. no obvious papilledema on fundoscopic exam. Hearing is grossly intact.   Motor: Normal bulk, tone and strength throughout. Fine finger movements were intact and symmetric. no tremors or drift noted.    Sensation: Intact to light touch and pinprick throughout. no right/left confusion. no extinction to tactile on DSS.    Reflexes: 1+ throughout at biceps, brachioradialis, triceps, patellars and ankles bilaterally and equal. No clonus. R toe and L toe were both downgoing.  Coordination: No dysmetria on FNF or HKS  Gait: deferred     I personally reviewed the below data/images/labs:  CBC Full  -  ( 05 Nov 2022 06:10 )  WBC Count : 8.43 K/uL  RBC Count : 2.92 M/uL  Hemoglobin : 7.8 g/dL  Hematocrit : 24.9 %  Platelet Count - Automated : 244 K/uL  Mean Cell Volume : 85.3 fl  Mean Cell Hemoglobin : 26.7 pg  Mean Cell Hemoglobin Concentration : 31.3 gm/dL  Auto Neutrophil # : 6.30 K/uL  Auto Lymphocyte # : 0.70 K/uL  Auto Monocyte # : 1.02 K/uL  Auto Eosinophil # : 0.28 K/uL  Auto Basophil # : 0.06 K/uL  Auto Neutrophil % : 74.8 %  Auto Lymphocyte % : 8.3 %  Auto Monocyte % : 12.1 %  Auto Eosinophil % : 3.3 %  Auto Basophil % : 0.7 %    11-05    135  |  99  |  39<H>  ----------------------------<  108<H>  4.4   |  22  |  1.73<H>    Ca    8.6      05 Nov 2022 06:10  Phos  5.6     11-04  Mg     2.0     11-04    TPro  6.4  /  Alb  3.4  /  TBili  0.6  /  DBili  x   /  AST  85<H>  /  ALT  211<H>  /  AlkPhos  72  11-05        < from: CT Brain Stroke Protocol (10.24.22 @ 22:50) >  No acute intracranial hemorrhage, mass effect, or CTevidence of an acute   transcortical infarct.    Vague lucency at the genu of the right internal capsule and in the right   thalamus which may represent lacunar infarcts of indeterminate age. No   prior studies are available for comparison.    EEG Classification / Summary:  Abnormal  EEG in the awake / drowsy / asleep state(s).  Occasional repetitive sharp waves max O1/O2  Continuous polymorphic slowing, focal, bilateral posterior head region  Background slowing, generalized, mild     Clinical Impression:  Evidence for focal dysfunction in and increased risk for seizures from the bilateral posterior head region  Mild diffuse/multifocal cerebral dysfunction, not specific as to etiology

## 2022-11-05 NOTE — PROGRESS NOTE ADULT - PROBLEM SELECTOR PLAN 2
Post op seizure---> vimpat as per neuro  keppra d/c 11/3 secondary to nausea  Neuro f/u as an outpatient   MRI brain done this am   continue to monitor

## 2022-11-05 NOTE — PROGRESS NOTE ADULT - PROBLEM SELECTOR PLAN 1
Will continue current insulin regimen for now. Will continue monitoring  blood sugars, will Follow up.  Patient counseled for compliance with consistent low carb diet.      DC Planning: Patient previously well-controlled on Janumet, A1C 6.2%. Cr now trending up. Will continue monitoring and FU DC recommendations.  Patient counseled for compliance with consistent low carb diet and exercise as tolerated outpatient.

## 2022-11-05 NOTE — PROGRESS NOTE ADULT - ASSESSMENT
77  year old  RHD male PMH of HTN, HLD, DM2, former smoker,  hypothyroidism, obesity, LILLY on C-pap,  bladder cancer (2020, s/p resection of bladder tumor/ currently self catheterizes 6x/day ), PAF s/p DCCV /uncomplicated radiofrequency ablation of atrial fibrillation ([facility], [facility]I) on 7/13/22,  left atrial appendage & severe MR . Presents with c/o recent" abnormal echo with fatigue due to mitral valve regurgitation. Presents for  scheduled Mitral valve replacement , MAZE, left atrial appendage closure on 10/20/2022.  10/24/22   MVr - triangular resection with 28mm Bobby band  AtriClip 45mm   Dawn-Maze IV procedure  PFO CLOSURE  Code stroke called for LUE convulsion with residual LUE weakness immediately after, Patient had LUE convulsions that spread to entire body  CT done, neuro following:  Stroke on differential as well given vague lucency at the genu of the right internal capsule and in the right thalamus of indeterminate age. Likely lacunar infarcts.   MRI when capable , eeg and Keppra added  Extubated d 1  + pressors, prbc's  EEG prelim neg.  Pt with lethargy post keppra, d/c since eeg neg  10/ 26 EEG reviewed by neuro, although no seizures  he is at risk for further sezures, recommended resuming keppra and if lethargic ,  change to vimpat  Pt with junctional / afib  Transferred to [facility]du  10/28 Maintain PW for junctional rhythm, sorbitol for constipation. Transferred to [address] [place] [address]  10/27 Junctional 50-60, remains on amio 200bid, no beta blocker   Cont keppra for now.  Neuro f/u  D/c jared drain.  10/29 Add Eliquis 2.5 bid>PW removed this am Continue diuretics  Amio 1800 mg to date>continue to 5 gram load as per DR Barraza  Magnesium repleted  10/30 VSS; rsr/junct 50-60- no bb; continue amio 200 bid; pt c/o dysuria--> ck ua/ urine cx +l.esterase; neg nitrate; start bactrim as per Dr. Barraza and ck urine cx; monitor ekg- ck in am 10/31  continue diuresis; allow pt to start self cath himself  increase activity as tolerated  10/31 Continue with diuretics. Pt remains 7kg above preop weight. Bactrim for UTI. . Completed 2.2G Amiodarone load. Maintaining junctional rhythm. Continue with amiodarone for 5G load as per Attending   11/1 JR 54-70  Amio decreased to 200 qd--EP called,   serratia marescens --sensitivities pending on bactrim.  -2600cc/24hrs-torsemide 10; amio and bb d/c as per EP- discharge pt home with mcot  11/2 VSS: junc 50-80- no av nodals as per EP; ID consulted; uti sensitive to bactrim- continue 3 days as per ID; continue diuresis; endo consulted for uncontrolled dm  mri brain done this am - results pending  11/3 tachy andrew--> afib up to 150- amio load; EP consulted; echo recommended  11/4 [facility] junct 60-80; npo for PPM today; amio d/c secondary to elevated lft's and diuretics d/c bun/ cr 34/1.8-- IVF .9 NS @ 50 cc/ hr for 1 L; echo done this am- + circ pericardial effusion; no rv compromise or tamponade noted; d/w Dr. Barraza; ct chest done--> likely substernal collection/ clot; no drainage at this time; bp stable & pt asymptomatic; continue to monitor as per DR. Barraza and no further eliquis/ ac  11/5 VSS - HCT 24.9 . ST @ 120s at times will initiate bb           77  year old  RHD male PMH of HTN, HLD, DM2, former smoker,  hypothyroidism, obesity, LILLY on C-pap,  bladder cancer (2020, s/p resection of bladder tumor/ currently self catheterizes 6x/day ), PAF s/p DCCV /uncomplicated radiofrequency ablation of atrial fibrillation ([facility]/PVI, CTI) on 7/13/22,  left atrial appendage & severe MR . Presents with c/o recent" abnormal echo with fatigue due to mitral valve regurgitation. Presents for  scheduled Mitral valve replacement , MAZE, left atrial appendage closure on 10/20/2022.  10/24/22   MVr - triangular resection with 28mm Bobby band  AtriClip 45mm   Dawn-Maze IV procedure  PFO CLOSURE  Code stroke called for LUE convulsion with residual LUE weakness immediately after, Patient had LUE convulsions that spread to entire body  CT done, neuro following:  Stroke on differential as well given vague lucency at the genu of the right internal capsule and in the right thalamus of indeterminate age. Likely lacunar infarcts.   MRI when capable , eeg and Keppra added  Extubated d 1  + pressors, prbc's  EEG prelim neg.  Pt with lethargy post keppra, d/c since eeg neg  10/ 26 EEG reviewed by neuro, although no seizures  he is at risk for further sezures, recommended resuming keppra and if lethargic ,  change to vimpat  Pt with junctional / afib  Transferred to sdu  10/28 Maintain PW for junctional rhythm, sorbitol for constipation. Transferred to [address] [place] [address]  10/27 Junctional 50-60, remains on amio 200bid, no beta blocker   Cont keppra for now.  Neuro f/u  D/c jared drain.  10/29 Add Eliquis 2.5 bid>PW removed this am Continue diuretics  Amio 1800 mg to date>continue to 5 gram load as per DR Barraza  Magnesium repleted  10/30 VSS; rsr/junct 50-60- no bb; continue amio 200 bid; pt c/o dysuria--> ck ua/ urine cx +l.esterase; neg nitrate; start bactrim as per Dr. Barraza and ck urine cx; monitor ekg- ck in am 10/31  continue diuresis; allow pt to start self cath himself  increase activity as tolerated  10/31 Continue with diuretics. Pt remains 7kg above preop weight. Bactrim for UTI. . Completed 2.2G Amiodarone load. Maintaining junctional rhythm. Continue with amiodarone for 5G load as per Attending   11/1 JR 54-70  Amio decreased to 200 qd--EP called,   serratia marescens --sensitivities pending on bactrim.  -2600cc/24hrs-torsemide 10; amio and bb d/c as per EP- discharge pt home with mcot  11/2 VSS: junc 50-80- no av nodals as per EP; ID consulted; uti sensitive to bactrim- continue 3 days as per ID; continue diuresis; endo consulted for uncontrolled dm  mri brain done this am - results pending  11/3 tachy andrew--> afib up to 150- amio load; EP consulted; echo recommended  11/4 [facility] junct 60-80; npo for PPM today; amio d/c secondary to elevated lft's and diuretics d/c bun/ cr 34/1.8-- IVF .9 NS @ 50 cc/ hr for 1 L; echo done this am- + circ pericardial effusion; no rv compromise or tamponade noted; d/w Dr. Barraza; ct chest done--> likely substernal collection/ clot; no drainage at this time; bp stable & pt asymptomatic; continue to monitor as per DR. Barraza and no further eliquis/ ac  11/5 VSS - HCT 24.9 . ST @ 120s - low dose bb started - iron/folate/thiamine started will monitor H/h

## 2022-11-05 NOTE — PROGRESS NOTE ADULT - SUBJECTIVE AND OBJECTIVE BOX
Chief complaint    Patient is a 77y old  Male who presents with a chief complaint of As per Adult CT surgery PA chart note (11/02), "77  year old  RHD male PMH of HTN, HLD, DM2, former smoker,  hypothyroidism, obesity, LILLY on C-pap,  bladder cancer (2020, s/p resection of bladder tumor/ currently self catheterizes 6x/day ), PAF s/p DCCV /uncomplicated radiofrequency ablation of atrial fibrillation ([facility]/PVI, CTI) on 7/13/22,  left atrial appendage & severe MR . Presents with c/o recent" abnormal echo with fatigue due to mitral valve regurgitation. Presents for  scheduled Mitral valve replacement , MAZE, left atrial appendage closure on 10/20/2022.  10/24/22  MVr - triangular resection with 28mm Bobby band  AtriClip 45mm"         (02 Nov 2022 11:23)   Review of systems  Patient in bed, appears comfortable.    Labs and Fingersticks  CAPILLARY BLOOD GLUCOSE      POCT Blood Glucose.: 102 mg/dL (05 Nov 2022 15:19)  POCT Blood Glucose.: 112 mg/dL (05 Nov 2022 11:20)  POCT Blood Glucose.: 128 mg/dL (05 Nov 2022 07:44)  POCT Blood Glucose.: 208 mg/dL (04 Nov 2022 21:27)      Anion Gap, Serum: 14 (11-05 @ 06:10)  Anion Gap, Serum: 17 (11-04 @ 06:57)      Calcium, Total Serum: 8.6 (11-05 @ 06:10)  Calcium, Total Serum: 8.9 (11-04 @ 06:57)  Albumin, Serum: 3.4 (11-05 @ 06:10)  Albumin, Serum: 3.5 (11-04 @ 06:57)    Alanine Aminotransferase (ALT/SGPT): 211 *H* (11-05 @ 06:10)  Alanine Aminotransferase (ALT/SGPT): 301 *H* (11-04 @ 06:57)  Alkaline Phosphatase, Serum: 72 (11-05 @ 06:10)  Alkaline Phosphatase, Serum: 71 (11-04 @ 06:57)  Aspartate Aminotransferase (AST/SGOT): 85 *H* (11-05 @ 06:10)  Aspartate Aminotransferase (AST/SGOT): 214 *H* (11-04 @ 06:57)        11-05    135  |  99  |  39<H>  ----------------------------<  108<H>  4.4   |  22  |  1.73<H>    Ca    8.6      05 Nov 2022 06:10  Phos  5.6     11-04  Mg     2.0     11-04    TPro  6.4  /  Alb  3.4  /  TBili  0.6  /  DBili  x   /  AST  85<H>  /  ALT  211<H>  /  AlkPhos  72  11-05                        7.8    8.43  )-----------( 244      ( 05 Nov 2022 06:10 )             24.9     Medications  MEDICATIONS  (STANDING):  aspirin enteric coated 81 milliGRAM(s) Oral daily  cephalexin 250 milliGRAM(s) Oral every 6 hours  chlorhexidine 2% Cloths 1 Application(s) Topical daily  dextrose 5%. 1000 milliLiter(s) (50 mL/Hr) IV Continuous <Continuous>  dextrose 5%. 1000 milliLiter(s) (100 mL/Hr) IV Continuous <Continuous>  doxazosin 4 milliGRAM(s) Oral at bedtime  ferrous    sulfate 325 milliGRAM(s) Oral daily  folic acid 1 milliGRAM(s) Oral daily  glucagon  Injectable 1 milliGRAM(s) IntraMuscular once  insulin glargine Injectable (LANTUS) 15 Unit(s) SubCutaneous at bedtime  insulin lispro (ADMELOG) corrective regimen sliding scale   SubCutaneous three times a day before meals  insulin lispro (ADMELOG) corrective regimen sliding scale   SubCutaneous at bedtime  insulin lispro Injectable (ADMELOG) 8 Unit(s) SubCutaneous three times a day before meals  lacosamide 100 milliGRAM(s) Oral two times a day  levothyroxine 25 MICROGram(s) Oral daily  metoprolol tartrate 12.5 milliGRAM(s) Oral two times a day  pantoprazole    Tablet 40 milliGRAM(s) Oral before breakfast  polyethylene glycol 3350 17 Gram(s) Oral daily  senna 2 Tablet(s) Oral at bedtime  sodium chloride 0.9%. 1000 milliLiter(s) (50 mL/Hr) IV Continuous <Continuous>  thiamine 100 milliGRAM(s) Oral daily      Physical Exam  General: Patient appears comfortable.  Vital Signs Last 12 Hrs  T(F): 98.1 (11-05-22 @ 16:20), Max: 98.1 (11-05-22 @ 13:31)  HR: 70 (11-05-22 @ 16:20) (70 - 72)  BP: 132/67 (11-05-22 @ 16:20) (119/66 - 132/67)  BP(mean): --  RR: 18 (11-05-22 @ 16:20) (18 - 18)  SpO2: 97% (11-05-22 @ 16:20) (94% - 97%)  Neck: No palpable thyroid nodules.  CVS: S1S2, No murmurs  Respiratory: No wheezing, no crepitations  GI: Abdomen soft, non tender.  Musculoskeletal:  edema lower extremities.     Diagnostics

## 2022-11-05 NOTE — PROGRESS NOTE ADULT - ASSESSMENT
Assessment  DMT2: 77y Male with DM T2 with hyperglycemia, A1C 6.2%, was on oral meds at home, now on basal bolus insulin, blood sugars are  improving, no hypoglycemic episodes, NAD..  CAD: on medications, no chest pain, stable, monitored.  Hypothyroidism: On 25 Synthroid  mcg po daily, compliant with Synthroid intake, asymptomatic, euthyroid.  Obesity: No strict exercise routines, not on any weight loss program, neither on low calorie diet.          Izabela Grady MD  Cell: [phone]  Office: [phone]

## 2022-11-06 LAB
ALBUMIN SERPL ELPH-MCNC: 3.4 G/DL — SIGNIFICANT CHANGE UP (ref 3.3–5)
ALP SERPL-CCNC: 75 U/L — SIGNIFICANT CHANGE UP (ref 40–120)
ALT FLD-CCNC: 163 U/L — HIGH (ref 10–45)
ANION GAP SERPL CALC-SCNC: 11 MMOL/L — SIGNIFICANT CHANGE UP (ref 5–17)
AST SERPL-CCNC: 93 U/L — HIGH (ref 10–40)
BASOPHILS # BLD AUTO: 0.05 K/UL — SIGNIFICANT CHANGE UP (ref 0–0.2)
BASOPHILS NFR BLD AUTO: 0.6 % — SIGNIFICANT CHANGE UP (ref 0–2)
BILIRUB SERPL-MCNC: 1 MG/DL — SIGNIFICANT CHANGE UP (ref 0.2–1.2)
BUN SERPL-MCNC: 26 MG/DL — HIGH (ref 7–23)
CALCIUM SERPL-MCNC: 8.5 MG/DL — SIGNIFICANT CHANGE UP (ref 8.4–10.5)
CHLORIDE SERPL-SCNC: 101 MMOL/L — SIGNIFICANT CHANGE UP (ref 96–108)
CO2 SERPL-SCNC: 23 MMOL/L — SIGNIFICANT CHANGE UP (ref 22–31)
CREAT SERPL-MCNC: 1.3 MG/DL — SIGNIFICANT CHANGE UP (ref 0.5–1.3)
EGFR: 57 ML/MIN/1.73M2 — LOW
EOSINOPHIL # BLD AUTO: 0.38 K/UL — SIGNIFICANT CHANGE UP (ref 0–0.5)
EOSINOPHIL NFR BLD AUTO: 4.2 % — SIGNIFICANT CHANGE UP (ref 0–6)
GLUCOSE SERPL-MCNC: 96 MG/DL — SIGNIFICANT CHANGE UP (ref 70–99)
HCT VFR BLD CALC: 26.9 % — LOW (ref 39–50)
HGB BLD-MCNC: 8.3 G/DL — LOW (ref 13–17)
IMM GRANULOCYTES NFR BLD AUTO: 1.3 % — HIGH (ref 0–0.9)
LYMPHOCYTES # BLD AUTO: 0.76 K/UL — LOW (ref 1–3.3)
LYMPHOCYTES # BLD AUTO: 8.4 % — LOW (ref 13–44)
MCHC RBC-ENTMCNC: 27.2 PG — SIGNIFICANT CHANGE UP (ref 27–34)
MCHC RBC-ENTMCNC: 30.9 GM/DL — LOW (ref 32–36)
MCV RBC AUTO: 88.2 FL — SIGNIFICANT CHANGE UP (ref 80–100)
MONOCYTES # BLD AUTO: 1.07 K/UL — HIGH (ref 0–0.9)
MONOCYTES NFR BLD AUTO: 11.8 % — SIGNIFICANT CHANGE UP (ref 2–14)
NEUTROPHILS # BLD AUTO: 6.67 K/UL — SIGNIFICANT CHANGE UP (ref 1.8–7.4)
NEUTROPHILS NFR BLD AUTO: 73.7 % — SIGNIFICANT CHANGE UP (ref 43–77)
NRBC # BLD: 0 /100 WBCS — SIGNIFICANT CHANGE UP (ref 0–0)
PLATELET # BLD AUTO: 207 K/UL — SIGNIFICANT CHANGE UP (ref 150–400)
POTASSIUM SERPL-MCNC: 4.3 MMOL/L — SIGNIFICANT CHANGE UP (ref 3.5–5.3)
POTASSIUM SERPL-SCNC: 4.3 MMOL/L — SIGNIFICANT CHANGE UP (ref 3.5–5.3)
PROT SERPL-MCNC: 6.1 G/DL — SIGNIFICANT CHANGE UP (ref 6–8.3)
RBC # BLD: 3.05 M/UL — LOW (ref 4.2–5.8)
RBC # FLD: 15.4 % — HIGH (ref 10.3–14.5)
SODIUM SERPL-SCNC: 135 MMOL/L — SIGNIFICANT CHANGE UP (ref 135–145)
WBC # BLD: 9.05 K/UL — SIGNIFICANT CHANGE UP (ref 3.8–10.5)
WBC # FLD AUTO: 9.05 K/UL — SIGNIFICANT CHANGE UP (ref 3.8–10.5)

## 2022-11-06 RX ORDER — LIDOCAINE 4 G/100G
1 CREAM TOPICAL
Refills: 0 | Status: DISCONTINUED | OUTPATIENT
Start: 2022-11-06 | End: 2022-11-07

## 2022-11-06 RX ADMIN — Medication 12.5 MILLIGRAM(S): at 05:16

## 2022-11-06 RX ADMIN — LACOSAMIDE 100 MILLIGRAM(S): 50 TABLET ORAL at 17:28

## 2022-11-06 RX ADMIN — Medication 250 MILLIGRAM(S): at 04:15

## 2022-11-06 RX ADMIN — PANTOPRAZOLE SODIUM 40 MILLIGRAM(S): 20 TABLET, DELAYED RELEASE ORAL at 05:16

## 2022-11-06 RX ADMIN — Medication 250 MILLIGRAM(S): at 09:48

## 2022-11-06 RX ADMIN — Medication 1 MILLIGRAM(S): at 09:49

## 2022-11-06 RX ADMIN — Medication 25 MICROGRAM(S): at 05:16

## 2022-11-06 RX ADMIN — Medication 12.5 MILLIGRAM(S): at 17:28

## 2022-11-06 RX ADMIN — LACOSAMIDE 100 MILLIGRAM(S): 50 TABLET ORAL at 05:16

## 2022-11-06 RX ADMIN — POLYETHYLENE GLYCOL 3350 17 GRAM(S): 17 POWDER, FOR SOLUTION ORAL at 09:49

## 2022-11-06 RX ADMIN — Medication 8 UNIT(S): at 11:35

## 2022-11-06 RX ADMIN — Medication 4 MILLIGRAM(S): at 21:23

## 2022-11-06 RX ADMIN — Medication 325 MILLIGRAM(S): at 09:48

## 2022-11-06 RX ADMIN — Medication 81 MILLIGRAM(S): at 09:49

## 2022-11-06 RX ADMIN — CHLORHEXIDINE GLUCONATE 1 APPLICATION(S): 213 SOLUTION TOPICAL at 10:22

## 2022-11-06 RX ADMIN — LIDOCAINE 1 APPLICATION(S): 4 CREAM TOPICAL at 21:23

## 2022-11-06 RX ADMIN — Medication 2: at 11:34

## 2022-11-06 RX ADMIN — Medication 100 MILLIGRAM(S): at 09:49

## 2022-11-06 RX ADMIN — SENNA PLUS 2 TABLET(S): 8.6 TABLET ORAL at 21:22

## 2022-11-06 RX ADMIN — Medication 8 UNIT(S): at 17:27

## 2022-11-06 RX ADMIN — INSULIN GLARGINE 15 UNIT(S): 100 INJECTION, SOLUTION SUBCUTANEOUS at 21:22

## 2022-11-06 RX ADMIN — Medication 8 UNIT(S): at 07:50

## 2022-11-06 NOTE — PROGRESS NOTE ADULT - ASSESSMENT
77  year old  RHD male with HTN, HLD, DM2, former smoker,  hypothyroidism, obesity, LILLY on C-pap,  bladder cancer (2020, s/p resection of bladder tumor/ currently self catheterizes 6x/day ), PAF s/p DCCV /uncomplicated radiofrequency ablation of atrial fibrillation (WACA/PVI, CTI) on 7/13/22,  left atrial appendage & severe MR s/p open heart surgery for mitral valve repair and PFO closure 10/24/22.   Code stroke called for LUE convulsion with residual LUE weakness immediately after. LKW: 9:50 PM 10/24/22.   NIHSS: 12. MRS:2. on day of code stroke    Per primary team, LUE weakness improved. Patient had LUE convulsions that spread to entire body and team witnessed GTC's that lasted for 1.5 minutes. No witnessed gaze deviation per primary team.     On initial exam, patient able to follow commands and able to lift all extremities against gravity. Patient was able to life LUE but less than the RUE. Still, all extremities had drift that hit bed.   Not a tpa candidate given open heart surgery within 14 days. Not a thrombectomy candidate given no LVO.     o/e 10/25 AAOx3, OLIVIER but mild LUE weakness still noted.   10/26 exam baseline.  EEG with occasional sharps and risk for seizures from bilateral posterior head region     CTH: R IC vague lucency noted. may be age indeterminate  CTA H/N limited 2/2 poor opacification of vessels but no LVO.  CTP neg   A1c 6.2  LDL 27   s/p PPM     Impression: LUE convulsions with generalized spreading likely seizure. Stroke on differential as well given vague lucency at the genu of the right internal capsule and in the right thalamus of indeterminate age. Likely lacunar infarcts.     Recommendations:   - needs to be on AED.  if keppra 750mg BID was making him sleeping or causing other side effects such as GI upset you can change keppra to  vimpat 100mg BID given EEG findings ; now on vimpat no complaints   - MR brain wo when stable if able.  otherwise repeat CTH ; will get MRI outpatient if not done in house   - c/w ASA 81mg daily.  should be on statin therapy for secondary stroke prevention. lipitor 20 if no objection.  will hold off high dose as no athero on CTA H/N and LDL at goal  - telemetry  - PT/OT/SS/SLP, OOBC  - BP per CT team .   - check FS, glucose control <180  - GI/DVT ppx  - Thank you for allowing me to participate in the care of this patient. Call with questions.   - spoke iw ACP   - spoke with sister at bedside   called wife at [phone] and discussed keppra and interactions.  she is okay with keppra.   -. outpatient neuro f/.u   Bucky Sharpe MD  Vascular Neurology  Office: [phone] .

## 2022-11-06 NOTE — PROGRESS NOTE ADULT - ASSESSMENT
Assessment  DMT2: 77y Male with DM T2 with hyperglycemia, A1C 6.2%, was on oral meds at home, now on basal bolus insulin, blood sugars trending within acceptable range,, no hypoglycemic episodes, NAD..  CAD: on medications, no chest pain, stable, monitored.  Hypothyroidism: On 25 Synthroid  mcg po daily, compliant with Synthroid intake, asymptomatic, euthyroid.  Obesity: No strict exercise routines, not on any weight loss program, neither on low calorie diet.          Izabela Grady MD  Cell: [phone]  Office: [phone]

## 2022-11-06 NOTE — PROGRESS NOTE ADULT - SUBJECTIVE AND OBJECTIVE BOX
Chief complaint    Patient is a 77y old  Male who presents with a chief complaint of As per Adult CT surgery PA chart note (11/02), "77  year old  RHD male PMH of HTN, HLD, DM2, former smoker,  hypothyroidism, obesity, LILLY on C-pap,  bladder cancer (2020, s/p resection of bladder tumor/ currently self catheterizes 6x/day ), PAF s/p DCCV /uncomplicated radiofrequency ablation of atrial fibrillation (WACA/PVI, CTI) on 7/13/22,  left atrial appendage & severe MR . Presents with c/o recent" abnormal echo with fatigue due to mitral valve regurgitation. Presents for  scheduled Mitral valve replacement , MAZE, left atrial appendage closure on 10/20/2022.  10/24/22  MVr - triangular resection with 28mm Bobby band  AtriClip 45mm"         (02 Nov 2022 11:23)   Review of systems  Patient in bed, appears comfortable.    Labs and Fingersticks  CAPILLARY BLOOD GLUCOSE      POCT Blood Glucose.: 154 mg/dL (06 Nov 2022 11:18)  POCT Blood Glucose.: 106 mg/dL (06 Nov 2022 07:29)  POCT Blood Glucose.: 147 mg/dL (05 Nov 2022 21:03)      Anion Gap, Serum: 11 (11-06 @ 06:12)  Anion Gap, Serum: 14 (11-05 @ 06:10)      Calcium, Total Serum: 8.5 (11-06 @ 06:12)  Calcium, Total Serum: 8.6 (11-05 @ 06:10)  Albumin, Serum: 3.4 (11-06 @ 06:12)  Albumin, Serum: 3.4 (11-05 @ 06:10)    Alanine Aminotransferase (ALT/SGPT): 163 *H* (11-06 @ 06:12)  Alanine Aminotransferase (ALT/SGPT): 211 *H* (11-05 @ 06:10)  Alkaline Phosphatase, Serum: 75 (11-06 @ 06:12)  Alkaline Phosphatase, Serum: 72 (11-05 @ 06:10)  Aspartate Aminotransferase (AST/SGOT): 93 *H* (11-06 @ 06:12)  Aspartate Aminotransferase (AST/SGOT): 85 *H* (11-05 @ 06:10)        11-06    135  |  101  |  26<H>  ----------------------------<  96  4.3   |  23  |  1.30    Ca    8.5      06 Nov 2022 06:12    TPro  6.1  /  Alb  3.4  /  TBili  1.0  /  DBili  x   /  AST  93<H>  /  ALT  163<H>  /  AlkPhos  75  11-06                        8.3    9.05  )-----------( 207      ( 06 Nov 2022 06:11 )             26.9     Medications  MEDICATIONS  (STANDING):  aspirin enteric coated 81 milliGRAM(s) Oral daily  chlorhexidine 2% Cloths 1 Application(s) Topical daily  dextrose 5%. 1000 milliLiter(s) (100 mL/Hr) IV Continuous <Continuous>  dextrose 5%. 1000 milliLiter(s) (50 mL/Hr) IV Continuous <Continuous>  doxazosin 4 milliGRAM(s) Oral at bedtime  ferrous    sulfate 325 milliGRAM(s) Oral daily  folic acid 1 milliGRAM(s) Oral daily  glucagon  Injectable 1 milliGRAM(s) IntraMuscular once  insulin glargine Injectable (LANTUS) 15 Unit(s) SubCutaneous at bedtime  insulin lispro (ADMELOG) corrective regimen sliding scale   SubCutaneous at bedtime  insulin lispro (ADMELOG) corrective regimen sliding scale   SubCutaneous three times a day before meals  insulin lispro Injectable (ADMELOG) 8 Unit(s) SubCutaneous three times a day before meals  lacosamide 100 milliGRAM(s) Oral two times a day  levothyroxine 25 MICROGram(s) Oral daily  metoprolol tartrate 12.5 milliGRAM(s) Oral two times a day  pantoprazole    Tablet 40 milliGRAM(s) Oral before breakfast  polyethylene glycol 3350 17 Gram(s) Oral daily  senna 2 Tablet(s) Oral at bedtime  sodium chloride 0.9%. 1000 milliLiter(s) (50 mL/Hr) IV Continuous <Continuous>  thiamine 100 milliGRAM(s) Oral daily      Physical Exam  General: Patient appears comfortable.  Vital Signs Last 12 Hrs  T(F): 98 (11-06-22 @ 13:00), Max: 98.1 (11-06-22 @ 05:01)  HR: 70 (11-06-22 @ 13:00) (63 - 77)  BP: 129/64 (11-06-22 @ 13:00) (123/65 - 136/71)  BP(mean): --  RR: 17 (11-06-22 @ 13:00) (17 - 18)  SpO2: 96% (11-06-22 @ 13:00) (95% - 96%)  Neck: No palpable thyroid nodules.  CVS: S1S2, No murmurs  Respiratory: No wheezing, no crepitations  GI: Abdomen soft, non tender.  Musculoskeletal:  edema lower extremities.     Diagnostics

## 2022-11-06 NOTE — PROGRESS NOTE ADULT - SUBJECTIVE AND OBJECTIVE BOX
Neurology Progress Note    S: Patient seen and examined. No new events overnight. patient denied CP, SOB, HA or pain.    in chair today     Medication:  MEDICATIONS  (STANDING):  aspirin enteric coated 81 milliGRAM(s) Oral daily  chlorhexidine 2% Cloths 1 Application(s) Topical daily  dextrose 5%. 1000 milliLiter(s) (50 mL/Hr) IV Continuous <Continuous>  dextrose 5%. 1000 milliLiter(s) (100 mL/Hr) IV Continuous <Continuous>  doxazosin 4 milliGRAM(s) Oral at bedtime  ferrous    sulfate 325 milliGRAM(s) Oral daily  folic acid 1 milliGRAM(s) Oral daily  glucagon  Injectable 1 milliGRAM(s) IntraMuscular once  insulin glargine Injectable (LANTUS) 15 Unit(s) SubCutaneous at bedtime  insulin lispro (ADMELOG) corrective regimen sliding scale   SubCutaneous three times a day before meals  insulin lispro (ADMELOG) corrective regimen sliding scale   SubCutaneous at bedtime  insulin lispro Injectable (ADMELOG) 8 Unit(s) SubCutaneous three times a day before meals  lacosamide 100 milliGRAM(s) Oral two times a day  levothyroxine 25 MICROGram(s) Oral daily  metoprolol tartrate 12.5 milliGRAM(s) Oral two times a day  pantoprazole    Tablet 40 milliGRAM(s) Oral before breakfast  polyethylene glycol 3350 17 Gram(s) Oral daily  senna 2 Tablet(s) Oral at bedtime  sodium chloride 0.9%. 1000 milliLiter(s) (50 mL/Hr) IV Continuous <Continuous>  thiamine 100 milliGRAM(s) Oral daily    MEDICATIONS  (PRN):  acetaminophen     Tablet .. 650 milliGRAM(s) Oral every 6 hours PRN Mild Pain (1 - 3)  dextrose Oral Gel 15 Gram(s) Oral once PRN Blood Glucose LESS THAN 70 milliGRAM(s)/deciliter  guaiFENesin Oral Liquid (Sugar-Free) 100 milliGRAM(s) Oral every 6 hours PRN Cough  oxyCODONE    IR 5 milliGRAM(s) Oral every 6 hours PRN Moderate Pain (4 - 6)    Vitals:      Vital Signs Last 24 Hrs  T(C): 36.7 (06 Nov 2022 05:01), Max: 36.9 (05 Nov 2022 19:30)  T(F): 98.1 (06 Nov 2022 05:01), Max: 98.4 (05 Nov 2022 19:30)  HR: 70 (06 Nov 2022 10:15) (63 - 77)  BP: 136/71 (06 Nov 2022 10:15) (119/66 - 136/71)  BP(mean): --  RR: 17 (06 Nov 2022 07:30) (17 - 18)  SpO2: 96% (06 Nov 2022 10:15) (94% - 97%)    Parameters below as of 06 Nov 2022 10:15  Patient On (Oxygen Delivery Method): room air                General Exam:   General Appearance: Appropriately dressed and in no acute distress       Head: Normocephalic, atraumatic and no dysmorphic features  Ear, Nose, and Throat: Moist mucous membranes  CVS: S1S2+  Resp: No SOB, no wheeze or rhonchi  Abd: soft NTND  Extremities: No edema, no cyanosis  Skin: No bruises, no rashes     Neurological Exam:  Mental Status: Awake, alert and oriented x 3.  Able to follow simple and complex verbal commands. Able to name and repeat. fluent speech. No obvious aphasia or dysarthria noted.   Cranial Nerves: PERRL, EOMI, VFFC, sensation V1-V3 intact,  no obvious facial asymmetry , equal elevation of palate, scm/trap 5/5, tongue is midline on protrusion. no obvious papilledema on fundoscopic exam. Hearing is grossly intact.   Motor: Normal bulk, tone and strength throughout. Fine finger movements were intact and symmetric. no tremors or drift noted.    Sensation: Intact to light touch and pinprick throughout. no right/left confusion. no extinction to tactile on DSS.    Reflexes: 1+ throughout at biceps, brachioradialis, triceps, patellars and ankles bilaterally and equal. No clonus. R toe and L toe were both downgoing.  Coordination: No dysmetria on FNF or HKS  Gait: deferred     I personally reviewed the below data/images/labs:  CBC Full  -  ( 06 Nov 2022 06:11 )  WBC Count : 9.05 K/uL  RBC Count : 3.05 M/uL  Hemoglobin : 8.3 g/dL  Hematocrit : 26.9 %  Platelet Count - Automated : 207 K/uL  Mean Cell Volume : 88.2 fl  Mean Cell Hemoglobin : 27.2 pg  Mean Cell Hemoglobin Concentration : 30.9 gm/dL  Auto Neutrophil # : 6.67 K/uL  Auto Lymphocyte # : 0.76 K/uL  Auto Monocyte # : 1.07 K/uL  Auto Eosinophil # : 0.38 K/uL  Auto Basophil # : 0.05 K/uL  Auto Neutrophil % : 73.7 %  Auto Lymphocyte % : 8.4 %  Auto Monocyte % : 11.8 %  Auto Eosinophil % : 4.2 %  Auto Basophil % : 0.6 %    11-06    135  |  101  |  26<H>  ----------------------------<  96  4.3   |  23  |  1.30    Ca    8.5      06 Nov 2022 06:12    TPro  6.1  /  Alb  3.4  /  TBili  1.0  /  DBili  x   /  AST  93<H>  /  ALT  163<H>  /  AlkPhos  75  11-06      < from: CT Brain Stroke Protocol (10.24.22 @ 22:50) >  No acute intracranial hemorrhage, mass effect, or CTevidence of an acute   transcortical infarct.    Vague lucency at the genu of the right internal capsule and in the right   thalamus which may represent lacunar infarcts of indeterminate age. No   prior studies are available for comparison.    EEG Classification / Summary:  Abnormal  EEG in the awake / drowsy / asleep state(s).  Occasional repetitive sharp waves max O1/O2  Continuous polymorphic slowing, focal, bilateral posterior head region  Background slowing, generalized, mild     Clinical Impression:  Evidence for focal dysfunction in and increased risk for seizures from the bilateral posterior head region  Mild diffuse/multifocal cerebral dysfunction, not specific as to etiology

## 2022-11-06 NOTE — PROGRESS NOTE ADULT - ASSESSMENT
77  year old  RHD male PMH of HTN, HLD, DM2, former smoker,  hypothyroidism, obesity, LILLY on C-pap,  bladder cancer (2020, s/p resection of bladder tumor/ currently self catheterizes 6x/day ), PAF s/p DCCV /uncomplicated radiofrequency ablation of atrial fibrillation ([facility]/PVI, CTI) on 7/13/22,  left atrial appendage & severe MR . Presents with c/o recent" abnormal echo with fatigue due to mitral valve regurgitation. Presents for  scheduled Mitral valve replacement , MAZE, left atrial appendage closure on 10/20/2022.  10/24/22   MVr - triangular resection with 28mm Bobby band  AtriClip 45mm   Dawn-Maze IV procedure  PFO CLOSURE  Code stroke called for LUE convulsion with residual LUE weakness immediately after, Patient had LUE convulsions that spread to entire body  CT done, neuro following:  Stroke on differential as well given vague lucency at the genu of the right internal capsule and in the right thalamus of indeterminate age. Likely lacunar infarcts.   MRI when capable , eeg and Keppra added  Extubated d 1  + pressors, prbc's  EEG prelim neg.  Pt with lethargy post keppra, d/c since eeg neg  10/ 26 EEG reviewed by neuro, although no seizures  he is at risk for further sezures, recommended resuming keppra and if lethargic ,  change to vimpat  Pt with junctional / afib  Transferred to sdu  10/28 Maintain PW for junctional rhythm, sorbitol for constipation. Transferred to [address] [place] [address]  10/27 Junctional 50-60, remains on amio 200bid, no beta blocker   Cont keppra for now.  Neuro f/u  D/c jared drain.  10/29 Add Eliquis 2.5 bid>PW removed this am Continue diuretics  Amio 1800 mg to date>continue to 5 gram load as per DR Barraza  Magnesium repleted  10/30 VSS; rsr/junct 50-60- no bb; continue amio 200 bid; pt c/o dysuria--> ck ua/ urine cx +l.esterase; neg nitrate; start bactrim as per Dr. Barraza and ck urine cx; monitor ekg- ck in am 10/31  continue diuresis; allow pt to start self cath himself  increase activity as tolerated  10/31 Continue with diuretics. Pt remains 7kg above preop weight. Bactrim for UTI. . Completed 2.2G Amiodarone load. Maintaining junctional rhythm. Continue with amiodarone for 5G load as per Attending   11/1 JR 54-70  Amio decreased to 200 qd--EP called,   serratia marescens --sensitivities pending on bactrim.  -2600cc/24hrs-torsemide 10; amio and bb d/c as per EP- discharge pt home with mcot  11/2 VSS: junc 50-80- no av nodals as per EP; ID consulted; uti sensitive to bactrim- continue 3 days as per ID; continue diuresis; endo consulted for uncontrolled dm  mri brain done this am - results pending  11/3 tachy andrew--> afib up to 150- amio load; EP consulted; echo recommended  11/4 [facility] junct 60-80; npo for PPM today; amio d/c secondary to elevated lft's and diuretics d/c bun/ cr 34/1.8-- IVF .9 NS @ 50 cc/ hr for 1 L; echo done this am- + circ pericardial effusion; no rv compromise or tamponade noted; d/w Dr. Barraza; ct chest done--> likely substernal collection/ clot; no drainage at this time; bp stable & pt asymptomatic; continue to monitor as per DR. Barraza and no further eliquis/ ac  11/5 VSS - HCT 24.9 . ST @ 120s - low dose bb started - iron/folate/thiamine started will monitor H/h  11/6  Lft's stable  Pt received prbc x 1 yesterday           77  year old  RHD male PMH of HTN, HLD, DM2, former smoker,  hypothyroidism, obesity, LILLY on C-pap,  bladder cancer (2020, s/p resection of bladder tumor/ currently self catheterizes 6x/day ), PAF s/p DCCV /uncomplicated radiofrequency ablation of atrial fibrillation (WACA/PVI, CTI) on 7/13/22,  left atrial appendage & severe MR . Presents with c/o recent" abnormal echo with fatigue due to mitral valve regurgitation. Presents for  scheduled Mitral valve replacement , MAZE, left atrial appendage closure on 10/20/2022.  10/24/22   MVr - triangular resection with 28mm Bobby band  AtriClip 45mm   Dawn-Maze IV procedure  PFO CLOSURE  Code stroke called for LUE convulsion with residual LUE weakness immediately after, Patient had LUE convulsions that spread to entire body  CT done, neuro following:  Stroke on differential as well given vague lucency at the genu of the right internal capsule and in the right thalamus of indeterminate age. Likely lacunar infarcts.   MRI when capable , eeg and Keppra added  Extubated d 1  + pressors, prbc's  EEG prelim neg.  Pt with lethargy post keppra, d/c since eeg neg  10/ 26 EEG reviewed by neuro, although no seizures  he is at risk for further sezures, recommended resuming keppra and if lethargic ,  change to vimpat  Pt with junctional / afib  Transferred to sdu  10/28 Maintain PW for junctional rhythm, sorbitol for constipation. Transferred to [address] [place] [address]  10/27 Junctional 50-60, remains on amio 200bid, no beta blocker   Cont keppra for now.  Neuro f/u  D/c jared drain.  10/29 Add Eliquis 2.5 bid>PW removed this am Continue diuretics  Amio 1800 mg to date>continue to 5 gram load as per DR Barraza  Magnesium repleted  10/30 VSS; rsr/junct 50-60- no bb; continue amio 200 bid; pt c/o dysuria--> ck ua/ urine cx +l.esterase; neg nitrate; start bactrim as per Dr. Barraza and ck urine cx; monitor ekg- ck in am 10/31  continue diuresis; allow pt to start self cath himself  increase activity as tolerated  10/31 Continue with diuretics. Pt remains 7kg above preop weight. Bactrim for UTI. . Completed 2.2G Amiodarone load. Maintaining junctional rhythm. Continue with amiodarone for 5G load as per Attending   11/1 JR 54-70  Amio decreased to 200 qd--EP called,   serratia marescens --sensitivities pending on bactrim.  -2600cc/24hrs-torsemide 10; amio and bb d/c as per EP- discharge pt home with mcot  11/2 VSS: junc 50-80- no av nodals as per EP; ID consulted; uti sensitive to bactrim- continue 3 days as per ID; continue diuresis; endo consulted for uncontrolled dm  mri brain done this am - results pending  11/3 tachy andrew--> afib up to 150- amio load; EP consulted; echo recommended  11/4 [facility] junct 60-80; npo for PPM today; amio d/c secondary to elevated lft's and diuretics d/c bun/ cr 34/1.8-- IVF .9 NS @ 50 cc/ hr for 1 L; echo done this am- + circ pericardial effusion; no rv compromise or tamponade noted; d/w Dr. Barraza; ct chest done--> likely substernal collection/ clot; no drainage at this time; bp stable & pt asymptomatic; continue to monitor as per DR. Barraza and no further eliquis/ ac  11/5 VSS - HCT 24.9 . ST @ 120s - low dose bb started - iron/folate/thiamine started will monitor H/h  11/6  Lft's stable  Pt received prbc x 1 yesterday  creat down to 1.3

## 2022-11-06 NOTE — PROGRESS NOTE ADULT - SUBJECTIVE AND OBJECTIVE BOX
VITAL SIGNS    Telemetry:      Vital Signs Last 24 Hrs  T(C): 36.7 (22 @ 13:00), Max: 36.9 (22 @ 19:30)  T(F): 98 (22 @ 13:00), Max: 98.4 (22 @ 19:30)  HR: 70 (22 @ 13:00) (63 - 77)  BP: 129/64 (22 @ 13:00) (123/65 - 136/71)  RR: 17 (22 @ 13:00) (17 - 18)  SpO2: 96% (22 @ 13:00) (95% - 97%)                    @ 08:01  -   @ 07:00  --------------------------------------------------------  IN: 600 mL / OUT: 1100 mL / NET: -500 mL     @ 07:01  -   @ 15:09  --------------------------------------------------------  IN: 360 mL / OUT: 0 mL / NET: 360 mL          Daily     Daily Weight in k.6 (2022 09:39)            CAPILLARY BLOOD GLUCOSE      POCT Blood Glucose.: 154 mg/dL (2022 11:18)  POCT Blood Glucose.: 106 mg/dL (2022 07:29)  POCT Blood Glucose.: 147 mg/dL (2022 21:03)  POCT Blood Glucose.: 102 mg/dL (2022 15:19)            Drains:     MS         [  ] Drainage:                 L Pleural  [  ]  Drainage:                R Pleural  [  ]  Drainage:    Pacing Wires        [  ]   Settings:                                  Isolated  [  ]    Coumadin    [ ] YES          [  ]      NO                                   PHYSICAL EXAM        Neurology: alert and oriented x 3, nonfocal, no gross deficits  CV : s1 s2 RRR  Sternal Wound :  CDI , Stable  Lungs: cta  Abdomen: soft, nontender, nondistended, positive bowel sounds, last bowel movement                       chest tubes  :    voiding / gaitan - sbd         Extremities:      edema   /  -   calve tenderness ,    L leg  /  R leg  incisions cdi          acetaminophen     Tablet .. 650 milliGRAM(s) Oral every 6 hours PRN  aspirin enteric coated 81 milliGRAM(s) Oral daily  chlorhexidine 2% Cloths 1 Application(s) Topical daily  dextrose 5%. 1000 milliLiter(s) IV Continuous <Continuous>  dextrose 5%. 1000 milliLiter(s) IV Continuous <Continuous>  dextrose Oral Gel 15 Gram(s) Oral once PRN  doxazosin 4 milliGRAM(s) Oral at bedtime  ferrous    sulfate 325 milliGRAM(s) Oral daily  folic acid 1 milliGRAM(s) Oral daily  glucagon  Injectable 1 milliGRAM(s) IntraMuscular once  guaiFENesin Oral Liquid (Sugar-Free) 100 milliGRAM(s) Oral every 6 hours PRN  insulin glargine Injectable (LANTUS) 15 Unit(s) SubCutaneous at bedtime  insulin lispro (ADMELOG) corrective regimen sliding scale   SubCutaneous three times a day before meals  insulin lispro (ADMELOG) corrective regimen sliding scale   SubCutaneous at bedtime  insulin lispro Injectable (ADMELOG) 8 Unit(s) SubCutaneous three times a day before meals  lacosamide 100 milliGRAM(s) Oral two times a day  levothyroxine 25 MICROGram(s) Oral daily  metoprolol tartrate 12.5 milliGRAM(s) Oral two times a day  oxyCODONE    IR 5 milliGRAM(s) Oral every 6 hours PRN  pantoprazole    Tablet 40 milliGRAM(s) Oral before breakfast  polyethylene glycol 3350 17 Gram(s) Oral daily  senna 2 Tablet(s) Oral at bedtime  sodium chloride 0.9%. 1000 milliLiter(s) IV Continuous <Continuous>  thiamine 100 milliGRAM(s) Oral daily                    Physical Therapy Rec:   Home  [  ]   Home w/ PT  [  ]  Rehab  [  ]  Discussed with Cardiothoracic Team at AM rounds.     VITAL SIGNS    Telemetry:  nsr  a paced 70    Vital Signs Last 24 Hrs  T(C): 36.7 (22 @ 13:00), Max: 36.9 (22 @ 19:30)  T(F): 98 (22 @ 13:00), Max: 98.4 (22 @ 19:30)  HR: 70 (22 @ 13:00) (63 - 77)  BP: 129/64 (22 @ 13:00) (123/65 - 136/71)  RR: 17 (22 @ 13:00) (17 - 18)  SpO2: 96% (22 @ 13:00) (95% - 97%)                    @ 08:01  -   @ 07:00  --------------------------------------------------------  IN: 600 mL / OUT: 1100 mL / NET: -500 mL     @ 07:01  -   @ 15:09  --------------------------------------------------------  IN: 360 mL / OUT: 0 mL / NET: 360 mL          Daily     Daily Weight in k.6 (2022 09:39)            CAPILLARY BLOOD GLUCOSE      POCT Blood Glucose.: 154 mg/dL (2022 11:18)  POCT Blood Glucose.: 106 mg/dL (2022 07:29)  POCT Blood Glucose.: 147 mg/dL (2022 21:03)  POCT Blood Glucose.: 102 mg/dL (2022 15:19)              Pacing Wires          Coumadin    [ ] YES          [x  ]      NO                                   PHYSICAL EXAM        Neurology: alert and oriented x 3, nonfocal, no gross deficits  CV : s1 s2 RRR  Sternal Wound :  CDI , Stable  L chest cdi  Lungs: cta  Abdomen: soft, nontender, nondistended, positive bowel sounds, last bowel movement +                       :    gaitan - sbd        Extremities:    +  edema   /  -   calve tenderness ,          acetaminophen     Tablet .. 650 milliGRAM(s) Oral every 6 hours PRN  aspirin enteric coated 81 milliGRAM(s) Oral daily  chlorhexidine 2% Cloths 1 Application(s) Topical daily  dextrose 5%. 1000 milliLiter(s) IV Continuous <Continuous>  dextrose 5%. 1000 milliLiter(s) IV Continuous <Continuous>  dextrose Oral Gel 15 Gram(s) Oral once PRN  doxazosin 4 milliGRAM(s) Oral at bedtime  ferrous    sulfate 325 milliGRAM(s) Oral daily  folic acid 1 milliGRAM(s) Oral daily  glucagon  Injectable 1 milliGRAM(s) IntraMuscular once  guaiFENesin Oral Liquid (Sugar-Free) 100 milliGRAM(s) Oral every 6 hours PRN  insulin glargine Injectable (LANTUS) 15 Unit(s) SubCutaneous at bedtime  insulin lispro (ADMELOG) corrective regimen sliding scale   SubCutaneous three times a day before meals  insulin lispro (ADMELOG) corrective regimen sliding scale   SubCutaneous at bedtime  insulin lispro Injectable (ADMELOG) 8 Unit(s) SubCutaneous three times a day before meals  lacosamide 100 milliGRAM(s) Oral two times a day  levothyroxine 25 MICROGram(s) Oral daily  metoprolol tartrate 12.5 milliGRAM(s) Oral two times a day  oxyCODONE    IR 5 milliGRAM(s) Oral every 6 hours PRN  pantoprazole    Tablet 40 milliGRAM(s) Oral before breakfast  polyethylene glycol 3350 17 Gram(s) Oral daily  senna 2 Tablet(s) Oral at bedtime  sodium chloride 0.9%. 1000 milliLiter(s) IV Continuous <Continuous>  thiamine 100 milliGRAM(s) Oral daily                    Physical Therapy Rec:   Home  [  ]   Home w/ PT  [  ]  Rehab  [  ]  Discussed with Cardiothoracic Team at AM rounds.

## 2022-11-07 ENCOUNTER — TRANSCRIPTION ENCOUNTER (OUTPATIENT)
Age: 77
End: 2022-11-07

## 2022-11-07 VITALS — HEART RATE: 80 BPM | OXYGEN SATURATION: 95 %

## 2022-11-07 LAB
ALBUMIN SERPL ELPH-MCNC: 3.3 G/DL — SIGNIFICANT CHANGE UP (ref 3.3–5)
ALP SERPL-CCNC: 78 U/L — SIGNIFICANT CHANGE UP (ref 40–120)
ALT FLD-CCNC: 148 U/L — HIGH (ref 10–45)
ANION GAP SERPL CALC-SCNC: 7 MMOL/L — SIGNIFICANT CHANGE UP (ref 5–17)
AST SERPL-CCNC: 75 U/L — HIGH (ref 10–40)
BASOPHILS # BLD AUTO: 0.05 K/UL — SIGNIFICANT CHANGE UP (ref 0–0.2)
BASOPHILS NFR BLD AUTO: 0.6 % — SIGNIFICANT CHANGE UP (ref 0–2)
BILIRUB SERPL-MCNC: 1 MG/DL — SIGNIFICANT CHANGE UP (ref 0.2–1.2)
BUN SERPL-MCNC: 20 MG/DL — SIGNIFICANT CHANGE UP (ref 7–23)
CALCIUM SERPL-MCNC: 8.2 MG/DL — LOW (ref 8.4–10.5)
CHLORIDE SERPL-SCNC: 102 MMOL/L — SIGNIFICANT CHANGE UP (ref 96–108)
CO2 SERPL-SCNC: 24 MMOL/L — SIGNIFICANT CHANGE UP (ref 22–31)
CREAT SERPL-MCNC: 1.21 MG/DL — SIGNIFICANT CHANGE UP (ref 0.5–1.3)
EGFR: 62 ML/MIN/1.73M2 — SIGNIFICANT CHANGE UP
EOSINOPHIL # BLD AUTO: 0.42 K/UL — SIGNIFICANT CHANGE UP (ref 0–0.5)
EOSINOPHIL NFR BLD AUTO: 4.8 % — SIGNIFICANT CHANGE UP (ref 0–6)
GLUCOSE SERPL-MCNC: 120 MG/DL — HIGH (ref 70–99)
HCT VFR BLD CALC: 27.5 % — LOW (ref 39–50)
HGB BLD-MCNC: 8.6 G/DL — LOW (ref 13–17)
IMM GRANULOCYTES NFR BLD AUTO: 1.1 % — HIGH (ref 0–0.9)
LYMPHOCYTES # BLD AUTO: 0.85 K/UL — LOW (ref 1–3.3)
LYMPHOCYTES # BLD AUTO: 9.7 % — LOW (ref 13–44)
MCHC RBC-ENTMCNC: 27.1 PG — SIGNIFICANT CHANGE UP (ref 27–34)
MCHC RBC-ENTMCNC: 31.3 GM/DL — LOW (ref 32–36)
MCV RBC AUTO: 86.8 FL — SIGNIFICANT CHANGE UP (ref 80–100)
MONOCYTES # BLD AUTO: 1.31 K/UL — HIGH (ref 0–0.9)
MONOCYTES NFR BLD AUTO: 15 % — HIGH (ref 2–14)
NEUTROPHILS # BLD AUTO: 6.03 K/UL — SIGNIFICANT CHANGE UP (ref 1.8–7.4)
NEUTROPHILS NFR BLD AUTO: 68.8 % — SIGNIFICANT CHANGE UP (ref 43–77)
NRBC # BLD: 0 /100 WBCS — SIGNIFICANT CHANGE UP (ref 0–0)
PLATELET # BLD AUTO: 252 K/UL — SIGNIFICANT CHANGE UP (ref 150–400)
POTASSIUM SERPL-MCNC: 4.4 MMOL/L — SIGNIFICANT CHANGE UP (ref 3.5–5.3)
POTASSIUM SERPL-SCNC: 4.4 MMOL/L — SIGNIFICANT CHANGE UP (ref 3.5–5.3)
PROT SERPL-MCNC: 6.2 G/DL — SIGNIFICANT CHANGE UP (ref 6–8.3)
RBC # BLD: 3.17 M/UL — LOW (ref 4.2–5.8)
RBC # FLD: 15.5 % — HIGH (ref 10.3–14.5)
SODIUM SERPL-SCNC: 133 MMOL/L — LOW (ref 135–145)
WBC # BLD: 8.76 K/UL — SIGNIFICANT CHANGE UP (ref 3.8–10.5)
WBC # FLD AUTO: 8.76 K/UL — SIGNIFICANT CHANGE UP (ref 3.8–10.5)

## 2022-11-07 PROCEDURE — 86850 RBC ANTIBODY SCREEN: CPT

## 2022-11-07 PROCEDURE — 97535 SELF CARE MNGMENT TRAINING: CPT

## 2022-11-07 PROCEDURE — 74018 RADEX ABDOMEN 1 VIEW: CPT

## 2022-11-07 PROCEDURE — 93005 ELECTROCARDIOGRAM TRACING: CPT

## 2022-11-07 PROCEDURE — 95700 EEG CONT REC W/VID EEG TECH: CPT

## 2022-11-07 PROCEDURE — 85025 COMPLETE CBC W/AUTO DIFF WBC: CPT

## 2022-11-07 PROCEDURE — 31720 CLEARANCE OF AIRWAYS: CPT

## 2022-11-07 PROCEDURE — 93306 TTE W/DOPPLER COMPLETE: CPT

## 2022-11-07 PROCEDURE — 71046 X-RAY EXAM CHEST 2 VIEWS: CPT

## 2022-11-07 PROCEDURE — 82947 ASSAY GLUCOSE BLOOD QUANT: CPT

## 2022-11-07 PROCEDURE — 36415 COLL VENOUS BLD VENIPUNCTURE: CPT

## 2022-11-07 PROCEDURE — P9037: CPT

## 2022-11-07 PROCEDURE — C1769: CPT

## 2022-11-07 PROCEDURE — 36430 TRANSFUSION BLD/BLD COMPNT: CPT

## 2022-11-07 PROCEDURE — 84443 ASSAY THYROID STIM HORMONE: CPT

## 2022-11-07 PROCEDURE — U0003: CPT

## 2022-11-07 PROCEDURE — 97116 GAIT TRAINING THERAPY: CPT

## 2022-11-07 PROCEDURE — 97162 PT EVAL MOD COMPLEX 30 MIN: CPT

## 2022-11-07 PROCEDURE — 86891 AUTOLOGOUS BLOOD OP SALVAGE: CPT

## 2022-11-07 PROCEDURE — 87086 URINE CULTURE/COLONY COUNT: CPT

## 2022-11-07 PROCEDURE — 80176 ASSAY OF LIDOCAINE: CPT

## 2022-11-07 PROCEDURE — 86900 BLOOD TYPING SEROLOGIC ABO: CPT

## 2022-11-07 PROCEDURE — 81001 URINALYSIS AUTO W/SCOPE: CPT

## 2022-11-07 PROCEDURE — 86923 COMPATIBILITY TEST ELECTRIC: CPT

## 2022-11-07 PROCEDURE — 82435 ASSAY OF BLOOD CHLORIDE: CPT

## 2022-11-07 PROCEDURE — 70450 CT HEAD/BRAIN W/O DYE: CPT

## 2022-11-07 PROCEDURE — C1889: CPT

## 2022-11-07 PROCEDURE — 87077 CULTURE AEROBIC IDENTIFY: CPT

## 2022-11-07 PROCEDURE — 87186 SC STD MICRODIL/AGAR DIL: CPT

## 2022-11-07 PROCEDURE — 82565 ASSAY OF CREATININE: CPT

## 2022-11-07 PROCEDURE — 80053 COMPREHEN METABOLIC PANEL: CPT

## 2022-11-07 PROCEDURE — 86901 BLOOD TYPING SEROLOGIC RH(D): CPT

## 2022-11-07 PROCEDURE — 71250 CT THORAX DX C-: CPT

## 2022-11-07 PROCEDURE — 85384 FIBRINOGEN ACTIVITY: CPT

## 2022-11-07 PROCEDURE — 80048 BASIC METABOLIC PNL TOTAL CA: CPT

## 2022-11-07 PROCEDURE — 0042T: CPT

## 2022-11-07 PROCEDURE — P9100: CPT

## 2022-11-07 PROCEDURE — 85396 CLOTTING ASSAY WHOLE BLOOD: CPT

## 2022-11-07 PROCEDURE — 85520 HEPARIN ASSAY: CPT

## 2022-11-07 PROCEDURE — 84295 ASSAY OF SERUM SODIUM: CPT

## 2022-11-07 PROCEDURE — 70498 CT ANGIOGRAPHY NECK: CPT

## 2022-11-07 PROCEDURE — A9585: CPT

## 2022-11-07 PROCEDURE — 85014 HEMATOCRIT: CPT

## 2022-11-07 PROCEDURE — 84484 ASSAY OF TROPONIN QUANT: CPT

## 2022-11-07 PROCEDURE — 94002 VENT MGMT INPAT INIT DAY: CPT

## 2022-11-07 PROCEDURE — 82962 GLUCOSE BLOOD TEST: CPT

## 2022-11-07 PROCEDURE — C1785: CPT

## 2022-11-07 PROCEDURE — C1898: CPT

## 2022-11-07 PROCEDURE — P9073: CPT

## 2022-11-07 PROCEDURE — U0005: CPT

## 2022-11-07 PROCEDURE — 84100 ASSAY OF PHOSPHORUS: CPT

## 2022-11-07 PROCEDURE — P9041: CPT

## 2022-11-07 PROCEDURE — C1887: CPT

## 2022-11-07 PROCEDURE — 83735 ASSAY OF MAGNESIUM: CPT

## 2022-11-07 PROCEDURE — 80061 LIPID PANEL: CPT

## 2022-11-07 PROCEDURE — C1886: CPT

## 2022-11-07 PROCEDURE — 95711 VEEG 2-12 HR UNMONITORED: CPT

## 2022-11-07 PROCEDURE — P9016: CPT

## 2022-11-07 PROCEDURE — 85730 THROMBOPLASTIN TIME PARTIAL: CPT

## 2022-11-07 PROCEDURE — 85018 HEMOGLOBIN: CPT

## 2022-11-07 PROCEDURE — 82550 ASSAY OF CK (CPK): CPT

## 2022-11-07 PROCEDURE — 85610 PROTHROMBIN TIME: CPT

## 2022-11-07 PROCEDURE — P9047: CPT

## 2022-11-07 PROCEDURE — C1751: CPT

## 2022-11-07 PROCEDURE — 84132 ASSAY OF SERUM POTASSIUM: CPT

## 2022-11-07 PROCEDURE — 85027 COMPLETE CBC AUTOMATED: CPT

## 2022-11-07 PROCEDURE — P9045: CPT

## 2022-11-07 PROCEDURE — 82553 CREATINE MB FRACTION: CPT

## 2022-11-07 PROCEDURE — 33208 INSRT HEART PM ATRIAL & VENT: CPT

## 2022-11-07 PROCEDURE — 97530 THERAPEUTIC ACTIVITIES: CPT

## 2022-11-07 PROCEDURE — 71045 X-RAY EXAM CHEST 1 VIEW: CPT

## 2022-11-07 PROCEDURE — P9012: CPT

## 2022-11-07 PROCEDURE — 70553 MRI BRAIN STEM W/O & W/DYE: CPT

## 2022-11-07 PROCEDURE — 86965 POOLING BLOOD PLATELETS: CPT

## 2022-11-07 PROCEDURE — 97110 THERAPEUTIC EXERCISES: CPT

## 2022-11-07 PROCEDURE — 82330 ASSAY OF CALCIUM: CPT

## 2022-11-07 PROCEDURE — 88305 TISSUE EXAM BY PATHOLOGIST: CPT

## 2022-11-07 PROCEDURE — 82803 BLOOD GASES ANY COMBINATION: CPT

## 2022-11-07 PROCEDURE — 97165 OT EVAL LOW COMPLEX 30 MIN: CPT

## 2022-11-07 PROCEDURE — 70496 CT ANGIOGRAPHY HEAD: CPT

## 2022-11-07 PROCEDURE — 83605 ASSAY OF LACTIC ACID: CPT

## 2022-11-07 RX ORDER — ATORVASTATIN CALCIUM 80 MG/1
1 TABLET, FILM COATED ORAL
Qty: 0 | Refills: 0 | DISCHARGE

## 2022-11-07 RX ORDER — OXYCODONE HYDROCHLORIDE 5 MG/1
1 TABLET ORAL
Qty: 20 | Refills: 0
Start: 2022-11-07 | End: 2022-11-11

## 2022-11-07 RX ORDER — DOXAZOSIN MESYLATE 4 MG
1 TABLET ORAL
Qty: 30 | Refills: 0
Start: 2022-11-07 | End: 2022-12-06

## 2022-11-07 RX ORDER — THIAMINE MONONITRATE (VIT B1) 100 MG
1 TABLET ORAL
Qty: 30 | Refills: 0
Start: 2022-11-07 | End: 2022-12-06

## 2022-11-07 RX ORDER — SENNA PLUS 8.6 MG/1
2 TABLET ORAL
Qty: 28 | Refills: 0
Start: 2022-11-07 | End: 2022-11-20

## 2022-11-07 RX ORDER — LIDOCAINE 4 G/100G
1 CREAM TOPICAL
Qty: 25 | Refills: 0
Start: 2022-11-07 | End: 2022-11-20

## 2022-11-07 RX ORDER — LEVOTHYROXINE SODIUM 125 MCG
1 TABLET ORAL
Qty: 0 | Refills: 0 | DISCHARGE

## 2022-11-07 RX ORDER — METOPROLOL TARTRATE 50 MG
1 TABLET ORAL
Qty: 0 | Refills: 0 | DISCHARGE

## 2022-11-07 RX ORDER — FERROUS SULFATE 325(65) MG
1 TABLET ORAL
Qty: 30 | Refills: 0
Start: 2022-11-07 | End: 2022-12-06

## 2022-11-07 RX ORDER — ASPIRIN/CALCIUM CARB/MAGNESIUM 324 MG
1 TABLET ORAL
Qty: 30 | Refills: 0
Start: 2022-11-07 | End: 2022-12-06

## 2022-11-07 RX ORDER — DILTIAZEM HCL 120 MG
1 CAPSULE, EXT RELEASE 24 HR ORAL
Qty: 0 | Refills: 0 | DISCHARGE

## 2022-11-07 RX ORDER — POTASSIUM CHLORIDE 20 MEQ
1 PACKET (EA) ORAL
Qty: 0 | Refills: 0 | DISCHARGE

## 2022-11-07 RX ORDER — LACOSAMIDE 50 MG/1
1 TABLET ORAL
Qty: 60 | Refills: 0
Start: 2022-11-07 | End: 2022-12-06

## 2022-11-07 RX ORDER — DOXAZOSIN MESYLATE 4 MG
1 TABLET ORAL
Qty: 0 | Refills: 0 | DISCHARGE

## 2022-11-07 RX ORDER — FOLIC ACID 0.8 MG
1 TABLET ORAL
Qty: 30 | Refills: 0
Start: 2022-11-07 | End: 2022-12-06

## 2022-11-07 RX ORDER — ACETAMINOPHEN 500 MG
2 TABLET ORAL
Qty: 0 | Refills: 0 | DISCHARGE
Start: 2022-11-07

## 2022-11-07 RX ORDER — APIXABAN 2.5 MG/1
1 TABLET, FILM COATED ORAL
Qty: 0 | Refills: 0 | DISCHARGE

## 2022-11-07 RX ORDER — METOPROLOL TARTRATE 50 MG
1 TABLET ORAL
Qty: 30 | Refills: 0
Start: 2022-11-07 | End: 2022-12-06

## 2022-11-07 RX ORDER — LEVOTHYROXINE SODIUM 125 MCG
1 TABLET ORAL
Qty: 30 | Refills: 0
Start: 2022-11-07 | End: 2022-12-06

## 2022-11-07 RX ADMIN — Medication 8 UNIT(S): at 07:51

## 2022-11-07 RX ADMIN — Medication 12.5 MILLIGRAM(S): at 05:13

## 2022-11-07 RX ADMIN — Medication 100 MILLIGRAM(S): at 05:13

## 2022-11-07 RX ADMIN — PANTOPRAZOLE SODIUM 40 MILLIGRAM(S): 20 TABLET, DELAYED RELEASE ORAL at 05:13

## 2022-11-07 RX ADMIN — LACOSAMIDE 100 MILLIGRAM(S): 50 TABLET ORAL at 05:14

## 2022-11-07 RX ADMIN — Medication 25 MICROGRAM(S): at 05:13

## 2022-11-07 RX ADMIN — Medication 100 MILLIGRAM(S): at 11:09

## 2022-11-07 RX ADMIN — LIDOCAINE 1 APPLICATION(S): 4 CREAM TOPICAL at 05:13

## 2022-11-07 RX ADMIN — Medication 1 MILLIGRAM(S): at 11:09

## 2022-11-07 RX ADMIN — Medication 81 MILLIGRAM(S): at 11:09

## 2022-11-07 RX ADMIN — Medication 325 MILLIGRAM(S): at 11:09

## 2022-11-07 NOTE — PROGRESS NOTE ADULT - SUBJECTIVE AND OBJECTIVE BOX
Neurology Progress Note    S: Patient seen and examined. No new events overnight. patient denied CP, SOB, HA or pain d/c plan today     Medication:  MEDICATIONS  (STANDING):  aspirin enteric coated 81 milliGRAM(s) Oral daily  chlorhexidine 2% Cloths 1 Application(s) Topical daily  dextrose 5%. 1000 milliLiter(s) (50 mL/Hr) IV Continuous <Continuous>  dextrose 5%. 1000 milliLiter(s) (100 mL/Hr) IV Continuous <Continuous>  doxazosin 4 milliGRAM(s) Oral at bedtime  ferrous    sulfate 325 milliGRAM(s) Oral daily  folic acid 1 milliGRAM(s) Oral daily  glucagon  Injectable 1 milliGRAM(s) IntraMuscular once  insulin glargine Injectable (LANTUS) 15 Unit(s) SubCutaneous at bedtime  insulin lispro (ADMELOG) corrective regimen sliding scale   SubCutaneous three times a day before meals  insulin lispro (ADMELOG) corrective regimen sliding scale   SubCutaneous at bedtime  insulin lispro Injectable (ADMELOG) 8 Unit(s) SubCutaneous three times a day before meals  lacosamide 100 milliGRAM(s) Oral two times a day  levothyroxine 25 MICROGram(s) Oral daily  metoprolol tartrate 12.5 milliGRAM(s) Oral two times a day  pantoprazole    Tablet 40 milliGRAM(s) Oral before breakfast  polyethylene glycol 3350 17 Gram(s) Oral daily  senna 2 Tablet(s) Oral at bedtime  sodium chloride 0.9%. 1000 milliLiter(s) (50 mL/Hr) IV Continuous <Continuous>  thiamine 100 milliGRAM(s) Oral daily    MEDICATIONS  (PRN):  acetaminophen     Tablet .. 650 milliGRAM(s) Oral every 6 hours PRN Mild Pain (1 - 3)  dextrose Oral Gel 15 Gram(s) Oral once PRN Blood Glucose LESS THAN 70 milliGRAM(s)/deciliter  guaiFENesin Oral Liquid (Sugar-Free) 100 milliGRAM(s) Oral every 6 hours PRN Cough  oxyCODONE    IR 5 milliGRAM(s) Oral every 6 hours PRN Moderate Pain (4 - 6)    Vitals:    Vital Signs Last 24 Hrs  T(C): 36.6 (11-07-22 @ 04:47), Max: 36.9 (11-06-22 @ 19:40)  T(F): 97.9 (11-07-22 @ 04:47), Max: 98.4 (11-06-22 @ 19:40)  HR: 80 (11-07-22 @ 09:39) (69 - 81)  BP: 115/66 (11-07-22 @ 04:47) (115/66 - 135/67)  BP(mean): --  RR: 18 (11-07-22 @ 04:47) (17 - 18)  SpO2: 95% (11-07-22 @ 09:39) (93% - 97%)    Orthostatic VS  11-05-22 @ 15:28  Lying BP: 124/64 HR: 70  Sitting BP: 115/65 HR: 71  Standing BP: 109/58 HR: 76  Site: --  Mode: --            General Exam:   General Appearance: Appropriately dressed and in no acute distress       Head: Normocephalic, atraumatic and no dysmorphic features  Ear, Nose, and Throat: Moist mucous membranes  CVS: S1S2+  Resp: No SOB, no wheeze or rhonchi  Abd: soft NTND  Extremities: No edema, no cyanosis  Skin: No bruises, no rashes     Neurological Exam:  Mental Status: Awake, alert and oriented x 3.  Able to follow simple and complex verbal commands. Able to name and repeat. fluent speech. No obvious aphasia or dysarthria noted.   Cranial Nerves: PERRL, EOMI, VFFC, sensation V1-V3 intact,  no obvious facial asymmetry , equal elevation of palate, scm/trap 5/5, tongue is midline on protrusion. no obvious papilledema on fundoscopic exam. Hearing is grossly intact.   Motor: Normal bulk, tone and strength throughout. Fine finger movements were intact and symmetric. no tremors or drift noted.    Sensation: Intact to light touch and pinprick throughout. no right/left confusion. no extinction to tactile on DSS.    Reflexes: 1+ throughout at biceps, brachioradialis, triceps, patellars and ankles bilaterally and equal. No clonus. R toe and L toe were both downgoing.  Coordination: No dysmetria on FNF or HKS  Gait: deferred     I personally reviewed the below data/images/labs:  CBC Full  -  ( 07 Nov 2022 05:45 )  WBC Count : 8.76 K/uL  RBC Count : 3.17 M/uL  Hemoglobin : 8.6 g/dL  Hematocrit : 27.5 %  Platelet Count - Automated : 252 K/uL  Mean Cell Volume : 86.8 fl  Mean Cell Hemoglobin : 27.1 pg  Mean Cell Hemoglobin Concentration : 31.3 gm/dL  Auto Neutrophil # : 6.03 K/uL  Auto Lymphocyte # : 0.85 K/uL  Auto Monocyte # : 1.31 K/uL  Auto Eosinophil # : 0.42 K/uL  Auto Basophil # : 0.05 K/uL  Auto Neutrophil % : 68.8 %  Auto Lymphocyte % : 9.7 %  Auto Monocyte % : 15.0 %  Auto Eosinophil % : 4.8 %  Auto Basophil % : 0.6 %  11-07    133<L>  |  102  |  20  ----------------------------<  120<H>  4.4   |  24  |  1.21    Ca    8.2<L>      07 Nov 2022 05:45    TPro  6.2  /  Alb  3.3  /  TBili  1.0  /  DBili  x   /  AST  75<H>  /  ALT  148<H>  /  AlkPhos  78  11-07        < from: CT Brain Stroke Protocol (10.24.22 @ 22:50) >  No acute intracranial hemorrhage, mass effect, or CTevidence of an acute   transcortical infarct.    Vague lucency at the genu of the right internal capsule and in the right   thalamus which may represent lacunar infarcts of indeterminate age. No   prior studies are available for comparison.    EEG Classification / Summary:  Abnormal  EEG in the awake / drowsy / asleep state(s).  Occasional repetitive sharp waves max O1/O2  Continuous polymorphic slowing, focal, bilateral posterior head region  Background slowing, generalized, mild     Clinical Impression:  Evidence for focal dysfunction in and increased risk for seizures from the bilateral posterior head region  Mild diffuse/multifocal cerebral dysfunction, not specific as to etiology

## 2022-11-07 NOTE — DISCHARGE NOTE PROVIDER - NSDCCPCAREPLAN_GEN_ALL_CORE_FT
PRINCIPAL DISCHARGE DIAGNOSIS  Diagnosis: S/P MVR (mitral valve repair)  Assessment and Plan of Treatment: REfer to your Cardiac Surgery Do's & Dont's Fact Sheet  take medications as prescribed  weigh yourself daily & record - report weight gain > 2.5 pounds overnight to MD  maintain glucose levels < 150  diabetic diet. no added salt  ambulate 4-5 times a day

## 2022-11-07 NOTE — PROGRESS NOTE ADULT - ASSESSMENT
77  year old  RHD male with HTN, HLD, DM2, former smoker,  hypothyroidism, obesity, LILLY on C-pap,  bladder cancer (2020, s/p resection of bladder tumor/ currently self catheterizes 6x/day ), PAF s/p DCCV /uncomplicated radiofrequency ablation of atrial fibrillation (WACA/PVI, CTI) on 7/13/22,  left atrial appendage & severe MR s/p open heart surgery for mitral valve repair and PFO closure 10/24/22.   Code stroke called for LUE convulsion with residual LUE weakness immediately after. LKW: 9:50 PM 10/24/22.   NIHSS: 12. MRS:2. on day of code stroke    Per primary team, LUE weakness improved. Patient had LUE convulsions that spread to entire body and team witnessed GTC's that lasted for 1.5 minutes. No witnessed gaze deviation per primary team.     On initial exam, patient able to follow commands and able to lift all extremities against gravity. Patient was able to life LUE but less than the RUE. Still, all extremities had drift that hit bed.   Not a tpa candidate given open heart surgery within 14 days. Not a thrombectomy candidate given no LVO.     o/e 10/25 AAOx3, OLIVIER but mild LUE weakness still noted.   10/26 exam baseline.  EEG with occasional sharps and risk for seizures from bilateral posterior head region     CTH: R IC vague lucency noted. may be age indeterminate  CTA H/N limited 2/2 poor opacification of vessels but no LVO.  CTP neg   A1c 6.2  LDL 27   s/p PPM     Impression: LUE convulsions with generalized spreading likely seizure. Stroke on differential as well given vague lucency at the genu of the right internal capsule and in the right thalamus of indeterminate age. Likely lacunar infarcts.     Recommendations:   - needs to be on AED.  if keppra 750mg BID was making him sleeping or causing other side effects such as GI upset you can change keppra to  vimpat 100mg BID given EEG findings ; now on vimpat no complaints   - MR brain wo when stable if able.  otherwise repeat CTH ; will get MRI outpatient if not done in house   - c/w ASA 81mg daily.  should be on statin therapy for secondary stroke prevention. lipitor 20 if no objection.  will hold off high dose as no athero on CTA H/N and LDL at goal  - telemetry  - PT/OT/SS/SLP, OOBC  - BP per CT team .   - check FS, glucose control <180  - GI/DVT ppx  - Thank you for allowing me to participate in the care of this patient. Call with questions.   - spoke iw ACP   - spoke with sister at bedside   called wife at [phone] and discussed keppra and interactions.     -. outpatient neuro f/.u   - d/c planning today   Bucky Sharpe MD  Vascular Neurology  Office: [phone] .

## 2022-11-07 NOTE — DISCHARGE NOTE PROVIDER - CARE PROVIDER_API CALL
Escobar Barraza (MD)  Surgery; Surgical Critical Care; Thoracic and Cardiac Surgery  [address] [place] [address]  Phone: [phone]  Fax: [phone]  Follow Up Time:    Escobar Barraza)  Surgery; Surgical Critical Care; Thoracic and Cardiac Surgery  [address] [place] [address]  Phone: [phone]  Fax: [phone]  Established Patient  Scheduled Appointment: 11/14/2022 03:30 PM    Driss Jett)  Cardiac Electrophysiology; Cardiology  [address] [place] [address]  Phone: [phone]  Fax: ()-  Follow Up Time:    Escobar Barraza)  Surgery; Surgical Critical Care; Thoracic and Cardiac Surgery  [address] [place] [address]  Phone: [phone]  Fax: [phone]  Established Patient  Scheduled Appointment: 11/14/2022 03:30 PM    Driss Jett)  Cardiac Electrophysiology; Cardiology  [address] [place] [address]  Phone: [phone]  Fax: ()-  Follow Up Time:     Bucky Shrape)  Neurology; Vascular Neurology  [address] [facility], [address]  [place] [address]  Phone: [phone]  Fax: [phone]  Follow Up Time:

## 2022-11-07 NOTE — DISCHARGE NOTE PROVIDER - NSDCFUSCHEDAPPT_GEN_ALL_CORE_FT
[facility]  ELECTROPH [address]  Scheduled Appointment: 11/18/2022     Escobar Barraza  [facility]  CTSURG [address]  Scheduled Appointment: 11/14/2022    [facility]  ELECTROPH [address]  Scheduled Appointment: 11/18/2022

## 2022-11-07 NOTE — DISCHARGE NOTE PROVIDER - NSDCPNSUBOBJ_GEN_ALL_CORE
PHYSICAL EXAM  Neurology: alert and oriented x 3, nonfocal, no gross deficits  CV : s1 s2 RRR  Sternal Wound :  CDI , Stable  L chest cdi  Lungs: cta  Abdomen: soft, nontender, nondistended, positive bowel sounds, last bowel movement +  :    gaitan - sbd        Extremities:    +  edema   /  -   calve tenderness ,

## 2022-11-07 NOTE — DISCHARGE NOTE PROVIDER - CARE PROVIDERS DIRECT ADDRESSES
[email or web address]_Unknown

## 2022-11-07 NOTE — PROGRESS NOTE ADULT - ASSESSMENT
Assessment  DMT2: 77y Male with DM T2 with hyperglycemia, A1C 6.2%, was on oral meds at home, now on basal bolus insulin, blood sugars are stable and trending within overall acceptable range, no hypoglycemic episodes, eating meals, well-appearing and eager for DC.  CAD: on medications, no chest pain, stable, monitored.  Hypothyroidism: On 25 Synthroid  mcg po daily, compliant with Synthroid intake, asymptomatic, euthyroid.  Obesity: No strict exercise routines, not on any weight loss program, neither on low calorie diet.          Izabela Grady MD  Cell: [phone]  Office: [phone]               Assessment  DMT2: 77y Male with DM T2 with hyperglycemia, A1C 6.2%, was on oral meds at home, now on basal bolus insulin, blood sugars are stable and trending within overall acceptable range,  no hypoglycemic episodes, eating meals, well-appearing and eager for DC.  CAD: on medications, no chest pain, stable, monitored.  Hypothyroidism: On 25 Synthroid  mcg po daily, compliant with Synthroid intake, asymptomatic, euthyroid.  Obesity: No strict exercise routines, not on any weight loss program, neither on low calorie diet.          Izabela Grady MD  Cell: [phone]  Office: [phone]

## 2022-11-07 NOTE — DISCHARGE NOTE PROVIDER - HOSPITAL COURSE
77  year old  RHD male PMH of HTN, HLD, DM2, former smoker,  hypothyroidism, obesity, LILLY on C-pap,  bladder cancer (2020, s/p resection of bladder tumor/ currently self catheterizes 6x/day ), PAF s/p DCCV /uncomplicated radiofrequency ablation of atrial fibrillation (WACA/PVI, CTI) on 7/13/22,  left atrial appendage & severe MR . Presents with c/o recent" abnormal echo with fatigue due to mitral valve regurgitation. Presents for  scheduled Mitral valve replacement , MAZE, left atrial appendage closure on 10/20/2022.  10/24/22   MVr - triangular resection with 28mm Bobby band  AtriClip 45mm   Dawn-Maze IV procedure  PFO CLOSURE  Code stroke called for LUE convulsion with residual LUE weakness immediately after, Patient had LUE convulsions that spread to entire body  CT done, neuro following:  Stroke on differential as well given vague lucency at the genu of the right internal capsule and in the right thalamus of indeterminate age. Likely lacunar infarcts.   MRI when capable , eeg and Keppra added  Extubated d 1  + pressors, prbc's  EEG prelim neg.  Pt with lethargy post keppra, d/c since eeg neg  10/ 26 EEG reviewed by neuro, although no seizures  he is at risk for further sezures, recommended resuming keppra and if lethargic ,  change to vimpat  Pt with junctional / afib  Transferred to sdu  10/28 Maintain PW for junctional rhythm, sorbitol for constipation. Transferred to [address] [place] [address]  10/27 Junctional 50-60, remains on amio 200bid, no beta blocker   Cont keppra for now.  Neuro f/u  D/c jared drain.  10/29 Add Eliquis 2.5 bid>PW removed this am Continue diuretics  Amio 1800 mg to date>continue to 5 gram load as per DR Barraza  Magnesium repleted  10/30 VSS; rsr/junct 50-60- no bb; continue amio 200 bid; pt c/o dysuria--> ck ua/ urine cx +l.esterase; neg nitrate; start bactrim as per Dr. Barraza and ck urine cx; monitor ekg- ck in am 10/31  continue diuresis; allow pt to start self cath himself  increase activity as tolerated  10/31 Continue with diuretics. Pt remains 7kg above preop weight. Bactrim for UTI. . Completed 2.2G Amiodarone load. Maintaining junctional rhythm. Continue with amiodarone for 5G load as per Attending   11/1 JR 54-70  Amio decreased to 200 qd--EP called,   serratia marescens --sensitivities pending on bactrim.  -2600cc/24hrs-torsemide 10; amio and bb d/c as per EP- discharge pt home with mcot  11/2 VSS: junc 50-80- no av nodals as per EP; ID consulted; uti sensitive to bactrim- continue 3 days as per ID; continue diuresis; endo consulted for uncontrolled dm  mri brain done this am - results pending  11/3 tachy andrew--> afib up to 150- amio load; EP consulted; echo recommended  11/4 [facility] junct 60-80; npo for PPM today; amio d/c secondary to elevated lft's and diuretics d/c bun/ cr 34/1.8-- IVF .9 NS @ 50 cc/ hr for 1 L; echo done this am- + circ pericardial effusion; no rv compromise or tamponade noted; d/w Dr. Barraza; ct chest done--> likely substernal collection/ clot; no drainage at this time; bp stable & pt asymptomatic; continue to monitor as per DR. Barraza and no further eliquis/ ac  11/5 VSS - HCT 24.9 . ST @ 120s - low dose bb started - iron/folate/thiamine started will monitor H/h  11/6  Lft's stable  Pt received prbc x 1 yesterday  creat down to 1.3  11/7 VSS- rounds made w/ Dr. Barraza- will d/c home today

## 2022-11-07 NOTE — PROGRESS NOTE ADULT - PROBLEM SELECTOR PROBLEM 3
UTI symptoms
UTI symptoms
S/P MVR (mitral valve repair)
UTI symptoms
S/P MVR (mitral valve repair)
UTI symptoms
S/P MVR (mitral valve repair)
UTI symptoms

## 2022-11-07 NOTE — PROGRESS NOTE ADULT - SUBJECTIVE AND OBJECTIVE BOX
Chief complaint  Patient is a 77y old  Male who presents with a chief complaint of sob (07 Nov 2022 07:49)   Review of systems  Patient is awake in chair, looks alert and comfortable, no hypoglycemic episodes.    Labs and Fingersticks  CAPILLARY BLOOD GLUCOSE      POCT Blood Glucose.: 122 mg/dL (07 Nov 2022 07:28)  POCT Blood Glucose.: 169 mg/dL (06 Nov 2022 21:20)  POCT Blood Glucose.: 139 mg/dL (06 Nov 2022 16:35)  POCT Blood Glucose.: 154 mg/dL (06 Nov 2022 11:18)      Anion Gap, Serum: 7 (11-07 @ 05:45)  Anion Gap, Serum: 11 (11-06 @ 06:12)      Calcium, Total Serum: 8.2 *L* (11-07 @ 05:45)  Calcium, Total Serum: 8.5 (11-06 @ 06:12)  Albumin, Serum: 3.3 (11-07 @ 05:45)  Albumin, Serum: 3.4 (11-06 @ 06:12)    Alanine Aminotransferase (ALT/SGPT): 148 *H* (11-07 @ 05:45)  Alanine Aminotransferase (ALT/SGPT): 163 *H* (11-06 @ 06:12)  Alkaline Phosphatase, Serum: 78 (11-07 @ 05:45)  Alkaline Phosphatase, Serum: 75 (11-06 @ 06:12)  Aspartate Aminotransferase (AST/SGOT): 75 *H* (11-07 @ 05:45)  Aspartate Aminotransferase (AST/SGOT): 93 *H* (11-06 @ 06:12)        11-07    133<L>  |  102  |  20  ----------------------------<  120<H>  4.4   |  24  |  1.21    Ca    8.2<L>      07 Nov 2022 05:45    TPro  6.2  /  Alb  3.3  /  TBili  1.0  /  DBili  x   /  AST  75<H>  /  ALT  148<H>  /  AlkPhos  78  11-07                        8.6    8.76  )-----------( 252      ( 07 Nov 2022 05:45 )             27.5     Medications  MEDICATIONS  (STANDING):  aspirin enteric coated 81 milliGRAM(s) Oral daily  chlorhexidine 2% Cloths 1 Application(s) Topical daily  dextrose 5%. 1000 milliLiter(s) (50 mL/Hr) IV Continuous <Continuous>  dextrose 5%. 1000 milliLiter(s) (100 mL/Hr) IV Continuous <Continuous>  doxazosin 4 milliGRAM(s) Oral at bedtime  ferrous    sulfate 325 milliGRAM(s) Oral daily  folic acid 1 milliGRAM(s) Oral daily  glucagon  Injectable 1 milliGRAM(s) IntraMuscular once  insulin glargine Injectable (LANTUS) 15 Unit(s) SubCutaneous at bedtime  insulin lispro (ADMELOG) corrective regimen sliding scale   SubCutaneous three times a day before meals  insulin lispro (ADMELOG) corrective regimen sliding scale   SubCutaneous at bedtime  insulin lispro Injectable (ADMELOG) 8 Unit(s) SubCutaneous three times a day before meals  lacosamide 100 milliGRAM(s) Oral two times a day  levothyroxine 25 MICROGram(s) Oral daily  lidocaine 5% Ointment 1 Application(s) Topical two times a day  metoprolol tartrate 12.5 milliGRAM(s) Oral two times a day  pantoprazole    Tablet 40 milliGRAM(s) Oral before breakfast  polyethylene glycol 3350 17 Gram(s) Oral daily  senna 2 Tablet(s) Oral at bedtime  sodium chloride 0.9%. 1000 milliLiter(s) (50 mL/Hr) IV Continuous <Continuous>  thiamine 100 milliGRAM(s) Oral daily      Physical Exam  General: Patient comfortable in bed  Vital Signs Last 12 Hrs  T(F): 97.9 (11-07-22 @ 04:47), Max: 97.9 (11-07-22 @ 04:47)  HR: 80 (11-07-22 @ 09:39) (70 - 81)  BP: 115/66 (11-07-22 @ 04:47) (115/66 - 115/66)  BP(mean): --  RR: 18 (11-07-22 @ 04:47) (18 - 18)  SpO2: 95% (11-07-22 @ 09:39) (93% - 96%)  Neck: No palpable thyroid nodules.  CVS: S1S2, No murmurs  Respiratory: No wheezing, no crepitations  GI: Abdomen soft, bowel sounds positive  Musculoskeletal:  edema lower extremities.   Skin: No skin rashes, no ecchymosis    Diagnostics             Chief complaint  Patient is a 77y old  Male who presents with a chief complaint of sob (07 Nov 2022 07:49)   Review of systems  Patient is awake in chair, looks alert and comfortable, no hypoglycemic episodes.    Labs and Fingersticks  CAPILLARY BLOOD GLUCOSE      POCT Blood Glucose.: 122 mg/dL (07 Nov 2022 07:28)  POCT Blood Glucose.: 169 mg/dL (06 Nov 2022 21:20)  POCT Blood Glucose.: 139 mg/dL (06 Nov 2022 16:35)  POCT Blood Glucose.: 154 mg/dL (06 Nov 2022 11:18)      Anion Gap, Serum: 7 (11-07 @ 05:45)  Anion Gap, Serum: 11 (11-06 @ 06:12)      Calcium, Total Serum: 8.2 *L* (11-07 @ 05:45)  Calcium, Total Serum: 8.5 (11-06 @ 06:12)  Albumin, Serum: 3.3 (11-07 @ 05:45)  Albumin, Serum: 3.4 (11-06 @ 06:12)    Alanine Aminotransferase (ALT/SGPT): 148 *H* (11-07 @ 05:45)  Alanine Aminotransferase (ALT/SGPT): 163 *H* (11-06 @ 06:12)  Alkaline Phosphatase, Serum: 78 (11-07 @ 05:45)  Alkaline Phosphatase, Serum: 75 (11-06 @ 06:12)  Aspartate Aminotransferase (AST/SGOT): 75 *H* (11-07 @ 05:45)  Aspartate Aminotransferase (AST/SGOT): 93 *H* (11-06 @ 06:12)        11-07    133<L>  |  102  |  20  ----------------------------<  120<H>  4.4   |  24  |  1.21    Ca    8.2<L>      07 Nov 2022 05:45    TPro  6.2  /  Alb  3.3  /  TBili  1.0  /  DBili  x   /  AST  75<H>  /  ALT  148<H>  /  AlkPhos  78  11-07                        8.6    8.76  )-----------( 252      ( 07 Nov 2022 05:45 )             27.5     Medications  MEDICATIONS  (STANDING):  aspirin enteric coated 81 milliGRAM(s) Oral daily  chlorhexidine 2% Cloths 1 Application(s) Topical daily  dextrose 5%. 1000 milliLiter(s) (50 mL/Hr) IV Continuous <Continuous>  dextrose 5%. 1000 milliLiter(s) (100 mL/Hr) IV Continuous <Continuous>  doxazosin 4 milliGRAM(s) Oral at bedtime  ferrous    sulfate 325 milliGRAM(s) Oral daily  folic acid 1 milliGRAM(s) Oral daily  glucagon  Injectable 1 milliGRAM(s) IntraMuscular once  insulin glargine Injectable (LANTUS) 15 Unit(s) SubCutaneous at bedtime  insulin lispro (ADMELOG) corrective regimen sliding scale   SubCutaneous three times a day before meals  insulin lispro (ADMELOG) corrective regimen sliding scale   SubCutaneous at bedtime  insulin lispro Injectable (ADMELOG) 8 Unit(s) SubCutaneous three times a day before meals  lacosamide 100 milliGRAM(s) Oral two times a day  levothyroxine 25 MICROGram(s) Oral daily  lidocaine 5% Ointment 1 Application(s) Topical two times a day  metoprolol tartrate 12.5 milliGRAM(s) Oral two times a day  pantoprazole    Tablet 40 milliGRAM(s) Oral before breakfast  polyethylene glycol 3350 17 Gram(s) Oral daily  senna 2 Tablet(s) Oral at bedtime  sodium chloride 0.9%. 1000 milliLiter(s) (50 mL/Hr) IV Continuous <Continuous>  thiamine 100 milliGRAM(s) Oral daily      Physical Exam  General: Patient comfortable in bed  Vital Signs Last 12 Hrs  T(F): 97.9 (11-07-22 @ 04:47), Max: 97.9 (11-07-22 @ 04:47)  HR: 80 (11-07-22 @ 09:39) (70 - 81)  BP: 115/66 (11-07-22 @ 04:47) (115/66 - 115/66)  BP(mean): --  RR: 18 (11-07-22 @ 04:47) (18 - 18)  SpO2: 95% (11-07-22 @ 09:39) (93% - 96%)  Neck: No palpable thyroid nodules.  CVS: S1S2, No murmurs  Respiratory: No wheezing, no crepitations  GI: Abdomen soft, bowel sounds positive  Musculoskeletal:  edema lower extremities.   Skin: No skin rashes, no ecchymosis    Diagnostics

## 2022-11-07 NOTE — PROGRESS NOTE ADULT - PROBLEM SELECTOR PLAN 5
Overweight/Obesity: Patient counseled for weight loss, exercise, low calorie diet.

## 2022-11-07 NOTE — PROGRESS NOTE ADULT - PROBLEM SELECTOR PROBLEM 2
At risk of seizures
Hypothyroid
Hypothyroid
At risk of seizures
Hypothyroid
At risk of seizures
Hypothyroid
Hypothyroid

## 2022-11-07 NOTE — PROGRESS NOTE ADULT - PROBLEM SELECTOR PLAN 1
Will continue current insulin regimen for now. Will continue monitoring blood sugars and FU.  Patient counseled for compliance with consistent low carb diet and exercise as tolerated outpatient.    DC Planning: Patient previously well-controlled on Janumet, A1C 6.2%. Can DC on prior oral hypoglycemic agents. FU 4 weeks.  Patient counseled for compliance with consistent low carb diet and exercise as tolerated outpatient.

## 2022-11-07 NOTE — PROGRESS NOTE ADULT - PROVIDER SPECIALTY LIST ADULT
Critical Care
Critical Care
Electrophysiology
Neurology
Neurology
CT Surgery
CT Surgery
Critical Care
Critical Care
Electrophysiology
Neurology
CT Surgery
Endocrinology
Neurology
Pulmonology
CT Surgery
CT Surgery
Endocrinology
CT Surgery
Endocrinology
CT Surgery
Endocrinology
Endocrinology

## 2022-11-07 NOTE — PROGRESS NOTE ADULT - PROBLEM SELECTOR PROBLEM 4
At risk of seizures
At risk of seizures
Junctional bradycardia
At risk of seizures
Junctional bradycardia
At risk of seizures
Junctional bradycardia
At risk of seizures
Junctional bradycardia

## 2022-11-07 NOTE — PROGRESS NOTE ADULT - PROBLEM SELECTOR PROBLEM 1
DM (diabetes mellitus)
S/P MVR (mitral valve repair)
DM (diabetes mellitus)
DM (diabetes mellitus)
S/P MVR (mitral valve repair)
DM (diabetes mellitus)
S/P MVR (mitral valve repair)
S/P MVR (mitral valve repair)
DM (diabetes mellitus)
S/P MVR (mitral valve repair)

## 2022-11-07 NOTE — DISCHARGE NOTE PROVIDER - NSDCMRMEDTOKEN_GEN_ALL_CORE_FT
acetaminophen: 2 tab(s) orally every 6 hours, As Needed  for mild pain/headache  aspirin 81 mg oral delayed release tablet: 1 tab(s) orally once a day  doxazosin 4 mg oral tablet: 1 tab(s) orally once a day (at bedtime)  ferrous sulfate 325 mg (65 mg elemental iron) oral tablet: 1 tab(s) orally once a day  finasteride 5 mg oral tablet: 1 tab(s) orally once a day (at bedtime)  folic acid 1 mg oral tablet: 1 tab(s) orally once a day  Janumet 50 mg-1000 mg oral tablet: 1 tab(s) orally 2 times a day, last dose 10/18 am as per Surgeon&#x27;s advice  lacosamide 100 mg oral tablet: 1 tab(s) orally 2 times a day MDD:2  levothyroxine 25 mcg (0.025 mg) oral tablet: 1 tab(s) orally once a day  lidocaine 5% topical ointment: 1 application topically 2 times a day  Metoprolol Succinate ER 25 mg oral tablet, extended release: 1 tab(s) orally once a day  oxyCODONE 5 mg oral tablet: 1 tab(s) orally every 6 hours, As Needed -Moderate Pain (4 - 6)  - for moderate pain MDD:four   pantoprazole 40 mg oral delayed release tablet: 1 tab(s) orally every 12 hours   senna leaf extract oral tablet: 2 tab(s) orally once a day (at bedtime)  thiamine 100 mg oral tablet: 1 tab(s) orally once a day

## 2022-11-07 NOTE — DISCHARGE NOTE PROVIDER - PROVIDER TOKENS
PROVIDER:[TOKEN:[[id number]]] PROVIDER:[TOKEN:[[id number]],SCHEDULEDAPPT:[11/14/2022],SCHEDULEDAPPTTIME:[03:30 PM],ESTABLISHEDPATIENT:[T]],PROVIDER:[TOKEN:[[id number]]] PROVIDER:[TOKEN:[[id number]],SCHEDULEDAPPT:[11/14/2022],SCHEDULEDAPPTTIME:[03:30 PM],ESTABLISHEDPATIENT:[T]],PROVIDER:[TOKEN:[[id number]]],PROVIDER:[TOKEN:[[id number]]]

## 2022-11-08 ENCOUNTER — TRANSCRIPTION ENCOUNTER (OUTPATIENT)
Age: 77
End: 2022-11-08

## 2022-11-08 ENCOUNTER — APPOINTMENT (OUTPATIENT)
Dept: CARE COORDINATION | Facility: HOME HEALTH | Age: 77
End: 2022-11-08

## 2022-11-08 VITALS
OXYGEN SATURATION: 98 % | HEART RATE: 70 BPM | DIASTOLIC BLOOD PRESSURE: 64 MMHG | SYSTOLIC BLOOD PRESSURE: 122 MMHG | RESPIRATION RATE: 16 BRPM

## 2022-11-08 PROCEDURE — 99024 POSTOP FOLLOW-UP VISIT: CPT

## 2022-11-08 RX ORDER — FUROSEMIDE 20 MG/1
20 TABLET ORAL
Qty: 20 | Refills: 3 | Status: DISCONTINUED | COMMUNITY
Start: 1900-01-01 | End: 2022-11-08

## 2022-11-08 RX ORDER — APIXABAN 5 MG/1
5 TABLET, FILM COATED ORAL
Qty: 60 | Refills: 0 | Status: DISCONTINUED | COMMUNITY
End: 2022-11-08

## 2022-11-08 RX ORDER — POTASSIUM CHLORIDE 750 MG/1
10 TABLET, FILM COATED, EXTENDED RELEASE ORAL
Qty: 7 | Refills: 0 | Status: DISCONTINUED | COMMUNITY
Start: 2022-09-22 | End: 2022-11-08

## 2022-11-08 RX ORDER — SULFAMETHOXAZOLE AND TRIMETHOPRIM 800; 160 MG/1; MG/1
800-160 TABLET ORAL
Refills: 0 | Status: DISCONTINUED | COMMUNITY
Start: 2022-09-29 | End: 2022-11-08

## 2022-11-08 RX ORDER — DILTIAZEM HYDROCHLORIDE 120 MG/1
120 CAPSULE, EXTENDED RELEASE ORAL DAILY
Refills: 0 | Status: DISCONTINUED | COMMUNITY
End: 2022-11-08

## 2022-11-08 RX ORDER — DOXYCYCLINE HYCLATE 20 MG/1
20 TABLET ORAL TWICE DAILY
Refills: 0 | Status: DISCONTINUED | COMMUNITY
End: 2022-11-08

## 2022-11-08 RX ORDER — ATORVASTATIN CALCIUM 40 MG/1
40 TABLET, FILM COATED ORAL
Qty: 30 | Refills: 0 | Status: DISCONTINUED | COMMUNITY
End: 2022-11-08

## 2022-11-08 RX ORDER — ASPIRIN 81 MG/1
81 TABLET, COATED ORAL
Qty: 30 | Refills: 0 | Status: ACTIVE | COMMUNITY
Start: 2022-11-07

## 2022-11-08 NOTE — PHYSICAL EXAM
[] : no respiratory distress [Respiration, Rhythm And Depth] : normal respiratory rhythm and effort [Auscultation Breath Sounds / Voice Sounds] : lungs were clear to auscultation bilaterally [Heart Rate And Rhythm] : heart rate was normal and rhythm regular [Heart Sounds] : normal S1 and S2 [FreeTextEntry1] : MSI, CT sites and PPM sites without erythema, drainage or warmth, with edges well approximated.  Sternum stable. BLE 2+ edema [Bowel Sounds] : normal bowel sounds [Abdomen Soft] : soft [Abdomen Tenderness] : non-tender

## 2022-11-08 NOTE — ASSESSMENT
[FreeTextEntry1] : 77M s/p MVR, MAZE, PFO closure, atria clip Dr. Barraza\par h/o bladder ca, self cath\par code stroke, cont anti seizure meds\par s/p PPM placement on this admission

## 2022-11-08 NOTE — HISTORY OF PRESENT ILLNESS
[FreeTextEntry1] : 77  year old  RHD male PMH of HTN, HLD, DM2, former smoker,  hypothyroidism,\par obesity, LILLY on C-pap,  bladder cancer (2020, s/p resection of bladder tumor/\par currently self catheterizes 6x/day ), PAF s/p DCCV /uncomplicated\par radiofrequency ablation of atrial fibrillation ([facility]/PVI, CTI) on 7/13/22,\par left atrial appendage & severe MR . Presents with c/o recent" abnormal echo\par with fatigue due to mitral valve regurgitation. Presents for  scheduled Mitral\par valve replacement , MAZE, left atrial appendage closure on 10/20/2022.\par 10/24/22\par MVr - triangular resection with 28mm Bobby band\par AtriClip 45mm\par Dawn-Maze IV procedure\par PFO CLOSURE\par Code stroke called for LUE convulsion with residual LUE weakness immediately\par after, Patient had LUE convulsions that spread to entire body\par CT done, neuro following:\par anti seizure meds started\par tachy andrew and elevated lft's, amio stopped, PPM placed\par \par pt recovering at home with wife after 2 week hospitalization\par edcuation and emotional support provided \par all questions answered\par

## 2022-11-09 ENCOUNTER — TRANSCRIPTION ENCOUNTER (OUTPATIENT)
Age: 77
End: 2022-11-09

## 2022-11-10 ENCOUNTER — NON-APPOINTMENT (OUTPATIENT)
Age: 77
End: 2022-11-10

## 2022-11-11 ENCOUNTER — TRANSCRIPTION ENCOUNTER (OUTPATIENT)
Age: 77
End: 2022-11-11

## 2022-11-11 PROBLEM — Z98.890 S/P MITRAL VALVE REPAIR: Status: ACTIVE | Noted: 2022-11-11

## 2022-11-11 PROBLEM — Z87.74 S/P PATENT FORAMEN OVALE CLOSURE: Status: ACTIVE | Noted: 2022-11-11

## 2022-11-11 PROBLEM — Z09 POSTOPERATIVE FOLLOW-UP: Status: ACTIVE | Noted: 2022-11-11

## 2022-11-11 PROBLEM — Z98.890 S/P MAZE OPERATION FOR ATRIAL FIBRILLATION: Status: ACTIVE | Noted: 2022-11-11

## 2022-11-11 PROBLEM — Z98.890 S/P LEFT ATRIAL APPENDAGE LIGATION: Status: ACTIVE | Noted: 2022-11-11

## 2022-11-11 RX ORDER — LIDOCAINE 5 G/100G
5 OINTMENT TOPICAL
Qty: 35 | Refills: 0 | Status: COMPLETED | COMMUNITY
Start: 2022-11-07 | End: 2022-11-11

## 2022-11-11 RX ORDER — SENNOSIDES 8.6 MG TABLETS 8.6 MG/1
8.6 TABLET ORAL
Qty: 60 | Refills: 0 | Status: ACTIVE | COMMUNITY

## 2022-11-11 RX ORDER — BLOOD SUGAR DIAGNOSTIC
STRIP MISCELLANEOUS
Qty: 200 | Refills: 0 | Status: COMPLETED | COMMUNITY
Start: 2022-08-24 | End: 2022-11-11

## 2022-11-11 RX ORDER — NIACIN 500 MG
TABLET, EXTENDED RELEASE ORAL DAILY
Refills: 0 | Status: COMPLETED | COMMUNITY
End: 2022-11-11

## 2022-11-11 NOTE — COUNSELING
[Hygeine (Including Daily Shower)] : hygeine (including daily shower) [Importance of Regular Medical Follow-Up] : the importance of regular medical follow-up [No Heavy Lifting] : no heavy lifting (>15-20 lb. for 1 month or 25 lb. for 3 months from date of surgery) [Blood Pressure Control] : blood pressure control [Weight Management] : weight management [S/S of infection] : signs and symptoms of infection (and to whom it should be reported) [Progressive Ambulation/Activity] : progressive ambulation/activity [Medication/Vitamin/Herb/Food Interaction] : medication/vitamin/herb/food interaction [SBE antibiotic prophylaxis] : SBE antibiotic prophylaxis was recommended [Stress Management] : stress management

## 2022-11-14 ENCOUNTER — APPOINTMENT (OUTPATIENT)
Dept: CARDIOTHORACIC SURGERY | Facility: CLINIC | Age: 77
End: 2022-11-14

## 2022-11-14 ENCOUNTER — TRANSCRIPTION ENCOUNTER (OUTPATIENT)
Age: 77
End: 2022-11-14

## 2022-11-14 ENCOUNTER — APPOINTMENT (OUTPATIENT)
Dept: ELECTROPHYSIOLOGY | Facility: CLINIC | Age: 77
End: 2022-11-14

## 2022-11-14 VITALS
BODY MASS INDEX: 37.38 KG/M2 | HEART RATE: 70 BPM | HEIGHT: 72 IN | DIASTOLIC BLOOD PRESSURE: 74 MMHG | OXYGEN SATURATION: 98 % | RESPIRATION RATE: 16 BRPM | WEIGHT: 276 LBS | TEMPERATURE: 98.2 F | SYSTOLIC BLOOD PRESSURE: 120 MMHG

## 2022-11-14 VITALS — HEART RATE: 66 BPM | DIASTOLIC BLOOD PRESSURE: 67 MMHG | OXYGEN SATURATION: 97 % | SYSTOLIC BLOOD PRESSURE: 124 MMHG

## 2022-11-14 DIAGNOSIS — Z98.890 OTHER SPECIFIED POSTPROCEDURAL STATES: ICD-10-CM

## 2022-11-14 DIAGNOSIS — Z09 ENCOUNTER FOR FOLLOW-UP EXAMINATION AFTER COMPLETED TREATMENT FOR CONDITIONS OTHER THAN MALIGNANT NEOPLASM: ICD-10-CM

## 2022-11-14 DIAGNOSIS — Z87.74 PERSONAL HISTORY OF (CORRECTED) CONGENITAL MALFORMATIONS OF HEART AND CIRCULATORY SYSTEM: ICD-10-CM

## 2022-11-14 DIAGNOSIS — Z86.79 OTHER SPECIFIED POSTPROCEDURAL STATES: ICD-10-CM

## 2022-11-14 PROCEDURE — 93000 ELECTROCARDIOGRAM COMPLETE: CPT | Mod: 59

## 2022-11-14 PROCEDURE — 99024 POSTOP FOLLOW-UP VISIT: CPT

## 2022-11-14 RX ORDER — LACOSAMIDE 100 MG/1
100 TABLET ORAL TWICE DAILY
Qty: 60 | Refills: 0 | Status: ACTIVE | COMMUNITY
Start: 2022-11-07 | End: 1900-01-01

## 2022-11-14 RX ORDER — SPIRONOLACTONE 25 MG/1
25 TABLET ORAL DAILY
Qty: 10 | Refills: 0 | Status: ACTIVE | COMMUNITY
Start: 2022-11-14 | End: 1900-01-01

## 2022-11-14 RX ORDER — PANTOPRAZOLE 40 MG/1
40 TABLET, DELAYED RELEASE ORAL DAILY
Qty: 30 | Refills: 0 | Status: ACTIVE | COMMUNITY
Start: 1900-01-01 | End: 1900-01-01

## 2022-11-14 RX ORDER — OMEGA-3/DHA/EPA/FISH OIL 300-1000MG
400 CAPSULE ORAL DAILY
Refills: 0 | Status: COMPLETED | COMMUNITY
End: 2022-11-14

## 2022-11-16 ENCOUNTER — TRANSCRIPTION ENCOUNTER (OUTPATIENT)
Age: 77
End: 2022-11-16

## 2022-11-16 NOTE — END OF VISIT
[FreeTextEntry3] : Written by Blil Rainey NP, acting as a scribe for Dr. Barraza\par “The documentation recorded by the scribe accurately reflects the service I personally performed and the decisions made by me.” Signature Escobar Barraza MD.

## 2022-11-16 NOTE — PHYSICAL EXAM
[] : no respiratory distress [Respiration, Rhythm And Depth] : normal respiratory rhythm and effort [Exaggerated Use Of Accessory Muscles For Inspiration] : no accessory muscle use [Auscultation Breath Sounds / Voice Sounds] : lungs were clear to auscultation bilaterally [Apical Impulse] : the apical impulse was normal [Heart Rate And Rhythm] : heart rate was normal and rhythm regular [Heart Sounds] : normal S1 and S2 [Clean] : clean [Dry] : dry [Healing Well] : healing well [___ +] : bilateral ankle [unfilled]U+ pitting edema [Bleeding] : no active bleeding [Foul Odor] : no foul smell [Purulent Drainage] : no purulent drainage [Serosanguinous Drainage] : no serosanguinous drainage [Erythema] : not erythematous [Warm] : not warm [Tender] : not tender

## 2022-11-16 NOTE — REASON FOR VISIT
[de-identified] : Mitral valve reconstruction with P2 resection, primary valvuloplasty supported with a  28 Bobby annuloplasty band with the atrial appendage closure utilizing 45 mm AtriCure clip, closure of a patent foramen ovale, and a formal Maze procedure [de-identified] : 10/24/22

## 2022-11-16 NOTE — CONSULT LETTER
[Dear  ___] : Dear  [unfilled], [Courtesy Letter:] : I had the pleasure of seeing your patient, [unfilled], in my office today. [Please see my note below.] : Please see my note below. [Consult Closing:] : Thank you very much for allowing me to participate in the care of this patient.  If you have any questions, please do not hesitate to contact me. [Sincerely,] : Sincerely, [FreeTextEntry2] : Dr.David Nichole [FreeTextEntry3] : Escobar Barraza MD\par  & \par \par Cardiovascular & Thoracic Surgery\par [facility] \par [address]\par [facility] [id number]\par \par

## 2022-11-16 NOTE — ASSESSMENT
[FreeTextEntry1] : Mr. CHACKO is a 77 year old male with  past medical history ofHTN, HLD, DM2, former smoker, hypothyroidism,  obesity, LILLY on C-pap,  bladder cancer (2020, s/p resection of bladder tumor/ currently self catheterizes 6x/day ), PAF s/p DCCV /uncomplicated radiofrequency ablation of atrial fibrillation (W[facility]/PVI, CTI) on 7/13/22, \par left atrial appendage & severe MR Jazmin Presents with c/o recent" abnormal echo with fatigue due to mitral valve regurgitation.  \par \par He is S/P Mitral valve reconstruction with P2 resection, primary valvuloplasty supported with a  28 Bobby annuloplasty band with the atrial appendage closure utilizing 45 mm AtriCure clip, closure of a patent foramen ovale, and a formal Maze procedure with left and  right ablation performed with radiofrequency bipolar energy source on 10/24/22. Post op course significant with Code stroke called for LUE convulsion with residual LUE weakness immediately after, Patient had LUE convulsions that spread to entire body CT done, neuro following: \par Stroke on differential as well given vague lucency at the genu of the right internal capsule and in the right thalamus of indeterminate age. Likely lacunar infarcts. Keppra added ,EEG negative. Pt with lethargy post keppra, d/c since eeg neg,  change to vimpat ,Pt with junctional / afib  Amio, Eliquis.   serratia marescens, sensitivitive to \par bactrim- continue 3 days as per ID. echo done this am- + circ pericardial effusion; no rv compromise or tamponade noted; d/w Dr. Barraza; ct chest done--> likely substernal collection/ clot; no drainage at this time. Discharged to home. He is here for post op Visit. \par \par Presents today with his wife and reports feeling well.  Running low on Vimpat, has not yet seen neurologist.  Walking more each day, feeling stronger.  Weight down to 276 from 282 at DC.  Reports he has swelling to BL feet despite weights, not on diuretics.  Denies CP, SOB, palpitations, dizziness, cough, fever or chills.  Eating with +BM. \par Follows Dr. Jeovanny Nichole for cardiology. \par \par Today on exam patient's lungs clear bilaterally, normal sinus rhythm, sternum stable, incision clean, dry and intact. 1-2+ pedal edema noted. Instructed patient on importance of optimal glycemic control, daily showering, daily weights, incentive spirometer use, and increase ambulation as tolerated. Instructed to call office with any signs or symptoms of infection or weight gain of 2 or more pounds in 1 day or 3 or more pounds in 1 week. \par \par Plan:\par \par - Start Torsemide 10mg and Aldactone 25mg QD\par - Will log daily weights and record swelling\par - Will send refil of Vimpat today but pt is to follow up with neurologist regarding DC and MGMT\par - Will follow up via telephone next week regarding swelling\par - Follow up with cardiologist Dr. Jeovanny Nichole \par - SBE antibiotic prophylaxis discussed at length \par - Continue to walk and increase as tolerated\par - Low salt diet and fluids discussed in detail\par - Call with any questions or concerns\par \par ** Addendum:  Called and made pt appointment with Dr. Sharpe of Neurology on 11/29 at 3:15PM.  Pt will follow up regarding Vimpat at that time. \par \par

## 2022-11-17 ENCOUNTER — TRANSCRIPTION ENCOUNTER (OUTPATIENT)
Age: 77
End: 2022-11-17

## 2022-11-20 ENCOUNTER — TRANSCRIPTION ENCOUNTER (OUTPATIENT)
Age: 77
End: 2022-11-20

## 2022-11-20 ENCOUNTER — INPATIENT (INPATIENT)
Facility: HOSPITAL | Age: 77
LOS: 1 days | Discharge: HOME HEALTH | DRG: 315 | End: 2022-11-22
Attending: THORACIC SURGERY (CARDIOTHORACIC VASCULAR SURGERY) | Admitting: THORACIC SURGERY (CARDIOTHORACIC VASCULAR SURGERY)
Payer: COMMERCIAL

## 2022-11-20 VITALS
DIASTOLIC BLOOD PRESSURE: 65 MMHG | HEART RATE: 70 BPM | SYSTOLIC BLOOD PRESSURE: 128 MMHG | OXYGEN SATURATION: 98 % | TEMPERATURE: 98 F

## 2022-11-20 DIAGNOSIS — Z98.890 OTHER SPECIFIED POSTPROCEDURAL STATES: Chronic | ICD-10-CM

## 2022-11-20 DIAGNOSIS — D49.4 NEOPLASM OF UNSPECIFIED BEHAVIOR OF BLADDER: Chronic | ICD-10-CM

## 2022-11-20 DIAGNOSIS — I51.9 HEART DISEASE, UNSPECIFIED: ICD-10-CM

## 2022-11-20 LAB
ALBUMIN SERPL ELPH-MCNC: 3.9 G/DL — SIGNIFICANT CHANGE UP (ref 3.3–5)
ALP SERPL-CCNC: 100 U/L — SIGNIFICANT CHANGE UP (ref 40–120)
ALT FLD-CCNC: 23 U/L — SIGNIFICANT CHANGE UP (ref 10–45)
ANION GAP SERPL CALC-SCNC: 14 MMOL/L — SIGNIFICANT CHANGE UP (ref 5–17)
AST SERPL-CCNC: 18 U/L — SIGNIFICANT CHANGE UP (ref 10–40)
BASOPHILS # BLD AUTO: 0.05 K/UL — SIGNIFICANT CHANGE UP (ref 0–0.2)
BASOPHILS NFR BLD AUTO: 0.7 % — SIGNIFICANT CHANGE UP (ref 0–2)
BILIRUB SERPL-MCNC: 0.7 MG/DL — SIGNIFICANT CHANGE UP (ref 0.2–1.2)
BLD GP AB SCN SERPL QL: NEGATIVE — SIGNIFICANT CHANGE UP
BUN SERPL-MCNC: 16 MG/DL — SIGNIFICANT CHANGE UP (ref 7–23)
CALCIUM SERPL-MCNC: 9.3 MG/DL — SIGNIFICANT CHANGE UP (ref 8.4–10.5)
CHLORIDE SERPL-SCNC: 103 MMOL/L — SIGNIFICANT CHANGE UP (ref 96–108)
CO2 SERPL-SCNC: 26 MMOL/L — SIGNIFICANT CHANGE UP (ref 22–31)
CREAT SERPL-MCNC: 1.19 MG/DL — SIGNIFICANT CHANGE UP (ref 0.5–1.3)
EGFR: 63 ML/MIN/1.73M2 — SIGNIFICANT CHANGE UP
EOSINOPHIL # BLD AUTO: 0.23 K/UL — SIGNIFICANT CHANGE UP (ref 0–0.5)
EOSINOPHIL NFR BLD AUTO: 3.4 % — SIGNIFICANT CHANGE UP (ref 0–6)
GLUCOSE SERPL-MCNC: 143 MG/DL — HIGH (ref 70–99)
HCT VFR BLD CALC: 34.2 % — LOW (ref 39–50)
HGB BLD-MCNC: 10.6 G/DL — LOW (ref 13–17)
IMM GRANULOCYTES NFR BLD AUTO: 0.4 % — SIGNIFICANT CHANGE UP (ref 0–0.9)
LYMPHOCYTES # BLD AUTO: 0.73 K/UL — LOW (ref 1–3.3)
LYMPHOCYTES # BLD AUTO: 10.9 % — LOW (ref 13–44)
MCHC RBC-ENTMCNC: 26.8 PG — LOW (ref 27–34)
MCHC RBC-ENTMCNC: 31 GM/DL — LOW (ref 32–36)
MCV RBC AUTO: 86.6 FL — SIGNIFICANT CHANGE UP (ref 80–100)
MONOCYTES # BLD AUTO: 0.78 K/UL — SIGNIFICANT CHANGE UP (ref 0–0.9)
MONOCYTES NFR BLD AUTO: 11.6 % — SIGNIFICANT CHANGE UP (ref 2–14)
NEUTROPHILS # BLD AUTO: 4.89 K/UL — SIGNIFICANT CHANGE UP (ref 1.8–7.4)
NEUTROPHILS NFR BLD AUTO: 73 % — SIGNIFICANT CHANGE UP (ref 43–77)
NRBC # BLD: 0 /100 WBCS — SIGNIFICANT CHANGE UP (ref 0–0)
PLATELET # BLD AUTO: 220 K/UL — SIGNIFICANT CHANGE UP (ref 150–400)
POTASSIUM SERPL-MCNC: 4 MMOL/L — SIGNIFICANT CHANGE UP (ref 3.5–5.3)
POTASSIUM SERPL-SCNC: 4 MMOL/L — SIGNIFICANT CHANGE UP (ref 3.5–5.3)
PROT SERPL-MCNC: 7 G/DL — SIGNIFICANT CHANGE UP (ref 6–8.3)
RBC # BLD: 3.95 M/UL — LOW (ref 4.2–5.8)
RBC # FLD: 15.8 % — HIGH (ref 10.3–14.5)
RH IG SCN BLD-IMP: POSITIVE — SIGNIFICANT CHANGE UP
SODIUM SERPL-SCNC: 143 MMOL/L — SIGNIFICANT CHANGE UP (ref 135–145)
WBC # BLD: 6.71 K/UL — SIGNIFICANT CHANGE UP (ref 3.8–10.5)
WBC # FLD AUTO: 6.71 K/UL — SIGNIFICANT CHANGE UP (ref 3.8–10.5)

## 2022-11-20 PROCEDURE — 99024 POSTOP FOLLOW-UP VISIT: CPT

## 2022-11-20 PROCEDURE — 71045 X-RAY EXAM CHEST 1 VIEW: CPT | Mod: 26

## 2022-11-20 PROCEDURE — 99291 CRITICAL CARE FIRST HOUR: CPT

## 2022-11-20 RX ORDER — CHLORHEXIDINE GLUCONATE 213 G/1000ML
10 SOLUTION TOPICAL ONCE
Refills: 0 | Status: DISCONTINUED | OUTPATIENT
Start: 2022-11-20 | End: 2022-11-21

## 2022-11-20 RX ORDER — FINASTERIDE 5 MG/1
5 TABLET, FILM COATED ORAL DAILY
Refills: 0 | Status: DISCONTINUED | OUTPATIENT
Start: 2022-11-20 | End: 2022-11-22

## 2022-11-20 RX ORDER — PREGABALIN 225 MG/1
2000 CAPSULE ORAL ONCE
Refills: 0 | Status: COMPLETED | OUTPATIENT
Start: 2022-11-20 | End: 2022-11-21

## 2022-11-20 RX ORDER — DOXAZOSIN MESYLATE 4 MG
4 TABLET ORAL AT BEDTIME
Refills: 0 | Status: DISCONTINUED | OUTPATIENT
Start: 2022-11-20 | End: 2022-11-22

## 2022-11-20 RX ORDER — METOPROLOL TARTRATE 50 MG
25 TABLET ORAL DAILY
Refills: 0 | Status: DISCONTINUED | OUTPATIENT
Start: 2022-11-20 | End: 2022-11-22

## 2022-11-20 RX ORDER — LEVOTHYROXINE SODIUM 125 MCG
25 TABLET ORAL DAILY
Refills: 0 | Status: DISCONTINUED | OUTPATIENT
Start: 2022-11-20 | End: 2022-11-22

## 2022-11-20 RX ORDER — PANTOPRAZOLE SODIUM 20 MG/1
40 TABLET, DELAYED RELEASE ORAL
Refills: 0 | Status: DISCONTINUED | OUTPATIENT
Start: 2022-11-20 | End: 2022-11-22

## 2022-11-20 RX ORDER — LACOSAMIDE 50 MG/1
100 TABLET ORAL
Refills: 0 | Status: DISCONTINUED | OUTPATIENT
Start: 2022-11-20 | End: 2022-11-22

## 2022-11-20 RX ORDER — OMEGA-3 ACID ETHYL ESTERS 1 G
1 CAPSULE ORAL
Refills: 0 | Status: DISCONTINUED | OUTPATIENT
Start: 2022-11-20 | End: 2022-11-22

## 2022-11-20 RX ORDER — NIACIN 50 MG
500 TABLET ORAL AT BEDTIME
Refills: 0 | Status: DISCONTINUED | OUTPATIENT
Start: 2022-11-20 | End: 2022-11-22

## 2022-11-20 RX ADMIN — FINASTERIDE 5 MILLIGRAM(S): 5 TABLET, FILM COATED ORAL at 23:50

## 2022-11-20 RX ADMIN — Medication 4 MILLIGRAM(S): at 23:50

## 2022-11-20 RX ADMIN — LACOSAMIDE 100 MILLIGRAM(S): 50 TABLET ORAL at 23:57

## 2022-11-20 NOTE — H&P ADULT - NSHPPHYSICALEXAM_GEN_ALL_CORE
alert and oriented x 3  no jvd , supple   Clear mare , no wheeze   reg s1s2   soft , + bs , non tender  + pulses  , mare lower ext edema

## 2022-11-20 NOTE — H&P ADULT - PROBLEM SELECTOR PLAN 1
limited echo noted with small effusion , no tamponde.   full echo pending   cont toprol   hold A/C at present   hold diuretics at present.   ct scans noted

## 2022-11-20 NOTE — H&P ADULT - NSICDXPASTMEDICALHX_GEN_ALL_CORE_FT
PAST MEDICAL HISTORY:  At risk for sleep apnea LILLY on C-pap    Bladder cancer     Bladder cancer rsx/ treatment 2020, self cath 3x/day    COVID-19 virus infection positive with mild infection 10/10/22 , had MAB on 10/10/22 at [facility], surgeon aware    Diabetes mellitus 2    Dysuria on self cath 6x/day, UC sent    Hyperlipidemia     Hypertension     Hypothyroidism     PAF (paroxysmal atrial fibrillation) s/p DCCV 7 rfa DR Hoover > nsr    Paroxysmal atrial fibrillation s/p ablation  on ELIQUIS till 10/13    Severe mitral regurgitation seen in recent ESDRAS

## 2022-11-20 NOTE — H&P ADULT - ASSESSMENT
77  year old  RHD male PMH of HTN, HLD, DM2, former smoker,  hypothyroidism, obesity, LILLY on C-pap,  bladder cancer (2020, s/p resection of bladder tumor/ currently self catheterizes 6x/day ), PAF s/p DCCV /uncomplicated radiofrequency ablation of atrial fibrillation (W[facility]/PVI, CTI) on 7/13/22,  left atrial appendage & severe MR . Presents with c/o recent" abnormal echo with fatigue due to mitral valve regurgitation. s/p PPM, s/p MVrepair , NAYELY, maze, post op cardiac effusion .   now s/p fall, pre syncope.

## 2022-11-20 NOTE — H&P ADULT - NSHPLABSRESULTS_GEN_ALL_CORE
chlorhexidine 0.12% Liquid 10 milliLiter(s) Swish and Spit once  cyanocobalamin 2000 MICROGram(s) Oral once  doxazosin 4 milliGRAM(s) Oral at bedtime  finasteride 5 milliGRAM(s) Oral daily  lacosamide 100 milliGRAM(s) Oral two times a day  levothyroxine 25 MICROGram(s) Oral daily  metoprolol succinate ER 25 milliGRAM(s) Oral daily  niacin  milliGRAM(s) Oral at bedtime  omega-3-Acid Ethyl Esters 1 Gram(s) Oral two times a day  pantoprazole    Tablet 40 milliGRAM(s) Oral before breakfast                            10.6   6.71  )-----------( 220      ( 20 Nov 2022 23:03 )             34.2       Hemoglobin: 10.6 g/dL (11-20 @ 23:03)      11-20    143  |  103  |  16  ----------------------------<  143<H>  4.0   |  26  |  1.19    Ca    9.3      20 Nov 2022 23:03    TPro  7.0  /  Alb  3.9  /  TBili  0.7  /  DBili  x   /  AST  18  /  ALT  23  /  AlkPhos  100  11-20    Creatinine Trend: 1.19<--, 1.21<--, 1.30<--, 1.73<--, 1.88<--, 1.55<--    COAGS: PT/INR - ( 20 Nov 2022 23:03 )   PT: 15.0 sec;   INR: 1.30 ratio         PTT - ( 20 Nov 2022 23:03 )  PTT:27.0 sec          T(C): 36.8 (11-21-22 @ 00:00), Max: 36.8 (11-21-22 @ 00:00)  HR: 69 (11-21-22 @ 02:00) (69 - 70)  BP: 130/62 (11-21-22 @ 02:00) (125/60 - 130/62)  RR: 17 (11-21-22 @ 02:00) (17 - 23)  SpO2: 97% (11-21-22 @ 02:00) (97% - 99%)  Wt(kg): --    I&O's Summary    20 Nov 2022 07:01  -  21 Nov 2022 02:13  --------------------------------------------------------  IN: 0 mL / OUT: 270 mL / NET: -270 mL      ECHO: < from: TTE with Doppler (w/Cont) (11.04.22 @ 08:56) >    Conclusions:  Endocardial visualization enhanced with intravenous  injection of Ultrasonic Enhancing Agent (Lumason).  Normal left ventricular systolic function. No segmental  wall motion abnormalities.  s/p mitral valve repair. No mitral regurgitation seen.  Circumferential pericardial effusion, which is largest  adjacent to the right ventricle (as imaged  in the  subcostal view, where it measures approximately 3.0 cm).  Minimal diastolic compression of the right ventricle is  noted (loop #46).  Results discussed with CT surgeon at  10:37 AM, 11/04/2022.  ------------------------------------------------------------------------  Confirmed on  11/4/2022 - 11:07:25 by Armand Ramirez MD, FASE  ------------------------------------------------------------------------    < end of copied text >

## 2022-11-20 NOTE — PATIENT PROFILE ADULT - FALL HARM RISK - RISK INTERVENTIONS

## 2022-11-20 NOTE — PROGRESS NOTE ADULT - SUBJECTIVE AND OBJECTIVE BOX
HPI:      Patient seen and examined at the bedside.    Remained critically ill on continuous ICU monitoring.    OBJECTIVE:  Vital Signs Last 24 Hrs  T(C): 36.7 (20 Nov 2022 22:30), Max: 36.7 (20 Nov 2022 22:30)  T(F): 98.1 (20 Nov 2022 22:30), Max: 98.1 (20 Nov 2022 22:30)  HR: 69 (20 Nov 2022 22:50) (69 - 70)  BP: 128/65 (20 Nov 2022 22:30) (128/65 - 128/65)  BP(mean): 91 (20 Nov 2022 22:30) (91 - 91)  RR: 20 (20 Nov 2022 22:50) (20 - 20)  SpO2: 99% (20 Nov 2022 22:50) (98% - 99%)    Parameters below as of 20 Nov 2022 22:30  Patient On (Oxygen Delivery Method): room air          Physical Exam:   General: intubated multiple lines gtt & tubes   Neurology: sedated   Eyes: bilateral pupils equal and reactive   ENT/Neck: +ETT midline, Neck supple, trachea midline, No JVD   Respiratory: Clear bilaterally   CV: S1S2, no murmurs        [x] Sternal dressing, [x] Mediastinal CT, [x] Pleural CT, [x] JEM drain        [x] Sinus rhythm, [x] Afib, [x] Temporary pacing, [x] PPM  Abdominal: Soft, NT, ND +BS,   Extremities: 1-2+ pedal edema noted, + peripheral pulses   Skin: No Rashes, Hematoma, Ecchymosis                           Assessment:    Cardiogenic Shock  Hypovolemic Shock   Post op respiratory insufficiency     NEURO: Without focal deficit, Intermittent IV Dilaudid ordered for pain  RESP: Patient initially required full ventilator support with subsequent weaning to pressure support and extubation, continue close monitoring of respiratory rate, pulse oximetry, breathing pattern and intermittent blood gas analysis with adequate pain control to prevent atelectasis.    CVS: Patient has required volume resuscitation, and IV Norepinephrine and IV Vasopressin infusions for hypovolemic shock with associated lactic acidosis. Ongoing resuscitative efforts and follow up of lactate.  GI: Stress ulcer prophylaxis with protonix.  RENAL: Optimize renal perfusion with adequate volume resuscitation and maintenance of blood pressure with close monitoring of fluid balance, urine output and electrolytes.  ENDOCRINE: Metabolic stability and prevention of infection and stress hyperglycemia/type 2 diabetes with hyperglycemia required an insulin infusion and hourly glucose checks  HEME: Anemia due to acute blood loss, no current plant for transfusion.     HPI: 76 y/o male with PMH of HTN, HLD, DM2, former smoker,  hypothyroidism, obesity, LILLY on C-pap,  bladder cancer (2020, s/p resection of bladder tumor/ currently self catheterizes 6x/day ), PAF s/p DCCV /uncomplicated radiofrequency ablation of atrial fibrillation (WACA/PVI, CTI) on 7/13/22,  left atrial appendage & severe MR, unerwent Mitral valve replacement , MAZE, and left atrial appendage closure on 10/24/2022. 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 In addition, he has seizure like activity and found to have a lacunar infarct, age indeterminate. He was prescribed Vimpat. He was noted to have a pericardial effusion post op, but not felt to require drainage. Patient was discharged to home November 7 and has had some difficulty with balance and ambulation. He fell twice today and was taken to where he was found to have several broken ribs and was transferred to [facility] for further care. Blood pressure was low when taken by EMS, but subsequently improved. Patient denies any dizziness, SOB, LOC and believes he simply lost his balance.        Patient seen and examined at the bedside.    Remained critically ill on continuous ICU monitoring.    OBJECTIVE:  Vital Signs Last 24 Hrs  T(C): 36.7 (20 Nov 2022 22:30), Max: 36.7 (20 Nov 2022 22:30)  T(F): 98.1 (20 Nov 2022 22:30), Max: 98.1 (20 Nov 2022 22:30)  HR: 69 (20 Nov 2022 22:50) (69 - 70)  BP: 128/65 (20 Nov 2022 22:30) (128/65 - 128/65)  BP(mean): 91 (20 Nov 2022 22:30) (91 - 91)  RR: 20 (20 Nov 2022 22:50) (20 - 20)  SpO2: 99% (20 Nov 2022 22:50) (98% - 99%)    Parameters below as of 20 Nov 2022 22:30  Patient On (Oxygen Delivery Method): room air          Physical Exam:   General: Obese male, no acute distress  Neurology: Without focal deficit  Eyes: bilateral pupils equal and reactive   ENT/Neck: Neck supple, trachea midline, No JVD   Respiratory: Clear bilaterally   CV: S1S2, no murmurs         [x] PPM, paced rhythm [x] well healed sternum without instability  Abdominal: Soft, NT, ND +BS,   Extremities: No pedal edema noted, + peripheral pulses   Skin: No Rashes, Hematoma, Ecchymosis                           Assessment:      NEURO: Without focal deficit, history of lacunar infarct and post operative seizure like activity. Continue Vimpat.  RESP: No acute issues, history of sleep apnea. Continue cpap overnight. Close monitoring of respiratory rate, pulse oximetry, and breathing pattern.  CVS: Monitor blood pressure and heart rhythm. History of atrial fibrillation. Lopressor continued with hold parameters. Echocardiogram requested to rule out tamponade   GI: Stress ulcer prophylaxis with protonix.  RENAL: Optimize renal perfusion with maintenance of blood pressure with close monitoring of fluid balance, urine output and electrolytes.  ENDOCRINE: Metabolic stability and prevention of infection and type 2 diabetes with hyperglycemia required an insulin sliding scale and hourly glucose checks  HEME: No acute issues.    Patient requires continuous monitoring with EKG, pulse oximetry, close monitoring of breathing pattern and hourly blood pressure monitoring and Patient has acute neuro checks in order to prevent or respond to cardiopulmonary instability or decompensation.    Care plan discussed with ICU care team.

## 2022-11-20 NOTE — H&P ADULT - HISTORY OF PRESENT ILLNESS
77  year old  RHD male PMH of HTN, HLD, DM2, former smoker,  hypothyroidism, obesity, LILLY on C-pap,  bladder cancer (2020, s/p resection of bladder tumor/ currently self catheterizes 6x/day ), PAF s/p DCCV /uncomplicated radiofrequency ablation of atrial fibrillation (WACA/PVI, CTI) on 7/13/22,  left atrial appendage & severe MR . Presents with c/o recent" abnormal echo with fatigue due to mitral valve regurgitation. Presents for  scheduled Mitral valve replacement , MAZE, left atrial appendage closure on 10/20/2022.  11/7 pt with echo , noted to have 3 cm circum effusion .     11/20 pt with pre syncope, with fall, right rib fx.  pt seen at outside hospital , ct chest -abd-pelvis done.   pt transferred to [place] for further assessment.     ECHO here 11/21 ( limited ) with small effusion , no tamponade

## 2022-11-21 DIAGNOSIS — I31.39 OTHER PERICARDIAL EFFUSION (NONINFLAMMATORY): ICD-10-CM

## 2022-11-21 DIAGNOSIS — S22.41XA MULTIPLE FRACTURES OF RIBS, RIGHT SIDE, INITIAL ENCOUNTER FOR CLOSED FRACTURE: ICD-10-CM
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
## 2022-11-21 LAB
APTT BLD: 27 SEC — LOW (ref 27.5–35.5)
INR BLD: 1.3 RATIO — HIGH (ref 0.88–1.16)
PROTHROM AB SERPL-ACNC: 15 SEC — HIGH (ref 10.5–13.4)
T3 SERPL-MCNC: 45 NG/DL — LOW (ref 80–200)
T4 AB SER-ACNC: 6.4 UG/DL — SIGNIFICANT CHANGE UP (ref 4.6–12)
TSH SERPL-MCNC: 3.52 UIU/ML — SIGNIFICANT CHANGE UP (ref 0.27–4.2)

## 2022-11-21 PROCEDURE — 99291 CRITICAL CARE FIRST HOUR: CPT | Mod: 24

## 2022-11-21 PROCEDURE — 93306 TTE W/DOPPLER COMPLETE: CPT | Mod: 26

## 2022-11-21 PROCEDURE — 93308 TTE F-UP OR LMTD: CPT | Mod: 26

## 2022-11-21 PROCEDURE — 94010 BREATHING CAPACITY TEST: CPT | Mod: 26

## 2022-11-21 RX ORDER — ASPIRIN/CALCIUM CARB/MAGNESIUM 324 MG
81 TABLET ORAL DAILY
Refills: 0 | Status: DISCONTINUED | OUTPATIENT
Start: 2022-11-21 | End: 2022-11-22

## 2022-11-21 RX ORDER — ACETAMINOPHEN 500 MG
650 TABLET ORAL EVERY 6 HOURS
Refills: 0 | Status: DISCONTINUED | OUTPATIENT
Start: 2022-11-21 | End: 2022-11-22

## 2022-11-21 RX ORDER — LIDOCAINE 4 G/100G
1 CREAM TOPICAL DAILY
Refills: 0 | Status: DISCONTINUED | OUTPATIENT
Start: 2022-11-21 | End: 2022-11-22

## 2022-11-21 RX ORDER — SPIRONOLACTONE 25 MG/1
50 TABLET, FILM COATED ORAL DAILY
Refills: 0 | Status: DISCONTINUED | OUTPATIENT
Start: 2022-11-21 | End: 2022-11-22

## 2022-11-21 RX ORDER — INSULIN LISPRO 100/ML
VIAL (ML) SUBCUTANEOUS
Refills: 0 | Status: DISCONTINUED | OUTPATIENT
Start: 2022-11-21 | End: 2022-11-22

## 2022-11-21 RX ADMIN — FINASTERIDE 5 MILLIGRAM(S): 5 TABLET, FILM COATED ORAL at 11:20

## 2022-11-21 RX ADMIN — PREGABALIN 2000 MICROGRAM(S): 225 CAPSULE ORAL at 05:27

## 2022-11-21 RX ADMIN — Medication 81 MILLIGRAM(S): at 11:20

## 2022-11-21 RX ADMIN — Medication 25 MICROGRAM(S): at 05:26

## 2022-11-21 RX ADMIN — Medication 4 MILLIGRAM(S): at 23:01

## 2022-11-21 RX ADMIN — Medication 25 MILLIGRAM(S): at 05:25

## 2022-11-21 RX ADMIN — Medication 1 GRAM(S): at 05:26

## 2022-11-21 RX ADMIN — LIDOCAINE 1 PATCH: 4 CREAM TOPICAL at 17:35

## 2022-11-21 RX ADMIN — LACOSAMIDE 100 MILLIGRAM(S): 50 TABLET ORAL at 23:01

## 2022-11-21 RX ADMIN — Medication 2: at 17:35

## 2022-11-21 RX ADMIN — Medication 500 MILLIGRAM(S): at 23:09

## 2022-11-21 RX ADMIN — Medication 10 MILLIGRAM(S): at 11:21

## 2022-11-21 RX ADMIN — Medication 1 GRAM(S): at 23:01

## 2022-11-21 RX ADMIN — SPIRONOLACTONE 50 MILLIGRAM(S): 25 TABLET, FILM COATED ORAL at 11:21

## 2022-11-21 RX ADMIN — PANTOPRAZOLE SODIUM 40 MILLIGRAM(S): 20 TABLET, DELAYED RELEASE ORAL at 07:13

## 2022-11-21 RX ADMIN — LACOSAMIDE 100 MILLIGRAM(S): 50 TABLET ORAL at 11:20

## 2022-11-21 RX ADMIN — LIDOCAINE 1 PATCH: 4 CREAM TOPICAL at 19:30

## 2022-11-21 NOTE — PROGRESS NOTE ADULT - SUBJECTIVE AND OBJECTIVE BOX
VITAL SIGNS-Telemetry:  AP 80  Vital Signs Last 24 Hrs  T(C): 36.1 (22 @ 12:00), Max: 36.9 (22 @ 04:00)  T(F): 97 (22 @ 12:00), Max: 98.4 (22 @ 04:00)  HR: 69 (22 @ 14:00) (69 - 70)  BP: 113/58 (22 @ 14:00) (102/59 - 130/62)  RR: 13 (22 @ 14:00) (13 - 24)  SpO2: 98% (22 @ 14:00) (94% - 100%)          @ 07:01  -   @ 07:00  --------------------------------------------------------  IN: 100 mL / OUT: 655 mL / NET: -555 mL     @ 07:01  -   @ 15:10  --------------------------------------------------------  IN: 0 mL / OUT: 400 mL / NET: -400 mL    Daily Height in cm: 182.88 (2022 22:34)    Daily Weight in k.5 (2022 00:00)        PHYSICAL EXAM:  Neurology: alert and oriented x 3, nonfocal, no gross deficits  CV : S1S2  Sternal Wound :  CDI , Stable  Lungs: cta  Abdomen: soft, nontender, nondistended, positive bowel sounds, last bowel movement         Extremities:    +edema, no calf tenderness     aspirin enteric coated 81 milliGRAM(s) Oral daily  doxazosin 4 milliGRAM(s) Oral at bedtime  finasteride 5 milliGRAM(s) Oral daily  insulin lispro (ADMELOG) corrective regimen sliding scale   SubCutaneous Before meals and at bedtime  lacosamide 100 milliGRAM(s) Oral two times a day  levothyroxine 25 MICROGram(s) Oral daily  metoprolol succinate ER 25 milliGRAM(s) Oral daily  niacin  milliGRAM(s) Oral at bedtime  omega-3-Acid Ethyl Esters 1 Gram(s) Oral two times a day  pantoprazole    Tablet 40 milliGRAM(s) Oral before breakfast  spironolactone 50 milliGRAM(s) Oral daily  torsemide 10 milliGRAM(s) Oral daily    Physical Therapy Rec:   Home  [ x ]   Home w/ PT  [  ]  Rehab  [  ]  Discussed with Cardiothoracic Team at AM rounds.

## 2022-11-21 NOTE — CONSULT NOTE ADULT - SUBJECTIVE AND OBJECTIVE BOX
Neurology Consult    Reason for Consult: Patient is a 77y old  Male who presents with a chief complaint of S/P pre syncope, s/p fall, right Rib fx. (21 Nov 2022 06:26)      HPI:  77  year old  RHD male PMH of HTN, HLD, DM2, former smoker,  hypothyroidism, obesity, LILLY on C-pap,  bladder cancer (2020, s/p resection of bladder tumor/ currently self catheterizes 6x/day ), PAF s/p DCCV /uncomplicated radiofrequency ablation of atrial fibrillation (WACA/PVI, CTI) on 7/13/22,  left atrial appendage & severe MR . Presents with c/o recent" abnormal echo with fatigue due to mitral valve regurgitation. Presents for  scheduled Mitral valve replacement , MAZE, left atrial appendage closure on 10/20/2022.  11/7 pt with echo , noted to have 3 cm circum effusion .     11/20 pt with pre syncope, with fall, right rib fx.  pt seen at outside hospital , ct chest -abd-pelvis done.   pt transferred to [place] for further assessment.     ECHO here 11/21 ( limited ) with small effusion , no tamponade      (20 Nov 2022 23:51)       PAST MEDICAL & SURGICAL HISTORY:  Paroxysmal atrial fibrillation  s/p ablation  on ELIQUIS till 10/13      Hypertension      Hyperlipidemia      Bladder cancer      Diabetes mellitus  2      Hypothyroidism      At risk for sleep apnea  LILLY on C-pap      COVID-19 virus infection  positive with mild infection 10/10/22 , had MAB on 10/10/22 at [facility], surgeon aware      Severe mitral regurgitation  seen in recent ESDRAS      PAF (paroxysmal atrial fibrillation)  s/p DCCV 7 rfa DR Hoover &gt; nsr      Bladder cancer  rsx/ treatment 2020, self cath 3x/day      Dysuria  on self cath 6x/day, UC sent      H/O shoulder surgery  1/1/2017      H/O cardiac catheterization  5/2/2022      Bladder tumor  s/p resection 2020          Allergies: Allergies    penicillins (Diarrhea)    Intolerances        Social History: Denies toxic habits including tobacco, ETOH or illicit drugs.    Family History: FAMILY HISTORY:  FHx: ovarian cancer (Mother)    FH: breast cancer (Sibling)    FH: heart disease (Father)    . No family history of strokes    Medications: MEDICATIONS  (STANDING):  aspirin enteric coated 81 milliGRAM(s) Oral daily  doxazosin 4 milliGRAM(s) Oral at bedtime  finasteride 5 milliGRAM(s) Oral daily  insulin lispro (ADMELOG) corrective regimen sliding scale   SubCutaneous Before meals and at bedtime  lacosamide 100 milliGRAM(s) Oral two times a day  levothyroxine 25 MICROGram(s) Oral daily  metoprolol succinate ER 25 milliGRAM(s) Oral daily  niacin  milliGRAM(s) Oral at bedtime  omega-3-Acid Ethyl Esters 1 Gram(s) Oral two times a day  pantoprazole    Tablet 40 milliGRAM(s) Oral before breakfast  spironolactone 50 milliGRAM(s) Oral daily  torsemide 10 milliGRAM(s) Oral daily    MEDICATIONS  (PRN):      Review of Systems:  CONSTITUTIONAL:  No weight loss, fever, chills, weakness or fatigue.  HEENT:  Eyes:  No visual loss, blurred vision, double vision or yellow sclera. Ears, Nose, Throat:  No hearing loss, sneezing, congestion, runny nose or sore throat.  SKIN:  No rash or itching.  CARDIOVASCULAR:  No chest pain, chest pressure or chest discomfort. No palpitations or edema.  RESPIRATORY:  No shortness of breath, cough or sputum.  GASTROINTESTINAL:  No anorexia, nausea, vomiting or diarrhea. No abdominal pain or blood.  GENITOURINARY:  No burning on urination or incontinence   NEUROLOGICAL:  No headache, dizziness, syncope, paralysis, ataxia, numbness or tingling in the extremities. No change in bowel or bladder control. no limb weakness. no vision changes.   MUSCULOSKELETAL:  soarness from fracure   HEMATOLOGIC:  No anemia, bleeding or bruising.  LYMPHATICS:  No enlarged nodes. No history of splenectomy.  PSYCHIATRIC:  No history of depression or anxiety.  ENDOCRINOLOGIC:  No reports of sweating, cold or heat intolerance. No polyuria or polydipsia.      Vitals:  Vital Signs Last 24 Hrs  T(C): 36.1 (21 Nov 2022 08:00), Max: 36.9 (21 Nov 2022 04:00)  T(F): 97 (21 Nov 2022 08:00), Max: 98.4 (21 Nov 2022 04:00)  HR: 69 (21 Nov 2022 11:00) (69 - 70)  BP: 121/60 (21 Nov 2022 11:00) (102/59 - 130/62)  BP(mean): 86 (21 Nov 2022 11:00) (74 - 91)  RR: 17 (21 Nov 2022 11:00) (16 - 23)  SpO2: 97% (21 Nov 2022 11:00) (94% - 100%)    Parameters below as of 21 Nov 2022 08:00  Patient On (Oxygen Delivery Method): room air        General Exam:   General Appearance: Appropriately dressed and in no acute distress       Head: Normocephalic, atraumatic and no dysmorphic features  Ear, Nose, and Throat: Moist mucous membranes  CVS: S1S2+  Resp: No SOB, no wheeze or rhonchi  GI: soft NT/ND  Extremities: No edema or cyanosis  Skin: No bruises or rashes     Neurological Exam:  Mental Status: Awake, alert and oriented x 3.  Able to follow simple and complex verbal commands. Able to name and repeat. fluent speech. No obvious aphasia or dysarthria noted.   Cranial Nerves: PERRL, EOMI, VFFC, sensation V1-V3 intact,  no obvious facial asymmetry, equal elevation of palate, scm/trap 5/5, tongue is midline on protrusion. no obvious papilledema on fundoscopic exam. hearing is grossly intact.   Motor: Normal bulk, tone and strength throughout. Fine finger movements were intact and symmetric. no tremors or drift noted.    Sensation: Intact to light touch and pinprick throughout. no right/left confusion. no extinction to tactile on DSS. Romberg was negative.   Reflexes: 1+ throughout at biceps, brachioradialis, triceps, patellars and ankles bilaterally and equal. No clonus. R toe and L toe were both downgoing.  Coordination: No dysmetria on FNF or HKS  Gait: deferred in ICU     Data/Labs/Imaging which I personally reviewed.     Labs:     CBC Full  -  ( 20 Nov 2022 23:03 )  WBC Count : 6.71 K/uL  RBC Count : 3.95 M/uL  Hemoglobin : 10.6 g/dL  Hematocrit : 34.2 %  Platelet Count - Automated : 220 K/uL  Mean Cell Volume : 86.6 fl  Mean Cell Hemoglobin : 26.8 pg  Mean Cell Hemoglobin Concentration : 31.0 gm/dL  Auto Neutrophil # : 4.89 K/uL  Auto Lymphocyte # : 0.73 K/uL  Auto Monocyte # : 0.78 K/uL  Auto Eosinophil # : 0.23 K/uL  Auto Basophil # : 0.05 K/uL  Auto Neutrophil % : 73.0 %  Auto Lymphocyte % : 10.9 %  Auto Monocyte % : 11.6 %  Auto Eosinophil % : 3.4 %  Auto Basophil % : 0.7 %    11-20    143  |  103  |  16  ----------------------------<  143<H>  4.0   |  26  |  1.19    Ca    9.3      20 Nov 2022 23:03    TPro  7.0  /  Alb  3.9  /  TBili  0.7  /  DBili  x   /  AST  18  /  ALT  23  /  AlkPhos  100  11-20    LIVER FUNCTIONS - ( 20 Nov 2022 23:03 )  Alb: 3.9 g/dL / Pro: 7.0 g/dL / ALK PHOS: 100 U/L / ALT: 23 U/L / AST: 18 U/L / GGT: x           PT/INR - ( 20 Nov 2022 23:03 )   PT: 15.0 sec;   INR: 1.30 ratio         PTT - ( 20 Nov 2022 23:03 )  PTT:27.0 sec      < from: MR Head w/wo IV Cont (11.02.22 @ 11:23) >    ACC: [id number] EXAM:  MR BRAIN WAW IC                          PROCEDURE DATE:  11/02/2022          INTERPRETATION:  CLINICAL INDICATION: Seizures after mitral valve   replacement surgery      Magnetic resonance imaging of the brain was carried out with transaxial   SPGR, FLAIR, fast spin echo T2 weighted images, axial susceptibility   weighted series, diffusion weighted series and sagittal T1 weighted   series on a 3.0 Damari magnet. Post contrast axial, coronal and sagittal   T1 weighted imageswere obtained. 10 cc of Gadavist were intravenously   injected, 0 cc were discarded.    Comparison is made with the prior CT/CTA of 10/24/2022.      The mild ventricular and sulcal prominence is consistent with   age-appropriate involutional change. Scattered small vessel white matter   ischemic changes are noted. No acute infarcts are seen. There is no old   or new hemorrhage. After contrast administration is normal intracranial   vascular enhancement. No abnormal parenchymal or leptomeningeal   enhancement is identified.    There has been bilateral lens replacement surgery.      IMPRESSION: Age-appropriate involutional and ischemic gliotic changes. No   acute infarcts or hemorrhage.    --- End of Report ---            SHA MONTALVO MD; Attending Radiologist  This document has been electronically signed. Nov 2 2022  1:15PM    < end of copied text >

## 2022-11-21 NOTE — PHYSICAL THERAPY INITIAL EVALUATION ADULT - ADDITIONAL COMMENTS
Patient lives in private home with spouse, 2 steps to enter, first floor set up. Patient owns rolling walker, has grab bars in shower. Patient receiving home PT since prior hospitalization. Patient reports he was ambulating without AD prior to admission

## 2022-11-21 NOTE — PHYSICAL THERAPY INITIAL EVALUATION ADULT - GENERAL OBSERVATIONS, REHAB EVAL
Patient received semi reclined in bed, NAD, VSS, +PIV capped, +ICU monitors, +gaitan, spouse present

## 2022-11-21 NOTE — CONSULT NOTE ADULT - ASSESSMENT
77  year old  RHD male HTN, HLD, DM2, former smoker,  hypothyroidism, obesity, LILLY on C-pap,  bladder cancer (2020, s/p resection of bladder tumor/ currently self catheterizes 6x/day ), PAF s/p DCCV /uncomplicated radiofrequency ablation of atrial fibrillation (WACA/PVI, CTI) on 7/13/22,  left atrial appendage & severe MR . scheduled Mitral valve replacement , MAZE, left atrial appendage closure on 10/20/2022.  11/7 pt with echo , noted to have 3 cm circum effusion .   11/20 pt with pre syncope, with fall, right rib fx.  pt seen at outside hospital , ct chest -abd-pelvis done.   pt transferred to [place] for further assessment.   Last admission:   EEG with occasional sharps and risk for seizures from bilateral posterior head region   CTH: R IC vague lucency noted. may be age indeterminate  CTA H/N limited 2/2 poor opacification of vessels but no LVO.  CTP neg   A1c 6.2  LDL 27   s/p PPM   MRI brain small vessel changes     ECHO here 11/21 ( limited ) with small effusion , no tamponade   c/f seizure last admission   this episode no LOC, no convulsions.  felt unsteady (likey vasovagal) and when tried to lean on wall it was a doorway and he fell down     Impression: 1) mechanical fall 2) seizure hx 3) prior lacunar strokes   - c/w home vimpat 100mg BID.  has an appointment wiht me 1st week of december for f/u.   - c/w asa 81mg  - lipitor 20 if no objection   - Hemoglobin A1c and lipid panel  - telemetry  - PT/OT, OOBC  - check FS, glucose control <180  - GI/DVT ppx  - Counseling on diet, exercise, and medication adherence was done  - Counseling on smoking cessation and alcohol consumption offered when appropriate.  - Pain assessed and judicious use of narcotics when appropriate was discussed.    - Stroke education given when appropriate.  - Importance of fall prevention discussed.   - Differential diagnosis and plan of care discussed with patient and/or family and primary team  - Thank you for allowing me to participate in the care of this patient. Call with questions.   - plan to tx to floor. spoke [facility] ICU team  Bucky Sharpe MD  Vascular Neurology  Office: [phone]

## 2022-11-21 NOTE — PHYSICAL THERAPY INITIAL EVALUATION ADULT - PERTINENT HX OF CURRENT PROBLEM, REHAB EVAL
77  year old  RHD male PMH of HTN, HLD, DM2, former smoker,  hypothyroidism, obesity, LILLY on C-pap,  bladder cancer (2020, s/p resection of bladder tumor/ currently self catheterizes 6x/day ), PAF s/p DCCV /uncomplicated radiofrequency ablation of atrial fibrillation (WACA/PVI, CTI) on 7/13/22,  left atrial appendage & severe MR . Presents with c/o recent" abnormal echo with fatigue due to mitral valve regurgitation. Presents for  scheduled Mitral valve replacement , MAZE, left atrial appendage closure on 10/20/2022.  11/7 pt with echo , noted to have 3 cm circum effusion .   11/20: Chest xray: IMPRESSION: Visualized  lungs are clear, however incomplete evaluation due to partial   visualization of the lung bases.

## 2022-11-21 NOTE — PROGRESS NOTE ADULT - ASSESSMENT
77  year old  RHD male PMH of HTN, HLD, DM2, former smoker,  hypothyroidism, obesity, LILLY on C-pap,  bladder cancer (2020, s/p resection of bladder tumor/ currently self catheterizes 6x/day ), PAF s/p DCCV /uncomplicated radiofrequency ablation of atrial fibrillation (WACA/PVI, CTI) on 7/13/22,  left atrial appendage & severe MR . Presents with c/o recent" abnormal echo with fatigue due to mitral valve regurgitation. Presents for  scheduled Mitral valve replacement , MAZE, left atrial appendage closure on 10/20/2022.  s/p mvr-t on 10/23/22- d/c'd home postop echo on 11/7 noted to have 3 cm circum effusion .     On 11/20 pt with fatigue s/p fall in his hallway - back to wall, with fall, right rib fx x 3 .  pt seen at outside hospital , ct chest -abd-pelvis done.   pt transferred to [place] for further assessment.     ECHO here 11/21 ( limited ) with small effusion , no tamponade   11/21 PT eval appreciated - patient cleared for home- NO A/C as per dr. hou-   continue Vimpat as per Neuro   d/c home tomorrow.

## 2022-11-21 NOTE — PROGRESS NOTE ADULT - SUBJECTIVE AND OBJECTIVE BOX
Patient seen and examined at the bedside.    Remained critically ill on continuous ICU monitoring.    OBJECTIVE:  Vital Signs Last 24 Hrs  T(C): 36.8 (21 Nov 2022 00:00), Max: 36.8 (21 Nov 2022 00:00)  T(F): 98.2 (21 Nov 2022 00:00), Max: 98.2 (21 Nov 2022 00:00)  HR: 69 (21 Nov 2022 05:00) (69 - 70)  BP: 117/55 (21 Nov 2022 05:00) (102/59 - 130/62)  BP(mean): 79 (21 Nov 2022 05:00) (74 - 91)  RR: 18 (21 Nov 2022 05:00) (16 - 23)  SpO2: 94% (21 Nov 2022 05:00) (94% - 99%)    Parameters below as of 21 Nov 2022 00:00  Patient On (Oxygen Delivery Method): room air    Physical Exam:   General: NAD   Neurology: nonfocal   Eyes: bilateral pupils equal and reactive   ENT/Neck: Neck supple, trachea midline, No JVD   Respiratory: Clear bilaterally   CV: S1S2, no murmurs        [x] Sinus rhythm [x] PPM  Abdominal: Soft, NT, ND +BS   Extremities: 1-2+ pedal edema noted, + peripheral pulses   Skin: No Rashes, Hematoma, Ecchymosis                           Assessment:  77  year old  RHD male PMH of HTN, HLD, DM2, former smoker,  hypothyroidism, obesity, LILLY on C-pap,  bladder cancer (2020, s/p resection of bladder tumor/ currently self catheterizes 6x/day ), PAF s/p DCCV /uncomplicated radiofrequency ablation of atrial fibrillation (WACA/PVI, CTI) on 7/13/22,  left atrial appendage & severe MR . Presents with c/o recent" abnormal echo with fatigue due to mitral valve regurgitation. Presents for  scheduled Mitral valve replacement , MAZE, left atrial appendage closure on 10/20/2022.  11/7 pt with echo , noted to have 3 cm circum effusion .     Preop   Pericardial effusion   Multiple fracture of ribs on right side     Plan:   ***Neuro***  [x] Nonfocal   Post operative neuro assessment     ***Cardiovascular***  Invasive hemodynamic monitoring, assess perfusion indices   SR / Hct 34.2%/ Lactate 0.7  Reassessment of hemodynamics post resuscitation *or .rh if no fluids above*  Lopressor for rate control   Monitor chest tube outputs *remove if none present, check DAILY*  Serial EKG and cardiac enzymes     ***Pulmonary***  [x] Stable on RA  Encourage incentive spirometry, continue pulse ox monitoring, follow ABGs     ***GI***  [x] Diet: Consistent carb   [x] Protonix     ***Renal***  Continue to monitor I/Os, BUN/Creatinine.   Replete lytes PRN  Gonzalez present  c/w doxazosin     ***ID***  No active antibiotic coverage      ***Endocrine***  [x]  DM2 : HbA1c 6.2%                - [x]  ISS              - Need tight glycemic control to prevent wound infection.    [x] Hypothyroid   -c/w synthroid         Patient requires continuous monitoring with bedside rhythm monitoring, pulse oximetry monitoring, and continuous central venous and arterial pressure monitoring; and intermittent blood gas analysis. Care plan discussed with the ICU care team.   Patient remained critical, at risk for life threatening decompensation.    I have spent 30 minutes providing critical care management to this patient.    By signing my name below, I, Zita Gonzalez, attest that this documentation has been prepared under the direction and in the presence of SAMMY Gastelum.  Electronically signed: Ramesh Scott, 11-21-22 @ 06:27    I, Inga Rob, personally performed the services described in this documentation. all medical record entries made by the scribe were at my direction and in my presence. I have reviewed the chart and agree that the record reflects my personal performance and is accurate and complete  Electronically signed: SAMMY Gastelum. Patient seen and examined at the bedside.    Remained critically ill on continuous ICU monitoring.    OBJECTIVE:  Vital Signs Last 24 Hrs  T(C): 36.8 (21 Nov 2022 00:00), Max: 36.8 (21 Nov 2022 00:00)  T(F): 98.2 (21 Nov 2022 00:00), Max: 98.2 (21 Nov 2022 00:00)  HR: 69 (21 Nov 2022 05:00) (69 - 70)  BP: 117/55 (21 Nov 2022 05:00) (102/59 - 130/62)  BP(mean): 79 (21 Nov 2022 05:00) (74 - 91)  RR: 18 (21 Nov 2022 05:00) (16 - 23)  SpO2: 94% (21 Nov 2022 05:00) (94% - 99%)    Parameters below as of 21 Nov 2022 00:00  Patient On (Oxygen Delivery Method): room air    Physical Exam:   General: NAD   Neurology: nonfocal   Eyes: bilateral pupils equal and reactive   ENT/Neck: Neck supple, trachea midline, No JVD   Respiratory: Clear bilaterally   CV: S1S2, no murmurs        [x] Sinus rhythm [x] PPM  Abdominal: Soft, NT, ND +BS   Extremities: 1-2+ pedal edema noted, + peripheral pulses   Skin: No Rashes, Hematoma, Ecchymosis                           Assessment:  77  year old  RHD male PMH of HTN, HLD, DM2, former smoker,  hypothyroidism, obesity, LILLY on C-pap,  bladder cancer (2020, s/p resection of bladder tumor/ currently self catheterizes 6x/day ), PAF s/p DCCV /uncomplicated radiofrequency ablation of atrial fibrillation (WACA/PVI, CTI) on 7/13/22,  left atrial appendage & severe MR . Presents with c/o recent" abnormal echo with fatigue due to mitral valve regurgitation. Presents for  scheduled Mitral valve replacement , MAZE, left atrial appendage closure on 10/20/2022.  11/7 pt with echo , noted to have 3 cm circum effusion .     s/p MVR/ MAZE, NAYELY clip  fall at home return to ICU on 11/20     small Pericardial effusion   Multiple fracture of ribs on right side     Plan:   ***Neuro***  [x] Nonfocal   Post operative neuro assessment   c/w vimpat per neuro (questionable Siezure post op)     ***Cardiovascular***  Invasive hemodynamic monitoring, assess perfusion indices   SR / Hct 34.2%/ Lactate 0.7  Lopressor for rate control   Serial EKG and cardiac enzymes   follow up ECHO from today   c/w ASA only for now pending ECHO result   started on aldactone and torsemide     ***Pulmonary***  [x] Stable on RA  Encourage incentive spirometry, continue pulse ox monitoring, follow ABGs   cpap at night. uses own mask     ***GI***  [x] Diet: Consistent carb   [x] Protonix     ***Renal***  Continue to monitor I/Os, BUN/Creatinine.   Replete lytes PRN  Gonzalez present. d/c? self cauterization. hx of bladder CA    c/w doxazosin and proscar    ***ID***  No active antibiotic coverage      ***Endocrine***  [x]  DM2 : HbA1c 6.2%                - [x]  ISS              - Need tight glycemic control to prevent wound infection.    [x] Hypothyroid   -c/w synthroid         Patient requires continuous monitoring with bedside rhythm monitoring, pulse oximetry monitoring, and continuous central venous and arterial pressure monitoring; and intermittent blood gas analysis. Care plan discussed with the ICU care team.   Patient remained critical, at risk for life threatening decompensation.    I have spent 30 minutes providing critical care management to this patient.    By signing my name below, I, Zita Gonzalez, attest that this documentation has been prepared under the direction and in the presence of SAMMY Gastelum.  Electronically signed: Ramesh Scott, 11-21-22 @ 06:27    I, Inga Rob, personally performed the services described in this documentation. all medical record entries made by the scribe were at my direction and in my presence. I have reviewed the chart and agree that the record reflects my personal performance and is accurate and complete  Electronically signed: SAMMY Gastelum.

## 2022-11-21 NOTE — PROGRESS NOTE ADULT - PROBLEM SELECTOR PLAN 2
trace pericardial effusion post MVR-t   pt still w/ small pericardial effusion on echo -   will hold A/C as per Dr. Escobar Barraza.   pt eval - d/c home tomorrow

## 2022-11-22 ENCOUNTER — TRANSCRIPTION ENCOUNTER (OUTPATIENT)
Age: 77
End: 2022-11-22

## 2022-11-22 VITALS — WEIGHT: 263.45 LBS

## 2022-11-22 LAB
ANION GAP SERPL CALC-SCNC: 10 MMOL/L — SIGNIFICANT CHANGE UP (ref 5–17)
BUN SERPL-MCNC: 20 MG/DL — SIGNIFICANT CHANGE UP (ref 7–23)
CALCIUM SERPL-MCNC: 8.9 MG/DL — SIGNIFICANT CHANGE UP (ref 8.4–10.5)
CHLORIDE SERPL-SCNC: 104 MMOL/L — SIGNIFICANT CHANGE UP (ref 96–108)
CO2 SERPL-SCNC: 26 MMOL/L — SIGNIFICANT CHANGE UP (ref 22–31)
CREAT SERPL-MCNC: 1.24 MG/DL — SIGNIFICANT CHANGE UP (ref 0.5–1.3)
EGFR: 60 ML/MIN/1.73M2 — SIGNIFICANT CHANGE UP
GLUCOSE SERPL-MCNC: 108 MG/DL — HIGH (ref 70–99)
HCT VFR BLD CALC: 30 % — LOW (ref 39–50)
HGB BLD-MCNC: 9.4 G/DL — LOW (ref 13–17)
MCHC RBC-ENTMCNC: 27 PG — SIGNIFICANT CHANGE UP (ref 27–34)
MCHC RBC-ENTMCNC: 31.3 GM/DL — LOW (ref 32–36)
MCV RBC AUTO: 86.2 FL — SIGNIFICANT CHANGE UP (ref 80–100)
NRBC # BLD: 0 /100 WBCS — SIGNIFICANT CHANGE UP (ref 0–0)
PLATELET # BLD AUTO: 182 K/UL — SIGNIFICANT CHANGE UP (ref 150–400)
POTASSIUM SERPL-MCNC: 3.9 MMOL/L — SIGNIFICANT CHANGE UP (ref 3.5–5.3)
POTASSIUM SERPL-SCNC: 3.9 MMOL/L — SIGNIFICANT CHANGE UP (ref 3.5–5.3)
RBC # BLD: 3.48 M/UL — LOW (ref 4.2–5.8)
RBC # FLD: 15.9 % — HIGH (ref 10.3–14.5)
SODIUM SERPL-SCNC: 140 MMOL/L — SIGNIFICANT CHANGE UP (ref 135–145)
WBC # BLD: 6.23 K/UL — SIGNIFICANT CHANGE UP (ref 3.8–10.5)
WBC # FLD AUTO: 6.23 K/UL — SIGNIFICANT CHANGE UP (ref 3.8–10.5)

## 2022-11-22 PROCEDURE — 86900 BLOOD TYPING SEROLOGIC ABO: CPT

## 2022-11-22 PROCEDURE — 85025 COMPLETE CBC W/AUTO DIFF WBC: CPT

## 2022-11-22 PROCEDURE — 86850 RBC ANTIBODY SCREEN: CPT

## 2022-11-22 PROCEDURE — 36415 COLL VENOUS BLD VENIPUNCTURE: CPT

## 2022-11-22 PROCEDURE — 85610 PROTHROMBIN TIME: CPT

## 2022-11-22 PROCEDURE — 86901 BLOOD TYPING SEROLOGIC RH(D): CPT

## 2022-11-22 PROCEDURE — 83520 IMMUNOASSAY QUANT NOS NONAB: CPT

## 2022-11-22 PROCEDURE — 85027 COMPLETE CBC AUTOMATED: CPT

## 2022-11-22 PROCEDURE — 93306 TTE W/DOPPLER COMPLETE: CPT

## 2022-11-22 PROCEDURE — 93005 ELECTROCARDIOGRAM TRACING: CPT

## 2022-11-22 PROCEDURE — 94660 CPAP INITIATION&MGMT: CPT

## 2022-11-22 PROCEDURE — 71045 X-RAY EXAM CHEST 1 VIEW: CPT

## 2022-11-22 PROCEDURE — 97161 PT EVAL LOW COMPLEX 20 MIN: CPT

## 2022-11-22 PROCEDURE — 80048 BASIC METABOLIC PNL TOTAL CA: CPT

## 2022-11-22 PROCEDURE — 94010 BREATHING CAPACITY TEST: CPT

## 2022-11-22 PROCEDURE — 71045 X-RAY EXAM CHEST 1 VIEW: CPT | Mod: 26

## 2022-11-22 PROCEDURE — 85730 THROMBOPLASTIN TIME PARTIAL: CPT

## 2022-11-22 PROCEDURE — 84480 ASSAY TRIIODOTHYRONINE (T3): CPT

## 2022-11-22 PROCEDURE — 84436 ASSAY OF TOTAL THYROXINE: CPT

## 2022-11-22 PROCEDURE — 80053 COMPREHEN METABOLIC PANEL: CPT

## 2022-11-22 PROCEDURE — 93010 ELECTROCARDIOGRAM REPORT: CPT

## 2022-11-22 PROCEDURE — 82962 GLUCOSE BLOOD TEST: CPT

## 2022-11-22 PROCEDURE — 85520 HEPARIN ASSAY: CPT

## 2022-11-22 PROCEDURE — 84443 ASSAY THYROID STIM HORMONE: CPT

## 2022-11-22 RX ORDER — SPIRONOLACTONE 25 MG/1
2 TABLET, FILM COATED ORAL
Qty: 20 | Refills: 0
Start: 2022-11-22 | End: 2022-12-01

## 2022-11-22 RX ORDER — FINASTERIDE 5 MG/1
1 TABLET, FILM COATED ORAL
Qty: 0 | Refills: 0 | DISCHARGE
Start: 2022-11-22

## 2022-11-22 RX ORDER — PANTOPRAZOLE SODIUM 20 MG/1
1 TABLET, DELAYED RELEASE ORAL
Qty: 0 | Refills: 0 | DISCHARGE
Start: 2022-11-22

## 2022-11-22 RX ORDER — METOPROLOL TARTRATE 50 MG
1 TABLET ORAL
Qty: 0 | Refills: 0 | DISCHARGE
Start: 2022-11-22

## 2022-11-22 RX ORDER — OXYCODONE HYDROCHLORIDE 5 MG/1
1 TABLET ORAL
Qty: 20 | Refills: 0
Start: 2022-11-22 | End: 2022-11-26

## 2022-11-22 RX ORDER — NIACIN 50 MG
1 TABLET ORAL
Qty: 0 | Refills: 0 | DISCHARGE
Start: 2022-11-22

## 2022-11-22 RX ORDER — APIXABAN 2.5 MG/1
1 TABLET, FILM COATED ORAL
Qty: 60 | Refills: 0
Start: 2022-11-22 | End: 2022-12-21

## 2022-11-22 RX ORDER — LEVOTHYROXINE SODIUM 125 MCG
1 TABLET ORAL
Qty: 0 | Refills: 0 | DISCHARGE
Start: 2022-11-22

## 2022-11-22 RX ORDER — DOXAZOSIN MESYLATE 4 MG
1 TABLET ORAL
Qty: 0 | Refills: 0 | DISCHARGE
Start: 2022-11-22

## 2022-11-22 RX ORDER — FINASTERIDE 5 MG/1
1 TABLET, FILM COATED ORAL
Qty: 0 | Refills: 0 | DISCHARGE

## 2022-11-22 RX ADMIN — Medication 650 MILLIGRAM(S): at 03:00

## 2022-11-22 RX ADMIN — Medication 650 MILLIGRAM(S): at 02:04

## 2022-11-22 RX ADMIN — LACOSAMIDE 100 MILLIGRAM(S): 50 TABLET ORAL at 11:37

## 2022-11-22 RX ADMIN — FINASTERIDE 5 MILLIGRAM(S): 5 TABLET, FILM COATED ORAL at 11:37

## 2022-11-22 RX ADMIN — Medication 25 MILLIGRAM(S): at 06:11

## 2022-11-22 RX ADMIN — PANTOPRAZOLE SODIUM 40 MILLIGRAM(S): 20 TABLET, DELAYED RELEASE ORAL at 06:11

## 2022-11-22 RX ADMIN — Medication 1 GRAM(S): at 06:11

## 2022-11-22 RX ADMIN — Medication 81 MILLIGRAM(S): at 11:37

## 2022-11-22 RX ADMIN — Medication 25 MICROGRAM(S): at 06:11

## 2022-11-22 RX ADMIN — LIDOCAINE 1 PATCH: 4 CREAM TOPICAL at 06:00

## 2022-11-22 RX ADMIN — Medication 10 MILLIGRAM(S): at 06:13

## 2022-11-22 RX ADMIN — SPIRONOLACTONE 50 MILLIGRAM(S): 25 TABLET, FILM COATED ORAL at 06:12

## 2022-11-22 NOTE — PROGRESS NOTE ADULT - REASON FOR ADMISSION
S/P pre syncope, s/p fall, right Rib fx.

## 2022-11-22 NOTE — ADVANCED PRACTICE NURSE CONSULT - REASON FOR CONSULT
Wound care consult initiated by RN to assess patient's skin for a possible sacral skin injury present on admission    Reason for Admission: S/P pre syncope, s/p fall, right Rib fx.  History of Present Illness:   77  year old  RHD male PMH of HTN, HLD, DM2, former smoker,  hypothyroidism, obesity, LILLY on C-pap,  bladder cancer (2020, s/p resection of bladder tumor/ currently self catheterizes 6x/day ), PAF s/p DCCV /uncomplicated radiofrequency ablation of atrial fibrillation (WACA/PVI, CTI) on 7/13/22,  left atrial appendage & severe MR . Presents with c/o recent" abnormal echo with fatigue due to mitral valve regurgitation. Presents for  scheduled Mitral valve replacement , MAZE, left atrial appendage closure on 10/20/2022.  11/7 pt with echo , noted to have 3 cm circum effusion .     11/20 pt with pre syncope, with fall, right rib fx.  pt seen at outside hospital , ct chest -abd-pelvis done.   pt transferred to [place] for further assessment.     ECHO here 11/21 ( limited ) with small effusion , no tamponade

## 2022-11-22 NOTE — PROGRESS NOTE ADULT - ASSESSMENT
77  year old  RHD male HTN, HLD, DM2, former smoker,  hypothyroidism, obesity, LILLY on C-pap,  bladder cancer (2020, s/p resection of bladder tumor/ currently self catheterizes 6x/day ), PAF s/p DCCV /uncomplicated radiofrequency ablation of atrial fibrillation (WACA/PVI, CTI) on 7/13/22,  left atrial appendage & severe MR . scheduled Mitral valve replacement , MAZE, left atrial appendage closure on 10/20/2022.  11/7 pt with echo , noted to have 3 cm circum effusion .   11/20 pt with pre syncope, with fall, right rib fx.  pt seen at outside hospital , ct chest -abd-pelvis done.   pt transferred to [place] for further assessment.   Last admission:   EEG with occasional sharps and risk for seizures from bilateral posterior head region   CTH: R IC vague lucency noted. may be age indeterminate  CTA H/N limited 2/2 poor opacification of vessels but no LVO.  CTP neg   A1c 6.2  LDL 27   s/p PPM   MRI brain small vessel changes     ECHO here 11/21 ( limited ) with small effusion , no tamponade   c/f seizure last admission   this episode no LOC, no convulsions.  felt unsteady (likey vasovagal) and when tried to lean on wall it was a doorway and he fell down     Impression: 1) mechanical fall 2) seizure hx 3) prior lacunar strokes   - c/w home vimpat 100mg BID.  has an appointment wiht me 1st week of december for f/u.   - c/w asa 81mg  - lipitor 20 if no objection   - Hemoglobin A1c and lipid panel  - telemetry  - PT/OT, OOBC  - check FS, glucose control <180  - GI/DVT ppx  - Counseling on diet, exercise, and medication adherence was done  - Counseling on smoking cessation and alcohol consumption offered when appropriate.  - Pain assessed and judicious use of narcotics when appropriate was discussed.    - Stroke education given when appropriate.  - Importance of fall prevention discussed.   - Differential diagnosis and plan of care discussed with patient and/or family and primary team  - Thank you for allowing me to participate in the care of this patient. Call with questions.   - no objection to d/c planning with outpatient f/u. spoke with patient and ACP   Bucky Sharpe MD  Vascular Neurology  Office: [phone]

## 2022-11-22 NOTE — DISCHARGE NOTE PROVIDER - NSDCCPCAREPLAN_GEN_ALL_CORE_FT
PRINCIPAL DISCHARGE DIAGNOSIS  Diagnosis: Fall at home  Assessment and Plan of Treatment: follow up appt w/ Dr. Escobar Barraza on dec 9, fri @ 3:30pm  follow up appt with your Cardiologist in 2-3 weeks  take medications as prescribed  ambulate 4-5 times a day  low fat/low salt diet

## 2022-11-22 NOTE — DISCHARGE NOTE NURSING/CASE MANAGEMENT/SOCIAL WORK - PATIENT PORTAL LINK FT
You can access the FollowMyHealth Patient Portal offered by [facility] by registering at the following website: [email or web address]. By joining [facility]’s FollowMyHealth portal, you will also be able to view your health information using other applications (apps) compatible with our system.

## 2022-11-22 NOTE — ADVANCED PRACTICE NURSE CONSULT - ASSESSMENT
When wound care RN arrived on unit, patient was found sitting in chair at bedside. Patient Sofia was alert and oriented and gave consent to skin consult. He was able to stand independently and once standing RN was able to visualize an area of moist, irritated reddened skin in gluteal cleft and dry skin on B/L buttocks on either side of cleft with superficial abrasions- pt denies any itchiness. Patient endorses sitting a lot at home and states that he is continent of stool and has been applying desitin cream on buttocks and states that it has been helping him. Patient Sofia states that "it's much better than it was." Patient was left sitting in chair on waffle cushion once consult was complete. Patient was educated on the need for routine turning and positioning to prevent pressure injuries.

## 2022-11-22 NOTE — DISCHARGE NOTE PROVIDER - HOSPITAL COURSE
77  year old  RHD male PMH of HTN, HLD, DM2, former smoker,  hypothyroidism, obesity, LILLY on C-pap,  bladder cancer (2020, s/p resection of bladder tumor/ currently self catheterizes 6x/day ), PAF s/p DCCV /uncomplicated radiofrequency ablation of atrial fibrillation (WACA/PVI, CTI) on 7/13/22,  left atrial appendage & severe MR . Presents with c/o recent" abnormal echo with fatigue due to mitral valve regurgitation. Presents for  scheduled Mitral valve replacement , MAZE, left atrial appendage closure on 10/20/2022.  s/p mvr-t on 10/23/22- d/c'd home postop echo on 11/7 noted to have 3 cm circum effusion .     On 11/20 pt with fatigue s/p fall in his hallway - back to wall, with fall, right rib fx x 3 .  pt seen at outside hospital , ct chest -abd-pelvis done.   pt transferred to [place] for further assessment.     ECHO here 11/21 ( limited ) with small effusion , no tamponade   11/21 PT eval appreciated - patient cleared for home- NO A/C as per dr. hou-   continue Vimpat as per Neuro   11/22- VSS - d/c home today- echo no pericardial effusion

## 2022-11-22 NOTE — DISCHARGE NOTE NURSING/CASE MANAGEMENT/SOCIAL WORK - NSDCPNINST_GEN_ALL_CORE
PATIENT INFORMED OF THE SIGNS TO REPORT TO MD. PATIENT INFORMED OF THE DANGERS INHERENT IN SKIPPING MEDICATION DOSES AND DOUBLE ON ANY MEDICATION. JASMINE CATH TAKEN OUT, STRAIGHT CATH AT HOME

## 2022-11-22 NOTE — DISCHARGE NOTE NURSING/CASE MANAGEMENT/SOCIAL WORK - NSDCPEELIQUISFU_GEN_ALL_CORE
Go for blood tests as directed. Your doctor will do lab tests at regular visits to monitor the effects of this medicine. Please follow up with your doctor and keep your health care provider appointments. RN

## 2022-11-22 NOTE — PROGRESS NOTE ADULT - SUBJECTIVE AND OBJECTIVE BOX
Neurology Progress Note    S: Patient seen and examined. No new events overnight. patient denied CP, SOB, HA or pain.     Medication:  acetaminophen     Tablet .. 650 milliGRAM(s) Oral every 6 hours PRN  aspirin enteric coated 81 milliGRAM(s) Oral daily  doxazosin 4 milliGRAM(s) Oral at bedtime  finasteride 5 milliGRAM(s) Oral daily  insulin lispro (ADMELOG) corrective regimen sliding scale   SubCutaneous Before meals and at bedtime  lacosamide 100 milliGRAM(s) Oral two times a day  levothyroxine 25 MICROGram(s) Oral daily  lidocaine   4% Patch 1 Patch Transdermal daily  metoprolol succinate ER 25 milliGRAM(s) Oral daily  niacin  milliGRAM(s) Oral at bedtime  omega-3-Acid Ethyl Esters 1 Gram(s) Oral two times a day  pantoprazole    Tablet 40 milliGRAM(s) Oral before breakfast  spironolactone 50 milliGRAM(s) Oral daily  torsemide 10 milliGRAM(s) Oral daily      Vitals:  Vital Signs Last 24 Hrs  T(C): 36.7 (22 Nov 2022 04:05), Max: 36.8 (21 Nov 2022 15:16)  T(F): 98.1 (22 Nov 2022 04:05), Max: 98.2 (21 Nov 2022 15:16)  HR: 70 (22 Nov 2022 06:26) (69 - 81)  BP: 117/69 (22 Nov 2022 04:05) (113/54 - 121/65)  BP(mean): 85 (22 Nov 2022 04:05) (78 - 86)  RR: 18 (22 Nov 2022 04:05) (13 - 24)  SpO2: 97% (22 Nov 2022 06:26) (97% - 99%)    Parameters below as of 22 Nov 2022 04:05  Patient On (Oxygen Delivery Method): room air        General Exam:   General Appearance: Appropriately dressed and in no acute distress       Head: Normocephalic, atraumatic and no dysmorphic features  Ear, Nose, and Throat: Moist mucous membranes  CVS: S1S2+  Resp: No SOB, no wheeze or rhonchi  Abd: soft NTND  Extremities: No edema, no cyanosis  Skin: No bruises, no rashes     Neurological Exam:  Mental Status: Awake, alert and oriented x 3.  Able to follow simple and complex verbal commands. Able to name and repeat. fluent speech. No obvious aphasia or dysarthria noted.   Cranial Nerves: PERRL, EOMI, VFFC, sensation V1-V3 intact,  no obvious facial asymmetry , equal elevation of palate, scm/trap 5/5, tongue is midline on protrusion. no obvious papilledema on fundoscopic exam. Hearing is grossly intact.   Motor: Normal bulk, tone and strength throughout. Fine finger movements were intact and symmetric. no tremors or drift noted.    Sensation: Intact to light touch and pinprick throughout. no right/left confusion. no extinction to tactile on DSS. Romberg was negative.   Reflexes: 1+ throughout at biceps, brachioradialis, triceps, patellars and ankles bilaterally and equal. No clonus. R toe and L toe were both downgoing.  Coordination: No dysmetria on FNF or HKS  Gait: deferred     I personally reviewed the below data/images/labs:      CBC Full  -  ( 22 Nov 2022 07:17 )  WBC Count : 6.23 K/uL  RBC Count : 3.48 M/uL  Hemoglobin : 9.4 g/dL  Hematocrit : 30.0 %  Platelet Count - Automated : 182 K/uL  Mean Cell Volume : 86.2 fl  Mean Cell Hemoglobin : 27.0 pg  Mean Cell Hemoglobin Concentration : 31.3 gm/dL  Auto Neutrophil # : x  Auto Lymphocyte # : x  Auto Monocyte # : x  Auto Eosinophil # : x  Auto Basophil # : x  Auto Neutrophil % : x  Auto Lymphocyte % : x  Auto Monocyte % : x  Auto Eosinophil % : x  Auto Basophil % : x    11-22    140  |  104  |  20  ----------------------------<  108<H>  3.9   |  26  |  1.24    Ca    8.9      22 Nov 2022 07:18    TPro  7.0  /  Alb  3.9  /  TBili  0.7  /  DBili  x   /  AST  18  /  ALT  23  /  AlkPhos  100  11-20    LIVER FUNCTIONS - ( 20 Nov 2022 23:03 )  Alb: 3.9 g/dL / Pro: 7.0 g/dL / ALK PHOS: 100 U/L / ALT: 23 U/L / AST: 18 U/L / GGT: x           PT/INR - ( 20 Nov 2022 23:03 )   PT: 15.0 sec;   INR: 1.30 ratio         PTT - ( 20 Nov 2022 23:03 )  PTT:27.0 sec      ACC: [id number] EXAM:  MR BRAIN WAW IC                          PROCEDURE DATE:  11/02/2022          INTERPRETATION:  CLINICAL INDICATION: Seizures after mitral valve   replacement surgery      Magnetic resonance imaging of the brain was carried out with transaxial   SPGR, FLAIR, fast spin echo T2 weighted images, axial susceptibility   weighted series, diffusion weighted series and sagittal T1 weighted   series on a 3.0 Damari magnet. Post contrast axial, coronal and sagittal   T1 weighted imageswere obtained. 10 cc of Gadavist were intravenously   injected, 0 cc were discarded.    Comparison is made with the prior CT/CTA of 10/24/2022.      The mild ventricular and sulcal prominence is consistent with   age-appropriate involutional change. Scattered small vessel white matter   ischemic changes are noted. No acute infarcts are seen. There is no old   or new hemorrhage. After contrast administration is normal intracranial   vascular enhancement. No abnormal parenchymal or leptomeningeal   enhancement is identified.    There has been bilateral lens replacement surgery.      IMPRESSION: Age-appropriate involutional and ischemic gliotic changes. No   acute infarcts or hemorrhage.    --- End of Report ---            SHA MONTALVO MD; Attending Radiologist  This document has been electronically signed. Nov 2 2022  1:15PM    < end of copied text >

## 2022-11-22 NOTE — DISCHARGE NOTE PROVIDER - NSDCMRMEDTOKEN_GEN_ALL_CORE_FT
acetaminophen: 2 tab(s) orally every 6 hours, As Needed  for mild pain/headache  aspirin 81 mg oral delayed release tablet: 1 tab(s) orally once a day  doxazosin 4 mg oral tablet: 1 tab(s) orally once a day (at bedtime)  Eliquis 2.5 mg oral tablet: 1 tab(s) orally 2 times a day   ferrous sulfate 325 mg (65 mg elemental iron) oral tablet: 1 tab(s) orally once a day  finasteride 5 mg oral tablet: 1 tab(s) orally once a day  folic acid 1 mg oral tablet: 1 tab(s) orally once a day  Janumet 50 mg-1000 mg oral tablet: 1 tab(s) orally 2 times a day, last dose 10/18 am as per Surgeon&#x27;s advice  lacosamide 100 mg oral tablet: 1 tab(s) orally 2 times a day MDD:2  levothyroxine 25 mcg (0.025 mg) oral tablet: 1 tab(s) orally once a day  metoprolol succinate 25 mg oral tablet, extended release: 1 tab(s) orally once a day  niacin 500 mg oral tablet, extended release: 1 tab(s) orally once a day (at bedtime)  oxyCODONE 5 mg oral tablet: 1 tab(s) orally every 6 hours, As Needed -Moderate Pain (4 - 6)  - for moderate pain MDD:four   pantoprazole 40 mg oral delayed release tablet: 1 tab(s) orally once a day (before a meal)  senna leaf extract oral tablet: 2 tab(s) orally once a day (at bedtime)  spironolactone 25 mg oral tablet: 2 tab(s) orally once a day  thiamine 100 mg oral tablet: 1 tab(s) orally once a day  torsemide 10 mg oral tablet: 1 tab(s) orally once a day

## 2022-11-22 NOTE — DISCHARGE NOTE PROVIDER - NSDCPNSUBOBJ_GEN_ALL_CORE
PHYSICAL EXAM:  Neurology: alert and oriented x 3, nonfocal, no gross deficits  CV : S1S2  Sternal Wound :  CDI , Stable  Lungs: cta  Abdomen: soft, nontender, nondistended, positive bowel sounds, last bowel movement   11/20      Extremities:    +edema, no calf tenderness

## 2022-11-22 NOTE — DISCHARGE NOTE PROVIDER - CARE PROVIDER_API CALL
Escobar Barraza (MD)  Surgery; Surgical Critical Care; Thoracic and Cardiac Surgery  [address] [place] [address]  Phone: [phone]  Fax: [phone]  Follow Up Time:

## 2022-11-22 NOTE — DISCHARGE NOTE NURSING/CASE MANAGEMENT/SOCIAL WORK - NSDCPEFALRISK_GEN_ALL_CORE
For information on Fall & Injury Prevention, visit: [email or web address] OR  [email or web address] OR  https://www.cdc.gov/steadi/patient.html

## 2022-11-22 NOTE — DISCHARGE NOTE PROVIDER - NSDCFUSCHEDAPPT_GEN_ALL_CORE_FT
[facility] Physician [facility]  [address]  Scheduled Appointment: 02/14/2023

## 2022-11-22 NOTE — ADVANCED PRACTICE NURSE CONSULT - RECOMMEDATIONS
Impression:    moisture associated dermatitis of gluteal cleft and B/L buttocks present on admission    Recommendations:    1) continue turning and positioning q2 and PRN utilizing offloading assistive devices  2) continue with routine pericare daily and PRN soiling  3) encourage optimal nutrition  4) waffle cushion when oob to chair  5) B/L LE complete cair air fluidized boots to offload heels/feet  6) lucy protective cream to B/L buttocks/sacrum daily and PRN soiling        Plan of care discussed with covering RN, Patt on unit

## 2022-11-22 NOTE — DISCHARGE NOTE PROVIDER - NSCORESITESY/N_GEN_A_CORE_RD
with ortho. Parents educated on cast care--how to check cap refill and sensation, and when to seek emergency medical attention--and verbalized understanding.
No

## 2022-11-23 ENCOUNTER — NON-APPOINTMENT (OUTPATIENT)
Age: 77
End: 2022-11-23

## 2022-11-23 LAB — TSH RECEP AB FLD-ACNC: <1.1 IU/L — SIGNIFICANT CHANGE UP (ref 0–1.75)

## 2022-11-29 RX ORDER — TORSEMIDE 10 MG/1
10 TABLET ORAL
Qty: 10 | Refills: 0 | Status: ACTIVE | COMMUNITY
Start: 2022-11-14 | End: 1900-01-01

## 2022-12-01 NOTE — DISCHARGE NOTE NURSING/CASE MANAGEMENT/SOCIAL WORK - NSDPDISTO_GEN_ALL_CORE
Home 50yo F with h/o hyperthyroid presents with c/o migraine headache. reports she does not usually get many headaches but states it might be related to menopause. she also notes her kids are sick at home with the flu which she thought may also be contributing. she notes some dizziness described as lightheadedness and vertigo since last night, worsening at 630am today with nausea today and one episode of vomiting pta. she also reports significant light sensitivity. denies double vision or blurry vision, numbness, tingling, weakness, abd pain, chest pain, sob, palpitations, difficulty walking, neck pain or stiffness. states her doctor referred her to the ED. she took excedrine migraine pta with some relief.

## 2022-12-04 ENCOUNTER — NON-APPOINTMENT (OUTPATIENT)
Age: 77
End: 2022-12-04

## 2022-12-07 ENCOUNTER — TRANSCRIPTION ENCOUNTER (OUTPATIENT)
Age: 77
End: 2022-12-07

## 2022-12-09 ENCOUNTER — APPOINTMENT (OUTPATIENT)
Dept: CARDIOTHORACIC SURGERY | Facility: CLINIC | Age: 77
End: 2022-12-09

## 2022-12-30 NOTE — H&P PST ADULT - AIRWAY
Call patient to  the results and copy of xray at [facility] location. Patient verbalizes understanding of telephone conversation. VARSHA Ruth RN   normal

## 2023-02-14 ENCOUNTER — APPOINTMENT (OUTPATIENT)
Dept: ELECTROPHYSIOLOGY | Facility: CLINIC | Age: 78
End: 2023-02-14

## 2023-03-15 ENCOUNTER — RX RENEWAL (OUTPATIENT)
Age: 78
End: 2023-03-15

## 2023-08-15 NOTE — DISCHARGE NOTE PROVIDER - DISCHARGE DATE
PCP - Alex Cast MD  Subjective:   Parisa Dimas is a 71 y.o. female who presents for follow-up of Atrial Fibrillation     12/13/22: She was under evaluation for subaortic membrane/HOCM by Dr Marrero. Cardiac MRI revealed no LGE 5/22. ECho 11/16/22 with no evidence of outflow obstruction or gradient. She is due for stress test tomorrow. She was recently in [place] (early Dec 2022) and noticed more TOMLINSON and fatigue as well as palpitations. ECG 12/12/22 showed AF. Eliquis was started; verapamil was used in place of amlodipine. No syncope or near syncope.      2/23: MILDRED/CV set up for 12/27/22, procedure aborted due to SR. One hour of palpitations in interim.     8/23: Last visit no changes in meds. 1 episode of AF she believes around [facility] which lasted 1 day. No issues since then. Reports occasional LH and chest pressure when standing. Tolerating OAC. ECG shows NSR.    History:     Social History     Tobacco Use    Smoking status: Never    Smokeless tobacco: Never   Substance Use Topics    Alcohol use: Not Currently     Alcohol/week: 0.0 standard drinks of alcohol     Family History   Problem Relation Age of Onset    Cancer Mother         gallbladder    Coronary artery disease Father 79    Diabetes Father     Cerebral palsy Sister     Osteoporosis Sister     Epilepsy Sister     Prostate cancer Brother 57    Diabetes Brother     Heart attack Brother     Hypertension Daughter     No Known Problems Son     Diabetes Maternal Aunt     Diabetes Maternal Uncle     Diabetes Maternal Grandmother     Hypertension Other         multiple    Melanoma Neg Hx        Meds:   Review of patient's allergies indicates:  No Known Allergies    Current Outpatient Medications:     apixaban (ELIQUIS) 5 mg Tab, Take 1 tablet (5 mg total) by mouth 2 (two) times daily., Disp: 180 tablet, Rfl: 3    ascorbic acid, vitamin C, (VITAMIN C) 1000 MG tablet, Take 1,000 mg by mouth once daily., Disp: , Rfl:     b complex vitamins capsule,  Take 1 capsule by mouth once daily., Disp: , Rfl:     cetirizine (ZYRTEC) 10 MG tablet, Take 10 mg by mouth once daily., Disp: , Rfl:     coenzyme Q10 200 mg capsule, Take 200 mg by mouth once daily., Disp: , Rfl:     hydroCHLOROthiazide (HYDRODIURIL) 25 MG tablet, TAKE 1 TABLET BY MOUTH ONCE DAILY, Disp: 90 tablet, Rfl: 3    L.ACID/L.CASEI/B.BIF/B.MELISSA/FOS (PROBIOTIC BLEND ORAL), Take 2 tablets by mouth once daily., Disp: , Rfl:     lovastatin (MEVACOR) 20 MG tablet, TAKE 1 TABLET BY MOUTH IN  THE EVENING, Disp: 90 tablet, Rfl: 0    magnesium 30 mg Tab, Take 1 tablet by mouth Daily., Disp: , Rfl:     metFORMIN (GLUCOPHAGE-XR) 500 MG ER 24hr tablet, TAKE 2 TABLETS BY MOUTH  ONCE DAILY, Disp: 180 tablet, Rfl: 3    multivitamin capsule, Take 1 capsule by mouth once daily., Disp: , Rfl:     OMEGA-3S/DHA/EPA/FISH OIL (OMEGA 3 ORAL), Take 2,800 mg by mouth once daily., Disp: , Rfl:     omeprazole (PRILOSEC) 40 MG capsule, TAKE 1 CAPSULE BY MOUTH  ONCE DAILY, Disp: 90 capsule, Rfl: 1    valsartan (DIOVAN) 320 MG tablet, TAKE 1 TABLET BY MOUTH ONCE DAILY, Disp: 90 tablet, Rfl: 3    verapamiL (VERELAN) 240 MG C24P, Take 1 capsule (240 mg total) by mouth once daily., Disp: 90 capsule, Rfl: 1    vitamin D (VITAMIN D3) 1000 units Tab, Take 1,000 Units by mouth once daily., Disp: , Rfl:     vitamin E 400 UNIT capsule, Take 400 Units by mouth once daily., Disp: , Rfl:     zinc sulfate (ZINC-15 ORAL), Take by mouth., Disp: , Rfl:     aspirin (ECOTRIN) 81 MG EC tablet, Take 81 mg by mouth once daily., Disp: , Rfl:     blood sugar diagnostic Strp, To check BG 1 times daily, to use with insurance preferred meter, Disp: 100 strip, Rfl: 11    blood-glucose meter kit, To check BG 1 times daily, to use with insurance preferred meter, Disp: 1 each, Rfl: 0    celecoxib (CELEBREX) 200 MG capsule, Take 1 capsule (200 mg total) by mouth once daily. (Patient not taking: Reported on 8/15/2023), Disp: 60 capsule, Rfl: 2    lancets Misc, To check  "BG 1 times daily, to use with insurance preferred meter, Disp: 100 each, Rfl: 11    OPW TEST CLAIM - DO NOT FILL, OPW test claim. Do not fill. (Patient not taking: Reported on 5/24/2023), Disp: 1 tablet, Rfl: 0    Constitution: Negative for fever or chills. Negative for weight loss or gain.   HENT: Negative for sore throat or headaches. Negative for rhinorrhea.  Eyes: Negative for blurred or double vision.   Cardiovascular: See above  Pulmonary: Negative for SOB. Negative for cough.   Gastrointestinal: Negative for abdominal pain. Negative for nausea/ vomiting. Negative for diarrhea.   : Negative for dysuria.   Neurological: Negative for focal weakness or sensory changes.    Objective:   /80   Pulse 90   Ht 5' 3" (1.6 m)   Wt 91.5 kg (201 lb 11.5 oz)   LMP 08/01/2003   BMI 35.73 kg/m²     General: NAD. AAO  HENT: EOMI  Neck: No JVD  CV: RRR.   Resp: no increased WOB  Ext: No edema     Labs & Imaging   Reviewed in clinic today    Assessment:   Parisa Dimas is a 71 y.o. y.o. female who presented today for follow up of AF. Tolerating OAC. On verapamil.     1. Paroxysmal atrial fibrillation        2. Subaortic membrane        3. Primary hypertension          Plan:     - Continue current management    Neo Reyna MD, PGY8  Electrophysiology      " 18-Jul-2022

## 2023-10-23 ENCOUNTER — OUTPATIENT (OUTPATIENT)
Dept: OUTPATIENT SERVICES | Facility: HOSPITAL | Age: 78
LOS: 1 days | End: 2023-10-23
Payer: COMMERCIAL

## 2023-10-23 VITALS
TEMPERATURE: 97 F | HEIGHT: 72 IN | SYSTOLIC BLOOD PRESSURE: 134 MMHG | WEIGHT: 303.14 LBS | HEART RATE: 75 BPM | OXYGEN SATURATION: 96 % | RESPIRATION RATE: 18 BRPM | DIASTOLIC BLOOD PRESSURE: 74 MMHG

## 2023-10-23 DIAGNOSIS — Z98.890 OTHER SPECIFIED POSTPROCEDURAL STATES: Chronic | ICD-10-CM

## 2023-10-23 DIAGNOSIS — Z29.9 ENCOUNTER FOR PROPHYLACTIC MEASURES, UNSPECIFIED: ICD-10-CM

## 2023-10-23 DIAGNOSIS — D49.4 NEOPLASM OF UNSPECIFIED BEHAVIOR OF BLADDER: Chronic | ICD-10-CM

## 2023-10-23 DIAGNOSIS — I10 ESSENTIAL (PRIMARY) HYPERTENSION: ICD-10-CM

## 2023-10-23 DIAGNOSIS — E11.9 TYPE 2 DIABETES MELLITUS WITHOUT COMPLICATIONS: ICD-10-CM

## 2023-10-23 DIAGNOSIS — Z01.818 ENCOUNTER FOR OTHER PREPROCEDURAL EXAMINATION: ICD-10-CM

## 2023-10-23 DIAGNOSIS — G47.33 OBSTRUCTIVE SLEEP APNEA (ADULT) (PEDIATRIC): ICD-10-CM

## 2023-10-23 DIAGNOSIS — I48.4 ATYPICAL ATRIAL FLUTTER: ICD-10-CM

## 2023-10-23 LAB
ANION GAP SERPL CALC-SCNC: 11 MMOL/L — SIGNIFICANT CHANGE UP (ref 5–17)
ANION GAP SERPL CALC-SCNC: 11 MMOL/L — SIGNIFICANT CHANGE UP (ref 5–17)
APTT BLD: 32.3 SEC — SIGNIFICANT CHANGE UP (ref 24.5–35.6)
APTT BLD: 32.3 SEC — SIGNIFICANT CHANGE UP (ref 24.5–35.6)
BASOPHILS # BLD AUTO: 0.04 K/UL — SIGNIFICANT CHANGE UP (ref 0–0.2)
BASOPHILS # BLD AUTO: 0.04 K/UL — SIGNIFICANT CHANGE UP (ref 0–0.2)
BASOPHILS NFR BLD AUTO: 0.6 % — SIGNIFICANT CHANGE UP (ref 0–2)
BASOPHILS NFR BLD AUTO: 0.6 % — SIGNIFICANT CHANGE UP (ref 0–2)
BLD GP AB SCN SERPL QL: SIGNIFICANT CHANGE UP
BLD GP AB SCN SERPL QL: SIGNIFICANT CHANGE UP
BUN SERPL-MCNC: 23.5 MG/DL — HIGH (ref 8–20)
BUN SERPL-MCNC: 23.5 MG/DL — HIGH (ref 8–20)
CALCIUM SERPL-MCNC: 9 MG/DL — SIGNIFICANT CHANGE UP (ref 8.4–10.5)
CALCIUM SERPL-MCNC: 9 MG/DL — SIGNIFICANT CHANGE UP (ref 8.4–10.5)
CHLORIDE SERPL-SCNC: 102 MMOL/L — SIGNIFICANT CHANGE UP (ref 96–108)
CHLORIDE SERPL-SCNC: 102 MMOL/L — SIGNIFICANT CHANGE UP (ref 96–108)
CO2 SERPL-SCNC: 23 MMOL/L — SIGNIFICANT CHANGE UP (ref 22–29)
CO2 SERPL-SCNC: 23 MMOL/L — SIGNIFICANT CHANGE UP (ref 22–29)
CREAT SERPL-MCNC: 1.14 MG/DL — SIGNIFICANT CHANGE UP (ref 0.5–1.3)
CREAT SERPL-MCNC: 1.14 MG/DL — SIGNIFICANT CHANGE UP (ref 0.5–1.3)
EGFR: 66 ML/MIN/1.73M2 — SIGNIFICANT CHANGE UP
EGFR: 66 ML/MIN/1.73M2 — SIGNIFICANT CHANGE UP
EOSINOPHIL # BLD AUTO: 0.13 K/UL — SIGNIFICANT CHANGE UP (ref 0–0.5)
EOSINOPHIL # BLD AUTO: 0.13 K/UL — SIGNIFICANT CHANGE UP (ref 0–0.5)
EOSINOPHIL NFR BLD AUTO: 2 % — SIGNIFICANT CHANGE UP (ref 0–6)
EOSINOPHIL NFR BLD AUTO: 2 % — SIGNIFICANT CHANGE UP (ref 0–6)
GLUCOSE SERPL-MCNC: 138 MG/DL — HIGH (ref 70–99)
GLUCOSE SERPL-MCNC: 138 MG/DL — HIGH (ref 70–99)
HCT VFR BLD CALC: 38 % — LOW (ref 39–50)
HCT VFR BLD CALC: 38 % — LOW (ref 39–50)
HGB BLD-MCNC: 12.3 G/DL — LOW (ref 13–17)
HGB BLD-MCNC: 12.3 G/DL — LOW (ref 13–17)
IMM GRANULOCYTES NFR BLD AUTO: 0.6 % — SIGNIFICANT CHANGE UP (ref 0–0.9)
IMM GRANULOCYTES NFR BLD AUTO: 0.6 % — SIGNIFICANT CHANGE UP (ref 0–0.9)
INR BLD: 1.49 RATIO — HIGH (ref 0.85–1.18)
INR BLD: 1.49 RATIO — HIGH (ref 0.85–1.18)
LYMPHOCYTES # BLD AUTO: 1.21 K/UL — SIGNIFICANT CHANGE UP (ref 1–3.3)
LYMPHOCYTES # BLD AUTO: 1.21 K/UL — SIGNIFICANT CHANGE UP (ref 1–3.3)
LYMPHOCYTES # BLD AUTO: 18.9 % — SIGNIFICANT CHANGE UP (ref 13–44)
LYMPHOCYTES # BLD AUTO: 18.9 % — SIGNIFICANT CHANGE UP (ref 13–44)
MAGNESIUM SERPL-MCNC: 2.1 MG/DL — SIGNIFICANT CHANGE UP (ref 1.6–2.6)
MAGNESIUM SERPL-MCNC: 2.1 MG/DL — SIGNIFICANT CHANGE UP (ref 1.6–2.6)
MCHC RBC-ENTMCNC: 27.2 PG — SIGNIFICANT CHANGE UP (ref 27–34)
MCHC RBC-ENTMCNC: 27.2 PG — SIGNIFICANT CHANGE UP (ref 27–34)
MCHC RBC-ENTMCNC: 32.4 GM/DL — SIGNIFICANT CHANGE UP (ref 32–36)
MCHC RBC-ENTMCNC: 32.4 GM/DL — SIGNIFICANT CHANGE UP (ref 32–36)
MCV RBC AUTO: 84.1 FL — SIGNIFICANT CHANGE UP (ref 80–100)
MCV RBC AUTO: 84.1 FL — SIGNIFICANT CHANGE UP (ref 80–100)
MONOCYTES # BLD AUTO: 0.73 K/UL — SIGNIFICANT CHANGE UP (ref 0–0.9)
MONOCYTES # BLD AUTO: 0.73 K/UL — SIGNIFICANT CHANGE UP (ref 0–0.9)
MONOCYTES NFR BLD AUTO: 11.4 % — SIGNIFICANT CHANGE UP (ref 2–14)
MONOCYTES NFR BLD AUTO: 11.4 % — SIGNIFICANT CHANGE UP (ref 2–14)
NEUTROPHILS # BLD AUTO: 4.25 K/UL — SIGNIFICANT CHANGE UP (ref 1.8–7.4)
NEUTROPHILS # BLD AUTO: 4.25 K/UL — SIGNIFICANT CHANGE UP (ref 1.8–7.4)
NEUTROPHILS NFR BLD AUTO: 66.5 % — SIGNIFICANT CHANGE UP (ref 43–77)
NEUTROPHILS NFR BLD AUTO: 66.5 % — SIGNIFICANT CHANGE UP (ref 43–77)
PLATELET # BLD AUTO: 168 K/UL — SIGNIFICANT CHANGE UP (ref 150–400)
PLATELET # BLD AUTO: 168 K/UL — SIGNIFICANT CHANGE UP (ref 150–400)
POTASSIUM SERPL-MCNC: 4.7 MMOL/L — SIGNIFICANT CHANGE UP (ref 3.5–5.3)
POTASSIUM SERPL-MCNC: 4.7 MMOL/L — SIGNIFICANT CHANGE UP (ref 3.5–5.3)
POTASSIUM SERPL-SCNC: 4.7 MMOL/L — SIGNIFICANT CHANGE UP (ref 3.5–5.3)
POTASSIUM SERPL-SCNC: 4.7 MMOL/L — SIGNIFICANT CHANGE UP (ref 3.5–5.3)
PROTHROM AB SERPL-ACNC: 16.4 SEC — HIGH (ref 9.5–13)
PROTHROM AB SERPL-ACNC: 16.4 SEC — HIGH (ref 9.5–13)
RBC # BLD: 4.52 M/UL — SIGNIFICANT CHANGE UP (ref 4.2–5.8)
RBC # BLD: 4.52 M/UL — SIGNIFICANT CHANGE UP (ref 4.2–5.8)
RBC # FLD: 14.9 % — HIGH (ref 10.3–14.5)
RBC # FLD: 14.9 % — HIGH (ref 10.3–14.5)
SODIUM SERPL-SCNC: 135 MMOL/L — SIGNIFICANT CHANGE UP (ref 135–145)
SODIUM SERPL-SCNC: 135 MMOL/L — SIGNIFICANT CHANGE UP (ref 135–145)
T3 SERPL-MCNC: 66 NG/DL — LOW (ref 80–200)
T3 SERPL-MCNC: 66 NG/DL — LOW (ref 80–200)
T4 AB SER-ACNC: 6 UG/DL — SIGNIFICANT CHANGE UP (ref 4.5–12)
T4 AB SER-ACNC: 6 UG/DL — SIGNIFICANT CHANGE UP (ref 4.5–12)
TSH SERPL-MCNC: 3.26 UIU/ML — SIGNIFICANT CHANGE UP (ref 0.27–4.2)
TSH SERPL-MCNC: 3.26 UIU/ML — SIGNIFICANT CHANGE UP (ref 0.27–4.2)
WBC # BLD: 6.4 K/UL — SIGNIFICANT CHANGE UP (ref 3.8–10.5)
WBC # BLD: 6.4 K/UL — SIGNIFICANT CHANGE UP (ref 3.8–10.5)
WBC # FLD AUTO: 6.4 K/UL — SIGNIFICANT CHANGE UP (ref 3.8–10.5)
WBC # FLD AUTO: 6.4 K/UL — SIGNIFICANT CHANGE UP (ref 3.8–10.5)

## 2023-10-23 PROCEDURE — 86900 BLOOD TYPING SEROLOGIC ABO: CPT

## 2023-10-23 PROCEDURE — 85025 COMPLETE CBC W/AUTO DIFF WBC: CPT

## 2023-10-23 PROCEDURE — 36415 COLL VENOUS BLD VENIPUNCTURE: CPT

## 2023-10-23 PROCEDURE — 84436 ASSAY OF TOTAL THYROXINE: CPT

## 2023-10-23 PROCEDURE — G0463: CPT

## 2023-10-23 PROCEDURE — 86901 BLOOD TYPING SEROLOGIC RH(D): CPT

## 2023-10-23 PROCEDURE — 83735 ASSAY OF MAGNESIUM: CPT

## 2023-10-23 PROCEDURE — 85610 PROTHROMBIN TIME: CPT

## 2023-10-23 PROCEDURE — 84443 ASSAY THYROID STIM HORMONE: CPT

## 2023-10-23 PROCEDURE — 84480 ASSAY TRIIODOTHYRONINE (T3): CPT

## 2023-10-23 PROCEDURE — 93005 ELECTROCARDIOGRAM TRACING: CPT

## 2023-10-23 PROCEDURE — 86850 RBC ANTIBODY SCREEN: CPT

## 2023-10-23 PROCEDURE — 85730 THROMBOPLASTIN TIME PARTIAL: CPT

## 2023-10-23 PROCEDURE — 93010 ELECTROCARDIOGRAM REPORT: CPT

## 2023-10-23 PROCEDURE — 80048 BASIC METABOLIC PNL TOTAL CA: CPT

## 2023-10-23 RX ORDER — SITAGLIPTIN AND METFORMIN HYDROCHLORIDE 500; 50 MG/1; MG/1
1 TABLET, FILM COATED ORAL
Qty: 0 | Refills: 0 | DISCHARGE

## 2023-10-23 NOTE — H&P PST ADULT - PROBLEM SELECTOR PLAN 2
Mitral valve replacement , MAZE, left atrial appendage closure on 10/20/2022,   surgeon/ ANES aware, no preop Covid test required.    Denies any Covid symptoms today. Pt advised on holding metformin 24 hours

## 2023-10-23 NOTE — H&P PST ADULT - NSICDXPASTSURGICALHX_GEN_ALL_CORE_FT
PAST SURGICAL HISTORY:  Bladder tumor s/p resection 2020    H/O cardiac catheterization 5/2/2022    H/O shoulder surgery 1/1/2017     PAST SURGICAL HISTORY:  Bladder tumor s/p resection 2020    H/O cardiac catheterization 5/2/2022    H/O maze procedure     H/O mitral valve repair     H/O shoulder surgery 1/1/2017

## 2023-10-23 NOTE — H&P PST ADULT - OTHER CARE PROVIDERS
cardio Dr Catherine  [phone] EP cardio Dr Burdick , [phone] cardio Dr Catherine  [phone], [address] [phone], Dr Pinto  PCP

## 2023-10-23 NOTE — H&P PST ADULT - PROBLEM SELECTOR PLAN 4
last dose Eliquis on 10/13 am as per dr Barraza's advice Bring CPAP to hospital, verbalized understanding

## 2023-10-23 NOTE — H&P PST ADULT - EKG AND INTERPRETATION
AV dual paced rhythm with prolonged AV conduction AV dual paced rhythm with prolonged AV conduction 70

## 2023-10-23 NOTE — H&P PST ADULT - PROBLEM SELECTOR PLAN 3
Will follow up with labs/ fingerstick. Preop HgA1c ordered Instructed to hold metformin ( last dose on 10/18  AM  &  STAT FS  on the day of surgery ordered. Take metoprolol DOP

## 2023-10-23 NOTE — H&P PST ADULT - ASSESSMENT
{\rtf1\xasbnf00134\ansi\jdjetdj9216\ftnbj\uc1\deff0  {\fonttbl{\f0 \fnil Segoe UI;}{\f1 \froman \fcharset0 Segoe UI;}{\f2 \fswiss \fcharset0 Segoe UI;}{\f3 \fnil  New;}{\f4 \fnil Prince;}}  {\colortbl ;\fhd238\bhoaj306\cfeq731 ;\red51\green51\blue51 ;\red0\green0\blue0 ;\red0\green0\htes783 ;\udq458\green0\osax151 ;\sql078\green0\blue0 ;\red0\green0\blue0 ;}  {\stylesheet{\f0\fs20 Normal;}{\cs1 Default Paragraph Font;}{\s2\snext0\f3\fs0\b0\ul0\uldb0\uld0\embo0\i0\v0\strike0\striked0\protect0\super0\sub\caps0\scaps0\cf3\cb1\chcbpat1\expnd0\expndtw0\vfkumxgofc047\dn0 Definition Term;}{\s3\snext0\f3\fs0\b0\ul0\uldb0\uld0\embo0\i0\v0\strike0\striked0\protect0\super0\sub\caps0\scaps0\cf3\cb1\chcbpat1\expnd0\expndtw0\odggxjqigq942\dn0\li360   Definition List;}{\cs4\f3\fs0\b0\ul0\uldb0\uld0\embo0\i\v0\strike0\striked0\protect0\super0\sub\caps0\scaps0\cf3\cb1\chcbpat1\expnd0\expndtw0\sulqydsidf170\dn0 Definition;}{\s5\snext0\outlinelevel1\f3\fs48\b\ul0\uldb0\uld0\embo0\i0\v0\strike0\striked0\protect0\super0\sub\caps0\scaps0\cf3\cb1\chcbpat1\expnd0\expndtw0\bvgptttpjc862\dn0\sb100\sa100\sl0\outlinelevel1\keepn   H1;}{\s6\snext0\outlinelevel2\f3\fs36\b\ul0\uldb0\uld0\embo0\i0\v0\strike0\striked0\protect0\super0\sub\caps0\scaps0\cf3\cb1\chcbpat1\expnd0\expndtw0\jtjtxaxsrs252\dn0\sb100\sa100\sl0\outlinelevel2\keepn H2;}{\s7\snext0\outlinelevel3\f3\fs28\b\ul0\uldb0\uld0\embo0\i0\v0\strike0\striked0\protect0\super0\sub\caps0\scaps0\cf3\cb1\chcbpat1\expnd0\expndtw0\ptekavfwzb095\dn0\sb100\sa100\sl0\outlinelevel3\keepn   H3;}{\s8\snext0\outlinelevel4\f3\fs24\b\ul0\uldb0\uld0\embo0\i0\v0\strike0\striked0\protect0\super0\sub\caps0\scaps0\cf3\cb1\chcbpat1\expnd0\expndtw0\fdmxfldrcf491\dn0\sb100\sa100\sl0\outlinelevel4\keepn H4;}{\s9\snext0\outlinelevel5\f3\fs20\b\ul0\uldb0\uld0\embo0\i0\v0\strike0\striked0\protect0\super0\sub\caps0\scaps0\cf3\cb1\chcbpat1\expnd0\expndtw0\zokdewuwki052\dn0\sb100\sa100\sl0\outlinelevel5\keepn   H5;}{\s10\snext0\outlinelevel6\f3\fs16\b\ul0\uldb0\uld0\embo0\i0\v0\strike0\striked0\protect0\super0\sub\caps0\scaps0\cf3\cb1\chcbpat1\expnd0\expndtw0\yhbezdbvtp058\dn0\sb100\sa100\sl0\outlinelevel6\keepn H6;}{\s11\snext0\f3\fs0\b0\ul0\uldb0\uld0\embo0\i\v0\strike0\striked0\protect0\super0\sub\caps0\scaps0\cf3\cb1\chcbpat1\expnd0\expndtw0\pudghrehii417\dn0   Address;}{\s12\snext0\f3\fs0\b0\ul0\uldb0\uld0\embo0\i0\v0\strike0\striked0\protect0\super0\sub\caps0\scaps0\cf3\cb1\chcbpat1\expnd0\expndtw0\secucymerc222\dn0\li360\ri360\sb100\sa100\sl0 Blockquote;}{\cs13\f3\fs0\b0\ul0\uldb0\uld0\embo0\i\v0\strike0\striked0\protect0\super0\sub\caps0\scaps0\cf3\cb1\chcbpat1\expnd0\expndtw0\wslvmtdxlm567\dn0   CITE;}{\cs14\f3\fs20\b0\ul0\uldb0\uld0\embo0\i0\v0\strike0\striked0\protect0\super0\sub\caps0\scaps0\cf3\cb1\chcbpat1\expnd0\expndtw0\cqdsvqkvlp785\dn0 CODE;}{\cs15\f3\fs0\b0\ul0\uldb0\uld0\embo0\i\v0\strike0\striked0\protect0\super0\sub\caps0\scaps0\cf3\cb1\chcbpat1\expnd0\expndtw0\ackahtlzsx462\dn0   Emphasis;}{\cs16\f3\fs0\b0\ul\uldb0\uld0\embo0\i0\v0\strike0\striked0\protect0\super0\sub\caps0\scaps0\cf4\cb1\chcbpat1\expnd0\expndtw0\aukjcocwgi251\dn0 Hyperlink;}{\cs17\f3\fs0\b0\ul\uldb0\uld0\embo0\i0\v0\strike0\striked0\protect0\super0\sub\caps0\scaps0\cf5\cb1\chcbpat1\expnd0\expndtw0\izunrrcodx878\dn0   FollowedHyperlink;}{\cs18\f3\fs20\b\ul0\uldb0\uld0\embo0\i0\v0\strike0\striked0\protect0\super0\sub\caps0\scaps0\cf3\cb1\chcbpat1\expnd0\expndtw0\xyryuoxjmp663\dn0 Keyboard;}{\s19\snext0\f3\fs20\b0\ul0\uldb0\uld0\embo0\i0\v0\strike0\striked0\protect0\super0\sub\caps0\scaps0\cf3\cb1\chcbpat1\expnd0\expndtw0\vbllusunyo888\dn0\tx0\tx959\go5633\bo6502\it5011\zh5670\ig5261\eq5497\mu0392\mx5331\wf1949\sb0\sa0\sl0   Preformatted;}{\s20\snext0\f4\fs16\b0\ul0\uldb0\uld0\embo0\i0\v\strike0\striked0\protect0\super0\sub\caps0\scaps0\cf3\cb1\chcbpat1\expnd0\expndtw0\fmxihrsavq700\dn0\brdrt\brdrdb\brdrcf3\qc z-Bottom of Form;}{\s21\snext0\f4\fs16\b0\ul0\uldb0\uld0\embo0\i0\v\strike0\striked0\protect0\super0\sub\caps0\scaps0\cf3\cb1\chcbpat1\expnd0\expndtw0\owxzgwgsjd556\dn0\brdrb\brdrdb\brdrcf3\qc   z-Top of Form;}{\cs22\f3\fs0\b0\ul0\uldb0\uld0\embo0\i0\v0\strike0\striked0\protect0\super0\sub\caps0\scaps0\cf3\cb1\chcbpat1\expnd0\expndtw0\avqxhjmdqn040\dn0 Sample;}{\cs23\f3\fs0\b\ul0\uldb0\uld0\embo0\i0\v0\strike0\striked0\protect0\super0\sub\caps0\scaps0\cf3\cb1\chcbpat1\expnd0\expndtw0\bpjebdijws339\dn0   Strong;}{\cs24\f3\fs20\b0\ul0\uldb0\uld0\embo0\i0\v0\strike0\striked0\protect0\super0\sub\caps0\scaps0\cf3\cb1\chcbpat1\expnd0\expndtw0\ppwbqrlfej611\dn0 Typewriter;}{\cs25\f3\fs0\b0\ul0\uldb0\uld0\embo0\i\v0\strike0\striked0\protect0\super0\sub\caps0\scaps0\cf3\cb1\chcbpat1\expnd0\expndtw0\lmqmrxirlf563\dn0   Variable;}{\cs26\f3\fs0\b0\ul0\uldb0\uld0\embo0\i0\v\strike0\striked0\protect0\super0\sub\caps0\scaps0\cf6\cb1\chcbpat1\expnd0\expndtw0\wypzwidimx483\dn0 HTML Markup;}{\cs27\f3\fs0\b0\ul0\uldb0\uld0\embo0\i0\v\strike0\striked0\protect0\super0\sub\caps0\scaps0\cf3\cb1\chcbpat1\expnd0\expndtw0\mpharmxmdb668\dn0   Comment;}{\s28\snext0\f4\fs24\b0\ul0\uldb0\uld0\embo0\i0\v0\strike0\striked0\protect0\super0\sub\caps0\scaps0\cf3\cb1\chcbpat1\expnd0\expndtw0\tldzfqvchw085\dn0\li0\ri0\ltrpar\cbpat3 [Normal];}}  {\*\revtbl{Unknown;}}  {\*\listtable  {\list\listtemplateid0  {\listlevel\levelnfc0\levelfollow0\levelstartat1{\leveltext \'02\'00.}{\levelnumbers \'01}}  {\listlevel\levelnfc0\levelfollow0\levelstartat1{\leveltext \'02\'01.}{\levelnumbers \'01}}  {\listlevel\levelnfc0\levelfollow0\levelstartat1{\leveltext \'02\'02.}{\levelnumbers \'01}}  {\listlevel\levelnfc0\levelfollow0\levelstartat1{\leveltext \'02\'03.}{\levelnumbers \'01}}  {\listlevel\levelnfc0\levelfollow0\levelstartat1{\leveltext \'02\'04.}{\levelnumbers \'01}}  {\listlevel\levelnfc0\levelfollow0\levelstartat1{\leveltext \'02\'05.}{\levelnumbers \'01}}  {\listlevel\levelnfc0\levelfollow0\levelstartat1{\leveltext \'02\'06.}{\levelnumbers \'01}}  {\listlevel\levelnfc0\levelfollow0\levelstartat1{\leveltext \'02\'07.}{\levelnumbers \'01}}  {\listlevel\levelnfc0\levelfollow0\levelstartat0{\leveltext \'00}{\levelnumbers}}  {\listname ;}\listid2  }  {\list\listtemplateid0  {\listlevel\levelnfc0\levelfollow0\levelstartat1{\leveltext \'02\'00.}{\levelnumbers \'01}}  {\listlevel\levelnfc0\levelfollow0\levelstartat1{\leveltext \'02\'01.}{\levelnumbers \'01}}  {\listlevel\levelnfc0\levelfollow0\levelstartat1{\leveltext \'02\'02.}{\levelnumbers \'01}}  {\listlevel\levelnfc0\levelfollow0\levelstartat1{\leveltext \'02\'03.}{\levelnumbers \'01}}  {\listlevel\levelnfc0\levelfollow0\levelstartat1{\leveltext \'02\'04.}{\levelnumbers \'01}}  {\listlevel\levelnfc0\levelfollow0\levelstartat1{\leveltext \'02\'05.}{\levelnumbers \'01}}  {\listlevel\levelnfc0\levelfollow0\levelstartat1{\leveltext \'02\'06.}{\levelnumbers \'01}}  {\listlevel\levelnfc0\levelfollow0\levelstartat1{\leveltext \'02\'07.}{\levelnumbers \'01}}  {\listlevel\levelnfc0\levelfollow0\levelstartat0{\leveltext \'00}{\levelnumbers}}  {\listname ;}\listid1  }  }  {\*\listoverridetable}  \pveucu88366\pmvaud66889\tcuuy5928\yxiri2821\wsica9781\wfkfy7453\zhikkrw675\vmctcbc680\nogrowautofit\khtxec590\formshade\nofeaturethrottle1\dntblnsbdb\fet4\aendnotes\aftnnrlc\pgbrdrhead\pgbrdrfoot  \sectd\qecuzh94214\lkkrnz88071\guttersxn0\qszinhhw0207\mwshvosc0867\bnxflphg5620\jpjwofvg3426\dltlsqe498\wmcncdz242\sbkpage\pgncont\pgndec  \plain\plain\f0\fs24\ql\plain\f0\fs24\plain\f1\fs20\ifjd3255\hich\f1\dbch\f1\loch\f1\fs20 77 year old male patient with a history of HTN, HLD, DM2, hypothyroidism, obesity, bladder cancer s/p resection now self catheterizes and persistent AFib who underwent   ablation on 22 ([facility]/PVI and CTI). Now with severe Mitral valve disorder, \plain\f2\fs20\ursi9465\hich\f2\dbch\f2\loch\f2\cf2\fs20 scheduled for Mitral valve replacement , MAZE, left atrial appendage closure on 10/20/2022.\plain\f1\fs20\niag3676\hich\f1\dbch\f1\loch\f1\fs20\par  \pard\plain\f0\fs24\plain\f1\fs20\ekdb4097\hich\f1\dbch\f1\loch\f1\fs20 CAPRINI SCORE [CLOT]\par  \par  AGE RELATED RISK FACTORS                                                       MOBILITY RELATED FACTORS\par  [ ] Age 41-60 years                                            (1 Point)                  [ ] Bed rest                                                        (1 Point)\par  [ ] Age: 61-74 years                                           (2 Points)                 [ ] Plaster cast                                                   (2 Points)\par  [x ] Age= 75 years                                              (3 Points)                 [ ] Bed bound for more than 72 hours                 (2 Points)\par  \par  DISEASE RELATED RISK FACTORS                                               GENDER SPECIFIC FACTORS\par  [ ] Edema in the lower extremities                       (1 Point)                  [ ] Pregnancy                                                     (1 Point)\par  [ ] Varicose veins                                               (1 Point)                  [ ] Post-partum < 6 weeks                                   (1 Point)           \par  [x ] BMI > 25 Kg/m2                                            (1 Point)                  [ ] Hormonal therapy  or oral contraception          (1 Point)               \par  [ ] Sepsis (in the previous month)                        (1 Point)                  [ ] History of pregnancy complications                 (1 point)\par  [ ] Pneumonia or serious lung disease                                               [ ] Unexplained or recurrent                     (1 Point)\par           (in the previous month)                               (1 Point)\par  [ ] Abnormal pulmonary function test                     (1 Point)                 SURGERY RELATED RISK FACTORS\par  [ ] Acute myocardial infarction                              (1 Point)                 [ ]  Section                                             (1 Point)\par  [ ] Congestive heart failure (in the previous month)  (1 Point)               [ ] Minor surgery                                                  (1 Point) \par  [ ] Inflammatory bowel disease                             (1 Point)                 [ ] Arthroscopic surgery                                        (2 Points)\par  [ ] Central venous access                                      (2 Points)                [x ] General surgery lasting more than 45 minutes   (2 Points)     \par  [ ] Stroke (in the previous month)                          (5 Points)               [ ] Elective arthroplasty                                         (5 Points)          \par                                                                                                                                   \par  HEMATOLOGY RELATED FACTORS                                                 TRAUMA RELATED RISK FACTORS\par  [ ] Prior episodes of VTE                                     (3 Points)                [ ] Fracture of the hip, pelvis, or leg                       (5 Points)\par  [ ] Positive family history for VTE                         (3 Points)                 [ ] Acute spinal cord injury (in the previous month)  (5 Points)\par  [ ] Prothrombin  A                                     (3 Points)                 [ ] Paralysis  (less than 1 month)                             (5 Points)\par  [ ] Factor V Leiden                                             (3 Points)                  [ ] Multiple Trauma within 1 month                        (5 Points)\par  [ ] Lupus anticoagulants                                     (3 Points)                                                         \par  [ ] Anticardiolipin antibodies                               (3 Points)                                                     \par  [ ] High homocysteine in the blood                      (3 Points)                                           \par  [ ] Other congenital or acquired thrombophilia      (3 Points)                                              \par  [ ] Heparin induced thrombocytopenia                  (3 Points)                                    \par  \par  \par  Total Score [    6      ]\par  \par  Caprini Score 0 - 2:  Low Risk, No VTE Prophylaxis required for most patients, encourage ambulation\par  Caprini Score 3 - 6:  At Risk, pharmacologic VTE prophylaxis is indicated for most patients (in the absence of a contraindication)\par  Caprini Score Greater than or = 7:  High Risk, pharmacologic VTE prophylaxis is indicated for most patients (in the absence of a contraindication)\plain\f2\fs20\jssy5598\hich\f2\dbch\f2\loch\f2\cf2\fs20\par  \ql\plain\f0\fs24\plain\f1\fs20\jtzq3621\hich\f1\dbch\f1\loch\f1\fs20\par  }   {\rtf1\tnahud26078\ansi\caxzkay4527\ftnbj\uc1\deff0  {\fonttbl{\f0 \fnil Segoe UI;}{\f1 \froman \fcharset0 Segoe UI;}{\f2 \fswiss \fcharset0 Segoe UI;}{\f3 \fnil  New;}{\f4 \fnil [place];}}  {\colortbl ;\rcs152\snbqv526\aczb581 ;\red51\green51\blue51 ;\red0\green0\blue0 ;\red0\green0\apst393 ;\dib094\green0\lmgg107 ;\ylz987\green0\blue0 ;\red0\green0\blue0 ;}  {\stylesheet{\f0\fs20 Normal;}{\cs1 Default Paragraph Font;}{\s2\snext0\f3\fs0\b0\ul0\uldb0\uld0\embo0\i0\v0\strike0\striked0\protect0\super0\sub\caps0\scaps0\cf3\cb1\chcbpat1\expnd0\expndtw0\dwojwuxxnt944\dn0 Definition Term;}{\s3\snext0\f3\fs0\b0\ul0\uldb0\uld0\embo0\i0\v0\strike0\striked0\protect0\super0\sub\caps0\scaps0\cf3\cb1\chcbpat1\expnd0\expndtw0\yvbjrhgwxj172\dn0\li360   Definition List;}{\cs4\f3\fs0\b0\ul0\uldb0\uld0\embo0\i\v0\strike0\striked0\protect0\super0\sub\caps0\scaps0\cf3\cb1\chcbpat1\expnd0\expndtw0\amflwnywvf129\dn0 Definition;}{\s5\snext0\outlinelevel1\f3\fs48\b\ul0\uldb0\uld0\embo0\i0\v0\strike0\striked0\protect0\super0\sub\caps0\scaps0\cf3\cb1\chcbpat1\expnd0\expndtw0\uihggiucxl486\dn0\sb100\sa100\sl0\outlinelevel1\keepn   H1;}{\s6\snext0\outlinelevel2\f3\fs36\b\ul0\uldb0\uld0\embo0\i0\v0\strike0\striked0\protect0\super0\sub\caps0\scaps0\cf3\cb1\chcbpat1\expnd0\expndtw0\nbmoufedhj545\dn0\sb100\sa100\sl0\outlinelevel2\keepn H2;}{\s7\snext0\outlinelevel3\f3\fs28\b\ul0\uldb0\uld0\embo0\i0\v0\strike0\striked0\protect0\super0\sub\caps0\scaps0\cf3\cb1\chcbpat1\expnd0\expndtw0\jdrowggvar753\dn0\sb100\sa100\sl0\outlinelevel3\keepn   H3;}{\s8\snext0\outlinelevel4\f3\fs24\b\ul0\uldb0\uld0\embo0\i0\v0\strike0\striked0\protect0\super0\sub\caps0\scaps0\cf3\cb1\chcbpat1\expnd0\expndtw0\tgadolijze023\dn0\sb100\sa100\sl0\outlinelevel4\keepn H4;}{\s9\snext0\outlinelevel5\f3\fs20\b\ul0\uldb0\uld0\embo0\i0\v0\strike0\striked0\protect0\super0\sub\caps0\scaps0\cf3\cb1\chcbpat1\expnd0\expndtw0\ybalxgrkbf415\dn0\sb100\sa100\sl0\outlinelevel5\keepn   H5;}{\s10\snext0\outlinelevel6\f3\fs16\b\ul0\uldb0\uld0\embo0\i0\v0\strike0\striked0\protect0\super0\sub\caps0\scaps0\cf3\cb1\chcbpat1\expnd0\expndtw0\bgrhfbetqq347\dn0\sb100\sa100\sl0\outlinelevel6\keepn H6;}{\s11\snext0\f3\fs0\b0\ul0\uldb0\uld0\embo0\i\v0\strike0\striked0\protect0\super0\sub\caps0\scaps0\cf3\cb1\chcbpat1\expnd0\expndtw0\nbgefkyqzk483\dn0   Address;}{\s12\snext0\f3\fs0\b0\ul0\uldb0\uld0\embo0\i0\v0\strike0\striked0\protect0\super0\sub\caps0\scaps0\cf3\cb1\chcbpat1\expnd0\expndtw0\shthldkygi917\dn0\li360\ri360\sb100\sa100\sl0 Blockquote;}{\cs13\f3\fs0\b0\ul0\uldb0\uld0\embo0\i\v0\strike0\striked0\protect0\super0\sub\caps0\scaps0\cf3\cb1\chcbpat1\expnd0\expndtw0\gjbayssydo460\dn0   CITE;}{\cs14\f3\fs20\b0\ul0\uldb0\uld0\embo0\i0\v0\strike0\striked0\protect0\super0\sub\caps0\scaps0\cf3\cb1\chcbpat1\expnd0\expndtw0\gbyucdbyew418\dn0 CODE;}{\cs15\f3\fs0\b0\ul0\uldb0\uld0\embo0\i\v0\strike0\striked0\protect0\super0\sub\caps0\scaps0\cf3\cb1\chcbpat1\expnd0\expndtw0\vmwufpmazr477\dn0   Emphasis;}{\cs16\f3\fs0\b0\ul\uldb0\uld0\embo0\i0\v0\strike0\striked0\protect0\super0\sub\caps0\scaps0\cf4\cb1\chcbpat1\expnd0\expndtw0\zngwkzgbpn978\dn0 Hyperlink;}{\cs17\f3\fs0\b0\ul\uldb0\uld0\embo0\i0\v0\strike0\striked0\protect0\super0\sub\caps0\scaps0\cf5\cb1\chcbpat1\expnd0\expndtw0\uknrabdevn860\dn0   FollowedHyperlink;}{\cs18\f3\fs20\b\ul0\uldb0\uld0\embo0\i0\v0\strike0\striked0\protect0\super0\sub\caps0\scaps0\cf3\cb1\chcbpat1\expnd0\expndtw0\tcdrmzicip036\dn0 Keyboard;}{\s19\snext0\f3\fs20\b0\ul0\uldb0\uld0\embo0\i0\v0\strike0\striked0\protect0\super0\sub\caps0\scaps0\cf3\cb1\chcbpat1\expnd0\expndtw0\htzemxuvqc089\dn0\tx0\tx959\gv8599\uv4084\le4587\yw5342\er2346\ji8592\uw6643\ox2514\ml1591\sb0\sa0\sl0   Preformatted;}{\s20\snext0\f4\fs16\b0\ul0\uldb0\uld0\embo0\i0\v\strike0\striked0\protect0\super0\sub\caps0\scaps0\cf3\cb1\chcbpat1\expnd0\expndtw0\jgvzwgonfq987\dn0\brdrt\brdrdb\brdrcf3\qc z-Bottom of Form;}{\s21\snext0\f4\fs16\b0\ul0\uldb0\uld0\embo0\i0\v\strike0\striked0\protect0\super0\sub\caps0\scaps0\cf3\cb1\chcbpat1\expnd0\expndtw0\nllcejrvog891\dn0\brdrb\brdrdb\brdrcf3\qc   z-Top of Form;}{\cs22\f3\fs0\b0\ul0\uldb0\uld0\embo0\i0\v0\strike0\striked0\protect0\super0\sub\caps0\scaps0\cf3\cb1\chcbpat1\expnd0\expndtw0\lichmfwrxv496\dn0 Sample;}{\cs23\f3\fs0\b\ul0\uldb0\uld0\embo0\i0\v0\strike0\striked0\protect0\super0\sub\caps0\scaps0\cf3\cb1\chcbpat1\expnd0\expndtw0\nfwetmstrh111\dn0   Strong;}{\cs24\f3\fs20\b0\ul0\uldb0\uld0\embo0\i0\v0\strike0\striked0\protect0\super0\sub\caps0\scaps0\cf3\cb1\chcbpat1\expnd0\expndtw0\tasmarsizh704\dn0 Typewriter;}{\cs25\f3\fs0\b0\ul0\uldb0\uld0\embo0\i\v0\strike0\striked0\protect0\super0\sub\caps0\scaps0\cf3\cb1\chcbpat1\expnd0\expndtw0\vzcemvjksr324\dn0   Variable;}{\cs26\f3\fs0\b0\ul0\uldb0\uld0\embo0\i0\v\strike0\striked0\protect0\super0\sub\caps0\scaps0\cf6\cb1\chcbpat1\expnd0\expndtw0\yjqpwxyihi154\dn0 HTML Markup;}{\cs27\f3\fs0\b0\ul0\uldb0\uld0\embo0\i0\v\strike0\striked0\protect0\super0\sub\caps0\scaps0\cf3\cb1\chcbpat1\expnd0\expndtw0\nsipnuxmre953\dn0   Comment;}{\s28\snext0\f4\fs24\b0\ul0\uldb0\uld0\embo0\i0\v0\strike0\striked0\protect0\super0\sub\caps0\scaps0\cf3\cb1\chcbpat1\expnd0\expndtw0\pajbgrygru464\dn0\li0\ri0\ltrpar\cbpat3 [Normal];}}  {\*\revtbl{Unknown;}}  {\*\listtable  {\list\listtemplateid0  {\listlevel\levelnfc0\levelfollow0\levelstartat1{\leveltext \'02\'00.}{\levelnumbers \'01}}  {\listlevel\levelnfc0\levelfollow0\levelstartat1{\leveltext \'02\'01.}{\levelnumbers \'01}}  {\listlevel\levelnfc0\levelfollow0\levelstartat1{\leveltext \'02\'02.}{\levelnumbers \'01}}  {\listlevel\levelnfc0\levelfollow0\levelstartat1{\leveltext \'02\'03.}{\levelnumbers \'01}}  {\listlevel\levelnfc0\levelfollow0\levelstartat1{\leveltext \'02\'04.}{\levelnumbers \'01}}  {\listlevel\levelnfc0\levelfollow0\levelstartat1{\leveltext \'02\'05.}{\levelnumbers \'01}}  {\listlevel\levelnfc0\levelfollow0\levelstartat1{\leveltext \'02\'06.}{\levelnumbers \'01}}  {\listlevel\levelnfc0\levelfollow0\levelstartat1{\leveltext \'02\'07.}{\levelnumbers \'01}}  {\listlevel\levelnfc0\levelfollow0\levelstartat0{\leveltext \'00}{\levelnumbers}}  {\listname ;}\listid2  }  {\list\listtemplateid0  {\listlevel\levelnfc0\levelfollow0\levelstartat1{\leveltext \'02\'00.}{\levelnumbers \'01}}  {\listlevel\levelnfc0\levelfollow0\levelstartat1{\leveltext \'02\'01.}{\levelnumbers \'01}}  {\listlevel\levelnfc0\levelfollow0\levelstartat1{\leveltext \'02\'02.}{\levelnumbers \'01}}  {\listlevel\levelnfc0\levelfollow0\levelstartat1{\leveltext \'02\'03.}{\levelnumbers \'01}}  {\listlevel\levelnfc0\levelfollow0\levelstartat1{\leveltext \'02\'04.}{\levelnumbers \'01}}  {\listlevel\levelnfc0\levelfollow0\levelstartat1{\leveltext \'02\'05.}{\levelnumbers \'01}}  {\listlevel\levelnfc0\levelfollow0\levelstartat1{\leveltext \'02\'06.}{\levelnumbers \'01}}  {\listlevel\levelnfc0\levelfollow0\levelstartat1{\leveltext \'02\07.}{\levelnumbers \'01}}  {\listlevel\levelnfc0\levelfollow0\levelstartat0{\leveltext \'00}{\levelnumbers}}  {\listname ;}\listid1  }  }  {\*\listoverridetable}  \[id number]\weucfx51840\magty1168\ggqjq7422\ruydf2957\lpxrv1974\heneosr736\rfkempc882\nogrowautofit\zvyrcm834\formshade\nofeaturethrottle1\dntblnsbdb\fet4\aendnotes\aftnnrlc\pgbrdrhead\pgbrdrfoot  \sectd\dtkfav69725\ypgyje57375\guttersxn0\jriimrow3170\dnrlqoat4187\vbcszjon2726\wydniyci9483\oczaeeq013\foqlpwh636\sbkpage\pgncont\pgndec  \plain\plain\f0\fs24\ql\plain\f0\fs24\plain\f1\fs20\dqcy2729\hich\f1\dbch\f1\loch\f1\fs20\par  \pard\plain\f0\fs24\plain\f1\fs20\mlen5078\hich\f1\dbch\f1\loch\f1\fs20 CAPRINI SCORE [CLOT]\par  \par  AGE RELATED RISK FACTORS                                                       MOBILITY RELATED FACTORS\par  [ ] Age 41-60 years                                            (1 Point)                  [ ] Bed rest                                                        (1 Point)\par  [ ] Age: 61-74 years                                           (2 Points)                 [ ] Plaster cast                                                   (2 Points)\par  [x ] Age= 75 years                                              (3 Points)                 [ ] Bed bound for more than 72 hours                 (2 Points)\par  \par  DISEASE RELATED RISK FACTORS                                               GENDER SPECIFIC FACTORS\par  [ ] Edema in the lower extremities                       (1 Point)                  [ ] Pregnancy                                                     (1 Point)\par  [ ] Varicose veins                                               (1 Point)                  [ ] Post-partum < 6 weeks                                   (1 Point)           \par  [x ] BMI > 25 Kg/m2                                            (1 Point)                  [ ] Hormonal therapy  or oral contraception          (1 Point)               \par  [ ] Sepsis (in the previous month)                        (1 Point)                  [ ] History of pregnancy complications                 (1 point)\par  [ ] Pneumonia or serious lung disease                                               [ ] Unexplained or recurrent                     (1 Point)\par           (in the previous month)                               (1 Point)\par  [ ] Abnormal pulmonary function test                     (1 Point)                 SURGERY RELATED RISK FACTORS\par  [ ] Acute myocardial infarction                              (1 Point)                 [ ]  Section                                             (1 Point)\par  [ ] Congestive heart failure (in the previous month)  (1 Point)               [ ] Minor surgery                                                  (1 Point) \par  [ ] Inflammatory bowel disease                             (1 Point)                 [ ] Arthroscopic surgery                                        (2 Points)\par  [ ] Central venous access                                      (2 Points)                [x ] General surgery lasting more than 45 minutes   (2 Points)     \par  [ ] Stroke (in the previous month)                          (5 Points)               [ ] Elective arthroplasty                                         (5 Points)          \par                                                                                                                                   \par  HEMATOLOGY RELATED FACTORS                                                 TRAUMA RELATED RISK FACTORS\par  [ ] Prior episodes of VTE                                     (3 Points)                [ ] Fracture of the hip, pelvis, or leg                       (5 Points)\par  [ ] Positive family history for VTE                         (3 Points)                 [ ] Acute spinal cord injury (in the previous month)  (5 Points)\par  [ ] Prothrombin 20312 A                                     (3 Points)                 [ ] Paralysis  (less than 1 month)                             (5 Points)\par  [ ] Factor V Leiden                                             (3 Points)                  [ ] Multiple Trauma within 1 month                        (5 Points)\par  [ ] Lupus anticoagulants                                     (3 Points)                                                         \par  [ ] Anticardiolipin antibodies                               (3 Points)                                                     \par  [ ] High homocysteine in the blood                      (3 Points)                                           \par  [ ] Other congenital or acquired thrombophilia      (3 Points)                                              \par  [ ] Heparin induced thrombocytopenia                  (3 Points)                                    \par  \par  \par  Total Score [    6      ]\par  \par  Caprini Score 0 - 2:  Low Risk, No VTE Prophylaxis required for most patients, encourage ambulation\par  Caprini Score 3 - 6:  At Risk, pharmacologic VTE prophylaxis is indicated for most patients (in the absence of a contraindication)\par  Caprini Score Greater than or = 7:  High Risk, pharmacologic VTE prophylaxis is indicated for most patients (in the absence of a contraindication)\par  \par  \plain\f0\fs20\[id number]\hich\f0\dbch\f0\loch\f0\fs20 OPIOID RISK TOOL\par  \par  ALICIA EACH BOX THAT APPLIES AND ADD TOTALS AT THE END\par  \par  FAMILY HISTORY OF SUBSTANCE ABUSE                 FEMALE         MALE \par                                               Alcohol                             [  ]1 pt          [  ]3pts\par                                               Illegal Durgs                     [  ]2 pts        [  ]3pts\par                                               Rx Drugs                           [  ]4 pts        [  ]4 pts\par  \par  PERSONAL HISTORY OF SUBSTANCE ABUSE                                           \par                                               Alcohol                             [  ]3 pts       [  ]3 pts\par                                               Illegal Durgs                     [  ]4 pts        [  ]4 pts\par                                               Rx Drugs                           [  ]5 pts        [  ]5 pts\par  \par  AGE BETWEEN 16-45 YEARS                                      [  ]1 pt         [  ]1 pt\par  \par  HISTORY OF PREADOLESCENT \par  SEXUAL ABUSE                                                             [  ]3 pts        [  ]0pts\par  \par  PSYCHOLOGICAL DISEASE\par                     ADD, OCD, Bipolar, Schizophrenia        [  ]2 pts         [  ]2 pts\par                      Depression                                               [  ]1 pt           [  ]1 pt       \par  \par  SCORING TOTAL   (add numbers and type here)              (***)                                 \par  \par  A score of 3 or lower indicated LOW risk for future opiod abuse\par  A score of 4 to 7 indicated moderate risk for future opiod abuse\par  A score of 8 or higher indicates a high risk for opiod abuse\plain\f2\fs20\[id number]\hich\f2\dbch\f2\loch\f2\cf2\fs20\par  \ql\plain\f0\fs24\plain\f1\fs20\juuq0929\hich\f1\dbch\f1\loch\f1\fs20\par  }   Plan: [facility]/Aflutter ablation on 23 with Dr Burdick   Labs pending.   Pre-procedure instructions provided (verbal & written) as follows: Patient educated on NPO after MN, preadmission instructions,  and day of procedure medications, verbalizes understanding. Pt instructed to stop vitamins/supplements/herbal medications/NSAIDS for one week prior to surgery and discuss with PMD.   - Last dose Eliquis  PM hold 8 AM   - Hold the following medications the morning of the procedure: Hold multaq 48 hours (as pe pt notified by Dr Burdick), hold metformin 24 hours    CAPRINI SCORE [CLOT]    AGE RELATED RISK FACTORS                                                       MOBILITY RELATED FACTORS  [ ] Age 41-60 years                                            (1 Point)                  [ ] Bed rest                                                        (1 Point)  [ ] Age: 61-74 years                                           (2 Points)                 [ ] Plaster cast                                                   (2 Points)  [x ] Age= 75 years                                              (3 Points)                 [ ] Bed bound for more than 72 hours                 (2 Points)    DISEASE RELATED RISK FACTORS                                               GENDER SPECIFIC FACTORS  [ ] Edema in the lower extremities                       (1 Point)                  [ ] Pregnancy                                                     (1 Point)  [ ] Varicose veins                                               (1 Point)                  [ ] Post-partum < 6 weeks                                   (1 Point)             [x ] BMI > 25 Kg/m2                                            (1 Point)                  [ ] Hormonal therapy  or oral contraception          (1 Point)                 [ ] Sepsis (in the previous month)                        (1 Point)                  [ ] History of pregnancy complications                 (1 point)  [ ] Pneumonia or serious lung disease                                               [ ] Unexplained or recurrent                     (1 Point)           (in the previous month)                               (1 Point)  [ ] Abnormal pulmonary function test                     (1 Point)                 SURGERY RELATED RISK FACTORS  [ ] Acute myocardial infarction                              (1 Point)                 [ ]  Section                                             (1 Point)  [ ] Congestive heart failure (in the previous month)  (1 Point)               [ ] Minor surgery                                                  (1 Point)   [ ] Inflammatory bowel disease                             (1 Point)                 [ ] Arthroscopic surgery                                        (2 Points)  [ ] Central venous access                                      (2 Points)                [x ] General surgery lasting more than 45 minutes   (2 Points)       [ ] Stroke (in the previous month)                          (5 Points)               [ ] Elective arthroplasty                                         (5 Points)                                                                                                                                               HEMATOLOGY RELATED FACTORS                                                 TRAUMA RELATED RISK FACTORS  [ ] Prior episodes of VTE                                     (3 Points)                [ ] Fracture of the hip, pelvis, or leg                       (5 Points)  [ ] Positive family history for VTE                         (3 Points)                 [ ] Acute spinal cord injury (in the previous month)  (5 Points)  [ ] Prothrombin 03340 A                                     (3 Points)                 [ ] Paralysis  (less than 1 month)                             (5 Points)  [ ] Factor V Leiden                                             (3 Points)                  [ ] Multiple Trauma within 1 month                        (5 Points)  [ ] Lupus anticoagulants                                     (3 Points)                                                           [ ] Anticardiolipin antibodies                               (3 Points)                                                       [ ] High homocysteine in the blood                      (3 Points)                                             [ ] Other congenital or acquired thrombophilia      (3 Points)                                                [ ] Heparin induced thrombocytopenia                  (3 Points)                                          Total Score [    6      ]    Caprini Score 0 - 2:  Low Risk, No VTE Prophylaxis required for most patients, encourage ambulation  Caprini Score 3 - 6:  At Risk, pharmacologic VTE prophylaxis is indicated for most patients (in the absence of a contraindication)  Caprini Score Greater than or = 7:  High Risk, pharmacologic VTE prophylaxis is indicated for most patients (in the absence of a contraindication)    OPIOID RISK TOOL    ALICIA EACH BOX THAT APPLIES AND ADD TOTALS AT THE END    FAMILY HISTORY OF SUBSTANCE ABUSE                 FEMALE         MALE                                                Alcohol                             [  ]1 pt          [  ]3pts                                               Illegal Durgs                     [  ]2 pts        [  ]3pts                                               Rx Drugs                           [  ]4 pts        [  ]4 pts    PERSONAL HISTORY OF SUBSTANCE ABUSE                                                                                          Alcohol                             [  ]3 pts       [  ]3 pts                                               Illegal Durgs                     [  ]4 pts        [  ]4 pts                                               Rx Drugs                           [  ]5 pts        [  ]5 pts    AGE BETWEEN 16-45 YEARS                                      [  ]1 pt         [  ]1 pt    HISTORY OF PREADOLESCENT   SEXUAL ABUSE                                                             [  ]3 pts        [  ]0pts    PSYCHOLOGICAL DISEASE                     ADD, OCD, Bipolar, Schizophrenia        [  ]2 pts         [  ]2 pts                      Depression                                               [  ]1 pt           [  ]1 pt           SCORING TOTAL   0    A score of 3 or lower indicated LOW risk for future opiod abuse  A score of 4 to 7 indicated moderate risk for future opiod abuse  A score of 8 or higher indicates a high risk for opiod abuse

## 2023-10-23 NOTE — H&P PST ADULT - PROBLEM SELECTOR PLAN 1
** Pt was tested covid positive with rapid test on 10/10/2022( surgeon aware)  & had [facility]AB from [facility] on 10/10/2022 (copy of available proof in chart)  Denies any Covid symptoms today. [facility]/Aflutter ablation on 11/8/23 with Dr Burdick  continue aspirin

## 2023-10-23 NOTE — H&P PST ADULT - HISTORY OF PRESENT ILLNESS
{\rtf1\ytedzc20661\ansi\ocvrazj4758\ftnbj\uc1\deff0  {\fonttbl{\f0 \fnil Segoe UI;}{\f1 \froman \fcharset0 Segoe UI;}{\f2 \fswiss \fcharset0 Segoe UI;}{\f3 \fnil  New;}{\f4 \fnil [place];}}  {\colortbl ;\wcc521\lgqsq817\inje119 ;\red51\green51\blue51 ;\red0\green0\blue0 ;\red0\green0\wycn505 ;\rsy346\green0\pykg136 ;\gud298\green0\blue0 ;\red0\green0\blue0 ;}  {\stylesheet{\f0\fs20 Normal;}{\cs1 Default Paragraph Font;}{\s2\snext0\f3\fs0\b0\ul0\uldb0\uld0\embo0\i0\v0\strike0\striked0\protect0\super0\sub\caps0\scaps0\cf3\cb1\chcbpat1\expnd0\expndtw0\nstcbggmek467\dn0 Definition Term;}{\s3\snext0\f3\fs0\b0\ul0\uldb0\uld0\embo0\i0\v0\strike0\striked0\protect0\super0\sub\caps0\scaps0\cf3\cb1\chcbpat1\expnd0\expndtw0\pmpbnhcdgr092\dn0\li360   Definition List;}{\cs4\f3\fs0\b0\ul0\uldb0\uld0\embo0\i\v0\strike0\striked0\protect0\super0\sub\caps0\scaps0\cf3\cb1\chcbpat1\expnd0\expndtw0\gipfovqkgl216\dn0 Definition;}{\s5\snext0\outlinelevel1\f3\fs48\b\ul0\uldb0\uld0\embo0\i0\v0\strike0\striked0\protect0\super0\sub\caps0\scaps0\cf3\cb1\chcbpat1\expnd0\expndtw0\jmjilohnje606\dn0\sb100\sa100\sl0\outlinelevel1\keepn   H1;}{\s6\snext0\outlinelevel2\f3\fs36\b\ul0\uldb0\uld0\embo0\i0\v0\strike0\striked0\protect0\super0\sub\caps0\scaps0\cf3\cb1\chcbpat1\expnd0\expndtw0\kwssbmchyn384\dn0\sb100\sa100\sl0\outlinelevel2\keepn H2;}{\s7\snext0\outlinelevel3\f3\fs28\b\ul0\uldb0\uld0\embo0\i0\v0\strike0\striked0\protect0\super0\sub\caps0\scaps0\cf3\cb1\chcbpat1\expnd0\expndtw0\fihbwjkoag939\dn0\sb100\sa100\sl0\outlinelevel3\keepn   H3;}{\s8\snext0\outlinelevel4\f3\fs24\b\ul0\uldb0\uld0\embo0\i0\v0\strike0\striked0\protect0\super0\sub\caps0\scaps0\cf3\cb1\chcbpat1\expnd0\expndtw0\swxuzupgec140\dn0\sb100\sa100\sl0\outlinelevel4\keepn H4;}{\s9\snext0\outlinelevel5\f3\fs20\b\ul0\uldb0\uld0\embo0\i0\v0\strike0\striked0\protect0\super0\sub\caps0\scaps0\cf3\cb1\chcbpat1\expnd0\expndtw0\wektontglk779\dn0\sb100\sa100\sl0\outlinelevel5\keepn   H5;}{\s10\snext0\outlinelevel6\f3\fs16\b\ul0\uldb0\uld0\embo0\i0\v0\strike0\striked0\protect0\super0\sub\caps0\scaps0\cf3\cb1\chcbpat1\expnd0\expndtw0\ucolcaweey879\dn0\sb100\sa100\sl0\outlinelevel6\keepn H6;}{\s11\snext0\f3\fs0\b0\ul0\uldb0\uld0\embo0\i\v0\strike0\striked0\protect0\super0\sub\caps0\scaps0\cf3\cb1\chcbpat1\expnd0\expndtw0\ktgotgzlzi043\dn0   Address;}{\s12\snext0\f3\fs0\b0\ul0\uldb0\uld0\embo0\i0\v0\strike0\striked0\protect0\super0\sub\caps0\scaps0\cf3\cb1\chcbpat1\expnd0\expndtw0\yventgrpqg654\dn0\li360\ri360\sb100\sa100\sl0 Blockquote;}{\cs13\f3\fs0\b0\ul0\uldb0\uld0\embo0\i\v0\strike0\striked0\protect0\super0\sub\caps0\scaps0\cf3\cb1\chcbpat1\expnd0\expndtw0\xdsbtdvunj886\dn0   CITE;}{\cs14\f3\fs20\b0\ul0\uldb0\uld0\embo0\i0\v0\strike0\striked0\protect0\super0\sub\caps0\scaps0\cf3\cb1\chcbpat1\expnd0\expndtw0\uybvprpgbh295\dn0 CODE;}{\cs15\f3\fs0\b0\ul0\uldb0\uld0\embo0\i\v0\strike0\striked0\protect0\super0\sub\caps0\scaps0\cf3\cb1\chcbpat1\expnd0\expndtw0\zjffpjutaq290\dn0   Emphasis;}{\cs16\f3\fs0\b0\ul\uldb0\uld0\embo0\i0\v0\strike0\striked0\protect0\super0\sub\caps0\scaps0\cf4\cb1\chcbpat1\expnd0\expndtw0\phexqoeofk021\dn0 Hyperlink;}{\cs17\f3\fs0\b0\ul\uldb0\uld0\embo0\i0\v0\strike0\striked0\protect0\super0\sub\caps0\scaps0\cf5\cb1\chcbpat1\expnd0\expndtw0\omwnjnkomt777\dn0   FollowedHyperlink;}{\cs18\f3\fs20\b\ul0\uldb0\uld0\embo0\i0\v0\strike0\striked0\protect0\super0\sub\caps0\scaps0\cf3\cb1\chcbpat1\expnd0\expndtw0\tyeueciakj593\dn0 Keyboard;}{\s19\snext0\f3\fs20\b0\ul0\uldb0\uld0\embo0\i0\v0\strike0\striked0\protect0\super0\sub\caps0\scaps0\cf3\cb1\chcbpat1\expnd0\expndtw0\ndahhysrwf245\dn0\tx0\tx959\sx8453\ac7071\ae7693\ak4230\aj4296\vp0310\he6237\lz4723\oa0528\sb0\sa0\sl0   Preformatted;}{\s20\snext0\f4\fs16\b0\ul0\uldb0\uld0\embo0\i0\v\strike0\striked0\protect0\super0\sub\caps0\scaps0\cf3\cb1\chcbpat1\expnd0\expndtw0\mctluclmcl626\dn0\brdrt\brdrdb\brdrcf3\qc z-Bottom of Form;}{\s21\snext0\f4\fs16\b0\ul0\uldb0\uld0\embo0\i0\v\strike0\striked0\protect0\super0\sub\caps0\scaps0\cf3\cb1\chcbpat1\expnd0\expndtw0\ebuaikycyb138\dn0\brdrb\brdrdb\brdrcf3\qc   z-Top of Form;}{\cs22\f3\fs0\b0\ul0\uldb0\uld0\embo0\i0\v0\strike0\striked0\protect0\super0\sub\caps0\scaps0\cf3\cb1\chcbpat1\expnd0\expndtw0\pozlxcvote371\dn0 Sample;}{\cs23\f3\fs0\b\ul0\uldb0\uld0\embo0\i0\v0\strike0\striked0\protect0\super0\sub\caps0\scaps0\cf3\cb1\chcbpat1\expnd0\expndtw0\piovgtwfdu861\dn0   Strong;}{\cs24\f3\fs20\b0\ul0\uldb0\uld0\embo0\i0\v0\strike0\striked0\protect0\super0\sub\caps0\scaps0\cf3\cb1\chcbpat1\expnd0\expndtw0\qkuqxrppir235\dn0 Typewriter;}{\cs25\f3\fs0\b0\ul0\uldb0\uld0\embo0\i\v0\strike0\striked0\protect0\super0\sub\caps0\scaps0\cf3\cb1\chcbpat1\expnd0\expndtw0\hsbtyumryz103\dn0   Variable;}{\cs26\f3\fs0\b0\ul0\uldb0\uld0\embo0\i0\v\strike0\striked0\protect0\super0\sub\caps0\scaps0\cf6\cb1\chcbpat1\expnd0\expndtw0\ncjntqhyhg625\dn0 HTML Markup;}{\cs27\f3\fs0\b0\ul0\uldb0\uld0\embo0\i0\v\strike0\striked0\protect0\super0\sub\caps0\scaps0\cf3\cb1\chcbpat1\expnd0\expndtw0\eszirvsvit053\dn0   Comment;}{\s28\snext0\f4\fs24\b0\ul0\uldb0\uld0\embo0\i0\v0\strike0\striked0\protect0\super0\sub\caps0\scaps0\cf3\cb1\chcbpat1\expnd0\expndtw0\wadyjiyntw991\dn0\li0\ri0\ltrpar\cbpat3 [Normal];}}  {\*\revtbl{Unknown;}}  {\*\listtable  {\list\listtemplateid0  {\listlevel\levelnfc0\levelfollow0\levelstartat1{\leveltext \'02\'00.}{\levelnumbers \'01}}  {\listlevel\levelnfc0\levelfollow0\levelstartat1{\leveltext \'02\'01.}{\levelnumbers \'01}}  {\listlevel\levelnfc0\levelfollow0\levelstartat1{\leveltext \'02\'02.}{\levelnumbers \'01}}  {\listlevel\levelnfc0\levelfollow0\levelstartat1{\leveltext \'02\'03.}{\levelnumbers \'01}}  {\listlevel\levelnfc0\levelfollow0\levelstartat1{\leveltext \'02\'04.}{\levelnumbers \'01}}  {\listlevel\levelnfc0\levelfollow0\levelstartat1{\leveltext \'02\'05.}{\levelnumbers \'01}}  {\listlevel\levelnfc0\levelfollow0\levelstartat1{\leveltext \'02\'06.}{\levelnumbers \'01}}  {\listlevel\levelnfc0\levelfollow0\levelstartat1{\leveltext \'02\'07.}{\levelnumbers \'01}}  {\listlevel\levelnfc0\levelfollow0\levelstartat0{\leveltext \'00}{\levelnumbers}}  {\listname ;}\listid2  }  {\list\listtemplateid0  {\listlevel\levelnfc0\levelfollow0\levelstartat1{\leveltext \'02\'00.}{\levelnumbers \'01}}  {\listlevel\levelnfc0\levelfollow0\levelstartat1{\leveltext \'02\'01.}{\levelnumbers \'01}}  {\listlevel\levelnfc0\levelfollow0\levelstartat1{\leveltext \'02\'02.}{\levelnumbers \'01}}  {\listlevel\levelnfc0\levelfollow0\levelstartat1{\leveltext \'02\'03.}{\levelnumbers \'01}}  {\listlevel\levelnfc0\levelfollow0\levelstartat1{\leveltext \'02\'04.}{\levelnumbers \'01}}  {\listlevel\levelnfc0\levelfollow0\levelstartat1{\leveltext \'02\'05.}{\levelnumbers \'01}}  {\listlevel\levelnfc0\levelfollow0\levelstartat1{\leveltext \'02\'06.}{\levelnumbers \'01}}  {\listlevel\levelnfc0\levelfollow0\levelstartat1{\leveltext \'02\'07.}{\levelnumbers \'01}}  {\listlevel\levelnfc0\levelfollow0\levelstartat0{\leveltext \'00}{\levelnumbers}}  {\listname ;}\listid1  }  }  {\*\listoverridetable}  \imumdy04500\lthivx81456\idxbh6950\xecbt6986\zskpv3856\aqnri4146\qiicwel005\zorcqll168\nogrowautofit\uujfwr591\formshade\nofeaturethrottle1\dntblnsbdb\fet4\aendnotes\aftnnrlc\pgbrdrhead\pgbrdrfoot  \sectd\ccdgzq59313\ludhze27321\guttersxn0\gubralrf4480\exwjplzo9526\clqhpqmu0180\mhrlpwtm0528\uscwgsd778\nsfivuw701\sbkpage\pgncont\pgndec  \plain\plain\f0\fs24\sb100\sa100\ql\plain\f0\fs24\plain\f1\fs20\svya9720\hich\f1\dbch\f1\loch\f1\fs20 77  year old  RHD male PMH of HTN, HLD, DM2, former smoker,  hypothyroidism, obesity, LILLY on C-pap,  bladder cancer (2020, s/p resection of bladder tumor/   currently self catheterizes 6x/day ), PAF s/p DCCV /\plain\f2\fs20\gfrp7898\hich\f2\dbch\f2\loch\f2\cf2\fs20\ltrch uncomplicated radiofrequency ablation of atrial fibrillation ([facility]/PVI, CTI)\plain\f2\fs20\ogxn2040\hich\f2\dbch\f2\loch\f2\cf2\fs20  on   7/13/22,  left atrial appendage & severe MR . Presents with c/o recent" abnormal echo with fatigue due to mitral valve regurgitation. Presents for  scheduled Mitral valve replacement , MAZE, left atrial appendage closure on 10/20/2022.\par  ** Pt s/p tested covid positive with rapid test on 10/10/2022  & had  Bebtelovimab MAB from [facility] on 10/10/59251  & available document in chart & as per patient surgeon aware & ok for suregry . Case discussed with ANES, & No preop   covid test required. \par  Denies any Covid symptoms today. \par  \plain\f1\fs20\wqsw6334\hich\f1\dbch\f1\loch\f1\fs20\par  \par  4/28/2022 TTE: LVEF 61%. LV thickness is mildly decreased. Grade 2 diastolic dysfunction. Interventricular septal thickness is mildly increased. RV systolic pressure elevated at 40-50 mmHg. Moderately dilated LA (LAd 5.3 cm; MARQUISE 422.6 ml/m2). Moderately   dilated RA. Mild MR with eccentric jet. Mild TR.\par  \sb0\sa0\plain\f0\fs24\plain\f1\fs20\mahl4615\hich\f1\dbch\f1\loch\f1\fs20 Cath 5/2/2022: no obstructive coronary disease\par  }   {\rtf1\ygpelr70635\ansi\yqxeyfw9913\ftnbj\uc1\deff0  {\fonttbl{\f0 \fnil Segoe UI;}{\f1 \froman \fcharset0 Segoe UI;}{\f2 \fswiss \fcharset0 Segoe UI;}{\f3 \fnil  New;}{\f4 \fnil [place];}}  {\colortbl ;\zpf740\pbijb186\ogus329 ;\red51\green51\blue51 ;\red0\green0\blue0 ;\red0\green0\qbbl298 ;\qmr237\green0\inri776 ;\fmn883\green0\blue0 ;\red0\green0\blue0 ;}  {\stylesheet{\f0\fs20 Normal;}{\cs1 Default Paragraph Font;}{\s2\snext0\f3\fs0\b0\ul0\uldb0\uld0\embo0\i0\v0\strike0\striked0\protect0\super0\sub\caps0\scaps0\cf3\cb1\chcbpat1\expnd0\expndtw0\nvfrvnnpvs764\dn0 Definition Term;}{\s3\snext0\f3\fs0\b0\ul0\uldb0\uld0\embo0\i0\v0\strike0\striked0\protect0\super0\sub\caps0\scaps0\cf3\cb1\chcbpat1\expnd0\expndtw0\dmumdxtaxu803\dn0\li360   Definition List;}{\cs4\f3\fs0\b0\ul0\uldb0\uld0\embo0\i\v0\strike0\striked0\protect0\super0\sub\caps0\scaps0\cf3\cb1\chcbpat1\expnd0\expndtw0\iwueiwxwph876\dn0 Definition;}{\s5\snext0\outlinelevel1\f3\fs48\b\ul0\uldb0\uld0\embo0\i0\v0\strike0\striked0\protect0\super0\sub\caps0\scaps0\cf3\cb1\chcbpat1\expnd0\expndtw0\ogzdelibnr973\dn0\sb100\sa100\sl0\outlinelevel1\keepn   H1;}{\s6\snext0\outlinelevel2\f3\fs36\b\ul0\uldb0\uld0\embo0\i0\v0\strike0\striked0\protect0\super0\sub\caps0\scaps0\cf3\cb1\chcbpat1\expnd0\expndtw0\shddrxizrs063\dn0\sb100\sa100\sl0\outlinelevel2\keepn H2;}{\s7\snext0\outlinelevel3\f3\fs28\b\ul0\uldb0\uld0\embo0\i0\v0\strike0\striked0\protect0\super0\sub\caps0\scaps0\cf3\cb1\chcbpat1\expnd0\expndtw0\hjtxqdfaet283\dn0\sb100\sa100\sl0\outlinelevel3\keepn   H3;}{\s8\snext0\outlinelevel4\f3\fs24\b\ul0\uldb0\uld0\embo0\i0\v0\strike0\striked0\protect0\super0\sub\caps0\scaps0\cf3\cb1\chcbpat1\expnd0\expndtw0\snvqcutujo334\dn0\sb100\sa100\sl0\outlinelevel4\keepn H4;}{\s9\snext0\outlinelevel5\f3\fs20\b\ul0\uldb0\uld0\embo0\i0\v0\strike0\striked0\protect0\super0\sub\caps0\scaps0\cf3\cb1\chcbpat1\expnd0\expndtw0\ururiloell960\dn0\sb100\sa100\sl0\outlinelevel5\keepn   H5;}{\s10\snext0\outlinelevel6\f3\fs16\b\ul0\uldb0\uld0\embo0\i0\v0\strike0\striked0\protect0\super0\sub\caps0\scaps0\cf3\cb1\chcbpat1\expnd0\expndtw0\bgkmgccbop742\dn0\sb100\sa100\sl0\outlinelevel6\keepn H6;}{\s11\snext0\f3\fs0\b0\ul0\uldb0\uld0\embo0\i\v0\strike0\striked0\protect0\super0\sub\caps0\scaps0\cf3\cb1\chcbpat1\expnd0\expndtw0\dqbhcabuwv615\dn0   Address;}{\s12\snext0\f3\fs0\b0\ul0\uldb0\uld0\embo0\i0\v0\strike0\striked0\protect0\super0\sub\caps0\scaps0\cf3\cb1\chcbpat1\expnd0\expndtw0\syblqfosix831\dn0\li360\ri360\sb100\sa100\sl0 Blockquote;}{\cs13\f3\fs0\b0\ul0\uldb0\uld0\embo0\i\v0\strike0\striked0\protect0\super0\sub\caps0\scaps0\cf3\cb1\chcbpat1\expnd0\expndtw0\ajgsfrmlng941\dn0   CITE;}{\cs14\f3\fs20\b0\ul0\uldb0\uld0\embo0\i0\v0\strike0\striked0\protect0\super0\sub\caps0\scaps0\cf3\cb1\chcbpat1\expnd0\expndtw0\xhucszxlrk267\dn0 CODE;}{\cs15\f3\fs0\b0\ul0\uldb0\uld0\embo0\i\v0\strike0\striked0\protect0\super0\sub\caps0\scaps0\cf3\cb1\chcbpat1\expnd0\expndtw0\hxaomiqckx511\dn0   Emphasis;}{\cs16\f3\fs0\b0\ul\uldb0\uld0\embo0\i0\v0\strike0\striked0\protect0\super0\sub\caps0\scaps0\cf4\cb1\chcbpat1\expnd0\expndtw0\yqdyllwykx117\dn0 Hyperlink;}{\cs17\f3\fs0\b0\ul\uldb0\uld0\embo0\i0\v0\strike0\striked0\protect0\super0\sub\caps0\scaps0\cf5\cb1\chcbpat1\expnd0\expndtw0\epgzmeuvdv624\dn0   FollowedHyperlink;}{\cs18\f3\fs20\b\ul0\uldb0\uld0\embo0\i0\v0\strike0\striked0\protect0\super0\sub\caps0\scaps0\cf3\cb1\chcbpat1\expnd0\expndtw0\rngqstohhy944\dn0 Keyboard;}{\s19\snext0\f3\fs20\b0\ul0\uldb0\uld0\embo0\i0\v0\strike0\striked0\protect0\super0\sub\caps0\scaps0\cf3\cb1\chcbpat1\expnd0\expndtw0\gscapbkjhs773\dn0\tx0\tx959\mn8755\kn1441\qr2252\iq0607\vy5172\yr0183\ls9742\tx6731\xq3756\sb0\sa0\sl0   Preformatted;}{\s20\snext0\f4\fs16\b0\ul0\uldb0\uld0\embo0\i0\v\strike0\striked0\protect0\super0\sub\caps0\scaps0\cf3\cb1\chcbpat1\expnd0\expndtw0\gonozqzgxc132\dn0\brdrt\brdrdb\brdrcf3\qc z-Bottom of Form;}{\s21\snext0\f4\fs16\b0\ul0\uldb0\uld0\embo0\i0\v\strike0\striked0\protect0\super0\sub\caps0\scaps0\cf3\cb1\chcbpat1\expnd0\expndtw0\lhxgxzzpde647\dn0\brdrb\brdrdb\brdrcf3\qc   z-Top of Form;}{\cs22\f3\fs0\b0\ul0\uldb0\uld0\embo0\i0\v0\strike0\striked0\protect0\super0\sub\caps0\scaps0\cf3\cb1\chcbpat1\expnd0\expndtw0\fzfiuvwfth269\dn0 Sample;}{\cs23\f3\fs0\b\ul0\uldb0\uld0\embo0\i0\v0\strike0\striked0\protect0\super0\sub\caps0\scaps0\cf3\cb1\chcbpat1\expnd0\expndtw0\vvnvlymstc627\dn0   Strong;}{\cs24\f3\fs20\b0\ul0\uldb0\uld0\embo0\i0\v0\strike0\striked0\protect0\super0\sub\caps0\scaps0\cf3\cb1\chcbpat1\expnd0\expndtw0\vguidbpich298\dn0 Typewriter;}{\cs25\f3\fs0\b0\ul0\uldb0\uld0\embo0\i\v0\strike0\striked0\protect0\super0\sub\caps0\scaps0\cf3\cb1\chcbpat1\expnd0\expndtw0\paboxurmvl184\dn0   Variable;}{\cs26\f3\fs0\b0\ul0\uldb0\uld0\embo0\i0\v\strike0\striked0\protect0\super0\sub\caps0\scaps0\cf6\cb1\chcbpat1\expnd0\expndtw0\cpiirwsuhm597\dn0 HTML Markup;}{\cs27\f3\fs0\b0\ul0\uldb0\uld0\embo0\i0\v\strike0\striked0\protect0\super0\sub\caps0\scaps0\cf3\cb1\chcbpat1\expnd0\expndtw0\iycdxdmuoy247\dn0   Comment;}{\s28\snext0\f4\fs24\b0\ul0\uldb0\uld0\embo0\i0\v0\strike0\striked0\protect0\super0\sub\caps0\scaps0\cf3\cb1\chcbpat1\expnd0\expndtw0\oyeeldglue991\dn0\li0\ri0\ltrpar\cbpat3 [Normal];}}  {\*\revtbl{Unknown;}}  {\*\listtable  {\list\listtemplateid0  {\listlevel\levelnfc0\levelfollow0\levelstartat1{\leveltext \'02\'00.}{\levelnumbers \'01}}  {\listlevel\levelnfc0\levelfollow0\levelstartat1{\leveltext \'02\'01.}{\levelnumbers \'01}}  {\listlevel\levelnfc0\levelfollow0\levelstartat1{\leveltext \'02\'02.}{\levelnumbers \'01}}  {\listlevel\levelnfc0\levelfollow0\levelstartat1{\leveltext \'02\'03.}{\levelnumbers \'01}}  {\listlevel\levelnfc0\levelfollow0\levelstartat1{\leveltext \'02\'04.}{\levelnumbers \'01}}  {\listlevel\levelnfc0\levelfollow0\levelstartat1{\leveltext \'02\'05.}{\levelnumbers \'01}}  {\listlevel\levelnfc0\levelfollow0\levelstartat1{\leveltext \'02\'06.}{\levelnumbers \'01}}  {\listlevel\levelnfc0\levelfollow0\levelstartat1{\leveltext \'02\'07.}{\levelnumbers \'01}}  {\listlevel\levelnfc0\levelfollow0\levelstartat0{\leveltext \'00}{\levelnumbers}}  {\listname ;}\listid2  }  {\list\listtemplateid0  {\listlevel\levelnfc0\levelfollow0\levelstartat1{\leveltext \'02\'00.}{\levelnumbers \'01}}  {\listlevel\levelnfc0\levelfollow0\levelstartat1{\leveltext \'02\'01.}{\levelnumbers \'01}}  {\listlevel\levelnfc0\levelfollow0\levelstartat1{\leveltext \'02\'02.}{\levelnumbers \'01}}  {\listlevel\levelnfc0\levelfollow0\levelstartat1{\leveltext \'02\'03.}{\levelnumbers \'01}}  {\listlevel\levelnfc0\levelfollow0\levelstartat1{\leveltext \'02\'04.}{\levelnumbers \'01}}  {\listlevel\levelnfc0\levelfollow0\levelstartat1{\leveltext \'02\'05.}{\levelnumbers \'01}}  {\listlevel\levelnfc0\levelfollow0\levelstartat1{\leveltext \'02\'06.}{\levelnumbers \'01}}  {\listlevel\levelnfc0\levelfollow0\levelstartat1{\leveltext \'02\'07.}{\levelnumbers \'01}}  {\listlevel\levelnfc0\levelfollow0\levelstartat0{\leveltext \'00}{\levelnumbers}}  {\listname ;}\listid1  }  }  {\*\listoverridetable}  \aiwqox57028\kmnpls57994\yhmqm9237\yxkpg4585\zdsan6235\fcvun9524\edsbnjs982\puvidfp113\nogrowautofit\fetnpy744\formshade\nofeaturethrottle1\dntblnsbdb\fet4\aendnotes\aftnnrlc\pgbrdrhead\pgbrdrfoot  \sectd\syjbht38990\ecuucl68141\guttersxn0\gjiiovlz3323\jpfudjxo7237\uldstfhi8998\dpnmpaki5579\mgamgam131\odyzclk194\sbkpage\pgncont\pgndec  \plain\plain\f0\fs24\sb100\sa100\ql\plain\f0\fs24\plain\f1\fs20\iyxr0238\hich\f1\dbch\f1\loch\f1\fs20 77  year old  RHD male PMH of HTN, HLD, DM2, former smoker,  hypothyroidism, obesity, LILLY on C-pap,  bladder cancer (2020, s/p resection of bladder tumor/   currently self catheterizes 6x/day ), PAF s/p DCCV /\plain\f2\fs20\atti5434\hich\f2\dbch\f2\loch\f2\cf2\fs20\ltrch uncomplicated radiofrequency ablation of atrial fibrillation ([facility]/PVI, CTI)\plain\f2\fs20\kmsa0858\hich\f2\dbch\f2\loch\f2\cf2\fs20  on   7/13/22,  left atrial appendage & severe MR . Presents with c/o recent" abnormal echo with fatigue due to mitral valve regurgitation. Presents for  scheduled Mitral valve replacement , MAZE, left atrial appendage closure on 10/20/2022.\par  Last episode in June 2023\par  occasional chest discomfort ? \par  denies palpitations, chest pain, dyspnea on exertion, fatigue, near syncope or syncope\plain\f1\fs20\sfjt1911\hich\f1\dbch\f1\loch\f1\fs20\par  denies recent UTIs follows with UTI specialist \par  4/28/2022 TTE: LVEF 61%. LV thickness is mildly decreased. Grade 2 diastolic dysfunction. Interventricular septal thickness is mildly increased. RV systolic pressure elevated at 40-50 mmHg. Moderately dilated LA (LAd 5.3 cm; MARQUISE 422.6 ml/m2). Moderately   dilated RA. Mild MR with eccentric jet. Mild TR.\par  \sb0\sa0\plain\f0\fs24\plain\f1\fs20\inav3568\hich\f1\dbch\f1\loch\f1\fs20 Cath 5/2/2022: no obstructive coronary disease\par  }   77  year old  RHD male PMH of HTN, HLD, DM2, former smoker,  hypothyroidism, obesity, LILLY on C-pap,  bladder cancer (2020, s/p resection of bladder tumor/ currently self catheterizes 6x/day ), PAF s/p DCCV /uncomplicated radiofrequency ablation of atrial fibrillation ([facility]/PVI, CTI) on 7/13/22,  left atrial appendage & severe MR . Presents with c/o recent" abnormal echo with fatigue due to mitral valve regurgitation. Presents for  scheduled Mitral valve replacement , MAZE, left atrial appendage closure on 10/20/2022.  Last episode in June 2023  occasional chest discomfort ?   denies palpitations, chest pain, dyspnea on exertion, fatigue, near syncope or syncope  denies recent UTIs follows with UTI specialist   11/21/22 1. An annuloplasty ring is seen in the mitral position. No  mitral regurgitation seen,  no evidence of aortic stenosis. Moderately dilated left atrium. . Hypokinesis of the anteroseptum.  Overall  preserved left ventricular systolic function. LVEF visually estimated is 55-60%. Moderate diastolic dysfunction (Stage II)  Cath 5/2/2022: no obstructive coronary disease, have documented 9/2022 cath from Oct 202 [facility], however study not available today at PST  77  year old  RHD male PMH of HTN, HLD, DM2, former smoker,  hypothyroidism, obesity, LILLY on C-pap,  bladder cancer (2020, s/p resection of bladder tumor/ currently self catheterizes 6x/day ), PAF s/p DCCV /uncomplicated radiofrequency ablation of atrial fibrillation ([facility]/PVI, CTI) on 7/13/22, PPM, left atrial appendage & severe MR s/p mitral valve repair and maze procedure 10/2022.  Patient presents today with aflutter, reports occasional chest discomfort that he feel sis associated with his pacemaker, does not occur with exertion.  He denies palpitations, dyspnea on exertion, fatigue, near syncope or syncope. He denies recent UTIs follows with UTI specialist.   Male now presents in anticipation of elective radiofrequency ablation of atrial fibrillation.  ESDRAS: 11/21/22 1. An annuloplasty ring is seen in the mitral position. No mitral regurgitation seen, no evidence of aortic stenosis. Moderately dilated left atrium. . Hypokinesis of the anteroseptum.  Overall  preserved left ventricular systolic function. LVEF visually estimated is 55-60%. Moderate diastolic dysfunction (Stage II)  Cath 5/2/2022: no obstructive coronary disease, have documented 9/2022 cath from Sept 2022 PST at [facility], however study not available today at PST

## 2023-10-23 NOTE — H&P PST ADULT - NSICDXPASTMEDICALHX_GEN_ALL_CORE_FT
PAST MEDICAL HISTORY:  At risk for sleep apnea LILLY on C-pap    Bladder cancer     Bladder cancer rsx/ treatment 2020, self cath 3x/day    COVID-19 virus infection positive with mild infection 10/10/22 , had MAB on 10/10/22 at [facility], surgeon aware    Diabetes mellitus 2    Dysuria on self cath 6x/day, UC sent    Hyperlipidemia     Hypertension     Hypothyroidism     PAF (paroxysmal atrial fibrillation) s/p DCCV 7 rfa DR Hoover > nsr    Paroxysmal atrial fibrillation s/p ablation  on ELIQUIS till 10/13    Severe mitral regurgitation seen in recent ESDRAS     PAST MEDICAL HISTORY:  Atypical atrial flutter     Bladder cancer     DM2 (diabetes mellitus, type 2)     Hyperlipidemia     Hypertension     Hypothyroidism     Mitral valve regurgitation     LILLY on CPAP

## 2023-10-23 NOTE — H&P PST ADULT - PROBLEM SELECTOR PLAN 5
The Caprini score indicates that this patient is at high risk for a VTE event (score 6 or greater). Surgical patients in this group will benefit from both pharmacologic prophylaxis and intermittent compression devices.  The surgical team will determine the balance between VTE risk and bleeding risk, and other clinical considerations Caprini Score 6 Moderate Risk, Surgical team should assess /Strongly recommend pharmacological and mechanical measures for VTE prophylaxis

## 2023-11-07 ENCOUNTER — NON-APPOINTMENT (OUTPATIENT)
Age: 78
End: 2023-11-07

## 2023-11-08 ENCOUNTER — INPATIENT (INPATIENT)
Facility: HOSPITAL | Age: 78
LOS: 0 days | Discharge: HOME | DRG: 274 | End: 2023-11-09
Attending: INTERNAL MEDICINE | Admitting: INTERNAL MEDICINE
Payer: COMMERCIAL

## 2023-11-08 ENCOUNTER — TRANSCRIPTION ENCOUNTER (OUTPATIENT)
Age: 78
End: 2023-11-08

## 2023-11-08 VITALS
SYSTOLIC BLOOD PRESSURE: 148 MMHG | OXYGEN SATURATION: 98 % | TEMPERATURE: 98 F | DIASTOLIC BLOOD PRESSURE: 69 MMHG | RESPIRATION RATE: 16 BRPM | HEART RATE: 69 BPM

## 2023-11-08 DIAGNOSIS — Z98.890 OTHER SPECIFIED POSTPROCEDURAL STATES: Chronic | ICD-10-CM

## 2023-11-08 DIAGNOSIS — D49.4 NEOPLASM OF UNSPECIFIED BEHAVIOR OF BLADDER: Chronic | ICD-10-CM

## 2023-11-08 DIAGNOSIS — I48.4 ATYPICAL ATRIAL FLUTTER: ICD-10-CM

## 2023-11-08 LAB
GLUCOSE BLDC GLUCOMTR-MCNC: 125 MG/DL — HIGH (ref 70–99)
GLUCOSE BLDC GLUCOMTR-MCNC: 125 MG/DL — HIGH (ref 70–99)

## 2023-11-08 PROCEDURE — 93623 PRGRMD STIMJ&PACG IV RX NFS: CPT | Mod: 26

## 2023-11-08 PROCEDURE — 93656 COMPRE EP EVAL ABLTJ ATR FIB: CPT

## 2023-11-08 PROCEDURE — 93657 TX L/R ATRIAL FIB ADDL: CPT

## 2023-11-08 PROCEDURE — 93655 ICAR CATH ABLTJ DSCRT ARRHYT: CPT

## 2023-11-08 RX ORDER — ACETAMINOPHEN 500 MG
650 TABLET ORAL EVERY 6 HOURS
Refills: 0 | Status: DISCONTINUED | OUTPATIENT
Start: 2023-11-08 | End: 2023-11-09

## 2023-11-08 RX ORDER — FUROSEMIDE 40 MG
40 TABLET ORAL DAILY
Refills: 0 | Status: DISCONTINUED | OUTPATIENT
Start: 2023-11-09 | End: 2023-11-09

## 2023-11-08 RX ORDER — ASPIRIN/CALCIUM CARB/MAGNESIUM 324 MG
81 TABLET ORAL DAILY
Refills: 0 | Status: DISCONTINUED | OUTPATIENT
Start: 2023-11-08 | End: 2023-11-09

## 2023-11-08 RX ORDER — SUCRALFATE 1 G
1 TABLET ORAL EVERY 12 HOURS
Refills: 0 | Status: DISCONTINUED | OUTPATIENT
Start: 2023-11-08 | End: 2023-11-09

## 2023-11-08 RX ORDER — OMEGA-3 ACID ETHYL ESTERS 1 G
1 CAPSULE ORAL
Refills: 0 | Status: DISCONTINUED | OUTPATIENT
Start: 2023-11-08 | End: 2023-11-09

## 2023-11-08 RX ORDER — MAGNESIUM OXIDE 400 MG ORAL TABLET 241.3 MG
1 TABLET ORAL
Refills: 0 | DISCHARGE

## 2023-11-08 RX ORDER — ALPRAZOLAM 0.25 MG
0.25 TABLET ORAL EVERY 8 HOURS
Refills: 0 | Status: DISCONTINUED | OUTPATIENT
Start: 2023-11-08 | End: 2023-11-09

## 2023-11-08 RX ORDER — METOPROLOL TARTRATE 50 MG
50 TABLET ORAL DAILY
Refills: 0 | Status: DISCONTINUED | OUTPATIENT
Start: 2023-11-08 | End: 2023-11-09

## 2023-11-08 RX ORDER — FUROSEMIDE 40 MG
20 TABLET ORAL ONCE
Refills: 0 | Status: DISCONTINUED | OUTPATIENT
Start: 2023-11-08 | End: 2023-11-08

## 2023-11-08 RX ORDER — APIXABAN 2.5 MG/1
1 TABLET, FILM COATED ORAL
Refills: 0 | DISCHARGE

## 2023-11-08 RX ORDER — OMEGA-3 ACID ETHYL ESTERS 1 G
1 CAPSULE ORAL
Refills: 0 | DISCHARGE

## 2023-11-08 RX ORDER — SPIRONOLACTONE 25 MG/1
50 TABLET, FILM COATED ORAL DAILY
Refills: 0 | Status: DISCONTINUED | OUTPATIENT
Start: 2023-11-08 | End: 2023-11-09

## 2023-11-08 RX ORDER — NIACIN 50 MG
500 TABLET ORAL AT BEDTIME
Refills: 0 | Status: DISCONTINUED | OUTPATIENT
Start: 2023-11-08 | End: 2023-11-09

## 2023-11-08 RX ORDER — FUROSEMIDE 40 MG
40 TABLET ORAL ONCE
Refills: 0 | Status: COMPLETED | OUTPATIENT
Start: 2023-11-08 | End: 2023-11-08

## 2023-11-08 RX ORDER — OXYCODONE HYDROCHLORIDE 5 MG/1
5 TABLET ORAL EVERY 6 HOURS
Refills: 0 | Status: DISCONTINUED | OUTPATIENT
Start: 2023-11-08 | End: 2023-11-09

## 2023-11-08 RX ORDER — ONDANSETRON 8 MG/1
4 TABLET, FILM COATED ORAL EVERY 6 HOURS
Refills: 0 | Status: DISCONTINUED | OUTPATIENT
Start: 2023-11-08 | End: 2023-11-09

## 2023-11-08 RX ORDER — METOPROLOL TARTRATE 50 MG
1 TABLET ORAL
Refills: 0 | DISCHARGE

## 2023-11-08 RX ORDER — PANTOPRAZOLE SODIUM 20 MG/1
40 TABLET, DELAYED RELEASE ORAL EVERY 12 HOURS
Refills: 0 | Status: DISCONTINUED | OUTPATIENT
Start: 2023-11-08 | End: 2023-11-09

## 2023-11-08 RX ORDER — MAGNESIUM OXIDE 400 MG ORAL TABLET 241.3 MG
400 TABLET ORAL
Refills: 0 | Status: DISCONTINUED | OUTPATIENT
Start: 2023-11-08 | End: 2023-11-09

## 2023-11-08 RX ORDER — ATORVASTATIN CALCIUM 80 MG/1
1 TABLET, FILM COATED ORAL
Refills: 0 | DISCHARGE

## 2023-11-08 RX ORDER — ATORVASTATIN CALCIUM 80 MG/1
10 TABLET, FILM COATED ORAL AT BEDTIME
Refills: 0 | Status: DISCONTINUED | OUTPATIENT
Start: 2023-11-08 | End: 2023-11-09

## 2023-11-08 RX ORDER — FINASTERIDE 5 MG/1
5 TABLET, FILM COATED ORAL DAILY
Refills: 0 | Status: DISCONTINUED | OUTPATIENT
Start: 2023-11-08 | End: 2023-11-09

## 2023-11-08 RX ORDER — THIAMINE MONONITRATE (VIT B1) 100 MG
100 TABLET ORAL DAILY
Refills: 0 | Status: DISCONTINUED | OUTPATIENT
Start: 2023-11-08 | End: 2023-11-09

## 2023-11-08 RX ORDER — OMEGA-3 ACID ETHYL ESTERS 1 G
2 CAPSULE ORAL
Refills: 0 | Status: DISCONTINUED | OUTPATIENT
Start: 2023-11-08 | End: 2023-11-08

## 2023-11-08 RX ORDER — APIXABAN 2.5 MG/1
5 TABLET, FILM COATED ORAL EVERY 12 HOURS
Refills: 0 | Status: DISCONTINUED | OUTPATIENT
Start: 2023-11-08 | End: 2023-11-09

## 2023-11-08 RX ORDER — BENZOCAINE AND MENTHOL 5; 1 G/100ML; G/100ML
1 LIQUID ORAL
Refills: 0 | Status: DISCONTINUED | OUTPATIENT
Start: 2023-11-08 | End: 2023-11-09

## 2023-11-08 RX ORDER — LEVOTHYROXINE SODIUM 125 MCG
25 TABLET ORAL DAILY
Refills: 0 | Status: DISCONTINUED | OUTPATIENT
Start: 2023-11-08 | End: 2023-11-09

## 2023-11-08 RX ORDER — DOXAZOSIN MESYLATE 4 MG
4 TABLET ORAL AT BEDTIME
Refills: 0 | Status: DISCONTINUED | OUTPATIENT
Start: 2023-11-08 | End: 2023-11-09

## 2023-11-08 RX ADMIN — PANTOPRAZOLE SODIUM 40 MILLIGRAM(S): 20 TABLET, DELAYED RELEASE ORAL at 17:24

## 2023-11-08 RX ADMIN — Medication 40 MILLIGRAM(S): at 21:03

## 2023-11-08 RX ADMIN — Medication 1 GRAM(S): at 17:25

## 2023-11-08 RX ADMIN — Medication 1 GRAM(S): at 19:01

## 2023-11-08 RX ADMIN — APIXABAN 5 MILLIGRAM(S): 2.5 TABLET, FILM COATED ORAL at 17:24

## 2023-11-08 RX ADMIN — Medication 4 MILLIGRAM(S): at 22:15

## 2023-11-08 RX ADMIN — FINASTERIDE 5 MILLIGRAM(S): 5 TABLET, FILM COATED ORAL at 22:15

## 2023-11-08 RX ADMIN — Medication 500 MILLIGRAM(S): at 22:15

## 2023-11-08 RX ADMIN — ATORVASTATIN CALCIUM 10 MILLIGRAM(S): 80 TABLET, FILM COATED ORAL at 22:16

## 2023-11-08 RX ADMIN — MAGNESIUM OXIDE 400 MG ORAL TABLET 400 MILLIGRAM(S): 241.3 TABLET ORAL at 17:24

## 2023-11-08 NOTE — CONSULT NOTE ADULT - PROBLEM/RECOMMENDATION-4
Attempted to call pt at this time, no response. LVM instructing to view portal message/call office back regarding recent symptoms she's been experiencing. Office# @ [phone] provided as needed.     DISPLAY PLAN FREE TEXT

## 2023-11-08 NOTE — DISCHARGE NOTE PROVIDER - HOSPITAL COURSE
78  year old male PMH of HTN, HLD, DM2, former smoker,  hypothyroidism, obesity, LILLY on C-pap,  bladder cancer (2020, s/p resection of bladder tumor/ currently self catheterizes 6x/day ), PAF (on eliquis) s/p DCCV /uncomplicated radiofrequency ablation of atrial fibrillation (WACA/PVI, CTI) on 7/13/22, PPM ([employer] dual chamber) on11/4/22, left atrial appendage & severe MR s/p mitral valve repair and maze procedure on 10/2022.  Following his previous radiofrequency ablation for a. fib in 2022 pt has had several hospitalizations for symptomatic A.fib with RVR with the most recent being in [facility] on June 2023.  Upon discharge patient was started on Multaq and F/u with Dr Burdick.  Pt now status post uncomplicated radiofrequency ablation of atrial fibrillation (Anterior mitral line, IVC to SVC line) with access obtain via b/l femoral veins and hemostasis achieved with figure of 8 sutures and quickclot. 78  year old male PMH of HTN, HLD, DM2, former smoker,  hypothyroidism, obesity, LILLY on C-pap,  bladder cancer (2020, s/p resection of bladder tumor/ currently self catheterizes 6x/day ), PAF (on eliquis) s/p DCCV /uncomplicated radiofrequency ablation of atrial fibrillation (WACA/PVI, CTI) on 7/13/22, PPM ([place] [employer] dual chamber) on11/4/22, left atrial appendage & severe MR s/p mitral valve repair and maze procedure on 10/2022.  Following his previous radiofrequency ablation for a. fib in 2022 pt has had several hospitalizations for symptomatic A.fib with RVR with the most recent being in [facility] on June 2023.  Upon discharge patient was started on Multaq and F/u with Dr Burdick.  Pt now POD 1 status post uncomplicated radiofrequency ablation of atrial fibrillation (Anterior mitral line, IVC to SVC line) with access obtain via b/l femoral veins and hemostasis achieved with figure of 8 sutures and quickclot.  The patient was observed overnight without event and was discharged home the following morning with a plan for outpatient follow up.

## 2023-11-08 NOTE — DISCHARGE NOTE PROVIDER - NSDCMRMEDTOKEN_GEN_ALL_CORE_FT
aspirin 81 mg oral delayed release tablet: 1 tab(s) orally once a day  atorvastatin 10 mg oral tablet: 1 tab(s) orally once a day  doxazosin 4 mg oral tablet: 1 tab(s) orally once a day (at bedtime)  Eliquis 5 mg oral tablet: 1 tab(s) orally 2 times a day  finasteride 5 mg oral tablet: 1 tab(s) orally once a day  levothyroxine 25 mcg (0.025 mg) oral tablet: 1 tab(s) orally once a day  magnesium oxide 400 mg oral capsule: 1 cap(s) orally once a day  metoprolol succinate 50 mg oral capsule, extended release: 1 cap(s) orally once a day  Multaq 400 mg oral tablet: 1 tab(s) orally 2 times a day  niacin 500 mg oral tablet, extended release: 1 tab(s) orally once a day (at bedtime)  Omega-3 1000 mg oral capsule: 1 cap(s) orally 2 times a day  spironolactone 25 mg oral tablet: 2 tab(s) orally once a day  thiamine 100 mg oral tablet: 1 tab(s) orally once a day  triamcinolone 0.1% topical cream: Apply topically to affected area 3 times a day   aspirin 81 mg oral delayed release tablet: 1 tab(s) orally once a day  atorvastatin 10 mg oral tablet: 1 tab(s) orally once a day  doxazosin 4 mg oral tablet: 1 tab(s) orally once a day (at bedtime)  Eliquis 5 mg oral tablet: 1 tab(s) orally 2 times a day  finasteride 5 mg oral tablet: 1 tab(s) orally once a day  furosemide 40 mg oral tablet: 1 tab(s) orally once a day  levothyroxine 25 mcg (0.025 mg) oral tablet: 1 tab(s) orally once a day  magnesium oxide 400 mg oral capsule: 1 cap(s) orally once a day  metoprolol succinate 50 mg oral capsule, extended release: 1 cap(s) orally once a day  niacin 500 mg oral tablet, extended release: 1 tab(s) orally once a day (at bedtime)  Omega-3 1000 mg oral capsule: 1 cap(s) orally 2 times a day  pantoprazole 40 mg oral delayed release tablet: 1 tab(s) orally every 12 hours then decrease to one tablet daily x 6 weeks  spironolactone 25 mg oral tablet: 2 tab(s) orally once a day  sucralfate 1 g/10 mL oral suspension: 10 milliliter(s) orally every 12 hours  thiamine 100 mg oral tablet: 1 tab(s) orally once a day  triamcinolone 0.1% topical cream: Apply topically to affected area 3 times a day

## 2023-11-08 NOTE — DISCHARGE NOTE PROVIDER - NSDCFUADDINST_GEN_ALL_CORE_FT
Follow up with Dr. Burdick in 2 - 4 weeks. Our office will contact you in 3-5 days to schedule this appointment. Please call [phone] with questions or concerns.

## 2023-11-08 NOTE — DISCHARGE NOTE PROVIDER - NSDCFUADDAPPT_GEN_ALL_CORE_FT
***Follow up with your primary care physician on 11/13 to have them check your creatinine level (a measure of the kidney's function)***

## 2023-11-08 NOTE — PROGRESS NOTE ADULT - SUBJECTIVE AND OBJECTIVE BOX
Admission Criteria  Please admit the patient to the following service: CARDIOLOGY    Major Criteria:    - Continuous EKG monitoring is required for condition causing arrhythmia (hyperkalemia, etc)    - Significant volume load > 200 ml    Admit to: 3GUL     Patient is being admitted to the inpatient service due to high risk characteristics and need for further management/monitoring and is considered to be at a significantly increased risk of major adverse cardiac and vascular events if discharged.

## 2023-11-08 NOTE — DISCHARGE NOTE PROVIDER - CARE PROVIDER_API CALL
Fernando Burdick  Cardiac Electrophysiology  [address], [address]  [place] [address]  Phone: [phone]  Fax: [phone]  Follow Up Time:     Jeovanny Nichole  Cardiovascular Disease  [address] [place] [address]  Phone: [phone]  Fax: [phone]  Follow Up Time:

## 2023-11-08 NOTE — PROGRESS NOTE ADULT - SUBJECTIVE AND OBJECTIVE BOX
PROCEDURE: Radiofrequency Ablation of Atrial Fibrillation    ELECTROPHYSIOLOGIST: Fernando Burdick MD,          COMPLICATIONS:  none        DISPOSITION: observation      CONDITION: stable    Pt doing well s/p Radiofrequency Ablation of atrial fibrillation (Anterior mitral line, IVC to SVC line) with access obtain via b/l femoral veins and hemostasis achieved with figure of 8 sutures and quickclot. Denies complaint.     MEDICATIONS  (STANDING):  apixaban 5 milliGRAM(s) Oral every 12 hours  aspirin enteric coated 81 milliGRAM(s) Oral daily  atorvastatin 10 milliGRAM(s) Oral at bedtime  doxazosin 4 milliGRAM(s) Oral at bedtime  finasteride 5 milliGRAM(s) Oral daily  levothyroxine 25 MICROGram(s) Oral daily  magnesium oxide 400 milliGRAM(s) Oral three times a day with meals  metoprolol succinate ER 50 milliGRAM(s) Oral daily  niacin  milliGRAM(s) Oral at bedtime  omega-3-Acid Ethyl Esters 2 Gram(s) Oral two times a day  spironolactone 50 milliGRAM(s) Oral daily  thiamine 100 milliGRAM(s) Oral daily  triamcinolone 0.1% Oral Paste 1 Application(s) Topical three times a day    MEDICATIONS  (PRN):  acetaminophen     Tablet .. 650 milliGRAM(s) Oral every 6 hours PRN Temp greater or equal to 38.5C (101.3F), Mild Pain (1 - 3)  ALPRAZolam 0.25 milliGRAM(s) Oral every 8 hours PRN anxiety  aluminum hydroxide/magnesium hydroxide/simethicone Suspension 30 milliLiter(s) Oral every 6 hours PRN Dyspepsia  benzocaine/menthol Lozenge 1 Lozenge Oral every 2 hours PRN Sore Throat  ondansetron Injectable 4 milliGRAM(s) IV Push every 6 hours PRN Nausea and/or Vomiting  oxyCODONE    IR 5 milliGRAM(s) Oral every 6 hours PRN Moderate Pain (4 - 6)    Allergies  penicillins (Diarrhea)    VS:  T(C): 36.6 (11-08-23 @ 07:41), Max: 36.6 (11-08-23 @ 07:41)  HR: 69 (11-08-23 @ 07:41) (69 - 69)  BP: 148/69 (11-08-23 @ 07:41) (148/69 - 148/69)  RR: 16 (11-08-23 @ 07:41) (16 - 16)  SpO2: 98% (11-08-23 @ 07:41) (98% - 98%)    Post procedure VS:  HR 69  /66  SpO2 96% on 3L NC    Physical Exam:  Neuro: A&O x 3, OLIVIER, FC  Card: S1/S2, RRR, no m/g/r  Resp: lungs CTA b/l  Abd: S/NT/ND  Groins: hemostatic sutures in place; sites C/D/I; no bleeding, hematoma, erythema, exudate or edema  Ext: no edema; distal pulses intact    I/Os: 2.7/1.4    ECG: Pending    Assessment:   78  year old male PMH of HTN, HLD, DM2, former smoker,  hypothyroidism, obesity, LILLY on C-pap,  bladder cancer (2020, s/p resection of bladder tumor/ currently self catheterizes 6x/day ), PAF (on eliquis) s/p DCCV /uncomplicated radiofrequency ablation of atrial fibrillation (WACA/PVI, CTI) on 7/13/22, PPM ([employer] dual chamber) on11/4/22, left atrial appendage & severe MR s/p mitral valve repair and maze procedure on 10/2022.  Following his previous radiofrequency ablation for a. fib in 2022 pt has had several hospitalizations for symptomatic A.fib with RVR with the most recent being in [facility] on June 2023.  Upon discharge patient was started on Multaq and F/u with Dr Burdick.  Pt now status post uncomplicated radiofrequency ablation of atrial fibrillation (Anterior mitral line, IVC to SVC line) with access obtain via b/l femoral veins and hemostasis achieved with figure of 8 sutures and quickclot.      Plan:   Bedrest x 4 hours, then OOB with assistance and progress as tolerated.   Groin sutures to be removed by EP service in AM.   Restart eliquis tonight pending groin status   DO NOT HOLD, INTERRUPT OR REVERSE ANTICOAGULATION WITHOUT EXPLICIT APPROVAL FROM EP SERVICE.   Pt received Lasix 40 mg IV at the end of procedure, Lasix 20mg IV x 1 dose later tonight, then Lasix 20mg PO daily x 3 days.     Discontinue Multaq  Start Protonix 40mg twice daily x 2 weeks, then once daily x 6 weeks.   Start Carafate 1gm BID x 2 weeks.   Gaitan catheter placed intra-op as patient self catheterizes s/p bladder ca; D/c gaitan catheter prior to discharge  Continue other home medications.   Strict I/Os.  Please encourage incentive spirometry and ambulation once able.  Observation and monitoring on telemetry overnight with anticipated discharge in the AM and outpt follow up in 2-4 weeks.   PROCEDURE: Radiofrequency Ablation of Atrial Fibrillation    ELECTROPHYSIOLOGIST: Fernando Burdick MD,          COMPLICATIONS:  none        DISPOSITION: observation      CONDITION: stable    Pt doing well s/p Radiofrequency Ablation of atrial fibrillation (Anterior mitral line, IVC to SVC line) with access obtain via b/l femoral veins and hemostasis achieved with figure of 8 sutures and quickclot. Denies complaint.     MEDICATIONS  (STANDING):  apixaban 5 milliGRAM(s) Oral every 12 hours  aspirin enteric coated 81 milliGRAM(s) Oral daily  atorvastatin 10 milliGRAM(s) Oral at bedtime  doxazosin 4 milliGRAM(s) Oral at bedtime  finasteride 5 milliGRAM(s) Oral daily  levothyroxine 25 MICROGram(s) Oral daily  magnesium oxide 400 milliGRAM(s) Oral three times a day with meals  metoprolol succinate ER 50 milliGRAM(s) Oral daily  niacin  milliGRAM(s) Oral at bedtime  omega-3-Acid Ethyl Esters 2 Gram(s) Oral two times a day  spironolactone 50 milliGRAM(s) Oral daily  thiamine 100 milliGRAM(s) Oral daily  triamcinolone 0.1% Oral Paste 1 Application(s) Topical three times a day    MEDICATIONS  (PRN):  acetaminophen     Tablet .. 650 milliGRAM(s) Oral every 6 hours PRN Temp greater or equal to 38.5C (101.3F), Mild Pain (1 - 3)  ALPRAZolam 0.25 milliGRAM(s) Oral every 8 hours PRN anxiety  aluminum hydroxide/magnesium hydroxide/simethicone Suspension 30 milliLiter(s) Oral every 6 hours PRN Dyspepsia  benzocaine/menthol Lozenge 1 Lozenge Oral every 2 hours PRN Sore Throat  ondansetron Injectable 4 milliGRAM(s) IV Push every 6 hours PRN Nausea and/or Vomiting  oxyCODONE    IR 5 milliGRAM(s) Oral every 6 hours PRN Moderate Pain (4 - 6)    Allergies  penicillins (Diarrhea)    VS:  T(C): 36.6 (11-08-23 @ 07:41), Max: 36.6 (11-08-23 @ 07:41)  HR: 69 (11-08-23 @ 07:41) (69 - 69)  BP: 148/69 (11-08-23 @ 07:41) (148/69 - 148/69)  RR: 16 (11-08-23 @ 07:41) (16 - 16)  SpO2: 98% (11-08-23 @ 07:41) (98% - 98%)    Post procedure VS:  HR 69  /66  SpO2 96% on 3L NC    Physical Exam:  Neuro: A&O x 3, OLIVIER, FC  Card: S1/S2, RRR, no m/g/r  Resp: lungs CTA b/l  Abd: S/NT/ND  Groins: hemostatic sutures in place; sites C/D/I; no bleeding, hematoma, erythema, exudate or edema  Ext: no edema; distal pulses intact    I/Os: 2.7/1.4    ECG: atrial paced @ 70 bpm, PEDRO LUIS 228ms, QRSD 88ms    Assessment:   78  year old male PMH of HTN, HLD, DM2, former smoker,  hypothyroidism, obesity, LILLY on C-pap,  bladder cancer (2020, s/p resection of bladder tumor/ currently self catheterizes 6x/day ), PAF (on eliquis) s/p DCCV /uncomplicated radiofrequency ablation of atrial fibrillation (WACA/PVI, CTI) on 7/13/22, PPM ([place] [employer] dual chamber) on11/4/22, left atrial appendage & severe MR s/p mitral valve repair and maze procedure on 10/2022.  Following his previous radiofrequency ablation for a. fib in 2022 pt has had several hospitalizations for symptomatic A.fib with RVR with the most recent being in [facility] on June 2023.  Upon discharge patient was started on Multaq and F/u with Dr Burdick.  Pt now status post uncomplicated radiofrequency ablation of atrial fibrillation (Anterior mitral line, IVC to SVC line) with access obtain via b/l femoral veins and hemostasis achieved with figure of 8 sutures and quickclot.      Plan:   Bedrest x 4 hours, then OOB with assistance and progress as tolerated.   Groin sutures to be removed by EP service in AM.   Restart eliquis tonight pending groin status   DO NOT HOLD, INTERRUPT OR REVERSE ANTICOAGULATION WITHOUT EXPLICIT APPROVAL FROM EP SERVICE.   Pt received Lasix 40 mg IV at the end of procedure, Lasix 20mg IV x 1 dose later tonight, then Lasix 20mg PO daily x 3 days.     Discontinue Multaq  Start Protonix 40mg twice daily x 2 weeks, then once daily x 6 weeks.   Start Carafate 1gm BID x 2 weeks.   Gaitan catheter placed intra-op as patient self catheterizes s/p bladder ca; D/c gaitan catheter prior to discharge  Continue other home medications.   Strict I/Os.  Please encourage incentive spirometry and ambulation once able.  Observation and monitoring on telemetry overnight with anticipated discharge in the AM and outpt follow up in 2-4 weeks.   PROCEDURE: Radiofrequency Ablation of Atrial Fibrillation    ELECTROPHYSIOLOGIST: Fernando Burdick MD,          COMPLICATIONS:  none        DISPOSITION: observation      CONDITION: stable    Pt doing well s/p ESDRAS/Radiofrequency Ablation of atrial fibrillation (Anterior mitral line, IVC to SVC line) with access obtain via b/l femoral veins and hemostasis achieved with figure of 8 sutures and quickclot. Denies complaint.     MEDICATIONS  (STANDING):  apixaban 5 milliGRAM(s) Oral every 12 hours  aspirin enteric coated 81 milliGRAM(s) Oral daily  atorvastatin 10 milliGRAM(s) Oral at bedtime  doxazosin 4 milliGRAM(s) Oral at bedtime  finasteride 5 milliGRAM(s) Oral daily  levothyroxine 25 MICROGram(s) Oral daily  magnesium oxide 400 milliGRAM(s) Oral three times a day with meals  metoprolol succinate ER 50 milliGRAM(s) Oral daily  niacin  milliGRAM(s) Oral at bedtime  omega-3-Acid Ethyl Esters 2 Gram(s) Oral two times a day  spironolactone 50 milliGRAM(s) Oral daily  thiamine 100 milliGRAM(s) Oral daily  triamcinolone 0.1% Oral Paste 1 Application(s) Topical three times a day    MEDICATIONS  (PRN):  acetaminophen     Tablet .. 650 milliGRAM(s) Oral every 6 hours PRN Temp greater or equal to 38.5C (101.3F), Mild Pain (1 - 3)  ALPRAZolam 0.25 milliGRAM(s) Oral every 8 hours PRN anxiety  aluminum hydroxide/magnesium hydroxide/simethicone Suspension 30 milliLiter(s) Oral every 6 hours PRN Dyspepsia  benzocaine/menthol Lozenge 1 Lozenge Oral every 2 hours PRN Sore Throat  ondansetron Injectable 4 milliGRAM(s) IV Push every 6 hours PRN Nausea and/or Vomiting  oxyCODONE    IR 5 milliGRAM(s) Oral every 6 hours PRN Moderate Pain (4 - 6)    Allergies  penicillins (Diarrhea)    VS:  T(C): 36.6 (11-08-23 @ 07:41), Max: 36.6 (11-08-23 @ 07:41)  HR: 69 (11-08-23 @ 07:41) (69 - 69)  BP: 148/69 (11-08-23 @ 07:41) (148/69 - 148/69)  RR: 16 (11-08-23 @ 07:41) (16 - 16)  SpO2: 98% (11-08-23 @ 07:41) (98% - 98%)    Post procedure VS:  HR 69  /66  SpO2 96% on 3L NC    Physical Exam:  Neuro: A&O x 3, OLIVIER, FC  Card: S1/S2, RRR, no m/g/r  Resp: lungs CTA b/l  Abd: S/NT/ND  Groins: hemostatic sutures in place; sites C/D/I; no bleeding, hematoma, erythema, exudate or edema  Ext: no edema; distal pulses intact    I/Os: 2.7/1.4    ECG: atrial paced @ 70 bpm, PEDRO LUIS 228ms, QRSD 88ms    ESDRAS: NAYELY occluded (hx of NAYELY clip)  Full report to follow.    Assessment:   78  year old male PMH of HTN, HLD, DM2, former smoker,  hypothyroidism, obesity, LILLY on C-pap,  bladder cancer (2020, s/p resection of bladder tumor/ currently self catheterizes 6x/day ), PAF (on eliquis) s/p DCCV /uncomplicated radiofrequency ablation of atrial fibrillation (WACA/PVI, CTI) on 7/13/22, PPM ([place] [employer] dual chamber) on11/4/22, left atrial appendage & severe MR s/p mitral valve repair and maze procedure on 10/2022.  Following his previous radiofrequency ablation for a. fib in 2022 pt has had several hospitalizations for symptomatic A.fib with RVR with the most recent being in [facility] on June 2023.  Upon discharge patient was started on Multaq and F/u with Dr Burdick.  Pt now status post uncomplicated ESDRAS/radiofrequency ablation of atrial fibrillation (Anterior mitral line, IVC to SVC line) with access obtain via b/l femoral veins and hemostasis achieved with figure of 8 sutures and quickclot.      Plan:   Bedrest x 4 hours, then OOB with assistance and progress as tolerated.   Groin sutures to be removed by EP service in AM.   Restart eliquis tonight pending groin status   DO NOT HOLD, INTERRUPT OR REVERSE ANTICOAGULATION WITHOUT EXPLICIT APPROVAL FROM EP SERVICE.   Pt received Lasix 40 mg IV at the end of procedure, Lasix 20mg IV x 1 dose later tonight, then Lasix 20mg PO daily x 3 days.     Discontinue Multaq  Start Protonix 40mg twice daily x 2 weeks, then once daily x 6 weeks.   Start Carafate 1gm BID x 2 weeks.   Gaitan catheter placed intra-op as patient self catheterizes s/p bladder ca; D/c gaitan catheter prior to discharge  Continue other home medications.   Strict I/Os.  Please encourage incentive spirometry and ambulation once able.  Observation and monitoring on telemetry overnight with anticipated discharge in the AM and outpt follow up in 2-4 weeks.   PROCEDURE: Radiofrequency Ablation of Atrial Fibrillation    ELECTROPHYSIOLOGIST: Fernando Burdick MD,          COMPLICATIONS:  none        DISPOSITION: observation      CONDITION: stable    Pt doing well s/p ESDRAS/Radiofrequency Ablation of atrial fibrillation (Anterior mitral line, IVC to SVC line) with access obtain via b/l femoral veins and hemostasis achieved with figure of 8 sutures and quickclot. Denies complaint.     MEDICATIONS  (STANDING):  apixaban 5 milliGRAM(s) Oral every 12 hours  aspirin enteric coated 81 milliGRAM(s) Oral daily  atorvastatin 10 milliGRAM(s) Oral at bedtime  doxazosin 4 milliGRAM(s) Oral at bedtime  finasteride 5 milliGRAM(s) Oral daily  levothyroxine 25 MICROGram(s) Oral daily  magnesium oxide 400 milliGRAM(s) Oral three times a day with meals  metoprolol succinate ER 50 milliGRAM(s) Oral daily  niacin  milliGRAM(s) Oral at bedtime  omega-3-Acid Ethyl Esters 2 Gram(s) Oral two times a day  spironolactone 50 milliGRAM(s) Oral daily  thiamine 100 milliGRAM(s) Oral daily  triamcinolone 0.1% Oral Paste 1 Application(s) Topical three times a day    MEDICATIONS  (PRN):  acetaminophen     Tablet .. 650 milliGRAM(s) Oral every 6 hours PRN Temp greater or equal to 38.5C (101.3F), Mild Pain (1 - 3)  ALPRAZolam 0.25 milliGRAM(s) Oral every 8 hours PRN anxiety  aluminum hydroxide/magnesium hydroxide/simethicone Suspension 30 milliLiter(s) Oral every 6 hours PRN Dyspepsia  benzocaine/menthol Lozenge 1 Lozenge Oral every 2 hours PRN Sore Throat  ondansetron Injectable 4 milliGRAM(s) IV Push every 6 hours PRN Nausea and/or Vomiting  oxyCODONE    IR 5 milliGRAM(s) Oral every 6 hours PRN Moderate Pain (4 - 6)    Allergies  penicillins (Diarrhea)    VS:  T(C): 36.6 (11-08-23 @ 07:41), Max: 36.6 (11-08-23 @ 07:41)  HR: 69 (11-08-23 @ 07:41) (69 - 69)  BP: 148/69 (11-08-23 @ 07:41) (148/69 - 148/69)  RR: 16 (11-08-23 @ 07:41) (16 - 16)  SpO2: 98% (11-08-23 @ 07:41) (98% - 98%)    Post procedure VS:  HR 69  /66  SpO2 96% on 3L NC    Physical Exam:  Neuro: A&O x 3, OLIVIER, FC  Card: S1/S2, RRR, no m/g/r  Resp: lungs CTA b/l  Abd: S/NT/ND  Groins: hemostatic sutures in place; sites C/D/I; no bleeding, hematoma, erythema, exudate or edema  Ext: no edema; distal pulses intact    I/Os: 2.7/1.4    ECG: atrial paced @ 70 bpm, PEDRO LUIS 228ms, QRSD 88ms    ESDRAS: NAYELY occluded (hx of NAYELY clip)  Full report to follow.    Assessment:   78  year old male PMH of HTN, HLD, DM2, former smoker,  hypothyroidism, obesity, LILLY on C-pap,  bladder cancer (2020, s/p resection of bladder tumor/ currently self catheterizes 6x/day ), PAF (on eliquis) s/p DCCV /uncomplicated radiofrequency ablation of atrial fibrillation (WACA/PVI, CTI) on 7/13/22, PPM ([place] [employer] dual chamber) on11/4/22, left atrial appendage & severe MR s/p mitral valve repair and maze procedure on 10/2022.  Following his previous radiofrequency ablation for a. fib in 2022 pt has had several hospitalizations for symptomatic A.fib with RVR with the most recent being in [facility] on June 2023.  Upon discharge patient was started on Multaq and F/u with Dr Burdick.  Pt now status post uncomplicated ESDRAS/radiofrequency ablation of atrial fibrillation (Anterior mitral line, IVC to SVC line) with access obtain via b/l femoral veins and hemostasis achieved with figure of 8 sutures and quickclot.      Plan:   Bedrest x 4 hours, then OOB with assistance and progress as tolerated.   Groin sutures to be removed by EP service in AM.   Restart eliquis tonight pending groin status   DO NOT HOLD, INTERRUPT OR REVERSE ANTICOAGULATION WITHOUT EXPLICIT APPROVAL FROM EP SERVICE.   Pt received Lasix 40 mg IV at the end of procedure for elevated Left atrial pressures; Lasix 40mg IV x 1 dose later tonight, then Lasix 40mg PO daily x 4 days.     Discontinue Multaq  Start Protonix 40mg twice daily x 2 weeks, then once daily x 6 weeks.   Start Carafate 1gm BID x 2 weeks.   Gaitan catheter placed intra-op as patient self catheterizes s/p bladder ca; D/c gaitan catheter prior to discharge  Continue other home medications.   Strict I/Os.  Please encourage incentive spirometry and ambulation once able.  Observation and monitoring on telemetry overnight with anticipated discharge in the AM and outpt follow up in 2-4 weeks.

## 2023-11-08 NOTE — PROGRESS NOTE ADULT - SUBJECTIVE AND OBJECTIVE BOX
78  year old male PMH of HTN, HLD, DM2, former smoker,  hypothyroidism, obesity, LILLY on C-pap,  bladder cancer (2020, s/p resection of bladder tumor/ currently self catheterizes 6x/day ), PAF (on eliquis) s/p DCCV /uncomplicated radiofrequency ablation of atrial fibrillation (WACA/PVI, CTI) on 7/13/22, PPM ([employer] dual chamber) on11/4/22, left atrial appendage & severe MR s/p mitral valve repair and maze procedure on 10/2022.  Following his previous radiofrequency ablation for a. fib in 2022 pt has had several hospitalizations for symptomatic A.fib with RVR with the most recent being in [facility] on June 2023.  Upon discharge patient was started on Multaq and F/u with Dr Burdick.  Pt now arrives for another ESDRAS/A.fib ablation with Dr Burdick  PST preformed on 10/23.  Since his PST he has developed a rash on his b/l upper extremities and right lower extremity for which he saw his PCP. Pt was prescribed topical triamcinolone acetonide for his upper extremities but instructed not to use it on his lower extremities.  As per patient rash has improved.  No other interval changes.  Pt reports strict compliance with his anticoagulation regiment.  Last dose of Eliquis was on 11/7 @ 17:00.  Last dose of multaq was on 11/5 as it was held for procedure.  NPO confirmed        Cardiology Summary    ECG 10/23/23 - AV paced @ 70    ESDRAS: 11/21/22 - An annuloplasty ring is seen in the mitral position. No mitral regurgitation seen, no evidence of aortic stenosis. Moderately dilated left atrium. . Hypokinesis of the anteroseptum.  Overall  preserved left ventricular systolic function. LVEF visually estimated is 55-60%. Moderate diastolic dysfunction (Stage II)    Cath 5/2/2022 - no obstructive coronary disease, have documented 9/2022 cath from Sept 2022 [email or web address] at [facility], however study not available today at PST

## 2023-11-09 ENCOUNTER — TRANSCRIPTION ENCOUNTER (OUTPATIENT)
Age: 78
End: 2023-11-09

## 2023-11-09 VITALS
RESPIRATION RATE: 16 BRPM | HEART RATE: 70 BPM | DIASTOLIC BLOOD PRESSURE: 56 MMHG | OXYGEN SATURATION: 97 % | SYSTOLIC BLOOD PRESSURE: 107 MMHG

## 2023-11-09 LAB
ANION GAP SERPL CALC-SCNC: 15 MMOL/L — SIGNIFICANT CHANGE UP (ref 5–17)
ANION GAP SERPL CALC-SCNC: 15 MMOL/L — SIGNIFICANT CHANGE UP (ref 5–17)
BUN SERPL-MCNC: 32.5 MG/DL — HIGH (ref 8–20)
BUN SERPL-MCNC: 32.5 MG/DL — HIGH (ref 8–20)
CALCIUM SERPL-MCNC: 8.5 MG/DL — SIGNIFICANT CHANGE UP (ref 8.4–10.5)
CALCIUM SERPL-MCNC: 8.5 MG/DL — SIGNIFICANT CHANGE UP (ref 8.4–10.5)
CHLORIDE SERPL-SCNC: 105 MMOL/L — SIGNIFICANT CHANGE UP (ref 96–108)
CHLORIDE SERPL-SCNC: 105 MMOL/L — SIGNIFICANT CHANGE UP (ref 96–108)
CO2 SERPL-SCNC: 21 MMOL/L — LOW (ref 22–29)
CO2 SERPL-SCNC: 21 MMOL/L — LOW (ref 22–29)
CREAT SERPL-MCNC: 1.42 MG/DL — HIGH (ref 0.5–1.3)
CREAT SERPL-MCNC: 1.42 MG/DL — HIGH (ref 0.5–1.3)
EGFR: 51 ML/MIN/1.73M2 — LOW
EGFR: 51 ML/MIN/1.73M2 — LOW
GLUCOSE BLDC GLUCOMTR-MCNC: 131 MG/DL — HIGH (ref 70–99)
GLUCOSE BLDC GLUCOMTR-MCNC: 131 MG/DL — HIGH (ref 70–99)
GLUCOSE SERPL-MCNC: 150 MG/DL — HIGH (ref 70–99)
GLUCOSE SERPL-MCNC: 150 MG/DL — HIGH (ref 70–99)
HCT VFR BLD CALC: 34.1 % — LOW (ref 39–50)
HCT VFR BLD CALC: 34.1 % — LOW (ref 39–50)
HGB BLD-MCNC: 11.1 G/DL — LOW (ref 13–17)
HGB BLD-MCNC: 11.1 G/DL — LOW (ref 13–17)
MAGNESIUM SERPL-MCNC: 2.4 MG/DL — SIGNIFICANT CHANGE UP (ref 1.6–2.6)
MAGNESIUM SERPL-MCNC: 2.4 MG/DL — SIGNIFICANT CHANGE UP (ref 1.6–2.6)
MCHC RBC-ENTMCNC: 27.4 PG — SIGNIFICANT CHANGE UP (ref 27–34)
MCHC RBC-ENTMCNC: 27.4 PG — SIGNIFICANT CHANGE UP (ref 27–34)
MCHC RBC-ENTMCNC: 32.6 GM/DL — SIGNIFICANT CHANGE UP (ref 32–36)
MCHC RBC-ENTMCNC: 32.6 GM/DL — SIGNIFICANT CHANGE UP (ref 32–36)
MCV RBC AUTO: 84.2 FL — SIGNIFICANT CHANGE UP (ref 80–100)
MCV RBC AUTO: 84.2 FL — SIGNIFICANT CHANGE UP (ref 80–100)
PLATELET # BLD AUTO: 150 K/UL — SIGNIFICANT CHANGE UP (ref 150–400)
PLATELET # BLD AUTO: 150 K/UL — SIGNIFICANT CHANGE UP (ref 150–400)
POTASSIUM SERPL-MCNC: 4.2 MMOL/L — SIGNIFICANT CHANGE UP (ref 3.5–5.3)
POTASSIUM SERPL-MCNC: 4.2 MMOL/L — SIGNIFICANT CHANGE UP (ref 3.5–5.3)
POTASSIUM SERPL-SCNC: 4.2 MMOL/L — SIGNIFICANT CHANGE UP (ref 3.5–5.3)
POTASSIUM SERPL-SCNC: 4.2 MMOL/L — SIGNIFICANT CHANGE UP (ref 3.5–5.3)
RBC # BLD: 4.05 M/UL — LOW (ref 4.2–5.8)
RBC # BLD: 4.05 M/UL — LOW (ref 4.2–5.8)
RBC # FLD: 14.9 % — HIGH (ref 10.3–14.5)
RBC # FLD: 14.9 % — HIGH (ref 10.3–14.5)
SODIUM SERPL-SCNC: 141 MMOL/L — SIGNIFICANT CHANGE UP (ref 135–145)
SODIUM SERPL-SCNC: 141 MMOL/L — SIGNIFICANT CHANGE UP (ref 135–145)
WBC # BLD: 8.43 K/UL — SIGNIFICANT CHANGE UP (ref 3.8–10.5)
WBC # BLD: 8.43 K/UL — SIGNIFICANT CHANGE UP (ref 3.8–10.5)
WBC # FLD AUTO: 8.43 K/UL — SIGNIFICANT CHANGE UP (ref 3.8–10.5)
WBC # FLD AUTO: 8.43 K/UL — SIGNIFICANT CHANGE UP (ref 3.8–10.5)

## 2023-11-09 PROCEDURE — 93010 ELECTROCARDIOGRAM REPORT: CPT

## 2023-11-09 RX ORDER — MAGNESIUM OXIDE 400 MG ORAL TABLET 241.3 MG
400 TABLET ORAL DAILY
Refills: 0 | Status: DISCONTINUED | OUTPATIENT
Start: 2023-11-09 | End: 2023-11-09

## 2023-11-09 RX ORDER — FUROSEMIDE 40 MG
1 TABLET ORAL
Qty: 4 | Refills: 0
Start: 2023-11-09 | End: 2023-11-12

## 2023-11-09 RX ORDER — PANTOPRAZOLE SODIUM 20 MG/1
1 TABLET, DELAYED RELEASE ORAL
Qty: 14 | Refills: 0
Start: 2023-11-09 | End: 2023-11-22

## 2023-11-09 RX ORDER — SUCRALFATE 1 G
10 TABLET ORAL
Qty: 300 | Refills: 0
Start: 2023-11-09 | End: 2023-11-22

## 2023-11-09 RX ORDER — DRONEDARONE 400 MG/1
1 TABLET, FILM COATED ORAL
Refills: 0 | DISCHARGE

## 2023-11-09 RX ORDER — PANTOPRAZOLE SODIUM 20 MG/1
1 TABLET, DELAYED RELEASE ORAL
Qty: 56 | Refills: 0
Start: 2023-11-09 | End: 2023-11-22

## 2023-11-09 RX ADMIN — Medication 1 GRAM(S): at 05:47

## 2023-11-09 RX ADMIN — PANTOPRAZOLE SODIUM 40 MILLIGRAM(S): 20 TABLET, DELAYED RELEASE ORAL at 05:47

## 2023-11-09 RX ADMIN — Medication 25 MICROGRAM(S): at 05:47

## 2023-11-09 RX ADMIN — SPIRONOLACTONE 50 MILLIGRAM(S): 25 TABLET, FILM COATED ORAL at 05:46

## 2023-11-09 RX ADMIN — APIXABAN 5 MILLIGRAM(S): 2.5 TABLET, FILM COATED ORAL at 05:46

## 2023-11-09 RX ADMIN — Medication 40 MILLIGRAM(S): at 05:47

## 2023-11-09 RX ADMIN — Medication 50 MILLIGRAM(S): at 05:47

## 2023-11-09 NOTE — PROGRESS NOTE ADULT - SUBJECTIVE AND OBJECTIVE BOX
Pt doing well POD #1 s/p radiofrequency ablation of atrial fibrillation. Denies complaint.     EKG: Pending  TELE: SR 60-80    MEDICATIONS  (STANDING):  apixaban 5 milliGRAM(s) Oral every 12 hours  aspirin enteric coated 81 milliGRAM(s) Oral daily  atorvastatin 10 milliGRAM(s) Oral at bedtime  doxazosin 4 milliGRAM(s) Oral at bedtime  finasteride 5 milliGRAM(s) Oral daily  furosemide    Tablet 40 milliGRAM(s) Oral daily  levothyroxine 25 MICROGram(s) Oral daily  magnesium oxide 400 milliGRAM(s) Oral daily  metoprolol succinate ER 50 milliGRAM(s) Oral daily  niacin  milliGRAM(s) Oral at bedtime  omega-3-Acid Ethyl Esters 1 Gram(s) Oral two times a day  pantoprazole    Tablet 40 milliGRAM(s) Oral every 12 hours  spironolactone 50 milliGRAM(s) Oral daily  sucralfate suspension 1 Gram(s) Oral every 12 hours  thiamine 100 milliGRAM(s) Oral daily  triamcinolone 0.1% Oral Paste 1 Application(s) Topical three times a day    MEDICATIONS  (PRN):  acetaminophen     Tablet .. 650 milliGRAM(s) Oral every 6 hours PRN Temp greater or equal to 38.5C (101.3F), Mild Pain (1 - 3)  ALPRAZolam 0.25 milliGRAM(s) Oral every 8 hours PRN anxiety  aluminum hydroxide/magnesium hydroxide/simethicone Suspension 30 milliLiter(s) Oral every 6 hours PRN Dyspepsia  benzocaine/menthol Lozenge 1 Lozenge Oral every 2 hours PRN Sore Throat  ondansetron Injectable 4 milliGRAM(s) IV Push every 6 hours PRN Nausea and/or Vomiting  oxyCODONE    IR 5 milliGRAM(s) Oral every 6 hours PRN Moderate Pain (4 - 6)    Allergies  penicillins (Diarrhea)    PAST MEDICAL & SURGICAL HISTORY:  Atypical atrial flutter  Mitral valve regurgitation  LILLY on CPAP  DM2 (diabetes mellitus, type 2)  Hypothyroidism  Bladder cancer  Hypertension  Hyperlipidemia  H/O shoulder surgery  1/1/2017  H/O cardiac catheterization  5/2/2022  Bladder tumor  s/p resection 2020  H/O mitral valve repair  H/O maze procedure    Vital Signs Last 24 Hrs  T(C): 36.7 (09 Nov 2023 05:45), Max: 36.8 (08 Nov 2023 21:00)  T(F): 98.1 (09 Nov 2023 05:45), Max: 98.2 (08 Nov 2023 21:00)  HR: 70 (09 Nov 2023 08:14) (62 - 70)  BP: 114/56 (09 Nov 2023 05:45) (114/56 - 128/66)  RR: 16 (09 Nov 2023 08:14) (15 - 19)  SpO2: 97% (09 Nov 2023 08:14) (95% - 98%)    Parameters below as of 09 Nov 2023 08:14  Patient On (Oxygen Delivery Method): room air    Physical Exam:  Constitutional: NAD, AAOx3  Cardiovascular: +S1S2 RRR  Pulmonary: CTA b/l, unlabored  Abd: soft NTND  Groins: C/D/I bilaterally; no bleeding, hematoma, edema  Extremities: no pedal edema, +distal pulses b/l  Neuro: CN II-XII grossly intact, OLIVIER x4     LABS:                        11.1   8.43  )-----------( 150      ( 09 Nov 2023 05:45 )             34.1     11-09    141  |  105  |  32.5<H>  ----------------------------<  150<H>  4.2   |  21.0<L>  |  1.42<H>    Ca    8.5      09 Nov 2023 05:45  Mg     2.4     11-09    Urinalysis Basic - ( 09 Nov 2023 05:45 )    Color: x / Appearance: x / SG: x / pH: x  Gluc: 150 mg/dL / Ketone: x  / Bili: x / Urobili: x   Blood: x / Protein: x / Nitrite: x   Leuk Esterase: x / RBC: x / WBC x   Sq Epi: x / Non Sq Epi: x / Bacteria: x    Assessment:   78  year old male PMH of HTN, HLD, DM2, former smoker,  hypothyroidism, obesity, LILLY on C-pap,  bladder cancer (2020, s/p resection of bladder tumor/ currently self catheterizes 6x/day ), PAF (on eliquis) s/p DCCV /uncomplicated radiofrequency ablation of atrial fibrillation (WACA/PVI, CTI) on 7/13/22, PPM ([employer] dual chamber) on11/4/22, left atrial appendage & severe MR s/p mitral valve repair and maze procedure on 10/2022.  Following his previous radiofrequency ablation for a. fib in 2022 pt has had several hospitalizations for symptomatic A.fib with RVR with the most recent being in [facility] on June 2023.  Upon discharge patient was started on Multaq and F/u with Dr Burdick.  Pt now POD 1 status post uncomplicated ESDRAS/radiofrequency ablation of atrial fibrillation (Anterior mitral line, IVC to SVC line) with access obtain via b/l femoral veins and hemostasis achieved with figure of 8 sutures and quickclot.  B/L figure of 8 sutures have been removed    Plan:   C/w eliquis    DO NOT HOLD, INTERRUPT OR REVERSE ANTICOAGULATION WITHOUT EXPLICIT APPROVAL FROM EP SERVICE.   Pt received Lasix 40 mg IV x 2 yesterday for elevated Left atrial pressure.  Cr up to 1.4 (baseline 1.1-1.3). C/w Lasix 40mg PO daily x 4 days and f/u with PCP and rpt bmp on 11/13      Discontinue Multaq  C/w Protonix 40mg twice daily x 2 weeks, then once daily x 6 weeks.   C/w Carafate 1gm BID x 2 weeks.   D/c gaitan catheter   Continue other home medications.   Pt instructed as to activity limitations - no lifting/pushing/pulling >10 lbs or strenuous exercise x 1 week.   Pt instructed as to access site care and f/up - written instructions included in d/c documents.  Outpt f/up in 2-4 weeks - office will contact pt to schedule.     Pt doing well POD #1 s/p radiofrequency ablation of atrial fibrillation. Denies complaint.     EKG: Atrial paced @ 70bpm, PEDRO LUIS 228ms, QRSD 88ms  TELE: Atrial paced 60-80    MEDICATIONS  (STANDING):  apixaban 5 milliGRAM(s) Oral every 12 hours  aspirin enteric coated 81 milliGRAM(s) Oral daily  atorvastatin 10 milliGRAM(s) Oral at bedtime  doxazosin 4 milliGRAM(s) Oral at bedtime  finasteride 5 milliGRAM(s) Oral daily  furosemide    Tablet 40 milliGRAM(s) Oral daily  levothyroxine 25 MICROGram(s) Oral daily  magnesium oxide 400 milliGRAM(s) Oral daily  metoprolol succinate ER 50 milliGRAM(s) Oral daily  niacin  milliGRAM(s) Oral at bedtime  omega-3-Acid Ethyl Esters 1 Gram(s) Oral two times a day  pantoprazole    Tablet 40 milliGRAM(s) Oral every 12 hours  spironolactone 50 milliGRAM(s) Oral daily  sucralfate suspension 1 Gram(s) Oral every 12 hours  thiamine 100 milliGRAM(s) Oral daily  triamcinolone 0.1% Oral Paste 1 Application(s) Topical three times a day    MEDICATIONS  (PRN):  acetaminophen     Tablet .. 650 milliGRAM(s) Oral every 6 hours PRN Temp greater or equal to 38.5C (101.3F), Mild Pain (1 - 3)  ALPRAZolam 0.25 milliGRAM(s) Oral every 8 hours PRN anxiety  aluminum hydroxide/magnesium hydroxide/simethicone Suspension 30 milliLiter(s) Oral every 6 hours PRN Dyspepsia  benzocaine/menthol Lozenge 1 Lozenge Oral every 2 hours PRN Sore Throat  ondansetron Injectable 4 milliGRAM(s) IV Push every 6 hours PRN Nausea and/or Vomiting  oxyCODONE    IR 5 milliGRAM(s) Oral every 6 hours PRN Moderate Pain (4 - 6)    Allergies  penicillins (Diarrhea)    PAST MEDICAL & SURGICAL HISTORY:  Atypical atrial flutter  Mitral valve regurgitation  LILLY on CPAP  DM2 (diabetes mellitus, type 2)  Hypothyroidism  Bladder cancer  Hypertension  Hyperlipidemia  H/O shoulder surgery  1/1/2017  H/O cardiac catheterization  5/2/2022  Bladder tumor  s/p resection 2020  H/O mitral valve repair  H/O maze procedure    Vital Signs Last 24 Hrs  T(C): 36.7 (09 Nov 2023 05:45), Max: 36.8 (08 Nov 2023 21:00)  T(F): 98.1 (09 Nov 2023 05:45), Max: 98.2 (08 Nov 2023 21:00)  HR: 70 (09 Nov 2023 08:14) (62 - 70)  BP: 114/56 (09 Nov 2023 05:45) (114/56 - 128/66)  RR: 16 (09 Nov 2023 08:14) (15 - 19)  SpO2: 97% (09 Nov 2023 08:14) (95% - 98%)    Parameters below as of 09 Nov 2023 08:14  Patient On (Oxygen Delivery Method): room air    Physical Exam:  Constitutional: NAD, AAOx3  Cardiovascular: +S1S2 RRR  Pulmonary: CTA b/l, unlabored  Abd: soft NTND  Groins: C/D/I bilaterally; no bleeding, hematoma, edema  Extremities: no pedal edema, +distal pulses b/l  Neuro: CN II-XII grossly intact, OLIVIER x4     LABS:                        11.1   8.43  )-----------( 150      ( 09 Nov 2023 05:45 )             34.1     11-09    141  |  105  |  32.5<H>  ----------------------------<  150<H>  4.2   |  21.0<L>  |  1.42<H>    Ca    8.5      09 Nov 2023 05:45  Mg     2.4     11-09    Urinalysis Basic - ( 09 Nov 2023 05:45 )    Color: x / Appearance: x / SG: x / pH: x  Gluc: 150 mg/dL / Ketone: x  / Bili: x / Urobili: x   Blood: x / Protein: x / Nitrite: x   Leuk Esterase: x / RBC: x / WBC x   Sq Epi: x / Non Sq Epi: x / Bacteria: x    Assessment:   78  year old male PMH of HTN, HLD, DM2, former smoker,  hypothyroidism, obesity, LILLY on C-pap,  bladder cancer (2020, s/p resection of bladder tumor/ currently self catheterizes 6x/day ), PAF (on eliquis) s/p DCCV /uncomplicated radiofrequency ablation of atrial fibrillation (WACA/PVI, CTI) on 7/13/22, PPM ([employer] dual chamber) on11/4/22, left atrial appendage & severe MR s/p mitral valve repair and maze procedure on 10/2022.  Following his previous radiofrequency ablation for a. fib in 2022 pt has had several hospitalizations for symptomatic A.fib with RVR with the most recent being in [facility] on June 2023.  Upon discharge patient was started on Multaq and F/u with Dr Burdick.  Pt now POD 1 status post uncomplicated ESDRAS/radiofrequency ablation of atrial fibrillation (Anterior mitral line, IVC to SVC line) with access obtain via b/l femoral veins and hemostasis achieved with figure of 8 sutures and quickclot.  B/L figure of 8 sutures have been removed    Plan:   C/w eliquis    DO NOT HOLD, INTERRUPT OR REVERSE ANTICOAGULATION WITHOUT EXPLICIT APPROVAL FROM EP SERVICE.   Pt received Lasix 40 mg IV x 2 yesterday for elevated Left atrial pressure.  Cr up to 1.4 (baseline 1.1-1.3). C/w Lasix 40mg PO daily x 4 days and f/u with PCP and rpt bmp on 11/13      Discontinue Multaq  C/w Protonix 40mg twice daily x 2 weeks, then once daily x 6 weeks.   C/w Carafate 1gm BID x 2 weeks.   D/c gaitan catheter   Continue other home medications.   Pt instructed as to activity limitations - no lifting/pushing/pulling >10 lbs or strenuous exercise x 1 week.   Pt instructed as to access site care and f/up - written instructions included in d/c documents.  Outpt f/up in 2-4 weeks - office will contact pt to schedule.

## 2023-11-09 NOTE — DISCHARGE NOTE NURSING/CASE MANAGEMENT/SOCIAL WORK - NSDCPEFALRISK_GEN_ALL_CORE
For information on Fall & Injury Prevention, visit: [email or web address] OR  [email or web address] OR  https://www.cdc.gov/steadi/patient.html

## 2023-11-09 NOTE — DISCHARGE NOTE NURSING/CASE MANAGEMENT/SOCIAL WORK - PATIENT PORTAL LINK FT
You can access the FollowMyHealth Patient Portal offered by [facility] by registering at the following website: [email or web address]. By joining [facility]’s FollowMyHealth portal, you will also be able to view your health information using other applications (apps) compatible with our system.

## 2023-11-13 PROCEDURE — 82962 GLUCOSE BLOOD TEST: CPT

## 2023-11-13 PROCEDURE — 93312 ECHO TRANSESOPHAGEAL: CPT

## 2023-11-13 PROCEDURE — 93655 ICAR CATH ABLTJ DSCRT ARRHYT: CPT

## 2023-11-13 PROCEDURE — C1732: CPT

## 2023-11-13 PROCEDURE — 93320 DOPPLER ECHO COMPLETE: CPT

## 2023-11-13 PROCEDURE — 80048 BASIC METABOLIC PNL TOTAL CA: CPT

## 2023-11-13 PROCEDURE — 83735 ASSAY OF MAGNESIUM: CPT

## 2023-11-13 PROCEDURE — C1731: CPT

## 2023-11-13 PROCEDURE — 93656 COMPRE EP EVAL ABLTJ ATR FIB: CPT

## 2023-11-13 PROCEDURE — C1766: CPT

## 2023-11-13 PROCEDURE — 93623 PRGRMD STIMJ&PACG IV RX NFS: CPT

## 2023-11-13 PROCEDURE — 93657 TX L/R ATRIAL FIB ADDL: CPT

## 2023-11-13 PROCEDURE — 93325 DOPPLER ECHO COLOR FLOW MAPG: CPT

## 2023-11-13 PROCEDURE — C1894: CPT

## 2023-11-13 PROCEDURE — 93005 ELECTROCARDIOGRAM TRACING: CPT

## 2023-11-13 PROCEDURE — C1759: CPT

## 2023-11-13 PROCEDURE — 85027 COMPLETE CBC AUTOMATED: CPT

## 2023-11-13 PROCEDURE — C1889: CPT

## 2023-11-13 PROCEDURE — G0378: CPT

## 2023-11-13 PROCEDURE — 36415 COLL VENOUS BLD VENIPUNCTURE: CPT

## 2024-01-31 ENCOUNTER — NON-APPOINTMENT (OUTPATIENT)
Age: 79
End: 2024-01-31

## 2024-03-29 NOTE — ED CDU PROVIDER SUBSEQUENT DAY NOTE - CROS ED GI ALL NEG
Patient arrives ambulatory for continued cough. Reports being seen 6 days ago with a normal chest xray.   negative...

## 2024-04-18 NOTE — PROGRESS NOTE ADULT - NSPROGADDITIONALINFOA_GEN_ALL_CORE
FAMILY HISTORY:  Mother  Still living? Unknown  FH: heart disease, Age at diagnosis: Age Unknown  FH: sickle cell anemia, Age at diagnosis: Age Unknown    
- Counseling on diet, exercise, and medication adherence was done  - Counseling on smoking cessation and alcohol consumption offered when appropriate.  - Pain assessed and judicious use of narcotics when appropriate was discussed.    - Stroke education given when appropriate.  - Importance of fall prevention discussed.   - Differential diagnosis and plan of care discussed with patient and/or family and primary team

## 2024-07-31 NOTE — ASU PATIENT PROFILE, ADULT - BLOOD TRANSFUSION, PREVIOUS, PROFILE
1 [id number] 07/18/2024 07/18/2024 clonazePAM (Tablet) 60.0 30 0.5 MG NA [facility] Commercial Insurance 0 / 0 [place]    1 [id number] 07/02/2024 07/02/2024 Zolpidem Tartrate (Tablet) 30.0 30 10 MG NA [facility]ON [facility] Commercial Insurance 0 / 0 [place]    1 [id number] 06/21/2024 06/21/2024 clonazePAM (Tablet) 60.0 30 0.5 MG NA [facility] Commercial Insurance 0 / 0 [place]    1 [id number] 06/18/2024 06/18/2024 ALPRAZolam (Tablet) 90.0 30 0.5 MG NA [facility] Commercial Insurance 0 / 0 [place]    1 [id number] 06/03/2024 06/03/2024 Zolpidem Tartrate (Tablet) 30.0 30 10 MG NA ARIES NEVILLE [facility] Commercial Insurance 0 / 0 [place]    1 [id number] 05/20/2024 05/20/2024 ALPRAZolam (Tablet) 90.0 30 0.5 MG NA [facility] Commercial Insurance 0 / 0 [place]    1 [id number] 05/07/2024 05/07/2024 Zolpidem Tartrate (Tablet) 30.0 30 10 MG [facility] Commercial Insurance 0 / 0 [place]    1 [id number] 04/10/2024 04/10/2024 Zolpidem Tartrate (Tablet) 30.0 30 10 MG NA [facility] Commercial Insurance 0 / 0 [place]    1 [id number] 03/29/2024 03/29/2024 ALPRAZolam (Tablet) 90.0 30 0.5 MG NA [facility] Commercial Insurance 0 / 0 [place]    1 [id number] 03/13/2024 03/12/2024 Zolpidem Tartrate (Tablet) 30.0 30 10 MG NA SHALINI [facility] Commercial Insurance 0 / 0 [place]      
Patient called to check status on the Zolpidem , as he is leaving on vacation today and is hoping to  at the pharmacy prior to leaving.   
no

## (undated) DEVICE — GUIDE SELECTION F/ATRICLIP LAA SYS

## (undated) DEVICE — SUT PROLENE 4-0 36" RB-1

## (undated) DEVICE — DRSG OPSITE 13.75 X 4"

## (undated) DEVICE — DEVICE CLAMP ENCOMPASS SYNERGY ISOLATOR

## (undated) DEVICE — DRAPE IOBAN 23" X 23"

## (undated) DEVICE — DRAPE 1/2 SHEET 40X57"

## (undated) DEVICE — DRSG OPSITE 2.5 X 2"

## (undated) DEVICE — SUT PLEDGET PRE PUNCH 4.8 X 9.5 X 1.5 MM

## (undated) DEVICE — GLV 7 PROTEXIS (WHITE)

## (undated) DEVICE — VISITEC 4X4

## (undated) DEVICE — GOWN TRIMAX LG

## (undated) DEVICE — SUCTION YANKAUER NO CONTROL VENT

## (undated) DEVICE — SUT PROLENE 4-0 36" SH

## (undated) DEVICE — SUT POLYSORB 0 36" GS-25 UNDYED

## (undated) DEVICE — VENODYNE/SCD SLEEVE CALF MEDIUM

## (undated) DEVICE — PACK UNIVERSAL CARDIAC SUPPLEMNTAL B

## (undated) DEVICE — SUT STAINLESS STEEL 5 18" SCC

## (undated) DEVICE — STAPLER SKIN VISI-STAT 35 WIDE

## (undated) DEVICE — GLV 8 PROTEXIS (WHITE)

## (undated) DEVICE — GLV 8.5 PROTEXIS (WHITE)

## (undated) DEVICE — Device

## (undated) DEVICE — POSITIONER CARDIAC BUMP

## (undated) DEVICE — GLV 7.5 PROTEXIS (WHITE)

## (undated) DEVICE — FOLEY TRAY 16FR 5CC LF LUBRISIL ADVANCE TEMP CLOSED

## (undated) DEVICE — DRAPE SLUSH / WARMER 44 X 66"

## (undated) DEVICE — PREP CHLORAPREP HI-LITE ORANGE 26ML

## (undated) DEVICE — RIGID ADULT SUCKER

## (undated) DEVICE — NDL TAPR FR EYE 1/2 CIR 3

## (undated) DEVICE — SUT TICRON 2-0 36" CV-316 DA

## (undated) DEVICE — PACING CABLE (BROWN) A/V TEMP SCREW DOWN 12FT

## (undated) DEVICE — PACK UNIVERSAL CARDIAC

## (undated) DEVICE — BLADE SCALPEL SAFETYLOCK #15

## (undated) DEVICE — DRAPE TOWEL BLUE 17" X 24"

## (undated) DEVICE — TUBING ALARIS PUMP MODULE NON-DEHP

## (undated) DEVICE — LAP PAD 18 X 18"

## (undated) DEVICE — URETERAL CATH RED RUBBER 18FR (RED)

## (undated) DEVICE — SUT SOFSILK 0 30" V-20

## (undated) DEVICE — DRAIN PLEUROVAC

## (undated) DEVICE — SOL INJ NS 0.9% 1000ML

## (undated) DEVICE — TUBING ATS SUCTION LINE

## (undated) DEVICE — GLV 8 PROTEXIS ORTHO (CREAM)

## (undated) DEVICE — STRYKER INTERPULSE HANDPIECE W IRR SUCTION TUBE

## (undated) DEVICE — SAW BLADE MICROAIRE STERNUM 1X34X9.4MM

## (undated) DEVICE — SENSOR MYOCARDIAL TEMP 15MM

## (undated) DEVICE — POSITIONER FOAM EGG CRATE ULNAR 2PCS (PINK)

## (undated) DEVICE — GOWN TRIMAX XXL

## (undated) DEVICE — SUT ETHIBOND 2-0 4-30" RB-1 WHITE

## (undated) DEVICE — DRSG DERMABOND PRINEO 60CM

## (undated) DEVICE — SUT VICRYL 2-0 27" CT-1 UNDYED

## (undated) DEVICE — DRSG MAMMARY SUPPORT XL SIZE 5

## (undated) DEVICE — SUT BIOSYN 4-0 18" P-12

## (undated) DEVICE — BLADE SCALPEL SAFETYLOCK #10

## (undated) DEVICE — CATH IV SAFE BC 20G X 1.16" (PINK)

## (undated) DEVICE — DRAIN CHANNEL 32FR ROUND HUBLESS FULL FLUTED

## (undated) DEVICE — SUT PROLENE 4-0 36" BB

## (undated) DEVICE — SUT SOFSILK 2 60" TIES

## (undated) DEVICE — BEAVER BLADE MINI SHARP ALL ROUND (BLUE)

## (undated) DEVICE — CONNECTOR "Y" 1/2 X 3/8 X 3/8"

## (undated) DEVICE — DRSG TEGADERM 6"X8"

## (undated) DEVICE — SOL IRR POUR H2O 250ML

## (undated) DEVICE — STOPCOCK 4-WAY W SWIVEL MALE LUER LOCK NON VENTED RED CAP

## (undated) DEVICE — SUT DOUBLE 6 WIRE STERNAL

## (undated) DEVICE — SUT PROLENE 3-0 36" SH

## (undated) DEVICE — SOL IRR POUR NS 0.9% 500ML

## (undated) DEVICE — SAW BLADE MICROAIRE STERNUM 1.1X50X42MM

## (undated) DEVICE — SUT GORETEX CV-4 (3-0) 36" TH-18

## (undated) DEVICE — BLADE SCALPEL SAFETYLOCK #11

## (undated) DEVICE — SUT BOOT STANDARD (ASSORTED) 5 PAIR

## (undated) DEVICE — VENTING ADAPTER "Y" (RED/BLUE) 7.5"

## (undated) DEVICE — DRAIN RESERVOIR FOR JACKSON PRATT 100CC CARDINAL

## (undated) DEVICE — SPONGE PEANUT AUTO COUNT

## (undated) DEVICE — CONNECTOR "Y" 1/2 X 1/2 X 3/8"

## (undated) DEVICE — GOWN XXXL

## (undated) DEVICE — URETERAL CATH RED RUBBER 8FR

## (undated) DEVICE — SOL NORMOSOL-R PH7.4 1000ML

## (undated) DEVICE — CONNECTOR STRAIGHT 3/8 X 1/2"

## (undated) DEVICE — DRSG STERISTRIPS 0.5 X 4"

## (undated) DEVICE — SYR LUER LOK 20CC

## (undated) DEVICE — DRAIN CHANNEL 19FR ROUND FULL FLUTED

## (undated) DEVICE — TUBING INSUFFLATION LAP FILTER 10FT

## (undated) DEVICE — SUT ETHIBOND 2-0 4-30" RB-1 GREEN

## (undated) DEVICE — SUT ETHIBOND 2-0 36" SH

## (undated) DEVICE — DRSG XEROFORM 1 X 8"

## (undated) DEVICE — SUT POLYSORB 2-0 30" GS-21 UNDYED

## (undated) DEVICE — MULTIPLE PERFUSION SET FEMALE 1 INLET LEG W 4 LEGS 15" (BLUE/RED)

## (undated) DEVICE — SUT SURGICAL STEEL 6 30" BP-1

## (undated) DEVICE — SUT PROLENE 5-0 30" RB-2

## (undated) DEVICE — WARMING BLANKET DUO-THERM HYPER/HYPOTHERM ADULT

## (undated) DEVICE — DRAPE LIGHT HANDLE COVER (BLUE)

## (undated) DEVICE — SUMP PERICARDIAL 20FR 1/4" ADULT

## (undated) DEVICE — NDL COUNTER FOAM AND MAGNET 40-70

## (undated) DEVICE — ELCTR BOVIE TIP BLADE VALLEYLAB 6.5"

## (undated) DEVICE — MARKING PEN W RULER

## (undated) DEVICE — DRAPE MAYO STAND 30"

## (undated) DEVICE — FEEDING TUBE NG SUMP 16FR 48"

## (undated) DEVICE — SYR ASEPTO

## (undated) DEVICE — SUMP INTRACARDIAC 20FR 1/4" ADULT

## (undated) DEVICE — TOURNIQUET SET 12FR (1 RED, 1 BLUE, 1 SNARE) 7"

## (undated) DEVICE — SOL IRR BAG NS 0.9% 3000ML

## (undated) DEVICE — VESSEL LOOP EXTRA MAXI-BLUE 0.200" X 22"

## (undated) DEVICE — TUBING SUCTION 20FT

## (undated) DEVICE — SUT TICRON 5 30" KV-40

## (undated) DEVICE — URETERAL CATH RED RUBBER 20FR (YELLOW)

## (undated) DEVICE — PHRENIC NERVE PAD MEDIUM

## (undated) DEVICE — SUT POLYSORB 2 30" GS-26

## (undated) DEVICE — GLV 6.5 PROTEXIS (WHITE)

## (undated) DEVICE — GLV 7.5 DERMAPRENE ULTRA